# Patient Record
Sex: MALE | Race: BLACK OR AFRICAN AMERICAN | NOT HISPANIC OR LATINO | URBAN - METROPOLITAN AREA
[De-identification: names, ages, dates, MRNs, and addresses within clinical notes are randomized per-mention and may not be internally consistent; named-entity substitution may affect disease eponyms.]

---

## 2018-12-03 ENCOUNTER — EMERGENCY (EMERGENCY)
Facility: HOSPITAL | Age: 38
LOS: 1 days | Discharge: HOME | End: 2018-12-03
Attending: EMERGENCY MEDICINE
Payer: COMMERCIAL

## 2018-12-03 VITALS
RESPIRATION RATE: 16 BRPM | HEART RATE: 92 BPM | TEMPERATURE: 99 F | OXYGEN SATURATION: 99 % | HEIGHT: 71 IN | WEIGHT: 229.94 LBS | SYSTOLIC BLOOD PRESSURE: 135 MMHG | DIASTOLIC BLOOD PRESSURE: 94 MMHG

## 2018-12-03 PROCEDURE — 99053 MED SERV 10PM-8AM 24 HR FAC: CPT

## 2018-12-03 PROCEDURE — 99282 EMERGENCY DEPT VISIT SF MDM: CPT

## 2018-12-03 PROCEDURE — 99284 EMERGENCY DEPT VISIT MOD MDM: CPT | Mod: 25

## 2018-12-03 NOTE — ED PROVIDER NOTE - OBJECTIVE STATEMENT
37 y/o M pt with no significant medical hx presents to ED c/o physician accidently needle stick of the left middle finger, distal phalanx. denies fever. no n/v/d. no urinary f/u/d. no motor or sensory deficits. no other acute issues symptoms or concerns

## 2018-12-03 NOTE — ED ADULT TRIAGE NOTE - CHIEF COMPLAINT QUOTE
Accidental Needle Stick while performing procedure, site cleansed immediately, pt offers no complaints, left 3rd digit

## 2020-04-25 ENCOUNTER — MESSAGE (OUTPATIENT)
Age: 40
End: 2020-04-25

## 2020-05-01 ENCOUNTER — APPOINTMENT (OUTPATIENT)
Age: 40
End: 2020-05-01

## 2020-05-03 LAB
SARS-COV-2 IGG SERPL IA-ACNC: <0.1 INDEX
SARS-COV-2 IGG SERPL QL IA: NEGATIVE

## 2020-05-11 ENCOUNTER — APPOINTMENT (OUTPATIENT)
Dept: DISASTER EMERGENCY | Facility: CLINIC | Age: 40
End: 2020-05-11

## 2021-08-19 ENCOUNTER — EMERGENCY (EMERGENCY)
Facility: HOSPITAL | Age: 41
LOS: 1 days | Discharge: HOME | End: 2021-08-19
Payer: COMMERCIAL

## 2021-08-19 VITALS
DIASTOLIC BLOOD PRESSURE: 90 MMHG | RESPIRATION RATE: 18 BRPM | HEART RATE: 65 BPM | SYSTOLIC BLOOD PRESSURE: 138 MMHG | OXYGEN SATURATION: 98 %

## 2021-08-19 LAB — SARS-COV-2 RNA SPEC QL NAA+PROBE: SIGNIFICANT CHANGE UP

## 2021-08-19 PROCEDURE — 99282 EMERGENCY DEPT VISIT SF MDM: CPT

## 2021-08-19 PROCEDURE — U0005: CPT

## 2021-08-19 PROCEDURE — 99283 EMERGENCY DEPT VISIT LOW MDM: CPT

## 2021-08-19 PROCEDURE — U0003: CPT

## 2021-08-19 NOTE — ED PROVIDER NOTE - PHYSICAL EXAMINATION
Const: AOX3 nontoxic appearing, no apparent respiratory or physical distress. Stable gait   HEENT: NC/AT. Moist mucous membranes.  Eyes: HUI. EOMI  Neck: Soft and supple. Full ROM without pain.  Resp: Speaking in full sentences. No evidence of respiratory distress.   Abd: Soft, non-tender, non-distended.  Skin: No rashes, abrasions or lacerations.  Neuro: Awake, alert & oriented x 3. Moves all extremities symmetrically.

## 2021-08-19 NOTE — ED PROVIDER NOTE - OBJECTIVE STATEMENT
COVID ASYMPTOMATIC SWAB  Pt presenting to the ER for COVID-19 testing. Denies fevers chills, loss of taste or smell, URI symptoms, chest pain or shortness of breath, nausea vomiting diarrhea abdominal pain, weakness or fatigue. Eating and drinking normal diet. Normal output. Pt requesting testing at this time. [] known exposure [] no-known exposure [x] travel [] no travel [ ] smoker [ ] non-smoker

## 2021-08-19 NOTE — ED PROVIDER NOTE - PATIENT PORTAL LINK FT
You can access the FollowMyHealth Patient Portal offered by [facility] by registering at the following website: [email or web address]. By joining [employer]’s FollowMyHealth portal, you will also be able to view your health information using other applications (apps) compatible with our system.

## 2021-08-19 NOTE — ED PROVIDER NOTE - CLINICAL SUMMARY MEDICAL DECISION MAKING FREE TEXT BOX
Pt nontoxic appearing, stable vitals, ambulatory with stable saturation without supplemental oxygen. PT does not meet criteria listed in most updated guidelines as per [facility] protocol/algorithm for admission at this time. pt advised about self-quarantine instructions until negative test results and/or symptom resolution. pt advised on hand hygiene, monitoring of symptoms, antipyretic use as well as and fu with primary care provider. Instructions given in pre-printed copy.

## 2021-10-05 ENCOUNTER — APPOINTMENT (OUTPATIENT)
Dept: INTERNAL MEDICINE | Facility: CLINIC | Age: 41
End: 2021-10-05
Payer: COMMERCIAL

## 2021-10-05 ENCOUNTER — NON-APPOINTMENT (OUTPATIENT)
Age: 41
End: 2021-10-05

## 2021-10-05 VITALS
HEART RATE: 67 BPM | SYSTOLIC BLOOD PRESSURE: 142 MMHG | WEIGHT: 262 LBS | BODY MASS INDEX: 36.68 KG/M2 | DIASTOLIC BLOOD PRESSURE: 89 MMHG | OXYGEN SATURATION: 97 % | HEIGHT: 71 IN | RESPIRATION RATE: 14 BRPM | TEMPERATURE: 97.3 F

## 2021-10-05 DIAGNOSIS — G43.909 MIGRAINE, UNSPECIFIED, NOT INTRACTABLE, W/OUT STATUS MIGRAINOSUS: ICD-10-CM

## 2021-10-05 DIAGNOSIS — Z00.00 ENCOUNTER FOR GENERAL ADULT MEDICAL EXAMINATION W/OUT ABNORMAL FINDINGS: ICD-10-CM

## 2021-10-05 PROCEDURE — 99386 PREV VISIT NEW AGE 40-64: CPT | Mod: 25

## 2021-10-05 PROCEDURE — 93000 ELECTROCARDIOGRAM COMPLETE: CPT

## 2021-10-05 PROCEDURE — 36415 COLL VENOUS BLD VENIPUNCTURE: CPT

## 2021-10-06 ENCOUNTER — TRANSCRIPTION ENCOUNTER (OUTPATIENT)
Age: 41
End: 2021-10-06

## 2021-10-07 ENCOUNTER — NON-APPOINTMENT (OUTPATIENT)
Age: 41
End: 2021-10-07

## 2021-10-07 LAB
ALBUMIN SERPL ELPH-MCNC: 4.8 G/DL
ALP BLD-CCNC: 71 U/L
ALT SERPL-CCNC: 29 U/L
ANION GAP SERPL CALC-SCNC: 13 MMOL/L
AST SERPL-CCNC: 23 U/L
BASOPHILS # BLD AUTO: 0.02 K/UL
BASOPHILS NFR BLD AUTO: 0.4 %
BILIRUB SERPL-MCNC: 0.3 MG/DL
BUN SERPL-MCNC: 19 MG/DL
CALCIUM SERPL-MCNC: 10.2 MG/DL
CHLORIDE SERPL-SCNC: 101 MMOL/L
CHOLEST SERPL-MCNC: 206 MG/DL
CO2 SERPL-SCNC: 27 MMOL/L
CREAT SERPL-MCNC: 1.26 MG/DL
EOSINOPHIL # BLD AUTO: 0.13 K/UL
EOSINOPHIL NFR BLD AUTO: 2.4 %
ESTIMATED AVERAGE GLUCOSE: 123 MG/DL
GLUCOSE SERPL-MCNC: 85 MG/DL
HBA1C MFR BLD HPLC: 5.9 %
HCT VFR BLD CALC: 46 %
HCV AB SER QL: NONREACTIVE
HCV S/CO RATIO: 0.11 S/CO
HDLC SERPL-MCNC: 53 MG/DL
HGB BLD-MCNC: 14.9 G/DL
HIV1+2 AB SPEC QL IA.RAPID: NONREACTIVE
IMM GRANULOCYTES NFR BLD AUTO: 0.4 %
LDLC SERPL CALC-MCNC: 140 MG/DL
LYMPHOCYTES # BLD AUTO: 1.64 K/UL
LYMPHOCYTES NFR BLD AUTO: 29.8 %
MAN DIFF?: NORMAL
MCHC RBC-ENTMCNC: 27.5 PG
MCHC RBC-ENTMCNC: 32.4 GM/DL
MCV RBC AUTO: 84.9 FL
MONOCYTES # BLD AUTO: 0.39 K/UL
MONOCYTES NFR BLD AUTO: 7.1 %
NEUTROPHILS # BLD AUTO: 3.31 K/UL
NEUTROPHILS NFR BLD AUTO: 59.9 %
NONHDLC SERPL-MCNC: 153 MG/DL
PLATELET # BLD AUTO: 276 K/UL
POTASSIUM SERPL-SCNC: 4.4 MMOL/L
PROT SERPL-MCNC: 7.5 G/DL
RBC # BLD: 5.42 M/UL
RBC # FLD: 13.5 %
SODIUM SERPL-SCNC: 140 MMOL/L
TRIGL SERPL-MCNC: 65 MG/DL
TSH SERPL-ACNC: 3.2 UIU/ML
WBC # FLD AUTO: 5.51 K/UL

## 2021-10-07 NOTE — ASSESSMENT
[FreeTextEntry1] : HCM \par - COVID-19 immune 2/2 \par - Colon cancer screening- patient was made aware of recent guideline changes for early screening for CRC. Patient not interested at the moment. \par - Refused flu shot \par - Up to date Tdap \par \par High blood pressure without diagnosis of HTN \par - Likely 2/2 weight gain \par - Will hold off pharmacological treatment at the moment. Patient will attempt to lose weight and check BP at home. \par - Instructed to call if BP are consistently elevated as headaches could possibly be associated with high blood pressure. \par \par Headaches \par - Likely migraines vs dehydration. No neurological deficit on exam.  \par - Patient instructed to hydrate well \par - Fu CT head non contrast considering increased frequency of headaches and associated resting tremors \par \par Obesity\par - More than 15 min spent couseling pt on healthy eating habits \par \par Back pain without sciatica \par - Likely nerve impingement 2/2 weight gain \par - Core strengthening and streching discussed \par - Fu Lumbosacral XR \par \par Screening for cardiovascular condition: \par - CV exam bening \par - EKG wnl \par \par \par

## 2021-10-07 NOTE — HISTORY OF PRESENT ILLNESS
[de-identified] : 42 yo M presenting for comprehensive physical exam \par COVID 19 immune 2/2 Pzier July (2021) \par Up to date with Tdap \par Not interested in flu shot today \par C/o headaches R temporal, frequency 1 every two weeks, more frequent last couple of months. +phonofobia, improves with tylenol. Sometimes associated with resting tremor.  \par Low back pain radiation to L thigh. Pain brought upon bending or standing up.  \par Admit weight gain has been trying weight watchers. Not really exercesing at the moment.

## 2021-10-07 NOTE — REVIEW OF SYSTEMS
[Back Pain] : back pain [Negative] : Psychiatric [Joint Pain] : no joint pain [Joint Stiffness] : no joint stiffness [Muscle Pain] : no muscle pain [Muscle Weakness] : no muscle weakness [Joint Swelling] : no joint swelling

## 2021-10-07 NOTE — HEALTH RISK ASSESSMENT
[1 or 2 (0 pts)] : 1 or 2 (0 points) [Never (0 pts)] : Never (0 points) [No falls in past year] : Patient reported no falls in the past year [0] : 2) Feeling down, depressed, or hopeless: Not at all (0) [PHQ-2 Negative - No further assessment needed] : PHQ-2 Negative - No further assessment needed [HIV Test offered] : HIV Test offered [Hepatitis C test offered] : Hepatitis C test offered [With Family] : lives with family [Employed] : employed [] :  [# Of Children ___] : has [unfilled] children [Sexually Active] : sexually active [] : No [Audit-CScore] : 0 [[id number]] : 0 [High Risk Behavior] : no high risk behavior [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

## 2021-10-07 NOTE — PHYSICAL EXAM
[Normal] : affect was normal and insight and judgment were intact [de-identified] : + L sided tenderness when forward and side bending

## 2021-12-30 ENCOUNTER — EMERGENCY (EMERGENCY)
Facility: HOSPITAL | Age: 41
LOS: 1 days | Discharge: HOME | End: 2021-12-30
Payer: COMMERCIAL

## 2021-12-30 VITALS
HEART RATE: 99 BPM | TEMPERATURE: 99 F | RESPIRATION RATE: 18 BRPM | WEIGHT: 259.93 LBS | HEIGHT: 71 IN | OXYGEN SATURATION: 98 % | SYSTOLIC BLOOD PRESSURE: 144 MMHG | DIASTOLIC BLOOD PRESSURE: 86 MMHG

## 2021-12-30 LAB
B PERT DNA SPEC QL NAA+PROBE: SIGNIFICANT CHANGE UP
C PNEUM DNA SPEC QL NAA+PROBE: SIGNIFICANT CHANGE UP
FLUAV H1 2009 PAND RNA SPEC QL NAA+PROBE: SIGNIFICANT CHANGE UP
FLUAV H1 RNA SPEC QL NAA+PROBE: SIGNIFICANT CHANGE UP
FLUAV H3 RNA SPEC QL NAA+PROBE: SIGNIFICANT CHANGE UP
FLUAV SUBTYP SPEC NAA+PROBE: SIGNIFICANT CHANGE UP
FLUBV RNA SPEC QL NAA+PROBE: SIGNIFICANT CHANGE UP
HADV DNA SPEC QL NAA+PROBE: SIGNIFICANT CHANGE UP
HCOV PNL SPEC NAA+PROBE: SIGNIFICANT CHANGE UP
HMPV RNA SPEC QL NAA+PROBE: SIGNIFICANT CHANGE UP
HPIV1 RNA SPEC QL NAA+PROBE: SIGNIFICANT CHANGE UP
HPIV2 RNA SPEC QL NAA+PROBE: SIGNIFICANT CHANGE UP
HPIV3 RNA SPEC QL NAA+PROBE: SIGNIFICANT CHANGE UP
HPIV4 RNA SPEC QL NAA+PROBE: SIGNIFICANT CHANGE UP
RAPID RVP RESULT: DETECTED
RV+EV RNA SPEC QL NAA+PROBE: SIGNIFICANT CHANGE UP
SARS-COV-2 RNA SPEC QL NAA+PROBE: DETECTED

## 2021-12-30 PROCEDURE — 0225U NFCT DS DNA&RNA 21 SARSCOV2: CPT

## 2021-12-30 PROCEDURE — 99283 EMERGENCY DEPT VISIT LOW MDM: CPT

## 2021-12-30 NOTE — ED PROVIDER NOTE - PATIENT PORTAL LINK FT
You can access the FollowMyHealth Patient Portal offered by [facility] by registering at the following website: [email or web address]. By joining [employer]’s FollowMyHealth portal, you will also be able to view your health information using other applications (apps) compatible with our system.

## 2021-12-30 NOTE — ED ADULT TRIAGE NOTE - CHIEF COMPLAINT QUOTE
Pt ambulatory into ED c/o sore throat and fever, started yesterday. Took Motrin approx 30 minutes prior to arrival.

## 2021-12-30 NOTE — ED PROVIDER NOTE - NS ED ROS FT
Denies fatigue, and weight loss. Denies HA, Dizziness. ENMT: Denies URI symptoms, difficulty swallowing, , loss of taste or smell. CARDIO: Denies CP, palpitations, edema. RESP: Denies Cough, SOB, Diff breathing, hemoptysis. GI: Denies N/V, ABD pain, change in bowel movement.. MS: Denies joint pain, back pain, weakness, decreased ROM, swelling. NEURO: Denies change in gait, seizures, loss of sensation, dizziness, confusion LOC. SKIN: denies rash or discoloration  +chills

## 2021-12-30 NOTE — ED PROVIDER NOTE - OBJECTIVE STATEMENT
COVID ASYMPTOMATIC SWAB  Pt presenting to the ER for COVID-19 testing. Denies fevers chills, loss of taste or smell, URI symptoms, chest pain or shortness of breath, nausea vomiting diarrhea abdominal pain, weakness or fatigue. Eating and drinking normal diet. Normal output. Pt requesting testing at this time. [x] known exposure : sob was sick

## 2021-12-30 NOTE — ED PROVIDER NOTE - PHYSICAL EXAMINATION
Const: AOX3 nontoxic appearing, no apparent respiratory or physical distress. Stable gait   HEENT: NC/AT. Moist mucous membranes.  Eyes: HUI. EOMI  Neck: Soft and supple. Full ROM without pain.  Resp: Speaking in full sentences. No evidence of respiratory distress.   Skin: No visible rashes, abrasions or lacerations.  Neuro: Awake, alert & oriented x 3. Moves all extremities symmetrically.

## 2022-02-03 ENCOUNTER — APPOINTMENT (OUTPATIENT)
Dept: CT IMAGING | Facility: CLINIC | Age: 42
End: 2022-02-03
Payer: COMMERCIAL

## 2022-02-03 ENCOUNTER — OUTPATIENT (OUTPATIENT)
Dept: OUTPATIENT SERVICES | Facility: HOSPITAL | Age: 42
LOS: 1 days | End: 2022-02-03
Payer: COMMERCIAL

## 2022-02-03 ENCOUNTER — RESULT REVIEW (OUTPATIENT)
Age: 42
End: 2022-02-03

## 2022-02-03 ENCOUNTER — APPOINTMENT (OUTPATIENT)
Dept: RADIOLOGY | Facility: CLINIC | Age: 42
End: 2022-02-03
Payer: COMMERCIAL

## 2022-02-03 DIAGNOSIS — G43.909 MIGRAINE, UNSPECIFIED, NOT INTRACTABLE, WITHOUT STATUS MIGRAINOSUS: ICD-10-CM

## 2022-02-03 PROCEDURE — 70450 CT HEAD/BRAIN W/O DYE: CPT | Mod: 26

## 2022-02-03 PROCEDURE — 72100 X-RAY EXAM L-S SPINE 2/3 VWS: CPT | Mod: 26

## 2022-02-03 PROCEDURE — 72100 X-RAY EXAM L-S SPINE 2/3 VWS: CPT

## 2022-02-03 PROCEDURE — 70450 CT HEAD/BRAIN W/O DYE: CPT

## 2022-02-04 ENCOUNTER — NON-APPOINTMENT (OUTPATIENT)
Age: 42
End: 2022-02-04

## 2022-11-08 ENCOUNTER — APPOINTMENT (OUTPATIENT)
Dept: PHYSICAL MEDICINE AND REHAB | Facility: CLINIC | Age: 42
End: 2022-11-08

## 2022-11-08 VITALS
BODY MASS INDEX: 36.4 KG/M2 | HEIGHT: 71 IN | OXYGEN SATURATION: 100 % | SYSTOLIC BLOOD PRESSURE: 145 MMHG | DIASTOLIC BLOOD PRESSURE: 90 MMHG | HEART RATE: 67 BPM | WEIGHT: 260 LBS

## 2022-11-08 DIAGNOSIS — M47.816 SPONDYLOSIS W/OUT MYELOPATHY OR RADICULOPATHY, LUMBAR REGION: ICD-10-CM

## 2022-11-08 DIAGNOSIS — M51.36 OTHER INTERVERTEBRAL DISC DEGENERATION, LUMBAR REGION: ICD-10-CM

## 2022-11-08 PROCEDURE — 99203 OFFICE O/P NEW LOW 30 MIN: CPT

## 2022-11-08 NOTE — ASSESSMENT
[FreeTextEntry1] : Mr. SAVANNAH PEREZ is a 42 year old male who presents with low back pain, likely discogenic vs due to underlying spondylosis. Denies any red flag signs. Will recommend:\par - X-ray L Spine reviewed\par - Given chronicity of pain, will order MRI L Spine\par - Patient to continue Motrin PRN for now, does not require another medication at this time.\par - Start PT 2-3x/week for stretching, strengthening (especially of core muscles), ROM exercises, HEP and modalities PRN including myofascial release, moist heat \par \par RTC in 5-6 weeks. Patient in agreement with plan. Patient aware of red flag signs including any changes to their bowel/bladder control, groin numbness or new weakness. Patient knows to seek immediate attention by calling 911 or going to nearest ER if these symptoms appear.

## 2022-11-08 NOTE — HISTORY OF PRESENT ILLNESS
[FreeTextEntry1] : Mr. SAVANNAH PEREZ is a 42 year old male who presents with low back pain. \par \par Location:Lower lumbar, middle but can radiate to either side\par Onset:Chronic, for about 3 months now, no inciting event\par Provocation/Palliative:Worse with laying down, better with activity. \par Quality:Mostly Achy, can be sharp\par Radiation:Down to L lateral/anterior thigh\par Severity:0/10 now, 7-8/10\par Timing:Not improving with time\par \par Denies any associated numbness. Denies any associated leg weakness. Denies any loss of bowel/bladder control or any groin numbness.\par Previous medications trialed:Motrin with some help\par Previous procedures relevant to complaint:None\par Conservative therapy tried?:None but does do a stretching program at home

## 2022-11-08 NOTE — PHYSICAL EXAM
[FreeTextEntry1] : PE:\par Constitutional: In NAD, calm and cooperative\par MSK (Back)\par 	Inspection: no gross swelling identified\par 	Palpation: No significant tenderness of the bilateral lower lumbar paraspinals\par 	ROM: Pain at 80 degrees lumbar flexion, no significant pain at end extension (15-20 degrees)\par 	Strength: 5/5 strength in bilateral lower extremities\par 	Reflexes: 2+ Patella reflex bilaterally, 2+ Achilles reflex bilaterally, negative clonus bilaterally\par 	Sensation: Intact to light touch in bilateral lower extremities\par Special tests:\par SLR:negative bilaterally\par JANET:negative bilaterally\par FADIR: negative bilaterally\par Facet loading: positive bilaterally

## 2022-11-08 NOTE — DATA REVIEWED
[FreeTextEntry1] : X-ray L Spine 2/3/22 reviewed by me: slight L5-S1 retrolisthesis\par \par  XR LUMBAR SPINE AP AND LATERAL 2 OR 3 VIEWS             Final\par \par No Documents Attached\par \par \par \par \par   EXAM: [id number] - XR LS SPINE AP LAT 2-3 VIEWS  - ORDERED BY: BERENICE FERRARI\par \par \par PROCEDURE DATE:  02/03/2022\par \par \par \par INTERPRETATION:  CLINICAL INDICATION: low back pain\par \par EXAM:\par Upright AP and lateral lumbosacral spine from 2/3/2022 at 0849. No similar prior studies available for comparison.\par \par IMPRESSION:\par No compression fractures, spondylolistheses, or spondylolysis defects.\par \par Now posterior L5-S1 disc space with slight L5 on S1 retrolistheses however without associated spondylolysis defects. Remaining vertebral body alignment and disc space heights maintained.\par \par Unremarkable SI joints and partially visualized hips.\par \par No lytic or blastic lesions.\par \par --- End of Report ---\par \par \par \par \par \par \par MARIS OLIVER MD; Attending Radiologist\par This document has been electronically signed. Feb  3 2022 11:34AM\par \par  \par \par  Ordered by: BERENICE FERRARI       Collected/Examined: [id number] 08:49AM       \par Verified by: BERENICE FERRARI [id number] 03:44PM       \par  Result Communication: No patient communication needed at this time;\par Stage: Final       \par  Performed at: [facility] [place]       Resulted: [id number] 11:34AM       Last Updated: 03Feb2022 03:44PM       Accession: [id number]

## 2022-11-09 ENCOUNTER — OUTPATIENT (OUTPATIENT)
Dept: OUTPATIENT SERVICES | Facility: HOSPITAL | Age: 42
LOS: 1 days | End: 2022-11-09
Payer: COMMERCIAL

## 2022-11-09 ENCOUNTER — APPOINTMENT (OUTPATIENT)
Dept: MRI IMAGING | Facility: CLINIC | Age: 42
End: 2022-11-09

## 2022-11-09 DIAGNOSIS — M51.36 OTHER INTERVERTEBRAL DISC DEGENERATION, LUMBAR REGION: ICD-10-CM

## 2022-11-09 PROCEDURE — 72148 MRI LUMBAR SPINE W/O DYE: CPT

## 2022-11-09 PROCEDURE — 72148 MRI LUMBAR SPINE W/O DYE: CPT | Mod: 26

## 2023-07-29 ENCOUNTER — INPATIENT (INPATIENT)
Facility: HOSPITAL | Age: 43
LOS: 3 days | Discharge: HOME | DRG: 813 | End: 2023-08-02
Attending: HOSPITALIST | Admitting: INTERNAL MEDICINE
Payer: COMMERCIAL

## 2023-07-29 VITALS — HEIGHT: 71 IN | WEIGHT: 265 LBS

## 2023-07-29 DIAGNOSIS — D69.6 THROMBOCYTOPENIA, UNSPECIFIED: ICD-10-CM

## 2023-07-29 DIAGNOSIS — R77.8 OTHER SPECIFIED ABNORMALITIES OF PLASMA PROTEINS: ICD-10-CM

## 2023-07-29 LAB
ABO RH CONFIRMATION: SIGNIFICANT CHANGE UP
ADD ON TEST-SPECIMEN IN LAB: SIGNIFICANT CHANGE UP
ADD ON TEST-SPECIMEN IN LAB: SIGNIFICANT CHANGE UP
ALBUMIN SERPL ELPH-MCNC: 3.3 G/DL — SIGNIFICANT CHANGE UP (ref 3.3–5)
ALP SERPL-CCNC: 67 U/L — SIGNIFICANT CHANGE UP (ref 40–120)
ALT FLD-CCNC: 45 U/L — SIGNIFICANT CHANGE UP (ref 12–78)
ANION GAP SERPL CALC-SCNC: 8 MMOL/L — SIGNIFICANT CHANGE UP (ref 5–17)
ANION GAP SERPL CALC-SCNC: 8 MMOL/L — SIGNIFICANT CHANGE UP (ref 5–17)
ANISOCYTOSIS BLD QL: SLIGHT — SIGNIFICANT CHANGE UP
APPEARANCE UR: ABNORMAL
APTT BLD: 28 SEC — SIGNIFICANT CHANGE UP (ref 24.5–35.6)
AST SERPL-CCNC: 83 U/L — HIGH (ref 15–37)
BABESIA MICROTI PCR, BLD RESULT: SIGNIFICANT CHANGE UP
BACTERIA # UR AUTO: ABNORMAL
BASOPHILS # BLD AUTO: 0 K/UL — SIGNIFICANT CHANGE UP (ref 0–0.2)
BASOPHILS NFR BLD AUTO: 0 % — SIGNIFICANT CHANGE UP (ref 0–2)
BILIRUB SERPL-MCNC: 4.1 MG/DL — HIGH (ref 0.2–1.2)
BILIRUB UR-MCNC: SIGNIFICANT CHANGE UP
BLD GP AB SCN SERPL QL: SIGNIFICANT CHANGE UP
BUN SERPL-MCNC: 35 MG/DL — HIGH (ref 7–23)
BUN SERPL-MCNC: 36 MG/DL — HIGH (ref 7–23)
CALCIUM SERPL-MCNC: 8.6 MG/DL — SIGNIFICANT CHANGE UP (ref 8.5–10.1)
CALCIUM SERPL-MCNC: 8.9 MG/DL — SIGNIFICANT CHANGE UP (ref 8.5–10.1)
CHLORIDE SERPL-SCNC: 105 MMOL/L — SIGNIFICANT CHANGE UP (ref 96–108)
CHLORIDE SERPL-SCNC: 110 MMOL/L — HIGH (ref 96–108)
CK SERPL-CCNC: 301 U/L — SIGNIFICANT CHANGE UP (ref 26–308)
CO2 SERPL-SCNC: 23 MMOL/L — SIGNIFICANT CHANGE UP (ref 22–31)
CO2 SERPL-SCNC: 28 MMOL/L — SIGNIFICANT CHANGE UP (ref 22–31)
COLOR SPEC: ABNORMAL
COMMENT - URINE: SIGNIFICANT CHANGE UP
CREAT SERPL-MCNC: 1.72 MG/DL — HIGH (ref 0.5–1.3)
CREAT SERPL-MCNC: 1.87 MG/DL — HIGH (ref 0.5–1.3)
D DIMER BLD IA.RAPID-MCNC: 3043 NG/ML DDU — HIGH
DIFF PNL FLD: SIGNIFICANT CHANGE UP
EGFR: 45 ML/MIN/1.73M2 — LOW
EGFR: 49 ML/MIN/1.73M2 — LOW
EOSINOPHIL # BLD AUTO: 0 K/UL — SIGNIFICANT CHANGE UP (ref 0–0.5)
EOSINOPHIL NFR BLD AUTO: 0 % — SIGNIFICANT CHANGE UP (ref 0–6)
EPI CELLS # UR: NEGATIVE — SIGNIFICANT CHANGE UP
FIBRINOGEN PPP-MCNC: 595 MG/DL — HIGH (ref 200–435)
GLUCOSE SERPL-MCNC: 165 MG/DL — HIGH (ref 70–99)
GLUCOSE SERPL-MCNC: 216 MG/DL — HIGH (ref 70–99)
GLUCOSE UR QL: SIGNIFICANT CHANGE UP
HAPTOGLOB SERPL-MCNC: <20 MG/DL — LOW (ref 34–200)
HCT VFR BLD CALC: 23.6 % — LOW (ref 39–50)
HCT VFR BLD CALC: 24.1 % — LOW (ref 39–50)
HGB BLD-MCNC: 8.2 G/DL — LOW (ref 13–17)
HGB BLD-MCNC: 8.3 G/DL — LOW (ref 13–17)
HYALINE CASTS # UR AUTO: NEGATIVE /LPF — SIGNIFICANT CHANGE UP
INR BLD: 1.05 RATIO — SIGNIFICANT CHANGE UP (ref 0.85–1.18)
KETONES UR-MCNC: SIGNIFICANT CHANGE UP
LDH SERPL L TO P-CCNC: 1572 U/L — HIGH (ref 84–241)
LEUKOCYTE ESTERASE UR-ACNC: SIGNIFICANT CHANGE UP
LIDOCAIN IGE QN: 610 U/L — HIGH (ref 73–393)
LYMPHOCYTES # BLD AUTO: 0.72 K/UL — LOW (ref 1–3.3)
LYMPHOCYTES # BLD AUTO: 9 % — LOW (ref 13–44)
MACROCYTES BLD QL: SLIGHT — SIGNIFICANT CHANGE UP
MAGNESIUM SERPL-MCNC: 2 MG/DL — SIGNIFICANT CHANGE UP (ref 1.6–2.6)
MANUAL SMEAR VERIFICATION: SIGNIFICANT CHANGE UP
MCHC RBC-ENTMCNC: 27.8 PG — SIGNIFICANT CHANGE UP (ref 27–34)
MCHC RBC-ENTMCNC: 28 PG — SIGNIFICANT CHANGE UP (ref 27–34)
MCHC RBC-ENTMCNC: 34.4 GM/DL — SIGNIFICANT CHANGE UP (ref 32–36)
MCHC RBC-ENTMCNC: 34.7 GM/DL — SIGNIFICANT CHANGE UP (ref 32–36)
MCV RBC AUTO: 80 FL — SIGNIFICANT CHANGE UP (ref 80–100)
MCV RBC AUTO: 81.4 FL — SIGNIFICANT CHANGE UP (ref 80–100)
MICROCYTES BLD QL: SLIGHT — SIGNIFICANT CHANGE UP
MONOCYTES # BLD AUTO: 0.56 K/UL — SIGNIFICANT CHANGE UP (ref 0–0.9)
MONOCYTES NFR BLD AUTO: 7 % — SIGNIFICANT CHANGE UP (ref 2–14)
MYELOCYTES NFR BLD: 1 % — HIGH (ref 0–0)
NEUTROPHILS # BLD AUTO: 6.62 K/UL — SIGNIFICANT CHANGE UP (ref 1.8–7.4)
NEUTROPHILS NFR BLD AUTO: 79 % — HIGH (ref 43–77)
NEUTS BAND # BLD: 4 % — SIGNIFICANT CHANGE UP (ref 0–8)
NITRITE UR-MCNC: SIGNIFICANT CHANGE UP
NRBC # BLD: 0 /100 — SIGNIFICANT CHANGE UP (ref 0–0)
NRBC # BLD: 2 /100 WBCS — HIGH (ref 0–0)
NRBC # BLD: SIGNIFICANT CHANGE UP /100 WBCS (ref 0–0)
NT-PROBNP SERPL-SCNC: 331 PG/ML — HIGH (ref 0–125)
PH UR: SIGNIFICANT CHANGE UP (ref 5–8)
PLAT MORPH BLD: NORMAL — SIGNIFICANT CHANGE UP
PLATELET # BLD AUTO: 18 K/UL — CRITICAL LOW (ref 150–400)
PLATELET # BLD AUTO: 7 K/UL — CRITICAL LOW (ref 150–400)
PLATELET COUNT - ESTIMATE: ABNORMAL
POLYCHROMASIA BLD QL SMEAR: SLIGHT — SIGNIFICANT CHANGE UP
POTASSIUM SERPL-MCNC: 3.4 MMOL/L — LOW (ref 3.5–5.3)
POTASSIUM SERPL-MCNC: 3.9 MMOL/L — SIGNIFICANT CHANGE UP (ref 3.5–5.3)
POTASSIUM SERPL-SCNC: 3.4 MMOL/L — LOW (ref 3.5–5.3)
POTASSIUM SERPL-SCNC: 3.9 MMOL/L — SIGNIFICANT CHANGE UP (ref 3.5–5.3)
PROT SERPL-MCNC: 7.6 GM/DL — SIGNIFICANT CHANGE UP (ref 6–8.3)
PROT UR-MCNC: SIGNIFICANT CHANGE UP
PROTHROM AB SERPL-ACNC: 11.9 SEC — SIGNIFICANT CHANGE UP (ref 9.5–13)
RBC # BLD: 2.95 M/UL — LOW (ref 4.2–5.8)
RBC # BLD: 2.96 M/UL — LOW (ref 4.2–5.8)
RBC # FLD: 15.9 % — HIGH (ref 10.3–14.5)
RBC # FLD: 17.6 % — HIGH (ref 10.3–14.5)
RBC BLD AUTO: ABNORMAL
RBC CASTS # UR COMP ASSIST: ABNORMAL /HPF (ref 0–4)
RETICS #: 130.2 K/UL — HIGH (ref 25–125)
RETICS/RBC NFR: 4.3 % — HIGH (ref 0.5–2.5)
SCHISTOCYTES BLD QL AUTO: SIGNIFICANT CHANGE UP
SODIUM SERPL-SCNC: 141 MMOL/L — SIGNIFICANT CHANGE UP (ref 135–145)
SODIUM SERPL-SCNC: 141 MMOL/L — SIGNIFICANT CHANGE UP (ref 135–145)
SP GR SPEC: SIGNIFICANT CHANGE UP (ref 1.01–1.02)
TROPONIN I, HIGH SENSITIVITY RESULT: 2199.55 NG/L — HIGH
TROPONIN I, HIGH SENSITIVITY RESULT: 2408.27 NG/L — HIGH
TROPONIN I, HIGH SENSITIVITY RESULT: 3920.91 NG/L — HIGH
UROBILINOGEN FLD QL: SIGNIFICANT CHANGE UP
WBC # BLD: 10.93 K/UL — HIGH (ref 3.8–10.5)
WBC # BLD: 7.97 K/UL — SIGNIFICANT CHANGE UP (ref 3.8–10.5)
WBC # FLD AUTO: 10.93 K/UL — HIGH (ref 3.8–10.5)
WBC # FLD AUTO: 7.97 K/UL — SIGNIFICANT CHANGE UP (ref 3.8–10.5)
WBC UR QL: SIGNIFICANT CHANGE UP /HPF (ref 0–5)

## 2023-07-29 PROCEDURE — 99291 CRITICAL CARE FIRST HOUR: CPT

## 2023-07-29 PROCEDURE — 85384 FIBRINOGEN ACTIVITY: CPT

## 2023-07-29 PROCEDURE — 78452 HT MUSCLE IMAGE SPECT MULT: CPT

## 2023-07-29 PROCEDURE — 83615 LACTATE (LD) (LDH) ENZYME: CPT

## 2023-07-29 PROCEDURE — 83735 ASSAY OF MAGNESIUM: CPT

## 2023-07-29 PROCEDURE — 86923 COMPATIBILITY TEST ELECTRIC: CPT

## 2023-07-29 PROCEDURE — 36415 COLL VENOUS BLD VENIPUNCTURE: CPT

## 2023-07-29 PROCEDURE — 93306 TTE W/DOPPLER COMPLETE: CPT

## 2023-07-29 PROCEDURE — P9059: CPT

## 2023-07-29 PROCEDURE — 86850 RBC ANTIBODY SCREEN: CPT

## 2023-07-29 PROCEDURE — 80053 COMPREHEN METABOLIC PANEL: CPT

## 2023-07-29 PROCEDURE — 36430 TRANSFUSION BLD/BLD COMPNT: CPT

## 2023-07-29 PROCEDURE — 85027 COMPLETE CBC AUTOMATED: CPT

## 2023-07-29 PROCEDURE — 83010 ASSAY OF HAPTOGLOBIN QUANT: CPT

## 2023-07-29 PROCEDURE — 71275 CT ANGIOGRAPHY CHEST: CPT | Mod: 26,MA

## 2023-07-29 PROCEDURE — 99223 1ST HOSP IP/OBS HIGH 75: CPT

## 2023-07-29 PROCEDURE — 86900 BLOOD TYPING SEROLOGIC ABO: CPT

## 2023-07-29 PROCEDURE — A9500: CPT

## 2023-07-29 PROCEDURE — 85049 AUTOMATED PLATELET COUNT: CPT

## 2023-07-29 PROCEDURE — 84100 ASSAY OF PHOSPHORUS: CPT

## 2023-07-29 PROCEDURE — 74177 CT ABD & PELVIS W/CONTRAST: CPT | Mod: 26,MA

## 2023-07-29 PROCEDURE — 84484 ASSAY OF TROPONIN QUANT: CPT

## 2023-07-29 PROCEDURE — 93010 ELECTROCARDIOGRAM REPORT: CPT

## 2023-07-29 PROCEDURE — 86901 BLOOD TYPING SEROLOGIC RH(D): CPT

## 2023-07-29 PROCEDURE — 71045 X-RAY EXAM CHEST 1 VIEW: CPT | Mod: 26,76

## 2023-07-29 PROCEDURE — 85025 COMPLETE CBC W/AUTO DIFF WBC: CPT

## 2023-07-29 PROCEDURE — P9011: CPT

## 2023-07-29 PROCEDURE — 36514 APHERESIS PLASMA: CPT

## 2023-07-29 PROCEDURE — 86985 SPLIT BLOOD OR PRODUCTS: CPT

## 2023-07-29 PROCEDURE — 81001 URINALYSIS AUTO W/SCOPE: CPT

## 2023-07-29 PROCEDURE — 93005 ELECTROCARDIOGRAM TRACING: CPT

## 2023-07-29 PROCEDURE — 83690 ASSAY OF LIPASE: CPT

## 2023-07-29 PROCEDURE — 82330 ASSAY OF CALCIUM: CPT

## 2023-07-29 PROCEDURE — P9016: CPT

## 2023-07-29 PROCEDURE — 80048 BASIC METABOLIC PNL TOTAL CA: CPT

## 2023-07-29 PROCEDURE — 85397 CLOTTING FUNCT ACTIVITY: CPT

## 2023-07-29 PROCEDURE — P9017: CPT

## 2023-07-29 PROCEDURE — 93306 TTE W/DOPPLER COMPLETE: CPT | Mod: 26

## 2023-07-29 PROCEDURE — 80074 ACUTE HEPATITIS PANEL: CPT

## 2023-07-29 PROCEDURE — 71045 X-RAY EXAM CHEST 1 VIEW: CPT

## 2023-07-29 PROCEDURE — 93017 CV STRESS TEST TRACING ONLY: CPT

## 2023-07-29 RX ORDER — MORPHINE SULFATE 50 MG/1
2 CAPSULE, EXTENDED RELEASE ORAL ONCE
Refills: 0 | Status: DISCONTINUED | OUTPATIENT
Start: 2023-07-29 | End: 2023-07-29

## 2023-07-29 RX ORDER — POTASSIUM CHLORIDE 20 MEQ
10 PACKET (EA) ORAL
Refills: 0 | Status: DISCONTINUED | OUTPATIENT
Start: 2023-07-29 | End: 2023-07-29

## 2023-07-29 RX ORDER — POTASSIUM CHLORIDE 20 MEQ
20 PACKET (EA) ORAL
Refills: 0 | Status: COMPLETED | OUTPATIENT
Start: 2023-07-29 | End: 2023-07-29

## 2023-07-29 RX ORDER — SODIUM CHLORIDE 9 MG/ML
1000 INJECTION, SOLUTION INTRAVENOUS
Refills: 0 | Status: DISCONTINUED | OUTPATIENT
Start: 2023-07-29 | End: 2023-07-30

## 2023-07-29 RX ORDER — DIPHENHYDRAMINE HCL 50 MG
50 CAPSULE ORAL EVERY 6 HOURS
Refills: 0 | Status: DISCONTINUED | OUTPATIENT
Start: 2023-07-29 | End: 2023-08-02

## 2023-07-29 RX ORDER — DIPHENHYDRAMINE HCL 50 MG
50 CAPSULE ORAL ONCE
Refills: 0 | Status: COMPLETED | OUTPATIENT
Start: 2023-07-29 | End: 2023-07-29

## 2023-07-29 RX ORDER — SODIUM CHLORIDE 9 MG/ML
1000 INJECTION INTRAMUSCULAR; INTRAVENOUS; SUBCUTANEOUS ONCE
Refills: 0 | Status: COMPLETED | OUTPATIENT
Start: 2023-07-29 | End: 2023-07-29

## 2023-07-29 RX ORDER — ACETAMINOPHEN 500 MG
1000 TABLET ORAL ONCE
Refills: 0 | Status: COMPLETED | OUTPATIENT
Start: 2023-07-29 | End: 2023-07-29

## 2023-07-29 RX ORDER — KETOROLAC TROMETHAMINE 30 MG/ML
15 SYRINGE (ML) INJECTION ONCE
Refills: 0 | Status: DISCONTINUED | OUTPATIENT
Start: 2023-07-29 | End: 2023-07-29

## 2023-07-29 RX ORDER — ACETAMINOPHEN 500 MG
1000 TABLET ORAL ONCE
Refills: 0 | Status: COMPLETED | OUTPATIENT
Start: 2023-07-29 | End: 2023-07-30

## 2023-07-29 RX ORDER — CALCIUM GLUCONATE 100 MG/ML
1 VIAL (ML) INTRAVENOUS ONCE
Refills: 0 | Status: COMPLETED | OUTPATIENT
Start: 2023-07-29 | End: 2023-07-29

## 2023-07-29 RX ORDER — ONDANSETRON 8 MG/1
4 TABLET, FILM COATED ORAL ONCE
Refills: 0 | Status: COMPLETED | OUTPATIENT
Start: 2023-07-29 | End: 2023-07-29

## 2023-07-29 RX ORDER — DIPHENHYDRAMINE HCL 50 MG
50 CAPSULE ORAL DAILY
Refills: 0 | Status: DISCONTINUED | OUTPATIENT
Start: 2023-07-29 | End: 2023-08-02

## 2023-07-29 RX ORDER — DEXAMETHASONE 0.5 MG/5ML
40 ELIXIR ORAL ONCE
Refills: 0 | Status: COMPLETED | OUTPATIENT
Start: 2023-07-29 | End: 2023-07-29

## 2023-07-29 RX ORDER — MORPHINE SULFATE 50 MG/1
2 CAPSULE, EXTENDED RELEASE ORAL EVERY 4 HOURS
Refills: 0 | Status: DISCONTINUED | OUTPATIENT
Start: 2023-07-29 | End: 2023-08-02

## 2023-07-29 RX ADMIN — ONDANSETRON 4 MILLIGRAM(S): 8 TABLET, FILM COATED ORAL at 03:11

## 2023-07-29 RX ADMIN — MORPHINE SULFATE 2 MILLIGRAM(S): 50 CAPSULE, EXTENDED RELEASE ORAL at 22:49

## 2023-07-29 RX ADMIN — Medication 15 MILLIGRAM(S): at 01:35

## 2023-07-29 RX ADMIN — Medication 400 MILLIGRAM(S): at 04:33

## 2023-07-29 RX ADMIN — MORPHINE SULFATE 2 MILLIGRAM(S): 50 CAPSULE, EXTENDED RELEASE ORAL at 06:56

## 2023-07-29 RX ADMIN — Medication 120 MILLIGRAM(S): at 03:03

## 2023-07-29 RX ADMIN — MORPHINE SULFATE 2 MILLIGRAM(S): 50 CAPSULE, EXTENDED RELEASE ORAL at 18:40

## 2023-07-29 RX ADMIN — Medication 20 MILLIEQUIVALENT(S): at 22:34

## 2023-07-29 RX ADMIN — Medication 100 GRAM(S): at 13:30

## 2023-07-29 RX ADMIN — Medication 20 MILLIEQUIVALENT(S): at 19:06

## 2023-07-29 RX ADMIN — SODIUM CHLORIDE 125 MILLILITER(S): 9 INJECTION, SOLUTION INTRAVENOUS at 22:34

## 2023-07-29 RX ADMIN — MORPHINE SULFATE 2 MILLIGRAM(S): 50 CAPSULE, EXTENDED RELEASE ORAL at 06:41

## 2023-07-29 RX ADMIN — Medication 50 MILLIGRAM(S): at 12:10

## 2023-07-29 RX ADMIN — Medication 100 GRAM(S): at 12:00

## 2023-07-29 RX ADMIN — SODIUM CHLORIDE 1000 MILLILITER(S): 9 INJECTION INTRAMUSCULAR; INTRAVENOUS; SUBCUTANEOUS at 01:19

## 2023-07-29 RX ADMIN — Medication 1000 MILLIGRAM(S): at 04:48

## 2023-07-29 RX ADMIN — Medication 15 MILLIGRAM(S): at 01:20

## 2023-07-29 RX ADMIN — Medication 50 MILLIGRAM(S): at 15:01

## 2023-07-29 RX ADMIN — MORPHINE SULFATE 2 MILLIGRAM(S): 50 CAPSULE, EXTENDED RELEASE ORAL at 18:19

## 2023-07-29 RX ADMIN — MORPHINE SULFATE 2 MILLIGRAM(S): 50 CAPSULE, EXTENDED RELEASE ORAL at 22:34

## 2023-07-29 RX ADMIN — SODIUM CHLORIDE 125 MILLILITER(S): 9 INJECTION, SOLUTION INTRAVENOUS at 15:04

## 2023-07-29 RX ADMIN — Medication 100 MILLIEQUIVALENT(S): at 18:04

## 2023-07-29 NOTE — ED PROVIDER NOTE - CARE PLAN
1 Principal Discharge DX:	Severe thrombocytopenia  Secondary Diagnosis:	Elevated troponin level  Secondary Diagnosis:	Pancreatitis  Secondary Diagnosis:	Gross hematuria

## 2023-07-29 NOTE — H&P ADULT - ASSESSMENT
Problem list:  1) Thrombocytopenia suspect 2/2 TTP  2) ELIF  3) Anemia, hemolytic      Admit to ICU level of care  Needs emergent plasmapheresis, high risk for spontaneous hemorrhage  Stat dose dexamethasone 40mg IVPB  Stat 2U FFP  Avoid platelets  Symptom control with zofran for nausea  ELIF and anemia 2/2 TTP and microangiopathy, should improve with plasmapheresis   Hematology Dr. Darnell called and case discussed, agrees with diagnosis and is setting up plasma exchange, she has ordered additional labs  Troponin elevated without EKG changes or chest pain most likely related to microangiopathy   Lipase elevated with evidence for pancreatitis on CT, again most likely related to micorangiopathy and should resolve with plasma exchange  Plan discussed with ICU attending Dr. Kapadia    CRITICAL CARE TIME: 73 minutes assessing presenting problems of acute illness, which pose high probability of life threatening deterioration or end organ damage/dysfunction, as well as medical decision making including initiating plan of care, reviewing data, reviewing radiologic exams, discussing with multidisciplinary team,  discussing goals of care with patient/family, and writing this note.  Non-inclusive of procedures performed,

## 2023-07-29 NOTE — CONSULT NOTE ADULT - SUBJECTIVE AND OBJECTIVE BOX
CHIEF COMPLAINT:    HPI:  44y/o M with HLD presents to  with 1 week history of abdominal pain, bloating, fatigue, GONZALEZ. Recent trip to [place], no recent hikes, or heavily wooded area travel. Went to [place] early July. In the ER noted to have hematuria, thrombocytopenia, anemia.     patient seen and examined at the bedside. lying on the stretcher, awake and alert. Feels well, has nausea. Confirms story above.  (29 Jul 2023 03:37)    Admitted for TTP, with signs of pancreatitis & NSTEMI.  Consulted for NSTEMI.  Trop initially elevated at 2100, peak 3920.  On questioning pt reported some chest pressure a few hours  prior to presenting to ED, duration hours.    PAST MEDICAL AND SURGICAL HISTORY:  PAST MEDICAL & SURGICAL HISTORY:  No pertinent past medical history          ALLERGIES:  Allergies    Allergy Status Unknown    Intolerances        SOCIAL HISTORY:  Social History:      FAMILY  HISTORY:  FAMILY HISTORY:      MEDICATIONS:  OUTPATIENT:  Home Medications:      INPATIENT:  MEDICATIONS  (STANDING):  calcium gluconate IVPB 1 Gram(s) IV Intermittent once  calcium gluconate IVPB 1 Gram(s) IV Intermittent once  diphenhydrAMINE Injectable 50 milliGRAM(s) IV Push daily  lactated ringers. 1000 milliLiter(s) (125 mL/Hr) IV Continuous <Continuous>  potassium chloride  10 mEq/100 mL IVPB 10 milliEquivalent(s) IV Intermittent every 1 hour    MEDICATIONS  (PRN):    MEDICATIONS  (PRN):      REVIEW OF SYSTEMS:  ===============================  ===============================  CONSTITUTIONAL: No weakness, fevers or chills  EYES/ENT: No visual changes;  No vertigo or throat pain   NECK: No pain or stiffness  RESPIRATORY: No cough, wheezing, hemoptysis; No shortness of breath  CARDIOVASCULAR: No chest pain or palpitations  GASTROINTESTINAL: No abdominal or epigastric pain. No nausea, vomiting, or hematemesis;   No diarrhea or constipation. No melena or hematochezia.  GENITOURINARY: No dysuria, frequency or hematuria  NEUROLOGICAL: No numbness or weakness  SKIN: No itching, burning, rashes, or lesions   All other review of systems is negative unless indicated above    Vital Signs Last 24 Hrs  T(C): 37.1 (29 Jul 2023 07:13), Max: 37.2 (29 Jul 2023 06:48)  T(F): 98.8 (29 Jul 2023 07:13), Max: 99 (29 Jul 2023 06:48)  HR: 121 (29 Jul 2023 16:00) (94 - 135)  BP: 125/75 (29 Jul 2023 16:00) (110/96 - 153/92)  BP(mean): 88 (29 Jul 2023 16:00) (73 - 108)  RR: 23 (29 Jul 2023 16:00) (11 - 27)  SpO2: 97% (29 Jul 2023 16:00) (94% - 100%)    Parameters below as of 29 Jul 2023 16:00  Patient On (Oxygen Delivery Method): room air        I&O's Summary    28 Jul 2023 07:01  -  29 Jul 2023 07:00  --------------------------------------------------------  IN: 640 mL / OUT: 0 mL / NET: 640 mL        I&O's Detail    28 Jul 2023 07:01  -  29 Jul 2023 07:00  --------------------------------------------------------  IN:    IV PiggyBack: 100 mL    Plasma: 540 mL  Total IN: 640 mL    OUT:  Total OUT: 0 mL    Total NET: 640 mL          PHYSICAL EXAM:    Constitutional: NAD, awake   HEENT: PERR, EOMI,  No oral cyananosis.  Neck:  supple,  No JVD  Respiratory: Breath sounds are clear bilaterally, No wheezing, rales or rhonchi  Cardiovascular: S1 and S2, regular rate and rhythm, no Murmurs, gallops or rubs  Gastrointestinal: Bowel Sounds present, soft, nontender.   Extremities: No peripheral edema. No clubbing or cyanosis.  Vascular: 2+ peripheral pulses  Neurological: A/O x 3, no focal deficits    ===============================  ===============================  LABS:                         8.2    10.93 )-----------( 18       ( 29 Jul 2023 16:12 )             23.6                         8.3    7.97  )-----------( 7        ( 29 Jul 2023 01:04 )             24.1     29 Jul 2023 16:12    141    |  105    |  36     ----------------------------<  216    3.4     |  28     |  1.72   29 Jul 2023 01:04    141    |  110    |  35     ----------------------------<  165    3.9     |  23     |  1.87     Ca    8.6        29 Jul 2023 16:12  Ca    8.9        29 Jul 2023 01:04  Mg     2.0       29 Jul 2023 01:04    TPro  7.6    /  Alb  3.3    /  TBili  4.1    /  DBili  x      /  AST  83     /  ALT  45     /  AlkPhos  67     29 Jul 2023 01:04    PT/INR - ( 29 Jul 2023 01:04 )   PT: 11.9 sec;   INR: 1.05 ratio         PTT - ( 29 Jul 2023 01:04 )  PTT:28.0 sec    THYROID STUDIES:    ===============================  ===============================  CARDIAC BIOMARKERS:  -------  -BNP VALUES:    -------  -TROPONIN VALUES:   Troponin I, High Sensitivity Result: 2408.27 ng/L *H* (07-29-23 @ 16:13)  Troponin I, High Sensitivity Result: 3920.91 ng/L *H* (07-29-23 @ 03:28)  Troponin I, High Sensitivity Result: 2199.55 ng/L *H* (07-29-23 @ 01:04)      ===============================  ===============================  EKG: NSR no ischemic changes

## 2023-07-29 NOTE — H&P ADULT - NSHPREVIEWOFSYSTEMS_GEN_ALL_CORE
Review of Systems:  CONSTITUTIONAL: No fever, chills, or fatigue  EYES: No eye pain, visual disturbances, or discharge  ENMT:  No difficulty hearing, tinnitus, vertigo; No sinus or throat pain  NECK: No pain or stiffness  RESPIRATORY: No cough, wheezing, chills or hemoptysis; No shortness of breath  CARDIOVASCULAR: No chest pain, palpitations, dizziness, or leg swelling  GASTROINTESTINAL: + epigastric pain. +nausea, No vomiting, or hematemesis; No diarrhea or constipation. No melena or hematochezia.  GENITOURINARY: No dysuria, frequency, + hematuria,  NEUROLOGICAL: No headaches, memory loss, loss of strength, numbness, or tremors  SKIN: No itching, burning, + rashes  MUSCULOSKELETAL: No joint pain or swelling; No muscle, back, or extremity pain  PSYCHIATRIC: No depression, anxiety, mood swings, or difficulty sleeping

## 2023-07-29 NOTE — ED PROVIDER NOTE - PROGRESS NOTE DETAILS
Case was discussed with Dr. Ortiz  On-call for cardiology.  Patient is noted to have a highly elevated troponin and D-dimer.  As I was discussing the case with him, the patient's platelet count resulted at 7.  There is concern for ITP versus TTP.  There is also concern for tickborne illness.  I discussed the case with Dr. Darnell  On-call for hematology/oncology.  She recommends checking LDH, reticulocyte count, haptoglobin as well as tickborne disease panel.  She advises to administer 40 mg of dexamethasone.  The ICU team has been consulted and evaluated the patient at bedside.  They will admit to the patient to the ICU and further discussed with hematology/oncology.  Star Albrecht, DO

## 2023-07-29 NOTE — ED PROVIDER NOTE - DIFFERENTIAL DIAGNOSIS
Rule out STEMI, rule out NSTEMI, rule out PE, rule out diverticulitis, rule out pancreatitis, rule out aortic dissection Differential Diagnosis

## 2023-07-29 NOTE — ED PROVIDER NOTE - OBJECTIVE STATEMENT
43-year-old male no pertinent past medical history presents for evaluation of left-sided abdominal pain, chest pain radiating to the back for the past 3 days.  Patient notes that he started feeling "bloating" and abdominal discomfort that was initially mild shortly after landing in [facility] prior to the onset of chest pain.  Patient states that he had taken PPI and H2 blockers with some initial improvement in pain.  Patient then notes while he was on vacation he had some dyspnea on exertion with walking which is not characteristic for him.  Patient notes that this is worse over the past 24 hours and he is experiencing the sternal border chest discomfort described as heaviness and a pressure that is worse with exertion.  Patient also feels like she is unable to take a deep breath.  Patient has not noticed any leg pain or swelling.  Patient states that when he was  walking into the emergency department his discomfort was about 5 out of 10.

## 2023-07-29 NOTE — CONSULT NOTE ADULT - ASSESSMENT
42 y/o M with a PMhx of HLD presented with ongoing abdominal discomfort, fatigue, SOB/CP with exertion for approx 1 week, found to be clinically consistent with TTP.      # Suspected TTP    - Overall strong clinical suspicion; etiology unclear at this time but most likely autoimmune mediated; other causes include organ transplant and malignancy   - Patient with anemia (consistent with hemolysis), thrombocytopenia, renal dysfunction (creatinine 1.87), headache (neuro symptoms/signs), but no fever   - Hgb 8.3 g/dL  - Plt 7K  - LDH >1500  - Hapto <20  - DDimer >3000  - Retic elevated  - Peripheral smear reviewed, large amount of schistocytes   - [id number] ordered STAT, pending results but will initiate tx at this time    - Received Dex 40mg x1 dose 07/28, will likely need to continue daily, considering Rituximab   - Will need STAT plasmapheresis daily for 5 days    - Attending Dr Darnell consulted pheresis services at [facility] ([phone]), will need 5L of exchange daily ---> pending teams arrival this morning 07/29 as soon as plasma is available  - Hospital blood bank aware of need for high volume plasma, ordered STAT  - Calcium gluconate ordered (hypocalcemia with pheresis)    - Need to continue to trend serial CBCs and daily LDH  - Continue close monitoring and supportive measures   - Avoid Plt transfusion at this time      # GONZALEZ/CP with Elevated Troponins    - Cardio consulted  - Trop trending up to 3900 07/29  - Continue necessary supportive measures      42 y/o M with a PMhx of HLD presented with ongoing abdominal discomfort, fatigue, SOB/CP with exertion for approx 1 week, found to be clinically consistent with TTP.      # Suspected TTP    - Overall strong clinical suspicion; etiology unclear at this time but most likely autoimmune mediated; other causes include organ transplant and malignancy   - Patient with anemia (consistent with hemolysis), thrombocytopenia, renal dysfunction (creatinine 1.87), headache (neuro symptoms/signs), but no fever   - Hgb 8.3 g/dL  - Plt 7K  - LDH >1500  - Hapto <20  - DDimer >3000  - Retic elevated  - Peripheral smear reviewed, large amount of schistocytes   - [id number] ordered STAT, pending results but will initiate tx at this time    - Received Dex 40mg x1 dose 07/28, will likely need to continue daily, considering Rituximab   - Will need STAT plasmapheresis daily for 5 days    - Attending Dr Darnell consulted pheresis services at [facility] ([phone]), will need 5L of exchange daily ---> pending teams arrival this morning 07/29 as soon as plasma is available  - Hospital blood bank aware of need for high volume plasma, ordered STAT  - Calcium gluconate ordered (hypocalcemia with pheresis)    - Need to continue to trend serial CBCs and daily LDH  - Continue close monitoring and supportive measures   - Avoid Plt transfusion at this time      # GONZALEZ/CP with Elevated Troponins    - Cardio consulted  - Trop trending up to 3900 07/29  - Most likey 2/2 suspected TTP causing microthrombi in coronary arterial network   - Continue necessary supportive measures      42 y/o M with a PMhx of HLD presented with ongoing abdominal discomfort, fatigue, SOB/CP with exertion for approx 1 week, found to be clinically consistent with TTP.      # Suspected TTP    - Overall strong clinical suspicion; etiology unclear at this time but most likely autoimmune mediated; other causes include organ transplant and malignancy   - Patient with anemia (consistent with hemolysis), thrombocytopenia, renal dysfunction (creatinine 1.87), headache (neuro symptoms/signs), but no fever   - Hgb 8.3 g/dL  - Plt 7K  - LDH >1500  - Hapto <20  - DDimer >3000  - Retic elevated  - Peripheral smear reviewed, large amount of schistocytes   - [id number] ordered STAT, pending results but will initiate tx at this time    - Received Dex 40mg x1 dose 07/28, will likely need to continue daily, considering Rituximab   - Will need STAT plasmapheresis daily for 5 days    - Attending Dr Darnell consulted pheresis services at [facility] ([phone]), will need 5L of exchange daily  - Hospital blood bank aware of need for high volume plasma, ordered STAT  - Calcium gluconate ordered (hypocalcemia with pheresis)  - Benadryl 50mg IV QD prior to each exchange tx  - Pheresis nurse at bedside (12:00)    - Need to continue to trend serial CBCs and daily LDH  - Continue close monitoring and supportive measures   - Avoid Plt transfusion at this time      # GONZALEZ/CP with Elevated Troponins    - Cardio consulted  - Trop trending up to 3900 07/29  - Most likey 2/2 suspected TTP causing microthrombi in coronary arterial network   - Continue necessary supportive measures      44 y/o M with a PMhx of HLD presented with ongoing abdominal discomfort, fatigue, SOB/CP with exertion for approx 1 week, found to be clinically consistent with TTP.      # Suspected TTP    - Overall strong clinical suspicion; etiology unclear at this time but most likely autoimmune mediated; other causes include organ transplant and malignancy   - Patient with anemia (consistent with hemolysis), thrombocytopenia, renal dysfunction (creatinine 1.87), headache (neuro symptoms/signs), but no fever   - Hgb 8.3 g/dL  - Plt 7K  - LDH >1500  - Hapto <20  - DDimer >3000  - Retic elevated  - Peripheral smear reviewed, large amount of schistocytes   - [id number] ordered STAT, pending results but will initiate tx at this time    - Received Dex 40mg x1 dose 07/28, will likely need to continue daily, considering Rituximab   - Will need STAT plasmapheresis daily for 5 days    - Attending Dr Darnell consulted pheresis services at [facility] ([phone]), will need 5L of exchange daily (approx 19 units plasma)  - Hospital blood bank aware of need for high volume plasma, ordered STAT  - Calcium gluconate ordered (hypocalcemia with pheresis)  - Benadryl 50mg IV QD prior to each exchange tx  - Pheresis nurse at bedside (12:00)    - Need to continue to trend serial CBCs and daily LDH  - Continue close monitoring and supportive measures   - Avoid Plt transfusion at this time      # GONZALEZ/CP with Elevated Troponins    - Cardio consulted  - Trop trending up to 3900 07/29  - Most likey 2/2 suspected TTP causing microthrombi in coronary arterial network   - Continue necessary supportive measures      44 y/o M with a PMhx of HLD presented with ongoing abdominal discomfort, fatigue, SOB/CP with exertion for approx 1 week, found to be clinically consistent with TTP.      # Suspected TTP    - Overall strong clinical suspicion; etiology unclear at this time but most likely autoimmune mediated; other causes include organ transplant and malignancy   - Patient with anemia (consistent with hemolysis), thrombocytopenia, renal dysfunction (creatinine 1.87), headache (neuro symptoms/signs), but no fever   - Hgb 8.3 g/dL  - Plt 7K  - LDH >1500  - Hapto <20  - DDimer >3000  - Retic elevated  - Peripheral smear reviewed, large amount of schistocytes   - [id number] ordered STAT, pending results but will initiate tx at this time    - Received Dex 40mg x1 dose 07/28, will likely need to continue daily, considering Rituximab   - Will need STAT plasmapheresis daily ( ordered  for 5 days  but will be ongoing until plts normalize)   - Attending Dr Darnell consulted pheresis services at [facility] ([phone]), will need 5L of exchange daily (approx 19 units plasma) - Firts pheresis today morning as soon as plasma available   - Hospital blood bank aware of need for high volume plasma, ordered STAT  - Calcium gluconate ordered (hypocalcemia with pheresis)  - Benadryl 50mg IV QD prior to each exchange tx  - Pheresis nurse at bedside (12:00)    - Need to continue to trend serial CBCs and daily LDH  - Continue close monitoring and supportive measures   - Avoid Plt transfusion at this time      # GONZALEZ/CP with Elevated Troponins    - Cardio consulted  - Trop trending up to 3900 07/29  - Most likey 2/2 suspected TTP causing microthrombi in coronary arterial network   - Continue necessary supportive measures       Addendum Hem Onc attending.  Patient seen and examined.   Reviewed blood smear- marked schistocytes.  Clinical presentation - c/w immune TTP   Plasmic score high ( 7)  [id number]  ordered STAT but results will not be available for few days.  Started on daily plasmapheresis today- exchange one plasma volume with each pheresis ( needs 5 liters of plasma replacement with each pheresis)   Monitor CBC LDH daily   Monitor troponins / for signs of coronary ischemia. No chest pain now  Folic acid   Steroids- received Dexamethasone 40 mg iv in ED   Will give methylprednisolone  125 mg iv x 6 hrs daily x 2 days 7/30 and 7/31   Might need Rituxan  +/- caplacizumab .  Will follow closely.  D/w ICU team.  D/w [facility] blood bank and  pheresis services from [facility].

## 2023-07-29 NOTE — H&P ADULT - NSHPPHYSICALEXAM_GEN_ALL_CORE
Physical Examination:    General:  Alert, oriented, interactive, nonfocal    HEENT: Pupils equal, reactive to light.  Symmetric    PULM: Clear to auscultation bilaterally, no significant sputum production    CVS: Regular rate and rhythm, no murmurs, rubs, or gallops    ABD: Soft, nondistended, tender over epigastric area, normoactive bowel sounds, no masses    EXT: No edema, nontender    SKIN: mild purpura over flexor surfaces of arms    NEURO: A&Ox3, strength 5/5 all extremities, no focal deficits

## 2023-07-29 NOTE — PROGRESS NOTE ADULT - SUBJECTIVE AND OBJECTIVE BOX
CC:    HPI:  42y/o M with HLD presents to  with 1 week history of abdominal pain, bloating, fatigue, GONZALEZ. Recent trip to [place], no recent hikes, or heavily wooded area travel. Went to [place] early July. In the ER noted to have hematuria, thrombocytopenia, anemia.     7/29: Patient seen in bed.  For Plasmapheresis today.  No CP.  + GONZALEZ.  Pressure behind his eyes       PAST MEDICAL & SURGICAL HISTORY:  No pertinent past medical history          FAMILY HISTORY:      Social Hx:    Allergies    Allergy Status Unknown    Intolerances        Height (cm): 180.3 (07-29 @ 00:23)  Weight (kg): 120.2 (07-29 @ 00:23)  BMI (kg/m2): 37 (07-29 @ 00:23)    ICU Vital Signs Last 24 Hrs  T(C): 37.1 (29 Jul 2023 07:13), Max: 37.2 (29 Jul 2023 06:48)  T(F): 98.8 (29 Jul 2023 07:13), Max: 99 (29 Jul 2023 06:48)  HR: 94 (29 Jul 2023 11:00) (94 - 105)  BP: 140/85 (29 Jul 2023 11:00) (118/94 - 153/92)  BP(mean): 98 (29 Jul 2023 11:00) (73 - 108)  ABP: --  ABP(mean): --  RR: 17 (29 Jul 2023 11:00) (11 - 27)  SpO2: 99% (29 Jul 2023 11:00) (94% - 100%)    O2 Parameters below as of 29 Jul 2023 09:00  Patient On (Oxygen Delivery Method): room air                I&O's Summary    28 Jul 2023 07:01  -  29 Jul 2023 07:00  --------------------------------------------------------  IN: 640 mL / OUT: 0 mL / NET: 640 mL                              8.3    7.97  )-----------( 7        ( 29 Jul 2023 01:04 )             24.1       07-29    141  |  110<H>  |  35<H>  ----------------------------<  165<H>  3.9   |  23  |  1.87<H>    Ca    8.9      29 Jul 2023 01:04  Mg     2.0     07-29    TPro  7.6  /  Alb  3.3  /  TBili  4.1<H>  /  DBili  x   /  AST  83<H>  /  ALT  45  /  AlkPhos  67  07-29      CARDIAC MARKERS ( 29 Jul 2023 01:04 )  x     / x     / 301 U/L / x     / x                Urinalysis Basic - ( 29 Jul 2023 01:30 )    Color: Brown / Appearance: Slightly Turbid / SG: See Note / pH: x  Gluc: x / Ketone: See Note  / Bili: See Note / Urobili: See Note   Blood: x / Protein: See Note / Nitrite: See Note   Leuk Esterase: See Note / RBC: 6-10 /HPF / WBC 3-5 /HPF   Sq Epi: x / Non Sq Epi: x / Bacteria: Few        MEDICATIONS  (STANDING):  calcium gluconate IVPB 1 Gram(s) IV Intermittent once  diphenhydrAMINE Injectable 50 milliGRAM(s) IV Push daily    MEDICATIONS  (PRN):      DVT Prophylaxis: V    Advanced Directives:  Discussed with:    Visit Information: 30 min    ** Time is exclusive of billed procedures and/or teaching and/or routine family updates.

## 2023-07-29 NOTE — ED PROVIDER NOTE - CLINICAL SUMMARY MEDICAL DECISION MAKING FREE TEXT BOX
43-year-old male no pertinent past medical history presents for evaluation of left-sided abdominal pain, chest pain radiating to the back for the past 3 days.  Patient notes that he started feeling "bloating" and abdominal discomfort that was initially mild shortly after landing in [facility] prior to the onset of chest pain.  Patient states that he had taken PPI and H2 blockers with some initial improvement in pain.  Patient then notes while he was on vacation he had some dyspnea on exertion with walking which is not characteristic for him.  Patient notes that this is worse over the past 24 hours and he is experiencing the sternal border chest discomfort described as heaviness and a pressure that is worse with exertion.  Patient also feels like she is unable to take a deep breath.  Patient has not noticed any leg pain or swelling.  Patient states that when he was  walking into the emergency department his discomfort was about 5 out of 10.   see differential listed above.  Patient was found to have highly elevated troponin and repeat EKG did not show any evidence of ST elevation.  The CT scan of the chest/abdomen/pelvis was reviewed with the radiologist and there was no evidence of PE or aortic dissection.  I discussed the case with the PCI doctor on-call and during the discussion the platelet count resulted at 7.  The patient will not be started on anticoagulation due to the platelet count and there is concern for TTP versus ITP.  I have discussed the case with the oncology physician on-call as well as the ICU.  Patient will receive initial dose of dexamethasone 40 mg IV.  Further management is pending recommendation from hematology/oncology

## 2023-07-29 NOTE — PROGRESS NOTE ADULT - ASSESSMENT
A/P: 43 male presenting with TTP, NSTEMI, Pancreatitis and ELIF    Plan:  ICU  Beto placed  plasmapheresis today  CBC and BMP after Plasmapheresis  Will start LR at 125  repeat Lipase in AM  Cardio Consult  ECHO  [place]

## 2023-07-29 NOTE — PATIENT PROFILE ADULT - FALL HARM RISK - HARM RISK INTERVENTIONS

## 2023-07-29 NOTE — ED ADULT TRIAGE NOTE - CHIEF COMPLAINT QUOTE
chest pain x 3 days. Pt returned from [place] 2 days ago. + mild SOB, epigastric pain. Denies n/v/d/fever/chills.

## 2023-07-29 NOTE — CONSULT NOTE ADULT - SUBJECTIVE AND OBJECTIVE BOX
HPI:    42 y/o M with a PMHx of HLD presented to  with x1 week of abdominal pain, bloating, fatigue, SOB/CP on exertion. He mentioned a recent trip to [place], also went to [place] earlier this month. He became concerned about symptoms not improving so came to the ED. He denied any other acute c/o at the time.  (29 Jul 2023 03:37)    07/29/2023: Seen at bedside this morning with attendings Dr Darnell and Dr Lee, no acute distress, understanding of suspected dx and planned tx, agreeable      PAST MEDICAL & SURGICAL HISTORY:    HLD      MEDICATIONS  (STANDING):    calcium gluconate IVPB 1 Gram(s) IV Intermittent once        ALLERGIES:    Allergy Status Unknown        FAMILY HISTORY:    Nothing noted      REVIEW OF SYSTEMS:    Constitutional, Eyes, ENT, Cardiovascular, Respiratory, Gastrointestinal, Genitourinary, Musculoskeletal, Integumentary, Neurological, Psychiatric, Endocrine, Heme/Lymph and Allergic/Immunologic review of systems are otherwise negative except as noted in HPI.       VITALS:    T(C): 37.1 (29 Jul 2023 07:13), Max: 37.2 (29 Jul 2023 06:48)  T(F): 98.8 (29 Jul 2023 07:13), Max: 99 (29 Jul 2023 06:48)  HR: 94 (29 Jul 2023 09:00) (94 - 105)  BP: 128/60 (29 Jul 2023 09:00) (118/94 - 153/92)  BP(mean): 73 (29 Jul 2023 09:00) (73 - 108)  RR: 16 (29 Jul 2023 09:00) (11 - 27)  SpO2: 98% (29 Jul 2023 09:00) (98% - 100%)    Parameters below as of 29 Jul 2023 01:30  Patient On (Oxygen Delivery Method): room air      PHYSICAL:    Constitutional: no acute distress  Eyes: PERRL, EOMI  ENT: pharynx is unremarkable  Neck: supple without JVD; R sided central catheter  Pulmonary: clear to auscultation bilaterally  Cardiac: RRR, normal S1S2  Vascular: no calf tenderness, venous stasis changes, varices   Abdomen: normoactive bowel sounds, soft and nontender, no hepatosplenomegaly or masses appreciated  Lymphatic: no peripheral adenopathy appreciated  Musculoskeletal: full range of motion and no deformities appreciated   Skin: normal appearance overall  Neurology: awake, alert, oriented, no obvious focal deficits   Psychiatric: affect/mood appropriate      LABS:    CBC Full  -  ( 29 Jul 2023 01:04 )  WBC Count : 7.97 K/uL  RBC Count : 2.96 M/uL  Hemoglobin : 8.3 g/dL  Hematocrit : 24.1 %  Platelet Count - Automated : 7 K/uL  Mean Cell Volume : 81.4 fl  Mean Cell Hemoglobin : 28.0 pg  Mean Cell Hemoglobin Concentration : 34.4 gm/dL  Auto Neutrophil # : 6.62 K/uL  Auto Lymphocyte # : 0.72 K/uL  Auto Monocyte # : 0.56 K/uL  Auto Eosinophil # : 0.00 K/uL  Auto Basophil # : 0.00 K/uL  Auto Neutrophil % : 79.0 %  Auto Lymphocyte % : 9.0 %  Auto Monocyte % : 7.0 %  Auto Eosinophil % : 0.0 %  Auto Basophil % : 0.0 %    07-29    141  |  110<H>  |  35<H>  ----------------------------<  165<H>  3.9   |  23  |  1.87<H>    Ca    8.9      29 Jul 2023 01:04  Mg     2.0     07-29    TPro  7.6  /  Alb  3.3  /  TBili  4.1<H>  /  DBili  x   /  AST  83<H>  /  ALT  45  /  AlkPhos  67  07-29    PT/INR - ( 29 Jul 2023 01:04 )   PT: 11.9 sec;   INR: 1.05 ratio         PTT - ( 29 Jul 2023 01:04 )  PTT:28.0 sec  Urinalysis Basic - ( 29 Jul 2023 01:30 )    Color: Brown / Appearance: Slightly Turbid / SG: See Note / pH: x  Gluc: x / Ketone: See Note  / Bili: See Note / Urobili: See Note   Blood: x / Protein: See Note / Nitrite: See Note   Leuk Esterase: See Note / RBC: 6-10 /HPF / WBC 3-5 /HPF   Sq Epi: x / Non Sq Epi: x / Bacteria: Few        RADIOLOGY & ADDITIONAL STUDIES:    Xray Chest 1 View- PORTABLE-Urgent (07.29.23 @ 01:32)    IMPRESSION:   No radiographic evidence of active chest disease.          CT Angio Chest PE Protocol w/ IV Cont (07.29.23 @ 01:51)    IMPRESSION:  No pulmonary embolus or evidence of aortic dissection.    Mild infiltration of the peripancreatic fat and retroperitoneum,   suggestive of acute pancreatitis. Correlate with serum lipase levels.       HPI:    44 y/o M with a PMHx of HLD presented to  with x1 week of abdominal pain, bloating, fatigue, SOB/CP on exertion. He mentioned a recent trip to [place], also went to [place] earlier this month. He became concerned about symptoms not improving so came to the ED. He denied any other acute c/o at the time.  (29 Jul 2023 03:37)    07/29/2023: Seen at bedside this morning with attendings Dr Darnell and Dr Lee, no acute distress, understanding of suspected dx and planned tx, agreeable      PAST MEDICAL & SURGICAL HISTORY:    HLD      MEDICATIONS  (STANDING):    calcium gluconate IVPB 1 Gram(s) IV Intermittent once        ALLERGIES:    Allergy Status Unknown        FAMILY HISTORY:    Nothing noted      REVIEW OF SYSTEMS:    Constitutional, Eyes, ENT, Cardiovascular, Respiratory, Gastrointestinal, Genitourinary, Musculoskeletal, Integumentary, Neurological, Psychiatric, Endocrine, Heme/Lymph and Allergic/Immunologic review of systems are otherwise negative except as noted in HPI.       VITALS:    T(C): 37.1 (29 Jul 2023 07:13), Max: 37.2 (29 Jul 2023 06:48)  T(F): 98.8 (29 Jul 2023 07:13), Max: 99 (29 Jul 2023 06:48)  HR: 94 (29 Jul 2023 09:00) (94 - 105)  BP: 128/60 (29 Jul 2023 09:00) (118/94 - 153/92)  BP(mean): 73 (29 Jul 2023 09:00) (73 - 108)  RR: 16 (29 Jul 2023 09:00) (11 - 27)  SpO2: 98% (29 Jul 2023 09:00) (98% - 100%)    Parameters below as of 29 Jul 2023 01:30  Patient On (Oxygen Delivery Method): room air      PHYSICAL:    Constitutional: no acute distress  Eyes: PERRL, EOMI  ENT: pharynx is unremarkable  Neck: supple without JVD; R sided central catheter  Pulmonary: clear to auscultation bilaterally  Cardiac: RRR, normal S1S2  Vascular: no calf tenderness, venous stasis changes, varices   Pheresis catheter R neck   Abdomen: normoactive bowel sounds, soft and nontender, no hepatosplenomegaly or masses appreciated  Lymphatic: no peripheral adenopathy appreciated  Musculoskeletal: full range of motion and no deformities appreciated   Skin: normal appearance overall  Neurology: awake, alert, oriented, no obvious focal deficits   Psychiatric: affect/mood appropriate      LABS:    CBC Full  -  ( 29 Jul 2023 01:04 )  WBC Count : 7.97 K/uL  RBC Count : 2.96 M/uL  Hemoglobin : 8.3 g/dL  Hematocrit : 24.1 %  Platelet Count - Automated : 7 K/uL  Mean Cell Volume : 81.4 fl  Mean Cell Hemoglobin : 28.0 pg  Mean Cell Hemoglobin Concentration : 34.4 gm/dL  Auto Neutrophil # : 6.62 K/uL  Auto Lymphocyte # : 0.72 K/uL  Auto Monocyte # : 0.56 K/uL  Auto Eosinophil # : 0.00 K/uL  Auto Basophil # : 0.00 K/uL  Auto Neutrophil % : 79.0 %  Auto Lymphocyte % : 9.0 %  Auto Monocyte % : 7.0 %  Auto Eosinophil % : 0.0 %  Auto Basophil % : 0.0 %    07-29    141  |  110<H>  |  35<H>  ----------------------------<  165<H>  3.9   |  23  |  1.87<H>    Ca    8.9      29 Jul 2023 01:04  Mg     2.0     07-29    TPro  7.6  /  Alb  3.3  /  TBili  4.1<H>  /  DBili  x   /  AST  83<H>  /  ALT  45  /  AlkPhos  67  07-29    PT/INR - ( 29 Jul 2023 01:04 )   PT: 11.9 sec;   INR: 1.05 ratio         PTT - ( 29 Jul 2023 01:04 )  PTT:28.0 sec  Urinalysis Basic - ( 29 Jul 2023 01:30 )    Color: Brown / Appearance: Slightly Turbid / SG: See Note / pH: x  Gluc: x / Ketone: See Note  / Bili: See Note / Urobili: See Note   Blood: x / Protein: See Note / Nitrite: See Note   Leuk Esterase: See Note / RBC: 6-10 /HPF / WBC 3-5 /HPF   Sq Epi: x / Non Sq Epi: x / Bacteria: Few    LDH 1572   Hapto < 20  Retic 130 K    RADIOLOGY & ADDITIONAL STUDIES:    Xray Chest 1 View- PORTABLE-Urgent (07.29.23 @ 01:32)    IMPRESSION:   No radiographic evidence of active chest disease.          CT Angio Chest PE Protocol w/ IV Cont (07.29.23 @ 01:51)    IMPRESSION:  No pulmonary embolus or evidence of aortic dissection.    Mild infiltration of the peripancreatic fat and retroperitoneum,   suggestive of acute pancreatitis. Correlate with serum lipase levels.

## 2023-07-29 NOTE — PROCEDURAL SAFETY CHECKLIST WITH OR WITHOUT SEDATION - NSINSTRUMENTCOUNTSD_GEN_ALL_CORE
Pt found in room with BiPAP mask off, standing up on side of bed. Pt pulled BiPAP mask off multiple times and instructed not to remove BiPAP and to call if assistance is needed. Pt inadvertently removed IV in R hand. BiPAP placed back on pt. Pt cleaned and dressed in clean hospital attire. Pt bed cleaned and sheets and blankets changed.   done

## 2023-07-29 NOTE — PROVIDER CONTACT NOTE (EICU) - SITUATION
43-year-old male no pertinent past medical history presents for evaluation of left-sided abdominal pain, chest pain radiating to the back for the past 3 days.  Patient notes that he started feeling "bloating" and abdominal discomfort that was initially mild shortly after landing in [facility] prior to the onset of chest pain.  Patient states that he had taken PPI and H2 blockers with some initial improvement in pain.  Patient then notes while he was on vacation he had some dyspnea on exertion with walking which is not characteristic for him.  Patient notes that this is worse over the past 24 hours and he is experiencing the sternal border chest discomfort described as heaviness and a pressure that is worse with exertion.  Patient also feels like she is unable to take a deep breath.  Patient has not noticed any leg pain or swelling.  Case discussed with the ICU PA.

## 2023-07-29 NOTE — H&P ADULT - HISTORY OF PRESENT ILLNESS
44y/o M with HLD presents to  with 1 week history of abdominal pain, bloating, fatigue, GONZALEZ. Recent trip to [place], no recent hikes, or heavily wooded area travel. Went to [place] early July. In the ER noted to have hematuria, thrombocytopenia, anemia.     patient seen and examined at the bedside. lying on the stretcher, awake and alert. Feels well, has nausea. Confirms story above.

## 2023-07-29 NOTE — CONSULT NOTE ADULT - PROBLEM SELECTOR RECOMMENDATION 9
-Pt reported pressure type chest pain in the setting of TTP.  ECG w/ no ischemic changes.  -Trop intial 2000s peak 3000s trending down to 2000    -Doubt ACS.  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  Elevatd trop has been reported in literature in TTP w/ occurrence in up to 59% of cases (J Thromb Haemost. 2015;13(2):293).  -elevated trop is a marker for higher mortality in TTP pts.  -Await echo - prelimin. review shows normal systolic function.  -Would not empirically treat for ACS given low platelets  -will consider need for ischemic eval. (lexiscan perfusion) once pt has fully recovered & is close to being dscharged.

## 2023-07-30 LAB
ANION GAP SERPL CALC-SCNC: 5 MMOL/L — SIGNIFICANT CHANGE UP (ref 5–17)
BUN SERPL-MCNC: 31 MG/DL — HIGH (ref 7–23)
CALCIUM SERPL-MCNC: 8 MG/DL — LOW (ref 8.5–10.1)
CHLORIDE SERPL-SCNC: 107 MMOL/L — SIGNIFICANT CHANGE UP (ref 96–108)
CO2 SERPL-SCNC: 30 MMOL/L — SIGNIFICANT CHANGE UP (ref 22–31)
CREAT SERPL-MCNC: 1.65 MG/DL — HIGH (ref 0.5–1.3)
EGFR: 52 ML/MIN/1.73M2 — LOW
GLUCOSE SERPL-MCNC: 176 MG/DL — HIGH (ref 70–99)
HCT VFR BLD CALC: 20.4 % — CRITICAL LOW (ref 39–50)
HCT VFR BLD CALC: 24.2 % — LOW (ref 39–50)
HGB BLD-MCNC: 6.8 G/DL — CRITICAL LOW (ref 13–17)
HGB BLD-MCNC: 7.9 G/DL — LOW (ref 13–17)
LDH SERPL L TO P-CCNC: 492 U/L — HIGH (ref 84–241)
LIDOCAIN IGE QN: 88 U/L — SIGNIFICANT CHANGE UP (ref 73–393)
MAGNESIUM SERPL-MCNC: 2.1 MG/DL — SIGNIFICANT CHANGE UP (ref 1.6–2.6)
MCHC RBC-ENTMCNC: 27.4 PG — SIGNIFICANT CHANGE UP (ref 27–34)
MCHC RBC-ENTMCNC: 27.8 PG — SIGNIFICANT CHANGE UP (ref 27–34)
MCHC RBC-ENTMCNC: 32.6 GM/DL — SIGNIFICANT CHANGE UP (ref 32–36)
MCHC RBC-ENTMCNC: 33.3 GM/DL — SIGNIFICANT CHANGE UP (ref 32–36)
MCV RBC AUTO: 83.3 FL — SIGNIFICANT CHANGE UP (ref 80–100)
MCV RBC AUTO: 84 FL — SIGNIFICANT CHANGE UP (ref 80–100)
NRBC # BLD: 6 /100 WBCS — HIGH (ref 0–0)
NRBC # BLD: 9 /100 WBCS — HIGH (ref 0–0)
PHOSPHATE SERPL-MCNC: 2.5 MG/DL — SIGNIFICANT CHANGE UP (ref 2.5–4.5)
PLATELET # BLD AUTO: 106 K/UL — LOW (ref 150–400)
PLATELET # BLD AUTO: 57 K/UL — LOW (ref 150–400)
POTASSIUM SERPL-MCNC: 4.2 MMOL/L — SIGNIFICANT CHANGE UP (ref 3.5–5.3)
POTASSIUM SERPL-SCNC: 4.2 MMOL/L — SIGNIFICANT CHANGE UP (ref 3.5–5.3)
RBC # BLD: 2.45 M/UL — LOW (ref 4.2–5.8)
RBC # BLD: 2.88 M/UL — LOW (ref 4.2–5.8)
RBC # FLD: 17.5 % — HIGH (ref 10.3–14.5)
RBC # FLD: 17.8 % — HIGH (ref 10.3–14.5)
SODIUM SERPL-SCNC: 142 MMOL/L — SIGNIFICANT CHANGE UP (ref 135–145)
WBC # BLD: 14.35 K/UL — HIGH (ref 3.8–10.5)
WBC # BLD: 8.88 K/UL — SIGNIFICANT CHANGE UP (ref 3.8–10.5)
WBC # FLD AUTO: 14.35 K/UL — HIGH (ref 3.8–10.5)
WBC # FLD AUTO: 8.88 K/UL — SIGNIFICANT CHANGE UP (ref 3.8–10.5)

## 2023-07-30 PROCEDURE — 99233 SBSQ HOSP IP/OBS HIGH 50: CPT

## 2023-07-30 PROCEDURE — 99291 CRITICAL CARE FIRST HOUR: CPT

## 2023-07-30 PROCEDURE — 99232 SBSQ HOSP IP/OBS MODERATE 35: CPT

## 2023-07-30 RX ORDER — SIMETHICONE 80 MG/1
80 TABLET, CHEWABLE ORAL EVERY 4 HOURS
Refills: 0 | Status: DISCONTINUED | OUTPATIENT
Start: 2023-07-30 | End: 2023-08-02

## 2023-07-30 RX ORDER — ACETAMINOPHEN 500 MG
1000 TABLET ORAL ONCE
Refills: 0 | Status: COMPLETED | OUTPATIENT
Start: 2023-07-30 | End: 2023-07-30

## 2023-07-30 RX ORDER — FOLIC ACID 0.8 MG
2 TABLET ORAL DAILY
Refills: 0 | Status: DISCONTINUED | OUTPATIENT
Start: 2023-07-30 | End: 2023-08-02

## 2023-07-30 RX ORDER — CALCIUM GLUCONATE 100 MG/ML
2 VIAL (ML) INTRAVENOUS ONCE
Refills: 0 | Status: COMPLETED | OUTPATIENT
Start: 2023-07-30 | End: 2023-07-30

## 2023-07-30 RX ADMIN — Medication 400 MILLIGRAM(S): at 18:14

## 2023-07-30 RX ADMIN — Medication 30 MILLILITER(S): at 22:50

## 2023-07-30 RX ADMIN — Medication 125 MILLIGRAM(S): at 05:42

## 2023-07-30 RX ADMIN — Medication 125 MILLIGRAM(S): at 22:26

## 2023-07-30 RX ADMIN — SIMETHICONE 80 MILLIGRAM(S): 80 TABLET, CHEWABLE ORAL at 12:16

## 2023-07-30 RX ADMIN — Medication 1000 MILLIGRAM(S): at 00:36

## 2023-07-30 RX ADMIN — Medication 50 MILLIGRAM(S): at 09:38

## 2023-07-30 RX ADMIN — Medication 2 MILLIGRAM(S): at 18:15

## 2023-07-30 RX ADMIN — Medication 50 MILLIGRAM(S): at 15:00

## 2023-07-30 RX ADMIN — Medication 400 MILLIGRAM(S): at 00:21

## 2023-07-30 RX ADMIN — SIMETHICONE 80 MILLIGRAM(S): 80 TABLET, CHEWABLE ORAL at 22:50

## 2023-07-30 RX ADMIN — SODIUM CHLORIDE 125 MILLILITER(S): 9 INJECTION, SOLUTION INTRAVENOUS at 05:43

## 2023-07-30 RX ADMIN — Medication 125 MILLIGRAM(S): at 14:39

## 2023-07-30 RX ADMIN — Medication 200 GRAM(S): at 14:00

## 2023-07-30 RX ADMIN — Medication 125 MILLIGRAM(S): at 00:22

## 2023-07-30 NOTE — PROGRESS NOTE ADULT - SUBJECTIVE AND OBJECTIVE BOX
HPI:  44y/o M with HLD presents to  with 1 week history of abdominal pain, bloating, fatigue, GONZALEZ. Recent trip to [place], no recent hikes, or heavily wooded area travel. Went to [place] early July. In the ER noted to have hematuria, thrombocytopenia, anemia.     patient seen and examined at the bedside. lying on the stretcher, awake and alert. Feels well, has nausea. Confirms story above.  (29 Jul 2023 03:37)    Admitted for TTP, with signs of pancreatitis & NSTEMI.  Consulted for NSTEMI.  Trop initially elevated at 2100, peak 3920.  On questioning pt reported some chest pressure a few hours  prior to presenting to ED, duration hours.    7/30/'23" less dyspneic today.  Reviewed results of echo.    MEDICATIONS:  OUTPATIENT  Home Medications:      INPATIENT  MEDICATIONS  (STANDING):  calcium gluconate IVPB 2 Gram(s) IV Intermittent once  diphenhydrAMINE Injectable 50 milliGRAM(s) IV Push daily  folic acid 2 milliGRAM(s) Oral daily  methylPREDNISolone sodium succinate Injectable 125 milliGRAM(s) IV Push every 6 hours    MEDICATIONS  (PRN):  diphenhydrAMINE Injectable 50 milliGRAM(s) IV Push every 6 hours PRN Rash and/or Itching  morphine  - Injectable 2 milliGRAM(s) IV Push every 4 hours PRN Moderate Pain (4 - 6)  simethicone 80 milliGRAM(s) Chew every 4 hours PRN Gas          Vital Signs Last 24 Hrs  T(C): 36.7 (30 Jul 2023 09:43), Max: 37.3 (29 Jul 2023 21:00)  T(F): 98.1 (30 Jul 2023 09:43), Max: 99.2 (29 Jul 2023 21:00)  HR: 106 (30 Jul 2023 12:00) (88 - 135)  BP: 120/83 (30 Jul 2023 12:00) (87/42 - 142/72)  BP(mean): 89 (30 Jul 2023 12:00) (54 - 105)  RR: 21 (30 Jul 2023 12:00) (7 - 24)  SpO2: 100% (30 Jul 2023 12:00) (94% - 100%)    Parameters below as of 29 Jul 2023 20:00  Patient On (Oxygen Delivery Method): room air    Daily     Daily I&O's Summary    29 Jul 2023 07:01  -  30 Jul 2023 07:00  --------------------------------------------------------  IN: 3074 mL / OUT: 1425 mL / NET: 1649 mL        I&O's Detail    29 Jul 2023 07:01  -  30 Jul 2023 07:00  --------------------------------------------------------  IN:    IV PiggyBack: 100 mL    Lactated Ringers: 2634 mL    Oral Fluid: 340 mL  Total IN: 3074 mL    OUT:    Voided (mL): 1425 mL  Total OUT: 1425 mL    Total NET: 1649 mL          I&O's Summary    29 Jul 2023 07:01  -  30 Jul 2023 07:00  --------------------------------------------------------  IN: 3074 mL / OUT: 1425 mL / NET: 1649 mL        PHYSICAL EXAM:    Constitutional: NAD, awake   HEENT: PERR, EOMI,  No oral cyananosis.  Neck:  supple,  No JVD  Respiratory: Breath sounds are clear bilaterally, No wheezing, rales or rhonchi  Cardiovascular: S1 and S2, regular rate and rhythm, no Murmurs, gallops or rubs  Gastrointestinal: Bowel Sounds present, soft, nontender.   Extremities: No peripheral edema. No clubbing or cyanosis.  Vascular: 2+ peripheral pulses  Neurological: A/O x 3, no focal deficits    ===============================  ===============================  LABS:                         6.8    8.88  )-----------( 57       ( 30 Jul 2023 05:29 )             20.4                         8.2    10.93 )-----------( 18       ( 29 Jul 2023 16:12 )             23.6                         8.3    7.97  )-----------( 7        ( 29 Jul 2023 01:04 )             24.1     30 Jul 2023 05:29    142    |  107    |  31     ----------------------------<  176    4.2     |  30     |  1.65   29 Jul 2023 16:12    141    |  105    |  36     ----------------------------<  216    3.4     |  28     |  1.72   29 Jul 2023 01:04    141    |  110    |  35     ----------------------------<  165    3.9     |  23     |  1.87     Ca    8.0        30 Jul 2023 05:29  Ca    8.6        29 Jul 2023 16:12  Ca    8.9        29 Jul 2023 01:04  Phos  2.5       30 Jul 2023 05:29  Mg     2.1       30 Jul 2023 05:29  Mg     2.0       29 Jul 2023 01:04    TPro  7.6    /  Alb  3.3    /  TBili  4.1    /  DBili  x      /  AST  83     /  ALT  45     /  AlkPhos  67     29 Jul 2023 01:04    PT/INR - ( 29 Jul 2023 01:04 )   PT: 11.9 sec;   INR: 1.05 ratio         PTT - ( 29 Jul 2023 01:04 )  PTT:28.0 sec  ===============================  ===============================  CARDIAC BIOMARKERS:  BNP      TROPONIN  Troponin I, High Sensitivity Result: 2408.27 ng/L *H* (07-29-23 @ 16:13)  Troponin I, High Sensitivity Result: 3920.91 ng/L *H* (07-29-23 @ 03:28)  Troponin I, High Sensitivity Result: 2199.55 ng/L *H* (07-29-23 @ 01:04)      ===============================  ===============================  EKG: NSR no ischemic changes     Impression     Summary     Estimated left ventricular ejection fraction is 60-65 %.   Mild to Moderate concentric left ventricular hypertrophy is present.   The left ventricle is normal in size, wall motion and contractility as   seen in limited views.   The mitral valve leaflets appear thin and normal.   Mild (1+) mitral regurgitation is present.   Normal appearing tricuspid valve structure.   Mild to Moderate Tricuspid regurgitation is present.   Normal appearing pulmonic valve structure.   Trace to mild pulmonic valvular regurgitation is present.   Trace to small pericardial effusion is present.   There is no evidence of tamponade.     Signature     ----------------------------------------------------------------   Electronically signed by Raymond Alberts MD(Interpreting   physician) on 07/29/2023 06:56 PM   ----------------------------------------------------------------      ===============================    Emmett Cortez M.D.  Cardiology, [facility]  Cell: [phone]  Offices:    ([facility] Office)  [phone] ([facility] Office)

## 2023-07-30 NOTE — PROGRESS NOTE ADULT - ASSESSMENT
44 y/o M with a PMhx of HLD presented with ongoing abdominal discomfort, fatigue, SOB/CP with exertion for approx 1 week, found to be clinically consistent with TTP.      # Suspected TTP    - Overall strong clinical suspicion; etiology unclear at this time but most likely autoimmune mediated; other causes include organ transplant and malignancy   - Patient with anemia (consistent with hemolysis), thrombocytopenia, renal dysfunction (creatinine 1.87), headache (neuro symptoms/signs), but no fever   - Plasmic score high ( 7 )  - Hgb down to 6.8 g/dL  - Plt up to 52K  - LDH initially >1500 ---> improved to 492 07/30  - Hapto <20  - DDimer >3000  - Retic elevated  - Peripheral smear reviewed, large amount of schistocytes   - [id number] ordered STAT, pending results but will initiate tx at this time    - Received Dex 40mg x1 dose 07/28 ----> methylprednisolone 125mg IV q 6 hrs daily for 2 days 07/30 & 07/31 (ordered)  - Will consider Rituxan +/- caplacizumab  - Will need STAT plasmapheresis daily, ordered  for 5 days but will be ongoing until Plts normalize ---> s/p first pheresis tx 07/29  - Attending Dr Darnell consulted pheresis services at [facility] ([phone]), will need 5L of exchange daily (approx 19 units plasma)  - Hospital blood bank aware of need for high volume plasma, ordered STAT  - Calcium gluconate ordered (hypocalcemia with pheresis)  - Add Folic Acid  - Benadryl 50mg IV QD prior to each exchange tx    - Need to continue to trend serial CBCs and daily LDH  - Continue close monitoring and supportive measures   - Avoid Plt transfusion at this time      # GONZALEZ/CP with Elevated Troponins    - Cardio following  - Trop down trending ---> 2400 07/30  - Most likely 2/2 suspected TTP causing microthrombi in coronary arterial network   - Continue necessary supportive measures    44 y/o M with a PMhx of HLD presented with ongoing abdominal discomfort, fatigue, SOB/CP with exertion for approx 1 week, found to be clinically consistent with TTP.      # Suspected TTP    - Overall strong clinical suspicion; etiology unclear at this time but most likely autoimmune mediated; other causes include organ transplant and malignancy   - Patient with anemia (consistent with hemolysis), thrombocytopenia, renal dysfunction (creatinine 1.87), headache (neuro symptoms/signs), but no fever   - Plasmic score high ( 7 )  - Hgb down to 6.8 g/dL  - Plt up to 52K  - LDH initially >1500 ---> improved to 492 07/30  - Hapto <20  - DDimer >3000  - Retic elevated  - Peripheral smear reviewed, large amount of schistocytes   - [id number] ordered STAT, pending results but will initiate tx at this time    - Received Dex 40mg x1 dose 07/28 ----> methylprednisolone 125mg IV q 6 hrs daily for 2 days 07/30 & 07/31 (ordered)  - Will consider Rituxan +/- caplacizumab ---> HepB surface antigen & antibody, core antibody ordered  - Will need STAT plasmapheresis daily, ordered  for 5 days but will be ongoing until Plts normalize ---> s/p first pheresis tx 07/29  - Attending Dr Darnell consulted pheresis services at [facility] ([phone]), will need 5L of exchange daily (approx 19 units plasma)  - Hospital blood bank aware of need for high volume plasma, ordered STAT  - Calcium gluconate ordered (hypocalcemia with pheresis)  - Folic Acid 2mg QD added  - Benadryl 50mg IV QD prior to each exchange tx    - Need to continue to trend serial CBCs and daily LDH  - Continue close monitoring and supportive measures   - Avoid Plt transfusion at this time      # GONZALEZ/CP with Elevated Troponins    - Cardio following  - Trop down trending ---> 2400 07/30  - Most likely 2/2 suspected TTP causing microthrombi in coronary arterial network   - Continue necessary supportive measures    42 y/o M with a PMhx of HLD presented with ongoing abdominal discomfort, fatigue, SOB/CP with exertion for approx 1 week, found to be clinically consistent with TTP.      # Suspected TTP    - Overall strong clinical suspicion; etiology unclear at this time but most likely autoimmune mediated; other causes include organ transplant and malignancy   - Patient with anemia (consistent with hemolysis), thrombocytopenia, renal dysfunction (creatinine 1.87), headache (neuro symptoms/signs), but no fever   - Plasmic score high ( 7 )  - Hgb down to 6.8 g/dL ---> 1U PRBC 07/30  - Plt up to 52K  - LDH initially >1500 ---> improved to 492 07/30  - Hapto <20  - DDimer >3000  - Retic elevated  - Peripheral smear reviewed, large amount of schistocytes   - [id number] ordered STAT, pending results but will initiate tx at this time    - Received Dex 40mg x1 dose 07/28 ----> methylprednisolone 125mg IV q 6 hrs daily for 2 days 07/30 & 07/31 (ordered)  - Will consider Rituxan +/- caplacizumab ---> HepB surface antigen & antibody, core antibody ordered  - Will need STAT plasmapheresis daily, ordered  for 5 days but will be ongoing until Plts normalize ---> s/p first pheresis tx 07/29  - Attending Dr Darnell consulted pheresis services at [facility] ([phone]), will need 5L of exchange daily (approx 19 units plasma)  - Hospital blood bank aware of need for high volume plasma, ordered STAT  - Calcium gluconate ordered (hypocalcemia with pheresis)  - Folic Acid 2mg QD added  - Benadryl 50mg IV QD prior to each exchange tx    - Need to continue to trend serial CBCs and daily LDH  - Continue close monitoring and supportive measures   - Avoid Plt transfusion at this time      # GONZALEZ/CP with Elevated Troponins    - Cardio following  - Trop down trending ---> 2400 07/30  - Most likely 2/2 suspected TTP causing microthrombi in coronary arterial network   - Continue necessary supportive measures    42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 Diagnosed with acute immune TTP.      # Suspected TTP    - Overall strong clinical suspicion  , confirmatory [id number] sent- pending   -  Plasmic score high ( 7 )  - Hgb down to 6.8 g/dL ---> 1U PRBC 07/30  - Plt up to 52K after first plasmapheresis   - LDH initially >1500 ---> improved to 492 07/30  - Peripheral smear reviewed, large amount of schistocytes   - [id number] ordered STAT, pending results but tx initiated as urgent.  - Received Dex 40mg x1 dose 07/28 ----> methylprednisolone 125mg IV q 6 hrs daily for 2 days 07/30 & 07/31 (ordered)  - Will consider Rituxan +/- caplacizumab ---> HepB surface antigen & antibody, core antibody ordered  - Will need STAT plasmapheresis daily, ordered  for 5 days but will be ongoing until Plts normalize ---> s/p first pheresis tx 07/29  - Attending Dr Darnell consulted pheresis services at [facility] ([phone]), he needs  5L  plasma  exchange daily (approx 19 units plasma)  - Hospital blood bank aware of need for high volume plasma, ordered STAT  - Calcium gluconate ordered (hypocalcemia with pheresis)  - Folic Acid 2mg QD added  - Benadryl 50mg IV QD prior to each exchange tx    - Need to continue to trend serial CBCs and daily LDH  - Continue close monitoring and supportive measures   - Avoid Plt transfusion at this time      # GONZALEZ/CP with Elevated Troponins    - Cardio following  - Trop down trending ---> 2400 07/30  - Most likely 2/2 suspected TTP causing microthrombi in coronary arterial network   - Continue necessary supportive measures

## 2023-07-30 NOTE — PROGRESS NOTE ADULT - SUBJECTIVE AND OBJECTIVE BOX
HPI:  42y/o M with HLD presents to  with 1 week history of abdominal pain, bloating, fatigue, GONZALEZ. Recent trip to [place], no recent hikes, or heavily wooded area travel. Went to [place] early July. In the ER noted to have hematuria, thrombocytopenia, anemia. Admitted for Thrombocytopenia. Received emergent plasmapheresis today for high suspicion of TTP.   Overnight: Pleuritic pain.     SAVANNAH PEREZ   43y    Male  All allergies reviewed No Known Allergies        BRIEF HOSPITAL COURSE:        PAST MEDICAL & SURGICAL HISTORY:  No pertinent past medical history          Review of Systems:                       All other ROS are negative.        ICU Vital Signs Last 24 Hrs  T(C): 36.8 (30 Jul 2023 20:52), Max: 37.2 (30 Jul 2023 11:54)  T(F): 98.3 (30 Jul 2023 20:52), Max: 98.9 (30 Jul 2023 11:54)  HR: 84 (31 Jul 2023 03:00) (82 - 122)  BP: 98/69 (31 Jul 2023 03:00) (87/42 - 134/95)  BP(mean): 76 (31 Jul 2023 03:00) (54 - 105)  ABP: --  ABP(mean): --  RR: 16 (31 Jul 2023 03:00) (10 - 27)  SpO2: 96% (31 Jul 2023 03:00) (94% - 100%)    O2 Parameters below as of 30 Jul 2023 13:00  Patient On (Oxygen Delivery Method): room air            I&O's Detail    29 Jul 2023 07:01  -  30 Jul 2023 07:00  --------------------------------------------------------  IN:    IV PiggyBack: 100 mL    Lactated Ringers: 2634 mL    Oral Fluid: 340 mL  Total IN: 3074 mL    OUT:    Voided (mL): 1425 mL  Total OUT: 1425 mL    Total NET: 1649 mL      30 Jul 2023 07:01  -  31 Jul 2023 04:14  --------------------------------------------------------  IN:    Lactated Ringers: 125 mL    Oral Fluid: 1160 mL    PRBCs (Packed Red Blood Cells): 441 mL  Total IN: 1726 mL    OUT:    Voided (mL): 900 mL  Total OUT: 900 mL    Total NET: 826 mL        Physical Examination:    General: NAD    HEENT: AT/NC. trachea midline    PULM: CTABL    CVS: RRR    ABD: Soft. Non- tendered.    EXT: warm. well perfused.    Neuro: No focal deficits.        LABS:                        7.9    14.35 )-----------( 106      ( 30 Jul 2023 18:42 )             24.2     07-30    142  |  107  |  31<H>  ----------------------------<  176<H>  4.2   |  30  |  1.65<H>    Ca    8.0<L>      30 Jul 2023 05:29  Phos  2.5     07-30  Mg     2.1     07-30            CAPILLARY BLOOD GLUCOSE          Urinalysis Basic - ( 30 Jul 2023 05:29 )    Color: x / Appearance: x / SG: x / pH: x  Gluc: 176 mg/dL / Ketone: x  / Bili: x / Urobili: x   Blood: x / Protein: x / Nitrite: x   Leuk Esterase: x / RBC: x / WBC x   Sq Epi: x / Non Sq Epi: x / Bacteria: x      CULTURES:    Medications:  MEDICATIONS  (STANDING):  diphenhydrAMINE Injectable 50 milliGRAM(s) IV Push daily  folic acid 2 milliGRAM(s) Oral daily  methylPREDNISolone sodium succinate Injectable 125 milliGRAM(s) IV Push every 6 hours    MEDICATIONS  (PRN):  aluminum hydroxide/magnesium hydroxide/simethicone Suspension 30 milliLiter(s) Oral every 4 hours PRN Dyspepsia  diphenhydrAMINE Injectable 50 milliGRAM(s) IV Push every 6 hours PRN Rash and/or Itching  morphine  - Injectable 2 milliGRAM(s) IV Push every 4 hours PRN Moderate Pain (4 - 6)  simethicone 80 milliGRAM(s) Chew every 4 hours PRN Gas    RADIOLOGY/IMAGING/ECHO    Appropriate Imaging reviewed.      Health issues     HEALTH ISSUES - PROBLEM Dx:  Elevated troponin      IMPRESSION:  No pulmonary embolus or evidence of aortic dissection.    Mild infiltration of the peripancreatic fat and retroperitoneum,   suggestive of acute pancreatitis. Correlate with serum lipase levels.    Dr. Riddle discussed the above findings with Dr. Albrecht at 2:00 AM on   7/29/2023 with read back.        --- End of Report ---            ORION RIDDLE MD; Attending Radiologist  This document has been electronically signed. Jul 29 2023  2:28AM

## 2023-07-30 NOTE — PROGRESS NOTE ADULT - NEUROLOGICAL
sensation intact/responds to pain/responds to verbal commands
sensation intact/responds to pain/responds to verbal commands

## 2023-07-30 NOTE — PROGRESS NOTE ADULT - SUBJECTIVE AND OBJECTIVE BOX
HPI:    42 y/o M with a PMHx of HLD presented to  with x1 week of abdominal pain, bloating, fatigue, SOB/CP on exertion. He mentioned a recent trip to [place], also went to [place] earlier this month. He became concerned about symptoms not improving so came to the ED. He denied any other acute c/o at the time.  (29 Jul 2023 03:37)    07/29/2023: Seen at bedside this morning with attendings Dr Darnell and Dr Lee, no acute distress, understanding of suspected dx and planned tx, agreeable    07/30/2023:      PAST MEDICAL & SURGICAL HISTORY:    HLD      MEDICATIONS  (STANDING):    calcium gluconate IVPB 2 Gram(s) IV Intermittent once  diphenhydrAMINE Injectable 50 milliGRAM(s) IV Push daily  lactated ringers. 1000 milliLiter(s) (125 mL/Hr) IV Continuous <Continuous>  methylPREDNISolone sodium succinate Injectable 125 milliGRAM(s) IV Push every 6 hours      MEDICATIONS  (PRN):    diphenhydrAMINE Injectable 50 milliGRAM(s) IV Push every 6 hours PRN Rash and/or Itching  morphine  - Injectable 2 milliGRAM(s) IV Push every 4 hours PRN Moderate Pain (4 - 6)  simethicone 80 milliGRAM(s) Chew every 4 hours PRN Gas      ALLERGIES:    Allergy Status Unknown        FAMILY HISTORY:    Nothing noted      REVIEW OF SYSTEMS:    Constitutional, Eyes, ENT, Cardiovascular, Respiratory, Gastrointestinal, Genitourinary, Musculoskeletal, Integumentary, Neurological, Psychiatric, Endocrine, Heme/Lymph and Allergic/Immunologic review of systems are otherwise negative except as noted in HPI.       VITALS:    ICU Vital Signs Last 24 Hrs  T(C): 36.7 (30 Jul 2023 09:43), Max: 37.3 (29 Jul 2023 21:00)  T(F): 98.1 (30 Jul 2023 09:43), Max: 99.2 (29 Jul 2023 21:00)  HR: 103 (30 Jul 2023 10:00) (88 - 135)  BP: 122/62 (30 Jul 2023 10:00) (87/42 - 142/72)  BP(mean): 75 (30 Jul 2023 10:00) (54 - 105)  ABP: --  ABP(mean): --  RR: 21 (30 Jul 2023 10:00) (7 - 24)  SpO2: 100% (30 Jul 2023 10:00) (94% - 100%)    O2 Parameters below as of 29 Jul 2023 20:00  Patient On (Oxygen Delivery Method): room air      PHYSICAL:    Constitutional: no acute distress  Eyes: PERRL, EOMI  ENT: pharynx is unremarkable  Neck: supple without JVD; R sided central catheter  Pulmonary: clear to auscultation bilaterally  Cardiac: RRR, normal S1S2  Vascular: no calf tenderness, venous stasis changes, varices   Pheresis catheter R neck   Abdomen: normoactive bowel sounds, soft and nontender, no hepatosplenomegaly or masses appreciated  Lymphatic: no peripheral adenopathy appreciated  Musculoskeletal: full range of motion and no deformities appreciated   Skin: normal appearance overall  Neurology: awake, alert, oriented, no obvious focal deficits   Psychiatric: affect/mood appropriate      LABS:                          6.8    8.88  )-----------( 57       ( 30 Jul 2023 05:29 )             20.4     07-30    142  |  107  |  31<H>  ----------------------------<  176<H>  4.2   |  30  |  1.65<H>    Ca    8.0<L>      30 Jul 2023 05:29  Phos  2.5     07-30  Mg     2.1     07-30    TPro  7.6  /  Alb  3.3  /  TBili  4.1<H>  /  DBili  x   /  AST  83<H>  /  ALT  45  /  AlkPhos  67  07-29      LDH 1572 ---> 492    Hapto < 20    Retic 130 K      RADIOLOGY & ADDITIONAL STUDIES:    Xray Chest 1 View- PORTABLE-Urgent (07.29.23 @ 01:32)    IMPRESSION:   No radiographic evidence of active chest disease.          CT Angio Chest PE Protocol w/ IV Cont (07.29.23 @ 01:51)    IMPRESSION:  No pulmonary embolus or evidence of aortic dissection.    Mild infiltration of the peripancreatic fat and retroperitoneum,   suggestive of acute pancreatitis. Correlate with serum lipase levels.       HPI:    42 y/o M with a PMHx of HLD presented to  with x1 week of abdominal pain, bloating, fatigue, SOB/CP on exertion. He mentioned a recent trip to [place], also went to [place] earlier this month. He became concerned about symptoms not improving so came to the ED. He denied any other acute c/o at the time.  (29 Jul 2023 03:37)    07/29/2023: Seen at bedside this morning with attendings Dr Darnell and Dr Lee, no acute distress, understanding of suspected dx and planned tx, agreeable    07/30/2023: Seen at bedside, no acute distress, had rash/allergic reaction yesterday s/p first pheresis session that has improved       PAST MEDICAL & SURGICAL HISTORY:    HLD      MEDICATIONS  (STANDING):    calcium gluconate IVPB 2 Gram(s) IV Intermittent once  diphenhydrAMINE Injectable 50 milliGRAM(s) IV Push daily  lactated ringers. 1000 milliLiter(s) (125 mL/Hr) IV Continuous <Continuous>  methylPREDNISolone sodium succinate Injectable 125 milliGRAM(s) IV Push every 6 hours      MEDICATIONS  (PRN):    diphenhydrAMINE Injectable 50 milliGRAM(s) IV Push every 6 hours PRN Rash and/or Itching  morphine  - Injectable 2 milliGRAM(s) IV Push every 4 hours PRN Moderate Pain (4 - 6)  simethicone 80 milliGRAM(s) Chew every 4 hours PRN Gas      ALLERGIES:    Allergy Status Unknown        FAMILY HISTORY:    Nothing noted      REVIEW OF SYSTEMS:    Constitutional, Eyes, ENT, Cardiovascular, Respiratory, Gastrointestinal, Genitourinary, Musculoskeletal, Integumentary, Neurological, Psychiatric, Endocrine, Heme/Lymph and Allergic/Immunologic review of systems are otherwise negative except as noted in HPI.       VITALS:    ICU Vital Signs Last 24 Hrs  T(C): 36.7 (30 Jul 2023 09:43), Max: 37.3 (29 Jul 2023 21:00)  T(F): 98.1 (30 Jul 2023 09:43), Max: 99.2 (29 Jul 2023 21:00)  HR: 103 (30 Jul 2023 10:00) (88 - 135)  BP: 122/62 (30 Jul 2023 10:00) (87/42 - 142/72)  BP(mean): 75 (30 Jul 2023 10:00) (54 - 105)  ABP: --  ABP(mean): --  RR: 21 (30 Jul 2023 10:00) (7 - 24)  SpO2: 100% (30 Jul 2023 10:00) (94% - 100%)    O2 Parameters below as of 29 Jul 2023 20:00  Patient On (Oxygen Delivery Method): room air      PHYSICAL:    Constitutional: no acute distress  Eyes: PERRL, EOMI  ENT: pharynx is unremarkable  Neck: supple without JVD; R sided central catheter  Pulmonary: clear to auscultation bilaterally  Cardiac: RRR, normal S1S2  Vascular: no calf tenderness, venous stasis changes, varices   Pheresis catheter R neck   Abdomen: normoactive bowel sounds, soft and nontender, no hepatosplenomegaly or masses appreciated  Lymphatic: no peripheral adenopathy appreciated  Musculoskeletal: full range of motion and no deformities appreciated   Skin: normal appearance overall  Neurology: awake, alert, oriented, no obvious focal deficits   Psychiatric: affect/mood appropriate      LABS:                          6.8    8.88  )-----------( 57       ( 30 Jul 2023 05:29 )             20.4     07-30    142  |  107  |  31<H>  ----------------------------<  176<H>  4.2   |  30  |  1.65<H>    Ca    8.0<L>      30 Jul 2023 05:29  Phos  2.5     07-30  Mg     2.1     07-30    TPro  7.6  /  Alb  3.3  /  TBili  4.1<H>  /  DBili  x   /  AST  83<H>  /  ALT  45  /  AlkPhos  67  07-29      LDH 1572 ---> 492    Hapto < 20    Retic 130 K      RADIOLOGY & ADDITIONAL STUDIES:    Xray Chest 1 View- PORTABLE-Urgent (07.29.23 @ 01:32)    IMPRESSION:   No radiographic evidence of active chest disease.          CT Angio Chest PE Protocol w/ IV Cont (07.29.23 @ 01:51)    IMPRESSION:  No pulmonary embolus or evidence of aortic dissection.    Mild infiltration of the peripancreatic fat and retroperitoneum,   suggestive of acute pancreatitis. Correlate with serum lipase levels.

## 2023-07-30 NOTE — PROGRESS NOTE ADULT - SUBJECTIVE AND OBJECTIVE BOX
HPI:  42y/o M with HLD presents to  with 1 week history of abdominal pain, bloating, fatigue, GONZALEZ. Recent trip to [place], no recent hikes, or heavily wooded area travel. Went to [place] early July. In the ER noted to have hematuria, thrombocytopenia, anemia.     7/29: Patient seen in bed.  For Plasmapheresis today.  No CP.  + GONZALEZ.  Pressure behind his eyes  7/30: HgB <7 today.  No HA.  GONZALEZ    PAST MEDICAL & SURGICAL HISTORY:  No pertinent past medical history          FAMILY HISTORY:      Social Hx:    Allergies    No Known Allergies    Intolerances            ICU Vital Signs Last 24 Hrs  T(C): 36.7 (30 Jul 2023 09:43), Max: 37.3 (29 Jul 2023 21:00)  T(F): 98.1 (30 Jul 2023 09:43), Max: 99.2 (29 Jul 2023 21:00)  HR: 101 (30 Jul 2023 11:00) (88 - 135)  BP: 122/62 (30 Jul 2023 10:00) (87/42 - 142/72)  BP(mean): 75 (30 Jul 2023 10:00) (54 - 105)  ABP: --  ABP(mean): --  RR: 17 (30 Jul 2023 11:00) (7 - 24)  SpO2: 98% (30 Jul 2023 11:00) (94% - 100%)    O2 Parameters below as of 29 Jul 2023 20:00  Patient On (Oxygen Delivery Method): room air                I&O's Summary    29 Jul 2023 07:01  -  30 Jul 2023 07:00  --------------------------------------------------------  IN: 3074 mL / OUT: 1425 mL / NET: 1649 mL                              6.8    8.88  )-----------( 57       ( 30 Jul 2023 05:29 )             20.4       07-30    142  |  107  |  31<H>  ----------------------------<  176<H>  4.2   |  30  |  1.65<H>    Ca    8.0<L>      30 Jul 2023 05:29  Phos  2.5     07-30  Mg     2.1     07-30    TPro  7.6  /  Alb  3.3  /  TBili  4.1<H>  /  DBili  x   /  AST  83<H>  /  ALT  45  /  AlkPhos  67  07-29      CARDIAC MARKERS ( 29 Jul 2023 01:04 )  x     / x     / 301 U/L / x     / x                Urinalysis Basic - ( 30 Jul 2023 05:29 )    Color: x / Appearance: x / SG: x / pH: x  Gluc: 176 mg/dL / Ketone: x  / Bili: x / Urobili: x   Blood: x / Protein: x / Nitrite: x   Leuk Esterase: x / RBC: x / WBC x   Sq Epi: x / Non Sq Epi: x / Bacteria: x        MEDICATIONS  (STANDING):  calcium gluconate IVPB 2 Gram(s) IV Intermittent once  diphenhydrAMINE Injectable 50 milliGRAM(s) IV Push daily  lactated ringers. 1000 milliLiter(s) (125 mL/Hr) IV Continuous <Continuous>  methylPREDNISolone sodium succinate Injectable 125 milliGRAM(s) IV Push every 6 hours    MEDICATIONS  (PRN):  diphenhydrAMINE Injectable 50 milliGRAM(s) IV Push every 6 hours PRN Rash and/or Itching  morphine  - Injectable 2 milliGRAM(s) IV Push every 4 hours PRN Moderate Pain (4 - 6)  simethicone 80 milliGRAM(s) Chew every 4 hours PRN Gas      DVT Prophylaxis:    Advanced Directives:  Discussed with:    Visit Information: 30 min    ** Time is exclusive of billed procedures and/or teaching and/or routine family updates.

## 2023-07-30 NOTE — PROGRESS NOTE ADULT - ASSESSMENT
Assessment/Plan:            Risks associated: ELIF. BERNA.   Therapies initiated:  Results interpreted:          Time spent on this patient encounter, which includes documenting this note in the electronic medical record, was 42 minutes including assessing the presenting problems with associated risks, reviewing the medical record to prepare for the encounter, and meeting face to face with the patient to obtain additional history.  I have also performed an appropriate physical exam, made interventions listed and ordered and interpreted appropriate diagnostic studies as documented.     To improve communication and patient safety, I have coordinated care with the multidisciplinary team including the bedside nurse, appropriate attending of record and consultants as needed.      *Note opened before midnight. Care given on 7/31.   Assessment/Plan:    1) Thrombocytopenia  2) hemolytic anemia  3) ELIF    Plan:      Neuro: Pain management.  CV: Elevated troponin most likely secondary to microangiopathy within coronary vasculature. No ischemic changes on EKG. non- ischemic presentation. Cardiology following, suggestive of ischemic eval s/p recovery from TTP. Keep BP <140 (decreases hemolysis)  Pulm: no active issues  Renal: ELIF secondary to TTP. Will resolve with initiation of plasmapheresis Maintain adequate pressure and promote euvolemia. Avoid nephrotoxic agents. Trend BMP.   GI: GI PPX ordered. Advance diet as tolerated. Anti- acids ordered.  ID: No infectious etiology noted.   Heme: TTP, underwent one session of plasmapheresis Nest session on 7/31. IV benadryl ordered PRN for urticaria development s/p treatment. Continue treatment until end points are reached (LDH, PLT, symptomatology). Pending ADAMS13 for definitive dx. Continue steroids Heparin SQ for dvt ppx started. Heme following.        Risks associated: microangiopathic hemolytic anemia. Vaso-occlusive thrombus in arterial/venous system  Therapies initiated: Plasmapheresis   Results interpreted: Labs.           Time spent on this patient encounter, which includes documenting this note in the electronic medical record, was 42 minutes including assessing the presenting problems with associated risks, reviewing the medical record to prepare for the encounter, and meeting face to face with the patient to obtain additional history.  I have also performed an appropriate physical exam, made interventions listed and ordered and interpreted appropriate diagnostic studies as documented.     To improve communication and patient safety, I have coordinated care with the multidisciplinary team including the bedside nurse, appropriate attending of record and consultants as needed.      *Note opened before midnight. Care given on 7/31.

## 2023-07-30 NOTE — PROGRESS NOTE ADULT - SUBJECTIVE AND OBJECTIVE BOX
Patient is a 43y old  Male who presents with a chief complaint of Thrombocytopenia (29 Jul 2023 17:25)    HPI:  42y/o M with HLD presents to  with 1 week history of abdominal pain, bloating, fatigue, GONZALEZ. Recent trip to [place], no recent hikes, or heavily wooded area travel. Went to [place] early July. In the ER noted to have hematuria, thrombocytopenia, anemia. Admitted for Thrombocytopenia. Received emergent plasmapheresis today for high suspicion of TTP.    Allergies: No Known Allergies    PAST MEDICAL & SURGICAL HISTORY:  No pertinent past medical history        FAMILY HISTORY:    SOCIAL HISTORY:    Home Medications:    Review of Systems:  Pertinent positives noted above, all other ROS negative x10 system    T(F): 99.2 (07-29-23 @ 21:00), Max: 99.2 (07-29-23 @ 21:00)  HR: 101 (07-30-23 @ 02:00) (94 - 135)  BP: 107/58 (07-30-23 @ 02:00) (105/73 - 153/92)  RR: 16 (07-30-23 @ 02:00) (7 - 27)  SpO2: 97% (07-30-23 @ 00:00)  Wt(kg): --    CAPILLARY BLOOD GLUCOSE        I&O's Summary    28 Jul 2023 07:01  -  29 Jul 2023 07:00  --------------------------------------------------------  IN: 640 mL / OUT: 0 mL / NET: 640 mL    29 Jul 2023 07:01  -  30 Jul 2023 02:52  --------------------------------------------------------  IN: 1547 mL / OUT: 1075 mL / NET: 472 mL        Physical Exam:     Gen: NAD  Neuro: awake/alert  HEENT: RIJ Catheter  CVS: +S1S2  Resp: CTA  Abd: NT ND  Ext: warm dry no edema  Skin: well perfused    Meds:       methylPREDNISolone sodium succinate Injectable 125 milliGRAM(s) IV Push every 6 hours     diphenhydrAMINE Injectable 50 milliGRAM(s) IV Push daily  diphenhydrAMINE Injectable 50 milliGRAM(s) IV Push every 6 hours PRN     morphine  - Injectable 2 milliGRAM(s) IV Push every 4 hours PRN                 lactated ringers. 1000 milliLiter(s) IV Continuous <Continuous>                                    8.2    10.93 )-----------( 18       ( 29 Jul 2023 16:12 )             23.6       07-29    141  |  105  |  36<H>  ----------------------------<  216<H>  3.4<L>   |  28  |  1.72<H>    Ca    8.6      29 Jul 2023 16:12  Mg     2.0     07-29    TPro  7.6  /  Alb  3.3  /  TBili  4.1<H>  /  DBili  x   /  AST  83<H>  /  ALT  45  /  AlkPhos  67  07-29      CARDIAC MARKERS ( 29 Jul 2023 01:04 )  x     / x     / 301 U/L / x     / x          PT/INR - ( 29 Jul 2023 01:04 )   PT: 11.9 sec;   INR: 1.05 ratio         PTT - ( 29 Jul 2023 01:04 )  PTT:28.0 sec  Urinalysis Basic - ( 29 Jul 2023 16:12 )    Color: x / Appearance: x / SG: x / pH: x  Gluc: 216 mg/dL / Ketone: x  / Bili: x / Urobili: x   Blood: x / Protein: x / Nitrite: x   Leuk Esterase: x / RBC: x / WBC x   Sq Epi: x / Non Sq Epi: x / Bacteria: x              Radiology:   < from: CT Abdomen and Pelvis w/ IV Cont (07.29.23 @ 01:51) >    ACC: [id number] EXAM:  CT ABDOMEN AND PELVIS IC   ORDERED BY: SOHA DELGADO     ACC: [id number] EXAM:  CT ANGIO CHEST PULM ART WAWI   ORDERED BY: SOHA DELGADO     PROCEDURE DATE:  07/29/2023          INTERPRETATION:  CLINICAL INFORMATION: Left upper quadrant and lower   quadrant abdominal pain radiating to the chest and back. Dyspnea on   exertion.    COMPARISON: None.    CONTRAST/COMPLICATIONS:  IV Contrast: Omnipaque 350 (accession [id number]), IV contrast documented   in unlinked concurrent exam(accession [id number])  90 cc administered   10   cc discarded  Oral Contrast: NONE  Complications: None reported at time of study completion    PROCEDURE:  CT Angiography of the Chest was performed followed by portal venous phase   imaging of the Abdomen and Pelvis.  Sagittal and coronal reformats were performed as well as 3D (MIP)   reconstructions.    FINDINGS:  CHEST:  LUNGS AND LARGE AIRWAYS: Patent central airways. No pulmonary nodules.  PLEURA: No pleural effusion.  VESSELS: No pulmonary embolus. Normal caliber thoracic aorta without   evidence of acute dissection.  HEART: Heart size is normal. Trace pericardial fluid.  MEDIASTINUM AND ALIS: No lymphadenopathy.  CHEST WALL AND LOWER NECK: Within normal limits.    ABDOMEN AND PELVIS:  LIVER: Within normal limits.  BILE DUCTS: Normal caliber.  GALLBLADDER: Within normal limits.  SPLEEN: Within normal limits.  PANCREAS: Mild peripancreatic inflammation in the fat within the   retroperitoneum. Homogeneous enhancement of the pancreatic parenchyma. No   pancreatic ductal dilatation.  ADRENALS: Within normal limits.  KIDNEYS/URETERS: Low-attenuation lesion within the right kidney too small   to accurately characterize. No hydronephrosis.    BLADDER: Minimally distended.  REPRODUCTIVE ORGANS: Prostate within normal limits.    BOWEL: No bowel obstruction. Appendix is normal. Mild infiltration   surrounding the proximal duodenum, likely reactive.  PERITONEUM: No ascites.  VESSELS: Within normal limits.  RETROPERITONEUM/LYMPH NODES: No lymphadenopathy.  ABDOMINAL WALL: Small fat-containing umbilical hernia.  BONES: Degenerative changes.    IMPRESSION:  No pulmonary embolus or evidence of aortic dissection.    Mild infiltration of the peripancreatic fat and retroperitoneum,   suggestive of acute pancreatitis. Correlate with serum lipase levels.    Dr. Riddle discussed the above findings with Dr. Delgado at 2:00 AM on   7/29/2023 with read back.        --- End of Report ---            ORION RIDDLE MD; Attending Radiologist  This document has been electronically signed. Jul 29 2023  2:28AM    < end of copied text >      Problems  -Thrombocytopenia  -Hypokalemia  -ELIF    Assessment/Plan:  -Tylenol ordered for pain control. Additional Morphine PRN  -Elevated Troponin Cardiology consulted; concern for micothrombi. Echo demonstrated. Echo demonstrated EF 60-65%  -Heme onc consulted. Strong suspicion for TTP. Received urgent plasmapheresis today. Plan to repeat in AM  -Solumedrol 125mg Q6  -Potassium 40meq supplemented  -ELIF likely secondary to TTP injury. Trend renal function  -DVT PPX    Time spent on this patient encounter, which includes documenting this note in the electronic medical record, was 55 minutes including assessing the presenting problems with associated risks, reviewing the medical record to prepare for the encounter, and meeting face to face with the patient to obtain additional history.  I have also performed an appropriate physical exam, made interventions listed and ordered and interpreted appropriate diagnostic studies as documented.     To improve communication and patient safety, I have coordinated care with the multidisciplinary team including the bedside nurse, appropriate attending of record and consultants as needed.

## 2023-07-30 NOTE — PROGRESS NOTE ADULT - ASSESSMENT
A/P: 43 male presenting with TTP, NSTEMI, Pancreatitis and ELIF    Plan:  ICU  Transfuse 1 U PRBC today  Repeat CBC after Plasmapheresis  plasmapheresis today  Continue Solumedrol  D/C LR  repeat Lipase in AM  Cardio Consult  ECHO  Venodynes   Likely can start chemical DVT prophylaxis on 7/31 as his platelets are recovering

## 2023-07-31 LAB
ANION GAP SERPL CALC-SCNC: 8 MMOL/L — SIGNIFICANT CHANGE UP (ref 5–17)
APPEARANCE UR: CLEAR — SIGNIFICANT CHANGE UP
BACTERIA # UR AUTO: ABNORMAL
BILIRUB UR-MCNC: NEGATIVE — SIGNIFICANT CHANGE UP
BUN SERPL-MCNC: 26 MG/DL — HIGH (ref 7–23)
CALCIUM SERPL-MCNC: 8.4 MG/DL — LOW (ref 8.5–10.1)
CHLORIDE SERPL-SCNC: 104 MMOL/L — SIGNIFICANT CHANGE UP (ref 96–108)
CO2 SERPL-SCNC: 28 MMOL/L — SIGNIFICANT CHANGE UP (ref 22–31)
COLOR SPEC: YELLOW — SIGNIFICANT CHANGE UP
CREAT SERPL-MCNC: 1.5 MG/DL — HIGH (ref 0.5–1.3)
DIFF PNL FLD: ABNORMAL
EGFR: 58 ML/MIN/1.73M2 — LOW
EPI CELLS # UR: NEGATIVE — SIGNIFICANT CHANGE UP
GLUCOSE SERPL-MCNC: 164 MG/DL — HIGH (ref 70–99)
GLUCOSE UR QL: NEGATIVE — SIGNIFICANT CHANGE UP
HAV IGM SER-ACNC: SIGNIFICANT CHANGE UP
HBV CORE IGM SER-ACNC: SIGNIFICANT CHANGE UP
HBV SURFACE AG SER-ACNC: SIGNIFICANT CHANGE UP
HCT VFR BLD CALC: 23.7 % — LOW (ref 39–50)
HCV AB S/CO SERPL IA: 0.08 S/CO — SIGNIFICANT CHANGE UP (ref 0–0.99)
HCV AB SERPL-IMP: SIGNIFICANT CHANGE UP
HGB BLD-MCNC: 7.8 G/DL — LOW (ref 13–17)
KETONES UR-MCNC: ABNORMAL
LDH SERPL L TO P-CCNC: 354 U/L — HIGH (ref 84–241)
LEUKOCYTE ESTERASE UR-ACNC: NEGATIVE — SIGNIFICANT CHANGE UP
MAGNESIUM SERPL-MCNC: 2.2 MG/DL — SIGNIFICANT CHANGE UP (ref 1.6–2.6)
MCHC RBC-ENTMCNC: 28 PG — SIGNIFICANT CHANGE UP (ref 27–34)
MCHC RBC-ENTMCNC: 32.9 GM/DL — SIGNIFICANT CHANGE UP (ref 32–36)
MCV RBC AUTO: 84.9 FL — SIGNIFICANT CHANGE UP (ref 80–100)
NITRITE UR-MCNC: NEGATIVE — SIGNIFICANT CHANGE UP
NRBC # BLD: 10 /100 WBCS — HIGH (ref 0–0)
PH UR: 7 — SIGNIFICANT CHANGE UP (ref 5–8)
PHOSPHATE SERPL-MCNC: 3.1 MG/DL — SIGNIFICANT CHANGE UP (ref 2.5–4.5)
PLATELET # BLD AUTO: 174 K/UL — SIGNIFICANT CHANGE UP (ref 150–400)
POTASSIUM SERPL-MCNC: 3.8 MMOL/L — SIGNIFICANT CHANGE UP (ref 3.5–5.3)
POTASSIUM SERPL-SCNC: 3.8 MMOL/L — SIGNIFICANT CHANGE UP (ref 3.5–5.3)
PROT UR-MCNC: 30 MG/DL
RBC # BLD: 2.79 M/UL — LOW (ref 4.2–5.8)
RBC # FLD: 17.4 % — HIGH (ref 10.3–14.5)
RBC CASTS # UR COMP ASSIST: ABNORMAL /HPF (ref 0–4)
SODIUM SERPL-SCNC: 140 MMOL/L — SIGNIFICANT CHANGE UP (ref 135–145)
SP GR SPEC: 1.01 — SIGNIFICANT CHANGE UP (ref 1.01–1.02)
UROBILINOGEN FLD QL: NEGATIVE — SIGNIFICANT CHANGE UP
WBC # BLD: 16.6 K/UL — HIGH (ref 3.8–10.5)
WBC # FLD AUTO: 16.6 K/UL — HIGH (ref 3.8–10.5)
WBC UR QL: SIGNIFICANT CHANGE UP /HPF (ref 0–5)

## 2023-07-31 PROCEDURE — 71045 X-RAY EXAM CHEST 1 VIEW: CPT | Mod: 26

## 2023-07-31 PROCEDURE — 99232 SBSQ HOSP IP/OBS MODERATE 35: CPT

## 2023-07-31 PROCEDURE — 99233 SBSQ HOSP IP/OBS HIGH 50: CPT

## 2023-07-31 RX ORDER — DIPHENHYDRAMINE HCL 50 MG
25 CAPSULE ORAL ONCE
Refills: 0 | Status: COMPLETED | OUTPATIENT
Start: 2023-07-31 | End: 2023-07-31

## 2023-07-31 RX ORDER — HEPARIN SODIUM 5000 [USP'U]/ML
5000 INJECTION INTRAVENOUS; SUBCUTANEOUS EVERY 8 HOURS
Refills: 0 | Status: DISCONTINUED | OUTPATIENT
Start: 2023-07-31 | End: 2023-08-02

## 2023-07-31 RX ORDER — CALCIUM GLUCONATE 100 MG/ML
2 VIAL (ML) INTRAVENOUS ONCE
Refills: 0 | Status: COMPLETED | OUTPATIENT
Start: 2023-07-31 | End: 2023-07-31

## 2023-07-31 RX ADMIN — Medication 50 MILLIGRAM(S): at 10:23

## 2023-07-31 RX ADMIN — HEPARIN SODIUM 5000 UNIT(S): 5000 INJECTION INTRAVENOUS; SUBCUTANEOUS at 23:00

## 2023-07-31 RX ADMIN — Medication 125 MILLIGRAM(S): at 23:00

## 2023-07-31 RX ADMIN — Medication 125 MILLIGRAM(S): at 04:06

## 2023-07-31 RX ADMIN — Medication 125 MILLIGRAM(S): at 10:22

## 2023-07-31 RX ADMIN — Medication 2 MILLIGRAM(S): at 10:22

## 2023-07-31 RX ADMIN — Medication 125 MILLIGRAM(S): at 18:13

## 2023-07-31 RX ADMIN — HEPARIN SODIUM 5000 UNIT(S): 5000 INJECTION INTRAVENOUS; SUBCUTANEOUS at 14:12

## 2023-07-31 RX ADMIN — Medication 25 MILLIGRAM(S): at 14:12

## 2023-07-31 RX ADMIN — HEPARIN SODIUM 5000 UNIT(S): 5000 INJECTION INTRAVENOUS; SUBCUTANEOUS at 06:26

## 2023-07-31 RX ADMIN — Medication 200 GRAM(S): at 10:36

## 2023-07-31 NOTE — PROGRESS NOTE ADULT - ASSESSMENT
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 1 week. Diagnosed with acute immune TTP.      # Suspected TTP    - Overall strong clinical suspicion  , confirmatory [id number] sent- pending   -  Plasmic score high ( 7 )  - Hgb down to 6.8 g/dL ---> 1U PRBC 07/30  - Plt up to 52K after first plasmapheresis   - LDH initially >1500 ---> improved to 492 07/30  - Peripheral smear reviewed, large amount of schistocytes   - Received Dex 40mg x1 dose 07/28 ----> methylprednisolone 125mg IV q 6 hrs daily for 2 days 07/30 & 07/31 (ordered)  - Switch to PO prednisone 1mg/Kg PO- 8/1/23  - Will consider Rituxan +/- caplacizumab --->await ADAMTS-13 for definitive Dx, Hep B surface antigen & antibody, core antibody-Negative   - STAT plasmapheresis daily, ordered for 5 days but ongoing until Plts normalize and two cycles to avoid rebound response --  - s/p first pheresis Tx 07/29 & 7/30. Platelets normal today-7/31 completed 3rd today, based on recommendation- two cycles of plasmapheresis after platelets normalize. Will reevaluate tomorrow and leave the catheter in place.  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 1 week. Diagnosed with acute immune TTP.      # Suspected TTP    - Strong clinical suspicion TTP; confirmatory [id number] pending   - Plasmic score high ( 7 )  - Hgb down to 6.8 g/dL ---> 1U PRBC 07/30--->7.8 gm/dL  - Plt up to 52K after first plasmapheresis ---->174 K  - LDH initially >1500 ---> improved to 492 07/30 --->354  - Peripheral smear: large amount of schistocytes   - Received Dex 40mg x1 dose 07/28 ----> methylprednisolone 125mg IV q 6 hrs daily for 2 days 07/30 & 07/31  - plan for PO prednisone 1mg/Kg PO- 8/1/23  - consider Rituxan +/- caplacizumab --->await ADAMTS-13 for definitive Dx,   - Hep B surface antigen & antibody, core antibody-Negative   - cont plasmapheresis daily x 5 days/ongoing until Plts normalize and two cycles to avoid rebound response.  Began 07/29. Platelets normal today-7/31/23, D3 today., Will cont two further plasmapheresis s/p platelets normalized.              - Calcium gluconate ordered (hypocalcemia with pheresis)  - Folic Acid 2mg QD added   - Benadryl 50mg IV QD prior to each exchange tx  - continue to trend serial CBCs and daily LDH  - Continue close monitoring and supportive measures   - Avoid Plt transfusion at this time      # GONZALEZ/CP with Elevated Tronopin    - Cardio following  - Trop down trending ---> 2408- 07/31  - ECHO -Normal  - Most likely 2/2 suspected TTP causing microthrombi in coronary arterial network   - Continue necessary supportive measures

## 2023-07-31 NOTE — PROGRESS NOTE ADULT - SUBJECTIVE AND OBJECTIVE BOX
HPI:    42 y/o M with a PMHx of HLD presented to  with x1 week of abdominal pain, bloating, fatigue, SOB/CP on exertion. He mentioned a recent trip to [place], also went to [place] earlier this month. He became concerned about symptoms not improving so came to the ED. He denied any other acute c/o at the time.  (29 Jul 2023 03:37)    07/29/2023: Seen at bedside this morning with attendings Dr Darnell and Dr Lee, no acute distress, understanding of suspected dx and planned tx, agreeable    07/30/2023: Seen at bedside, no acute distress, had rash/allergic reaction yesterday s/p first pheresis session that has improved     07/31/2023- Seen at bedside, plasmapheresis ongoing. Denies any acute symptoms    PAST MEDICAL & SURGICAL HISTORY:    HLD      MEDICATIONS  (STANDING):    diphenhydrAMINE Injectable 50 milliGRAM(s) IV Push daily  folic acid 2 milliGRAM(s) Oral daily  heparin   Injectable 5000 Unit(s) SubCutaneous every 8 hours  methylPREDNISolone sodium succinate Injectable 125 milliGRAM(s) IV Push every 6 hours    MEDICATIONS  (PRN):  aluminum hydroxide/magnesium hydroxide/simethicone Suspension 30 milliLiter(s) Oral every 4 hours PRN Dyspepsia  diphenhydrAMINE Injectable 50 milliGRAM(s) IV Push every 6 hours PRN Rash and/or Itching  morphine  - Injectable 2 milliGRAM(s) IV Push every 4 hours PRN Moderate Pain (4 - 6)  simethicone 80 milliGRAM(s) Chew every 4 hours PRN Gas        ALLERGIES:    Allergy Status Unknown        FAMILY HISTORY:    Nothing noted      REVIEW OF SYSTEMS:    Constitutional, Eyes, ENT, Cardiovascular, Respiratory, Gastrointestinal, Genitourinary, Musculoskeletal, Integumentary, Neurological, Psychiatric, Endocrine, Heme/Lymph and Allergic/Immunologic review of systems are otherwise negative except as noted in HPI.       VITALS:    ICU Vital Signs Last 24 Hrs  T(C): 37.1 (31 Jul 2023 04:00), Max: 37.1 (31 Jul 2023 04:00)  T(F): 98.8 (31 Jul 2023 04:00), Max: 98.8 (31 Jul 2023 04:00)  HR: 87 (31 Jul 2023 12:00) (76 - 112)  BP: 117/79 (31 Jul 2023 12:00) (98/57 - 135/94)  BP(mean): 88 (31 Jul 2023 12:00) (66 - 102)  ABP: --  ABP(mean): --  RR: 7 (31 Jul 2023 09:00) (7 - 27)  SpO2: 99% (31 Jul 2023 12:00) (94% - 100%)    O2 Parameters below as of 31 Jul 2023 08:00  Patient On (Oxygen Delivery Method): room air      PHYSICAL:    Constitutional: no acute distress  Eyes: PERRL, EOMI  ENT: pharynx is unremarkable  Neck: supple without JVD; R sided central catheter  Pulmonary: clear to auscultation bilaterally  Cardiac: RRR, normal S1S2  Vascular: no calf tenderness, venous stasis changes, varices   Pheresis catheter R neck   Abdomen: normoactive bowel sounds, soft and nontender, no hepatosplenomegaly or masses appreciated  Lymphatic: no peripheral adenopathy appreciated  Musculoskeletal: full range of motion and no deformities appreciated   Skin: normal appearance overall  Neurology: awake, alert, oriented, no obvious focal deficits   Psychiatric: affect/mood appropriate      LABS:                   7.8    16.60 )-----------( 174      ( 31 Jul 2023 05:53 )             23.7     07-31    140  |  104  |  26<H>  ----------------------------<  164<H>  3.8   |  28  |  1.50<H>    Ca    8.4<L>      31 Jul 2023 05:53  Phos  3.1     07-31  Mg     2.2     07-31           LDH-354<--1572 ---> 492    Hapto < 20    Retic 130 K      RADIOLOGY & ADDITIONAL STUDIES:    Xray Chest 1 View- PORTABLE-Urgent (07.29.23 @ 01:32)    IMPRESSION:   No radiographic evidence of active chest disease.          CT Angio Chest PE Protocol w/ IV Cont (07.29.23 @ 01:51)    IMPRESSION:  No pulmonary embolus or evidence of aortic dissection.    Mild infiltration of the peripancreatic fat and retroperitoneum,   suggestive of acute pancreatitis. Correlate with serum lipase levels.

## 2023-07-31 NOTE — PROGRESS NOTE ADULT - ASSESSMENT
43 male presenting with TTP, NSTEMI, Pancreatitis and ELIF    Plan:      Plasmapheresis today, has improved platelet account 170, on solumedrol    has improved,     awaiting ADAMTS 13 not back yet,   inc troponin likely secondary to TTP, normal echo, cardiology input appreciated    tolerating diet    May transfer to floor    trend platelet count, ldh, lfts, hematology input appreciated

## 2023-07-31 NOTE — PROGRESS NOTE ADULT - SUBJECTIVE AND OBJECTIVE BOX
Events Overnight:  Patient platelet count 170, feeling better, no sob, no chest pain, echo had normal lv function    HPI:     42y/o M with HLD presents to  with 1 week history of abdominal pain,   fatigue, GONZALEZ.  . Went to [place] early July. In the ER noted to have hematuria, thrombocytopenia 18 anemia.   positive schistocytes on smear  anemic  positive troponin, trending down  diagnosed with TTP, received plasma pharesis on 7/29, 30 and today  is responding also started on Solumedrol    PAST MEDICAL & SURGICAL HISTORY:  No pertinent past medical history    Social Hx. - patient works as ER MD    ROS- shortness of breathe, fatigue, abdominal pain improved, no chest pain, no fevers, no chills, slight leg swelling,   moving bowels, no dysuria,    ROS otherwise negative     MEDICATIONS  (STANDING):  diphenhydrAMINE Injectable 50 milliGRAM(s) IV Push daily  folic acid 2 milliGRAM(s) Oral daily  heparin   Injectable 5000 Unit(s) SubCutaneous every 8 hours  methylPREDNISolone sodium succinate Injectable 125 milliGRAM(s) IV Push every 6 hours    MEDICATIONS  (PRN):  aluminum hydroxide/magnesium hydroxide/simethicone Suspension 30 milliLiter(s) Oral every 4 hours PRN Dyspepsia  diphenhydrAMINE Injectable 50 milliGRAM(s) IV Push every 6 hours PRN Rash and/or Itching  morphine  - Injectable 2 milliGRAM(s) IV Push every 4 hours PRN Moderate Pain (4 - 6)  simethicone 80 milliGRAM(s) Chew every 4 hours PRN Gas    ICU Vital Signs Last 24 Hrs  T(C): 37.1 (31 Jul 2023 04:00), Max: 37.2 (30 Jul 2023 11:54)  T(F): 98.8 (31 Jul 2023 04:00), Max: 98.9 (30 Jul 2023 11:54)  HR: 98 (31 Jul 2023 10:00) (76 - 112)  BP: 126/81 (31 Jul 2023 10:00) (98/57 - 135/94)  BP(mean): 90 (31 Jul 2023 10:00) (66 - 102)  ABP: --  ABP(mean): --  RR: 7 (31 Jul 2023 09:00) (7 - 27)  SpO2: 100% (31 Jul 2023 10:00) (94% - 100%)    O2 Parameters below as of 31 Jul 2023 08:00  Patient On (Oxygen Delivery Method): room air    Physical Exam     General - comfortable, no distress  HEENT nc/at, anicteric  neuro non focal , awake, alert  neck supple  lungs clear  cv rrr  abdomen soft  extremities trace edema  skin no rash    I&O's Summary    30 Jul 2023 07:01  -  31 Jul 2023 07:00  --------------------------------------------------------  IN: 1726 mL / OUT: 900 mL / NET: 826 mL                        7.8    16.60 )-----------( 174      ( 31 Jul 2023 05:53 )             23.7       07-31    140  |  104  |  26<H>  ----------------------------<  164<H>  3.8   |  28  |  1.50<H>    Ca    8.4<L>      31 Jul 2023 05:53  Phos  3.1     07-31  Mg     2.2     07-31    Urinalysis Basic - ( 31 Jul 2023 05:53 )    Color: x / Appearance: x / SG: x / pH: x  Gluc: 164 mg/dL / Ketone: x  / Bili: x / Urobili: x   Blood: x / Protein: x / Nitrite: x   Leuk Esterase: x / RBC: x / WBC x   Sq Epi: x / Non Sq Epi: x / Bacteria: x    DVT Prophylaxis:   Heparin subq                                                              Advanced Directives: Full Code

## 2023-08-01 LAB
A PHAGOCYTOPH DNA BLD QL NAA+PROBE: NEGATIVE — SIGNIFICANT CHANGE UP
ALBUMIN SERPL ELPH-MCNC: 3.1 G/DL — LOW (ref 3.3–5)
ALP SERPL-CCNC: 60 U/L — SIGNIFICANT CHANGE UP (ref 40–120)
ALT FLD-CCNC: 29 U/L — SIGNIFICANT CHANGE UP (ref 12–78)
ANION GAP SERPL CALC-SCNC: 6 MMOL/L — SIGNIFICANT CHANGE UP (ref 5–17)
AST SERPL-CCNC: 27 U/L — SIGNIFICANT CHANGE UP (ref 15–37)
B MICROTI DNA BLD QL NAA+PROBE: NEGATIVE — SIGNIFICANT CHANGE UP
B MIYAMOTOI GLPQ BLD QL NAA+NON-PROBE: NEGATIVE — SIGNIFICANT CHANGE UP
BABESIA DNA SPEC QL NAA+PROBE: NEGATIVE — SIGNIFICANT CHANGE UP
BABESIA DNA SPEC QL NAA+PROBE: NEGATIVE — SIGNIFICANT CHANGE UP
BASOPHILS # BLD AUTO: 0 K/UL — SIGNIFICANT CHANGE UP (ref 0–0.2)
BASOPHILS NFR BLD AUTO: 0 % — SIGNIFICANT CHANGE UP (ref 0–2)
BILIRUB SERPL-MCNC: 0.5 MG/DL — SIGNIFICANT CHANGE UP (ref 0.2–1.2)
BUN SERPL-MCNC: 27 MG/DL — HIGH (ref 7–23)
CA-I BLD-SCNC: 1.09 MMOL/L — LOW (ref 1.15–1.33)
CALCIUM SERPL-MCNC: 8.2 MG/DL — LOW (ref 8.5–10.1)
CHLORIDE SERPL-SCNC: 104 MMOL/L — SIGNIFICANT CHANGE UP (ref 96–108)
CO2 SERPL-SCNC: 30 MMOL/L — SIGNIFICANT CHANGE UP (ref 22–31)
CREAT SERPL-MCNC: 1.46 MG/DL — HIGH (ref 0.5–1.3)
E CHAFFEENSIS DNA BLD QL NAA+PROBE: NEGATIVE — SIGNIFICANT CHANGE UP
E EWINGII DNA SPEC QL NAA+PROBE: NEGATIVE — SIGNIFICANT CHANGE UP
EGFR: 60 ML/MIN/1.73M2 — SIGNIFICANT CHANGE UP
EHRLICHIA DNA SPEC QL NAA+PROBE: NEGATIVE — SIGNIFICANT CHANGE UP
EOSINOPHIL # BLD AUTO: 0 K/UL — SIGNIFICANT CHANGE UP (ref 0–0.5)
EOSINOPHIL NFR BLD AUTO: 0 % — SIGNIFICANT CHANGE UP (ref 0–6)
GLUCOSE SERPL-MCNC: 160 MG/DL — HIGH (ref 70–99)
HAPTOGLOB SERPL-MCNC: 55 MG/DL — SIGNIFICANT CHANGE UP (ref 34–200)
HCT VFR BLD CALC: 24 % — LOW (ref 39–50)
HGB BLD-MCNC: 7.9 G/DL — LOW (ref 13–17)
LDH SERPL L TO P-CCNC: 379 U/L — HIGH (ref 84–241)
LIDOCAIN IGE QN: 90 U/L — SIGNIFICANT CHANGE UP (ref 73–393)
LYMPHOCYTES # BLD AUTO: 1.67 K/UL — SIGNIFICANT CHANGE UP (ref 1–3.3)
LYMPHOCYTES # BLD AUTO: 9 % — LOW (ref 13–44)
MAGNESIUM SERPL-MCNC: 2.2 MG/DL — SIGNIFICANT CHANGE UP (ref 1.6–2.6)
MCHC RBC-ENTMCNC: 28.4 PG — SIGNIFICANT CHANGE UP (ref 27–34)
MCHC RBC-ENTMCNC: 32.9 GM/DL — SIGNIFICANT CHANGE UP (ref 32–36)
MCV RBC AUTO: 86.3 FL — SIGNIFICANT CHANGE UP (ref 80–100)
MONOCYTES # BLD AUTO: 0.93 K/UL — HIGH (ref 0–0.9)
MONOCYTES NFR BLD AUTO: 5 % — SIGNIFICANT CHANGE UP (ref 2–14)
NEUTROPHILS # BLD AUTO: 15.23 K/UL — HIGH (ref 1.8–7.4)
NEUTROPHILS NFR BLD AUTO: 79 % — HIGH (ref 43–77)
NRBC # BLD: SIGNIFICANT CHANGE UP /100 WBCS (ref 0–0)
PHOSPHATE SERPL-MCNC: 3.1 MG/DL — SIGNIFICANT CHANGE UP (ref 2.5–4.5)
PLATELET # BLD AUTO: 260 K/UL — SIGNIFICANT CHANGE UP (ref 150–400)
POTASSIUM SERPL-MCNC: 3.9 MMOL/L — SIGNIFICANT CHANGE UP (ref 3.5–5.3)
POTASSIUM SERPL-SCNC: 3.9 MMOL/L — SIGNIFICANT CHANGE UP (ref 3.5–5.3)
PROT SERPL-MCNC: 6.1 GM/DL — SIGNIFICANT CHANGE UP (ref 6–8.3)
RBC # BLD: 2.78 M/UL — LOW (ref 4.2–5.8)
RBC # FLD: 17.9 % — HIGH (ref 10.3–14.5)
SODIUM SERPL-SCNC: 140 MMOL/L — SIGNIFICANT CHANGE UP (ref 135–145)
TROPONIN I, HIGH SENSITIVITY RESULT: 252.12 NG/L — HIGH
WBC # BLD: 18.57 K/UL — HIGH (ref 3.8–10.5)
WBC # FLD AUTO: 18.57 K/UL — HIGH (ref 3.8–10.5)

## 2023-08-01 PROCEDURE — 78452 HT MUSCLE IMAGE SPECT MULT: CPT | Mod: 26

## 2023-08-01 PROCEDURE — 99232 SBSQ HOSP IP/OBS MODERATE 35: CPT

## 2023-08-01 PROCEDURE — 93018 CV STRESS TEST I&R ONLY: CPT

## 2023-08-01 PROCEDURE — 99233 SBSQ HOSP IP/OBS HIGH 50: CPT

## 2023-08-01 PROCEDURE — 93016 CV STRESS TEST SUPVJ ONLY: CPT

## 2023-08-01 RX ORDER — CALCIUM GLUCONATE 100 MG/ML
2 VIAL (ML) INTRAVENOUS ONCE
Refills: 0 | Status: COMPLETED | OUTPATIENT
Start: 2023-08-01 | End: 2023-08-01

## 2023-08-01 RX ORDER — REGADENOSON 0.08 MG/ML
0.4 INJECTION, SOLUTION INTRAVENOUS ONCE
Refills: 0 | Status: COMPLETED | OUTPATIENT
Start: 2023-08-01 | End: 2023-08-01

## 2023-08-01 RX ADMIN — Medication 2 MILLIGRAM(S): at 11:07

## 2023-08-01 RX ADMIN — Medication 30 MILLILITER(S): at 11:05

## 2023-08-01 RX ADMIN — HEPARIN SODIUM 5000 UNIT(S): 5000 INJECTION INTRAVENOUS; SUBCUTANEOUS at 05:56

## 2023-08-01 RX ADMIN — Medication 50 MILLIGRAM(S): at 13:31

## 2023-08-01 RX ADMIN — REGADENOSON 0.4 MILLIGRAM(S): 0.08 INJECTION, SOLUTION INTRAVENOUS at 09:50

## 2023-08-01 RX ADMIN — Medication 200 GRAM(S): at 16:16

## 2023-08-01 RX ADMIN — Medication 30 MILLILITER(S): at 00:11

## 2023-08-01 RX ADMIN — Medication 50 MILLIGRAM(S): at 21:29

## 2023-08-01 RX ADMIN — Medication 30 MILLILITER(S): at 04:19

## 2023-08-01 NOTE — PROGRESS NOTE ADULT - PROBLEM SELECTOR PLAN 1
-Pt reported pressure type chest pain in the setting of TTP.  ECG w/ no ischemic changes.  -Trop intial 2000s peak 3000s trending down to 2000  -Echo w/ normal EF no RWMA.    -Doubt ACS.  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  Elevatd trop has been reported in literature in TTP w/ occurrence in up to 59% of cases (J Thromb Haemost. 2015;13(2):293).  -elevated trop is a marker for higher mortality in TTP pts.  -Would not empirically treat for ACS given low platelets.  -will consider need for ischemic eval. (lexiscan perfusion) once pt has fully recovered & is close to being dscharged.
-Pt reported pressure type chest pain in the setting of TTP.  ECG w/ no ischemic changes.  -Trop intial 2000s peak 3000s trending down to 2000  -Echo w/ normal EF no RWMA.    -Doubt ACS.  Likely myocardial injury 2/2 thrombotic microangiopathy involving coronaries.  Elevatd trop has been reported in literature in TTP w/ occurrence in up to 59% of cases (J Thromb Haemost. 2015;13(2):293).  -reviewed pharm nuclear stress w/ pt. - normal perfusion normal EF.  -no further evaluation at this time, no OP cardio followup needed.    Please call if questions.

## 2023-08-01 NOTE — PROVIDER CONTACT NOTE (OTHER) - SITUATION
Spoke with Jovanna to notify Dr. Reyes Jackson Jr. they will receive discharge papers via fax # [phone].
Dr. Albrecht spoke to Dr. Darnell (oncall Oncology)  paged @[phone], returned page @[phone]

## 2023-08-01 NOTE — PROGRESS NOTE ADULT - ASSESSMENT
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 1 week. Diagnosed with acute immune TTP.      # Suspected TTP    - Strong clinical suspicion TTP; confirmatory [id number] pending   - Plasmic score high ( 7 )  - Hgb down to 6.8 g/dL ---> 1U PRBC 07/30--->7.8 gm/dL  - Plt up to 52K after first plasmapheresis ---->174 K  - LDH initially >1500 ---> improved to 492 07/30 --->354  - Peripheral smear: large amount of schistocytes   - Received Dex 40mg x1 dose 07/28 ----> methylprednisolone 125mg IV q 6 hrs daily for 2 days 07/30 & 07/31  - Started PO Prednisone 50 mg PO BID- 8/1/23 for two days (8/1 & 8/2), then Prednisone 40 mg PO BID x 1 week (Ordered)  - consider Rituxan +/- caplacizumab --->await ADAMTS-13 for definitive Dx,   - Hep B surface antigen & antibody, core antibody-Negative   - cont plasmapheresis daily x 5 days/ongoing until Plts normalize and two cycles to avoid rebound response.  Began 07/29. Platelets normal -7/31/23, D3 and also 8/1-D4.,   - Will d/c further plasmapheresis- monitor platelets closely                - Calcium gluconate ordered (hypocalcemia with pheresis)  - Folic Acid 2mg QD added   - Benadryl 50mg IV QD prior to each exchange tx  - continue to trend serial CBCs and daily LDH  - Continue close monitoring and supportive measures   -d/w Dr. Bruno, in regards to d/c of plasmapheresis and PO prednisone plan.      # GONZALEZ/CP with Elevated Tronopin    - Cardio following  - Trop down trending --->252<-- 2408- 07/31  - ECHO -Normal  -s/p Stress test-No evidence of reversible or fixed perfusion defects. Normal left ventricular contractility with an ejection fraction of   79% (Normal: 50% or greater).  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 1 week. Diagnosed with acute immune TTP.      # Suspected TTP    - Strong clinical suspicion TTP; confirmatory [id number] pending   - Plasmic score high ( 7 )  - Hgb down to 6.8 g/dL ---> 1U PRBC --->7.8 gm/dL  - Plt up to 52K after first plasmapheresis ---->174 K-->207K  - LDH initially >1500 ---> improved to 492  --->354  - Peripheral smear at presentation c/w large amount of schistocytes   - Received Dex 40mg x1 dose  ----> methylprednisolone 125mg IV q 6 hrs daily for 2 days  &   - 23 started PO Prednisone 50 mg PO BID x two days ( & ), then Prednisone 40 mg PO BID x 1 week (Ordered)  - consider Rituxan +/- caplacizumab --->await ADAMTS-13 for definitive Dx,   - Hep B surface antigen & antibody, core antibody-Negative   - cont plasmapheresis daily; given plt recovery x 48 hrs, will stop plasmapheresis following tx 23.  Platelets normal -23-23, s/p 4 days pheresis, Will d/c further plasmapheresis- monitor platelets closely and will need f/u outpt with Dr. Darnell.             - Calcium gluconate ordered (hypocalcemia with pheresis)  - Folic Acid 2mg QD added   - Benadryl 50mg IV QD prior to each exchange tx  - continue to trend serial CBCs and daily LDH  - Continue close monitoring and supportive measures       # GONZALEZ/CP with Elevated Tronopin    - Cardio following  - Trop down trendin<-- 2408-   - ECHO -Normal  -s/p Stress test-No evidence of reversible or fixed perfusion defects. Normal left ventricular contractility with an ejection fraction of 79% (Normal: 50% or greater).  - Most likely 2/2 suspected TTP causing microthrombi in coronary arterial network   - Continue necessary supportive measures

## 2023-08-01 NOTE — PROGRESS NOTE ADULT - SUBJECTIVE AND OBJECTIVE BOX
HPI:    44 y/o M with a PMHx of HLD presented to  with x1 week of abdominal pain, bloating, fatigue, SOB/CP on exertion. He mentioned a recent trip to [place], also went to [place] earlier this month. He became concerned about symptoms not improving so came to the ED. He denied any other acute c/o at the time.  (29 Jul 2023 03:37)    07/29/2023: Seen at bedside this morning with attendings Dr Darnell and Dr Lee, no acute distress, understanding of suspected dx and planned tx, agreeable    07/30/2023: Seen at bedside, no acute distress, had rash/allergic reaction yesterday s/p first pheresis session that has improved     07/31/2023- Seen at bedside, plasmapheresis ongoing. Denies any acute symptoms    8/1/2023-In no acute distress    PAST MEDICAL & SURGICAL HISTORY:    HLD      MEDICATIONS  (STANDING):    diphenhydrAMINE Injectable 50 milliGRAM(s) IV Push daily  folic acid 2 milliGRAM(s) Oral daily  heparin   Injectable 5000 Unit(s) SubCutaneous every 8 hours  predniSONE   Tablet 50 milliGRAM(s) Oral two times a day    MEDICATIONS  (PRN):    aluminum hydroxide/magnesium hydroxide/simethicone Suspension 30 milliLiter(s) Oral every 4 hours PRN Dyspepsia  diphenhydrAMINE Injectable 50 milliGRAM(s) IV Push every 6 hours PRN Rash and/or Itching  morphine  - Injectable 2 milliGRAM(s) IV Push every 4 hours PRN Moderate Pain (4 - 6)  simethicone 80 milliGRAM(s) Chew every 4 hours PRN Gas        ALLERGIES:    Allergy Status Unknown      FAMILY HISTORY:    Nothing noted      REVIEW OF SYSTEMS:    Constitutional, Eyes, ENT, Cardiovascular, Respiratory, Gastrointestinal, Genitourinary, Musculoskeletal, Integumentary, Neurological, Psychiatric, Endocrine, Heme/Lymph and Allergic/Immunologic review of systems are otherwise negative except as noted in HPI.       VITALS:    ICU Vital Signs Last 24 Hrs  T(C): 36.8 (01 Aug 2023 16:10), Max: 37.1 (31 Jul 2023 20:00)  T(F): 98.3 (01 Aug 2023 16:10), Max: 98.8 (31 Jul 2023 20:00)  HR: 78 (01 Aug 2023 16:10) (73 - 98)  BP: 120/84 (01 Aug 2023 16:10) (120/84 - 136/74)  BP(mean): 96 (01 Aug 2023 16:10) (79 - 100)  ABP: --  ABP(mean): --  RR: 18 (01 Aug 2023 16:10) (12 - 21)  SpO2: 99% (01 Aug 2023 16:10) (95% - 100%)    O2 Parameters below as of 01 Aug 2023 16:10  Patient On (Oxygen Delivery Method): room air      PHYSICAL:    Constitutional: no acute distress  Eyes: PERRL, EOMI  ENT: pharynx is unremarkable  Neck: supple without JVD; R sided central catheter  Pulmonary: clear to auscultation bilaterally  Cardiac: RRR, normal S1S2  Vascular: no calf tenderness, venous stasis changes, varices   Pheresis catheter R neck   Abdomen: normoactive bowel sounds, soft and nontender, no hepatosplenomegaly or masses appreciated  Lymphatic: no peripheral adenopathy appreciated  Musculoskeletal: full range of motion and no deformities appreciated   Skin: normal appearance overall  Neurology: awake, alert, oriented, no obvious focal deficits   Psychiatric: affect/mood appropriate      LABS:                          7.9    18.57 )-----------( 260      ( 01 Aug 2023 05:31 )             24.0     08-01    140  |  104  |  27<H>  ----------------------------<  160<H>  3.9   |  30  |  1.46<H>    Ca    8.2<L>      01 Aug 2023 05:31  Phos  3.1     08-01  Mg     2.2     08-01    TPro  6.1  /  Alb  3.1<L>  /  TBili  0.5  /  DBili  x   /  AST  27  /  ALT  29  /  AlkPhos  60  08-01             LDH-379<--354<--1572 ---> 492    Hapto -55<-- 20    Retic 130 K      RADIOLOGY & ADDITIONAL STUDIES:    Xray Chest 1 View- PORTABLE-Urgent (07.29.23 @ 01:32)    IMPRESSION:   No radiographic evidence of active chest disease.        CT Angio Chest PE Protocol w/ IV Cont (07.29.23 @ 01:51)    IMPRESSION:  No pulmonary embolus or evidence of aortic dissection.    Mild infiltration of the peripancreatic fat and retroperitoneum,   suggestive of acute pancreatitis. Correlate with serum lipase levels.

## 2023-08-01 NOTE — PROGRESS NOTE ADULT - SUBJECTIVE AND OBJECTIVE BOX
HPI:  44y/o M with HLD presents to  with 1 week history of abdominal pain, bloating, fatigue, GONZALEZ. Recent trip to [place], no recent hikes, or heavily wooded area travel. Went to [place] early July. In the ER noted to have hematuria, thrombocytopenia, anemia.         Review of Systems:  CONSTITUTIONAL: weakness+  EYES/ENT: No visual changes;  No vertigo or throat pain   NECK: No pain or stiffness  RESPIRATORY: No cough, wheezing, hemoptysis; No shortness of breath,   CARDIOVASCULAR: No chest pain or palpitations  GASTROINTESTINAL: No abdominal or epigastric pain. No nausea, vomiting, or hematemesis; No diarrhea or constipation.   GENITOURINARY: No dysuria, frequency or hematuria  NEUROLOGICAL: No numbness or weakness  SKIN: No itching, burning, rashes, or lesions   All other review of systems is negative unless indicated above    PHYSICAL EXAM:    Vital Signs Last 24 Hrs  T(C): 36.8 (01 Aug 2023 08:00), Max: 37.1 (31 Jul 2023 20:00)  T(F): 98.3 (01 Aug 2023 08:00), Max: 98.8 (31 Jul 2023 20:00)  HR: 86 (01 Aug 2023 08:00) (76 - 98)  BP: 136/74 (01 Aug 2023 08:00) (120/70 - 136/74)  BP(mean): 85 (01 Aug 2023 08:00) (79 - 97)  RR: 21 (01 Aug 2023 08:00) (12 - 21)  SpO2: 99% (01 Aug 2023 08:00) (95% - 100%)    Parameters below as of 01 Aug 2023 04:00  Patient On (Oxygen Delivery Method): room air        GENERAL: comfortable   HEAD:  Atraumatic, Normocephalic  EYES: EOMI, PERRLA, conjunctiva and sclera clear  HEENT: Moist mucous membranes  NECK: Supple, No JVD  NERVOUS SYSTEM:  Alert & Oriented X3, Motor Strength 5/5 B/L upper and lower extremities; DTRs 2+ intact and symmetric  CHEST/LUNG: Clear to auscultation bilaterally; No rales, rhonchi, wheezing, or rubs  HEART:S1S2 normal, no murmer  ABDOMEN: Soft, Nontender, Nondistended; Bowel sounds present  GENITOURINARY- Voiding, no palpable bladder  EXTREMITIES:  2+ Peripheral Pulses, No clubbing, cyanosis, or edema  MUSCULOSKELTAL- No muscle tenderness, Muscle tone normal, No joint tenderness, no Joint swelling, Joint range of motion-normal  SKIN-no rash, no lesion  PSYCH- Mood stable  LYMPH Node- No palpable lymph node    LABS:                        7.9    18.57 )-----------( 260      ( 01 Aug 2023 05:31 )             24.0     08-01    140  |  104  |  27<H>  ----------------------------<  160<H>  3.9   |  30  |  1.46<H>    Ca    8.2<L>      01 Aug 2023 05:31  Phos  3.1     08-01  Mg     2.2     08-01    TPro  6.1  /  Alb  3.1<L>  /  TBili  0.5  /  DBili  x   /  AST  27  /  ALT  29  /  AlkPhos  60  08-01      Urinalysis Basic - ( 01 Aug 2023 05:31 )    Color: x / Appearance: x / SG: x / pH: x  Gluc: 160 mg/dL / Ketone: x  / Bili: x / Urobili: x   Blood: x / Protein: x / Nitrite: x   Leuk Esterase: x / RBC: x / WBC x   Sq Epi: x / Non Sq Epi: x / Bacteria: x        CAPILLARY BLOOD GLUCOSE                Standing medicine  aluminum hydroxide/magnesium hydroxide/simethicone Suspension 30 milliLiter(s) Oral every 4 hours PRN  diphenhydrAMINE Injectable 50 milliGRAM(s) IV Push every 6 hours PRN  diphenhydrAMINE Injectable 50 milliGRAM(s) IV Push daily  folic acid 2 milliGRAM(s) Oral daily  heparin   Injectable 5000 Unit(s) SubCutaneous every 8 hours  morphine  - Injectable 2 milliGRAM(s) IV Push every 4 hours PRN  predniSONE   Tablet 50 milliGRAM(s) Oral two times a day  simethicone 80 milliGRAM(s) Chew every 4 hours PRN      A/P    #ITP   -management as per hematologist team   -discussed with icu team     #positive trop   -stress test to follow     #dvt pr          HPI:  42y/o M with HLD presents to  with 1 week history of abdominal pain, bloating, fatigue, GONZALEZ. Recent trip to [place], no recent hikes, or heavily wooded area travel. Went to [place] early July. In the ER noted to have hematuria, thrombocytopenia, anemia.         Review of Systems:  CONSTITUTIONAL: weakness+  EYES/ENT: No visual changes;  No vertigo or throat pain   NECK: No pain or stiffness  RESPIRATORY: No cough, wheezing, hemoptysis; No shortness of breath,   CARDIOVASCULAR: No chest pain or palpitations  GASTROINTESTINAL: No abdominal or epigastric pain. No nausea, vomiting, or hematemesis; No diarrhea or constipation.   GENITOURINARY: No dysuria, frequency or hematuria  NEUROLOGICAL: No numbness or weakness  SKIN: No itching, burning, rashes, or lesions   All other review of systems is negative unless indicated above    PHYSICAL EXAM:    Vital Signs Last 24 Hrs  T(C): 36.8 (01 Aug 2023 08:00), Max: 37.1 (31 Jul 2023 20:00)  T(F): 98.3 (01 Aug 2023 08:00), Max: 98.8 (31 Jul 2023 20:00)  HR: 86 (01 Aug 2023 08:00) (76 - 98)  BP: 136/74 (01 Aug 2023 08:00) (120/70 - 136/74)  BP(mean): 85 (01 Aug 2023 08:00) (79 - 97)  RR: 21 (01 Aug 2023 08:00) (12 - 21)  SpO2: 99% (01 Aug 2023 08:00) (95% - 100%)    Parameters below as of 01 Aug 2023 04:00  Patient On (Oxygen Delivery Method): room air        GENERAL: comfortable   HEAD:  Atraumatic, Normocephalic  EYES: EOMI, PERRLA, conjunctiva and sclera clear  HEENT: Moist mucous membranes  NECK: Supple, No JVD  NERVOUS SYSTEM:  Alert & Oriented X3, Motor Strength 5/5 B/L upper and lower extremities; DTRs 2+ intact and symmetric  CHEST/LUNG: Clear to auscultation bilaterally; No rales, rhonchi, wheezing, or rubs  HEART:S1S2 normal, no murmer  ABDOMEN: Soft, Nontender, Nondistended; Bowel sounds present  GENITOURINARY- Voiding, no palpable bladder  EXTREMITIES:  2+ Peripheral Pulses, No clubbing, cyanosis, or edema  MUSCULOSKELTAL- No muscle tenderness, Muscle tone normal, No joint tenderness, no Joint swelling, Joint range of motion-normal  SKIN-no rash, no lesion  PSYCH- Mood stable  LYMPH Node- No palpable lymph node    LABS:                        7.9    18.57 )-----------( 260      ( 01 Aug 2023 05:31 )             24.0     08-01    140  |  104  |  27<H>  ----------------------------<  160<H>  3.9   |  30  |  1.46<H>    Ca    8.2<L>      01 Aug 2023 05:31  Phos  3.1     08-01  Mg     2.2     08-01    TPro  6.1  /  Alb  3.1<L>  /  TBili  0.5  /  DBili  x   /  AST  27  /  ALT  29  /  AlkPhos  60  08-01      Urinalysis Basic - ( 01 Aug 2023 05:31 )    Color: x / Appearance: x / SG: x / pH: x  Gluc: 160 mg/dL / Ketone: x  / Bili: x / Urobili: x   Blood: x / Protein: x / Nitrite: x   Leuk Esterase: x / RBC: x / WBC x   Sq Epi: x / Non Sq Epi: x / Bacteria: x        CAPILLARY BLOOD GLUCOSE                Standing medicine  aluminum hydroxide/magnesium hydroxide/simethicone Suspension 30 milliLiter(s) Oral every 4 hours PRN  diphenhydrAMINE Injectable 50 milliGRAM(s) IV Push every 6 hours PRN  diphenhydrAMINE Injectable 50 milliGRAM(s) IV Push daily  folic acid 2 milliGRAM(s) Oral daily  heparin   Injectable 5000 Unit(s) SubCutaneous every 8 hours  morphine  - Injectable 2 milliGRAM(s) IV Push every 4 hours PRN  predniSONE   Tablet 50 milliGRAM(s) Oral two times a day  simethicone 80 milliGRAM(s) Chew every 4 hours PRN      A/P    #TTP  -management as per hematologist team   -discussed with icu team     #positive trop   -stress test to follow     #dvt pr

## 2023-08-01 NOTE — CHART NOTE - NSCHARTNOTEFT_GEN_A_CORE
CALLED BY OUTPATIENT LABS DUE TO ABNORMAL RESULT   JUSTEN PRELIM POSITIVE
STRESS TEST REPORT    SAVANNAH PEREZ 43yM  MRN: [id number]  : 78  Admit: 23    REGADENASON    Regadenoson was 5 cc (0.4 mg Regadenoson) which was given rapidly over 10 seconds  into a peripheral IV.  Immediately after Regadenoson injection, flush w/ 5 cc saline given.  Radiopharmaceutical was injected 10-20 sec after the saline flush.  Patient was monitored for 6 minutes.  A 12 lead ECG was recorded every minute & BP was recorded throughout the test.      Resting ECG: NSR  Peak infusion ECG:  NSR  Chest pain: none     CONCLUSION:  Normal response to IV lexiscan.  See myocardial perfusion scan report.
Hematology  Case reviewed Strong clinical suspicion for TTP  Patient started on steroids  [id number] ordered STAT  STAT plasma exchange daily   Spoke with pheresis services [facility] ( [phone])  Needs 5 liters of plasma exchange daily.   Ordered plasma STAT and spoke with hospital blood bank.  Pheresis will be done around 8-9 AM when plasma available.  Spoke with ICU team.  Full consult to follow.

## 2023-08-01 NOTE — PROGRESS NOTE ADULT - SUBJECTIVE AND OBJECTIVE BOX
HPI:  44y/o M with HLD presents to  with 1 week history of abdominal pain, bloating, fatigue, GONZALEZ. Recent trip to [place], no recent hikes, or heavily wooded area travel. Went to [place] early July. In the ER noted to have hematuria, thrombocytopenia, anemia.     patient seen and examined at the bedside. lying on the stretcher, awake and alert. Feels well, has nausea. Confirms story above.  (29 Jul 2023 03:37)    Admitted for TTP, with signs of pancreatitis & NSTEMI.  Consulted for NSTEMI.  Trop initially elevated at 2100, peak 3920.  On questioning pt reported some chest pressure a few hours  prior to presenting to ED, duration hours.    7/30/'23" less dyspneic today.  Reviewed results of echo.  8/1/'23: no complaints asymptomatic reviewd results of MPI  normal perfusion.      MEDICATIONS:  OUTPATIENT  Home Medications:      INPATIENT  MEDICATIONS  (STANDING):  diphenhydrAMINE Injectable 50 milliGRAM(s) IV Push daily  folic acid 2 milliGRAM(s) Oral daily  heparin   Injectable 5000 Unit(s) SubCutaneous every 8 hours  predniSONE   Tablet 50 milliGRAM(s) Oral two times a day    MEDICATIONS  (PRN):  aluminum hydroxide/magnesium hydroxide/simethicone Suspension 30 milliLiter(s) Oral every 4 hours PRN Dyspepsia  diphenhydrAMINE Injectable 50 milliGRAM(s) IV Push every 6 hours PRN Rash and/or Itching  morphine  - Injectable 2 milliGRAM(s) IV Push every 4 hours PRN Moderate Pain (4 - 6)  simethicone 80 milliGRAM(s) Chew every 4 hours PRN Gas          Vital Signs Last 24 Hrs  T(C): 36.7 (01 Aug 2023 15:56), Max: 37.1 (31 Jul 2023 20:00)  T(F): 98 (01 Aug 2023 15:56), Max: 98.8 (31 Jul 2023 20:00)  HR: 85 (01 Aug 2023 16:00) (73 - 98)  BP: 121/85 (01 Aug 2023 16:00) (121/85 - 136/74)  BP(mean): 95 (01 Aug 2023 16:00) (79 - 100)  RR: 17 (01 Aug 2023 16:00) (12 - 21)  SpO2: 98% (01 Aug 2023 15:56) (95% - 100%)    Parameters below as of 01 Aug 2023 15:56  Patient On (Oxygen Delivery Method): room air    Daily     Daily I&O's Summary    31 Jul 2023 07:01  -  01 Aug 2023 07:00  --------------------------------------------------------  IN: 1360 mL / OUT: 700 mL / NET: 660 mL        I&O's Detail    31 Jul 2023 07:01  -  01 Aug 2023 07:00  --------------------------------------------------------  IN:    Oral Fluid: 1360 mL  Total IN: 1360 mL    OUT:    Voided (mL): 700 mL  Total OUT: 700 mL    Total NET: 660 mL          I&O's Summary    31 Jul 2023 07:01  -  01 Aug 2023 07:00  --------------------------------------------------------  IN: 1360 mL / OUT: 700 mL / NET: 660 mL      PHYSICAL EXAM:    Constitutional: NAD, awake   HEENT: PERR, EOMI,  No oral cyananosis.  Neck:  supple,  No JVD  Respiratory: Breath sounds are clear bilaterally, No wheezing, rales or rhonchi  Cardiovascular: S1 and S2, regular rate and rhythm, no Murmurs, gallops or rubs  Gastrointestinal: Bowel Sounds present, soft, nontender.   Extremities: No peripheral edema. No clubbing or cyanosis.  Vascular: 2+ peripheral pulses  Neurological: A/O x 3, no focal deficits    ===============================  ===============================  LABS:                         7.9    18.57 )-----------( 260      ( 01 Aug 2023 05:31 )             24.0                         7.8    16.60 )-----------( 174      ( 31 Jul 2023 05:53 )             23.7                         7.9    14.35 )-----------( 106      ( 30 Jul 2023 18:42 )             24.2     01 Aug 2023 05:31    140    |  104    |  27     ----------------------------<  160    3.9     |  30     |  1.46   31 Jul 2023 05:53    140    |  104    |  26     ----------------------------<  164    3.8     |  28     |  1.50   30 Jul 2023 05:29    142    |  107    |  31     ----------------------------<  176    4.2     |  30     |  1.65     Ca    8.2        01 Aug 2023 05:31  Ca    8.4        31 Jul 2023 05:53  Ca    8.0        30 Jul 2023 05:29  Phos  3.1       01 Aug 2023 05:31  Phos  3.1       31 Jul 2023 05:53  Phos  2.5       30 Jul 2023 05:29  Mg     2.2       01 Aug 2023 05:31  Mg     2.2       31 Jul 2023 05:53  Mg     2.1       30 Jul 2023 05:29    TPro  6.1    /  Alb  3.1    /  TBili  0.5    /  DBili  x      /  AST  27     /  ALT  29     /  AlkPhos  60     01 Aug 2023 05:31      ===============================  ===============================  CARDIAC BIOMARKERS:  BNP      TROPONIN  Troponin I, High Sensitivity Result: 252.12 ng/L *H* (08-01-23 @ 05:31)  Troponin I, High Sensitivity Result: 2408.27 ng/L *H* (07-29-23 @ 16:13)  Troponin I, High Sensitivity Result: 3920.91 ng/L *H* (07-29-23 @ 03:28)  Troponin I, High Sensitivity Result: 2199.55 ng/L *H* (07-29-23 @ 01:04)      ===============================  ===============================  EKG: NSR no ischemic changes     Impression     Summary     Estimated left ventricular ejection fraction is 60-65 %.   Mild to Moderate concentric left ventricular hypertrophy is present.   The left ventricle is normal in size, wall motion and contractility as   seen in limited views.   The mitral valve leaflets appear thin and normal.   Mild (1+) mitral regurgitation is present.   Normal appearing tricuspid valve structure.   Mild to Moderate Tricuspid regurgitation is present.   Normal appearing pulmonic valve structure.   Trace to mild pulmonic valvular regurgitation is present.   Trace to small pericardial effusion is present.   There is no evidence of tamponade.     Signature     ----------------------------------------------------------------   Electronically signed by Raymond Alberts MD(Interpreting   physician) on 07/29/2023 06:56 PM   ----------------------------------------------------------------      ===============================    Emmett Cortez M.D.  Cardiology, [facility]  Cell: [phone]  Offices:    ([facility] Office)  [phone] ([facility] Office)

## 2023-08-02 ENCOUNTER — TRANSCRIPTION ENCOUNTER (OUTPATIENT)
Age: 43
End: 2023-08-02

## 2023-08-02 VITALS
OXYGEN SATURATION: 98 % | RESPIRATION RATE: 19 BRPM | DIASTOLIC BLOOD PRESSURE: 90 MMHG | TEMPERATURE: 98 F | SYSTOLIC BLOOD PRESSURE: 129 MMHG | HEART RATE: 72 BPM

## 2023-08-02 LAB
BASOPHILS # BLD AUTO: 0.08 K/UL — SIGNIFICANT CHANGE UP (ref 0–0.2)
BASOPHILS NFR BLD AUTO: 0.4 % — SIGNIFICANT CHANGE UP (ref 0–2)
CLOSURE TME COLL+EPINEP BLD: 312 K/UL — SIGNIFICANT CHANGE UP (ref 150–400)
EOSINOPHIL # BLD AUTO: 0.04 K/UL — SIGNIFICANT CHANGE UP (ref 0–0.5)
EOSINOPHIL NFR BLD AUTO: 0.2 % — SIGNIFICANT CHANGE UP (ref 0–6)
HAPTOGLOB SERPL-MCNC: <20 MG/DL — LOW (ref 34–200)
HCT VFR BLD CALC: 30 % — LOW (ref 39–50)
HGB BLD-MCNC: 9.7 G/DL — LOW (ref 13–17)
IMM GRANULOCYTES NFR BLD AUTO: 9.6 % — HIGH (ref 0–0.9)
LDH SERPL L TO P-CCNC: 629 U/L — HIGH (ref 84–241)
LYMPHOCYTES # BLD AUTO: 11.5 % — LOW (ref 13–44)
LYMPHOCYTES # BLD AUTO: 2.26 K/UL — SIGNIFICANT CHANGE UP (ref 1–3.3)
MCHC RBC-ENTMCNC: 28.9 PG — SIGNIFICANT CHANGE UP (ref 27–34)
MCHC RBC-ENTMCNC: 32.3 GM/DL — SIGNIFICANT CHANGE UP (ref 32–36)
MCV RBC AUTO: 89.3 FL — SIGNIFICANT CHANGE UP (ref 80–100)
MONOCYTES # BLD AUTO: 1.08 K/UL — HIGH (ref 0–0.9)
MONOCYTES NFR BLD AUTO: 5.5 % — SIGNIFICANT CHANGE UP (ref 2–14)
NEUTROPHILS # BLD AUTO: 14.28 K/UL — HIGH (ref 1.8–7.4)
NEUTROPHILS NFR BLD AUTO: 72.8 % — SIGNIFICANT CHANGE UP (ref 43–77)
NRBC # BLD: 25 /100 WBCS — HIGH (ref 0–0)
PLATELET # BLD AUTO: SIGNIFICANT CHANGE UP K/UL (ref 150–400)
RBC # BLD: 3.36 M/UL — LOW (ref 4.2–5.8)
RBC # FLD: 19.1 % — HIGH (ref 10.3–14.5)
WBC # BLD: 19.62 K/UL — HIGH (ref 3.8–10.5)
WBC # FLD AUTO: 19.62 K/UL — HIGH (ref 3.8–10.5)

## 2023-08-02 PROCEDURE — 99239 HOSP IP/OBS DSCHRG MGMT >30: CPT

## 2023-08-02 PROCEDURE — 99232 SBSQ HOSP IP/OBS MODERATE 35: CPT

## 2023-08-02 RX ORDER — FOLIC ACID 0.8 MG
2 TABLET ORAL
Qty: 30 | Refills: 0
Start: 2023-08-02 | End: 2023-08-16

## 2023-08-02 RX ADMIN — Medication 50 MILLIGRAM(S): at 09:54

## 2023-08-02 RX ADMIN — Medication 2 MILLIGRAM(S): at 09:54

## 2023-08-02 NOTE — PROGRESS NOTE ADULT - REASON FOR ADMISSION
Thrombocytopenia

## 2023-08-02 NOTE — DISCHARGE NOTE PROVIDER - CARE PROVIDER_API CALL
Marsha Deshpande  Hematology  [address], [place] [address]  Phone: [phone]  Fax: [phone]  Follow Up Time: 1-3 days    Reyes Jackson  Internal Medicine  [address] [place] [address]  Phone: [phone]  Fax: [phone]  Follow Up Time: 1 week

## 2023-08-02 NOTE — DISCHARGE NOTE PROVIDER - NSDCCPCAREPLAN_GEN_ALL_CORE_FT
PRINCIPAL DISCHARGE DIAGNOSIS  Diagnosis: Severe thrombocytopenia  Assessment and Plan of Treatment:       SECONDARY DISCHARGE DIAGNOSES  Diagnosis: Elevated troponin level  Assessment and Plan of Treatment:     Diagnosis: Pancreatitis  Assessment and Plan of Treatment:     Diagnosis: Gross hematuria  Assessment and Plan of Treatment:

## 2023-08-02 NOTE — DISCHARGE NOTE NURSING/CASE MANAGEMENT/SOCIAL WORK - NSDCPEFALRISK_GEN_ALL_CORE
For information on Fall & Injury Prevention, visit: [email or web address] OR  [email or web address] OR  https://www.cdc.gov/steadi/patient.html

## 2023-08-02 NOTE — PROGRESS NOTE ADULT - ASSESSMENT
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 1 week. Diagnosed with acute immune TTP.      # Suspected TTP    - Strong clinical suspicion TTP; confirmatory [id number] pending as of 23  - Plasmic score high ( 7 )  - Hgb down to 6.8 g/dL ---> 1U PRBC --->7.8 gm/dL  - Hgb- 9.7 g/dl as of 23  - Plt up to 52K after first plasmapheresis ---->174 K-->260K-->312 K/ul  - LDH initially >1500 ---> improved to 492  --->354  - Peripheral smear at presentation c/w large amount of schistocytes   - Received Dex 40mg x1 dose  ----> methylprednisolone 125mg IV q 6 hrs daily for 2 days  &   - 23 started PO Prednisone 50 mg PO BID x two days ( & ), then Prednisone 40 mg PO BID x 1 week (Ordered)  - Will consider Rituxan +/- caplacizumab --->await ADAMTS-13 for definitive Dx   - Hep B surface antigen & antibody, core antibody-Negative   - s/p 4 days plasmapheresis , ,  & 23.  Platelets normal -23-23, s/p 4 days pheresis,   - s/p Calcium gluconate with pheresis  - Folic Acid 2mg QD added   - Benadryl 50mg IV QD prior to each exchange tx  - continue to trend serial CBCs and daily LDH while admitted.  - Continue close monitoring and supportive measures  -d/w Dr. Bruno and Dr. Mathew, most likely d/c today -keep the catheter in place and to f/u outpatient with Dr. Darnell within few days   -Per discussion with Dr. Paige and Dr. Darnell, patient may be d/c'd home with Shiley catheter in place, which will be removed as out patient later pending further management.        # GONZALEZ/CP with Elevated Tronopin    - Cardio following  - Trop down trendin<-- 2408-   - ECHO -Normal  -s/p Stress test-No evidence of reversible or fixed perfusion defects. Normal left ventricular contractility with an ejection fraction of 79% (Normal: 50% or greater).  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 1 week. Diagnosed with acute immune TTP.      # Suspected TTP    - Strong clinical suspicion TTP; confirmatory [id number] pending as of 23  - Plasmic score high ( 7 )  - Hgb down to 6.8 g/dL ---> 1U PRBC --->7.8 gm/dL  - Hgb- 9.7 g/dl as of 23  - Plt up to 52K after first plasmapheresis ---->174 K-->260K-->312 K/ul  - LDH initially >1500 ---> improved to 492  --->354  - Peripheral smear at presentation c/w large amount of schistocytes   - Received Dex 40mg x1 dose  ----> methylprednisolone 125mg IV q 6 hrs daily for 2 days  &   - 23 started PO Prednisone 50 mg PO BID x two days ( & ), then Prednisone 40 mg PO BID x 1 week (Ordered)  - Will consider Rituxan +/- caplacizumab --->await ADAMTS-13 for definitive Dx   - Hep B surface antigen & antibody, core antibody-Negative   - s/p 4 days plasmapheresis , ,  & 23.  Platelets normal -23-23, s/p 4 days pheresis,   - s/p Calcium gluconate with pheresis  - Folic Acid 2mg QD added   - Benadryl 50mg IV QD prior to each exchange tx  - continue to trend serial CBCs and daily LDH while admitted.  - Continue close monitoring and supportive measures  -d/w Dr. Bruno and Dr. Mathew, most likely d/c today -keep the catheter in place and to f/u outpatient with Dr. Darnell within few days   -Per discussion with Dr. Paige and Dr. Darnell, patient may be d/c'd home with prednisone 50mg po bid and  Shiley catheter in place, which will be removed as out patient later pending further management.        # GONZALEZ/CP with Elevated Tronopin    - Cardio following  - Trop down trendin<-- 2408-   - ECHO -Normal  -s/p Stress test-No evidence of reversible or fixed perfusion defects. Normal left ventricular contractility with an ejection fraction of 79% (Normal: 50% or greater).  - Most likely 2/2 suspected TTP causing microthrombi in coronary arterial network   - Continue necessary supportive measures

## 2023-08-02 NOTE — DISCHARGE NOTE NURSING/CASE MANAGEMENT/SOCIAL WORK - PATIENT PORTAL LINK FT
You can access the FollowMyHealth Patient Portal offered by [facility] by registering at the following website: [email or web address]. By joining [facility]’s FollowMyHealth portal, you will also be able to view your health information using other applications (apps) compatible with our system.

## 2023-08-02 NOTE — DISCHARGE NOTE PROVIDER - PROVIDER TOKENS
PROVIDER:[TOKEN:[[id number]],FOLLOWUP:[1-3 days]],PROVIDER:[TOKEN:[[id number]],FOLLOWUP:[1 week]]

## 2023-08-02 NOTE — DISCHARGE NOTE PROVIDER - HOSPITAL COURSE
Hospital course-  42y/o M with HLD presents to  with 1 week history of abdominal pain, bloating, fatigue, GONZALEZ.  In the ER noted to have hematuria, thrombocytopenia, anemia.     PT was diagnosed with TTP and started on steroids and was given plasmapheresis which improved his clinical condition and currently he is feeling better. He is being discharged with further management as an outpt, time spent 45 minutes   He will be discharged on Shiley's catheter which course will be decided in hematologist office follow up on friday or monday.    #Discussed case with ICU team and hematologist team. Discussed with pt and all questions have been answered      Vital Signs Last 24 Hrs  T(C): 37 (02 Aug 2023 08:00), Max: 37 (02 Aug 2023 08:00)  T(F): 98.6 (02 Aug 2023 08:00), Max: 98.6 (02 Aug 2023 08:00)  HR: 78 (02 Aug 2023 08:00) (67 - 98)  BP: 126/85 (02 Aug 2023 08:00) (120/84 - 151/94)  BP(mean): 98 (02 Aug 2023 08:00) (95 - 110)  RR: 18 (02 Aug 2023 08:00) (17 - 21)  SpO2: 98% (02 Aug 2023 08:00) (98% - 100%)    Parameters below as of 02 Aug 2023 08:00  Patient On (Oxygen Delivery Method): room air      T(C): 37 (08-02-23 @ 08:00), Max: 37 (08-02-23 @ 08:00)  HR: 78 (08-02-23 @ 08:00) (67 - 98)  BP: 126/85 (08-02-23 @ 08:00) (120/84 - 151/94)  RR: 18 (08-02-23 @ 08:00) (17 - 21)  SpO2: 98% (08-02-23 @ 08:00) (98% - 100%)    CONSTITUTIONAL: Well groomed, no apparent distress  EYES: PERRLA and symmetric, EOMI, No conjunctival or scleral injection, non-icteric  ENMT: Oral mucosa with moist membranes. Normal dentition; no pharyngeal injection or exudates             NECK: Supple, symmetric and without tracheal deviation   RESP: No respiratory distress, no use of accessory muscles; CTA b/l, no WRR  CV: RRR, +S1S2, no MRG; no JVD; no peripheral edema  GI: Soft, NT, ND, no rebound, no guarding; no palpable masses; no hepatosplenomegaly; no hernia palpated  LYMPH: No cervical LAD or tenderness; no axillary LAD or tenderness; no inguinal LAD or tenderness  MSK: Normal gait; No digital clubbing or cyanosis; examination of the (head/neck/spine/ribs/pelvis, RUE, LUE, RLE, LLE) without misalignment,            Normal ROM without pain, no spinal tenderness, normal muscle strength/tone  SKIN: No rashes or ulcers noted; no subcutaneous nodules or induration palpable  NEURO: CN II-XII intact; normal reflexes in upper and lower extremities, sensation intact in upper and lower extremities b/l to light touch   PSYCH: Appropriate insight/judgment; A+O x 3, mood and affect appropriate, recent/remote memory intact      08-02-23 @ 11:12

## 2023-08-02 NOTE — PROGRESS NOTE ADULT - SUBJECTIVE AND OBJECTIVE BOX
HPI:    44 y/o M with a PMHx of HLD presented to  with x1 week of abdominal pain, bloating, fatigue, SOB/CP on exertion. He mentioned a recent trip to [place], also went to [place] earlier this month. He became concerned about symptoms not improving so came to the ED. He denied any other acute c/o at the time.  (29 Jul 2023 03:37)    07/29/2023: Seen at bedside this morning with attendings Dr Darnell and Dr Lee, no acute distress, understanding of suspected dx and planned tx, agreeable    07/30/2023: Seen at bedside, no acute distress, had rash/allergic reaction yesterday s/p first pheresis session that has improved     07/31/2023- Seen at bedside, plasmapheresis ongoing. Denies any acute symptoms    8/1/2023-In no acute distress    8/2/2023- Seen in ICU, OOB to chair, in no acute distress, eager to go home. aware to follow Dr. Anaya as outpatient, contact information given.    PAST MEDICAL & SURGICAL HISTORY:    HLD      MEDICATIONS  (STANDING):    diphenhydrAMINE Injectable 50 milliGRAM(s) IV Push daily  folic acid 2 milliGRAM(s) Oral daily  heparin   Injectable 5000 Unit(s) SubCutaneous every 8 hours  predniSONE   Tablet 50 milliGRAM(s) Oral two times a day    MEDICATIONS  (PRN):    aluminum hydroxide/magnesium hydroxide/simethicone Suspension 30 milliLiter(s) Oral every 4 hours PRN Dyspepsia  diphenhydrAMINE Injectable 50 milliGRAM(s) IV Push every 6 hours PRN Rash and/or Itching  morphine  - Injectable 2 milliGRAM(s) IV Push every 4 hours PRN Moderate Pain (4 - 6)  simethicone 80 milliGRAM(s) Chew every 4 hours PRN Gas      ALLERGIES:    Allergy Status Unknown      FAMILY HISTORY:    Nothing noted      REVIEW OF SYSTEMS:    Constitutional, Eyes, ENT, Cardiovascular, Respiratory, Gastrointestinal, Genitourinary, Musculoskeletal, Integumentary, Neurological, Psychiatric, Endocrine, Heme/Lymph and Allergic/Immunologic review of systems are otherwise negative except as noted in HPI.       VITALS:    ICU Vital Signs Last 24 Hrs  T(C): 37 (02 Aug 2023 08:00), Max: 37 (02 Aug 2023 08:00)  T(F): 98.6 (02 Aug 2023 08:00), Max: 98.6 (02 Aug 2023 08:00)  HR: 78 (02 Aug 2023 08:00) (67 - 98)  BP: 126/85 (02 Aug 2023 08:00) (120/84 - 151/94)  BP(mean): 98 (02 Aug 2023 08:00) (95 - 110)  ABP: --  ABP(mean): --  RR: 18 (02 Aug 2023 08:00) (17 - 21)  SpO2: 98% (02 Aug 2023 08:00) (98% - 100%)    O2 Parameters below as of 02 Aug 2023 08:00  Patient On (Oxygen Delivery Method): room air      PHYSICAL:    Constitutional: no acute distress  Eyes: PERRL, EOMI  ENT: pharynx is unremarkable  Neck: supple without JVD; R sided central catheter  Pulmonary: clear to auscultation bilaterally  Cardiac: RRR, normal S1S2  Vascular: no calf tenderness, venous stasis changes, varices   Pheresis catheter R neck   Abdomen: normoactive bowel sounds, soft and nontender, no hepatosplenomegaly or masses appreciated  Lymphatic: no peripheral adenopathy appreciated  Musculoskeletal: full range of motion and no deformities appreciated   Skin: normal appearance overall  Neurology: awake, alert, oriented, no obvious focal deficits   Psychiatric: affect/mood appropriate      LABS:                                  9.7    19.62 )-----------( CLUMPED    ( 02 Aug 2023 07:59 )             30.0     08/02/23- Platelets- 312 K/ul    08-01    140  |  104  |  27<H>  ----------------------------<  160<H>  3.9   |  30  |  1.46<H>    Ca    8.2<L>      01 Aug 2023 05:31  Phos  3.1     08-01  Mg     2.2     08-01    TPro  6.1  /  Alb  3.1<L>  /  TBili  0.5  /  DBili  x   /  AST  27  /  ALT  29  /  AlkPhos  60  08-01      LDH-629<--379<--354<--1572 ---> 492    Hapto -55<-- 20    Retic 130 K      RADIOLOGY & ADDITIONAL STUDIES:    Xray Chest 1 View- PORTABLE-Urgent (07.29.23 @ 01:32)    IMPRESSION:   No radiographic evidence of active chest disease.        CT Angio Chest PE Protocol w/ IV Cont (07.29.23 @ 01:51)    IMPRESSION:  No pulmonary embolus or evidence of aortic dissection.    Mild infiltration of the peripancreatic fat and retroperitoneum,   suggestive of acute pancreatitis. Correlate with serum lipase levels.

## 2023-08-02 NOTE — DISCHARGE NOTE PROVIDER - CARE PROVIDERS DIRECT ADDRESSES
,[email or web address]

## 2023-08-02 NOTE — PROGRESS NOTE ADULT - PROVIDER SPECIALTY LIST ADULT
Heme/Onc
Heme/Onc
Hospitalist
MICU
Critical Care
Heme/Onc
Cardiology
Critical Care
Heme/Onc
Cardiology
Critical Care
Critical Care

## 2023-08-03 ENCOUNTER — RESULT REVIEW (OUTPATIENT)
Age: 43
End: 2023-08-03

## 2023-08-03 ENCOUNTER — APPOINTMENT (OUTPATIENT)
Dept: HEMATOLOGY ONCOLOGY | Facility: CLINIC | Age: 43
End: 2023-08-03

## 2023-08-03 ENCOUNTER — OUTPATIENT (OUTPATIENT)
Dept: OUTPATIENT SERVICES | Facility: HOSPITAL | Age: 43
LOS: 1 days | Discharge: HOME | End: 2023-08-03

## 2023-08-03 ENCOUNTER — TRANSCRIPTION ENCOUNTER (OUTPATIENT)
Age: 43
End: 2023-08-03

## 2023-08-03 DIAGNOSIS — M31.19 OTHER THROMBOTIC MICROANGIOPATHY: ICD-10-CM

## 2023-08-03 LAB
ANISOCYTOSIS BLD QL: SLIGHT — SIGNIFICANT CHANGE UP
BASOPHILS # BLD AUTO: 0 K/UL — SIGNIFICANT CHANGE UP (ref 0–0.2)
BASOPHILS NFR BLD AUTO: 0 % — SIGNIFICANT CHANGE UP (ref 0–2)
EOSINOPHIL # BLD AUTO: 0 K/UL — SIGNIFICANT CHANGE UP (ref 0–0.5)
EOSINOPHIL NFR BLD AUTO: 0 % — SIGNIFICANT CHANGE UP (ref 0–6)
HCT VFR BLD CALC: 27.2 % — LOW (ref 39–50)
HGB BLD-MCNC: 9 G/DL — LOW (ref 13–17)
LG PLATELETS BLD QL AUTO: SLIGHT — SIGNIFICANT CHANGE UP
LYMPHOCYTES # BLD AUTO: 18 % — SIGNIFICANT CHANGE UP (ref 13–44)
LYMPHOCYTES # BLD AUTO: 2.55 K/UL — SIGNIFICANT CHANGE UP (ref 1–3.3)
MACROCYTES BLD QL: SLIGHT — SIGNIFICANT CHANGE UP
MCHC RBC-ENTMCNC: 29.3 PG — SIGNIFICANT CHANGE UP (ref 27–34)
MCHC RBC-ENTMCNC: 33.1 GM/DL — SIGNIFICANT CHANGE UP (ref 32–36)
MCV RBC AUTO: 88.6 FL — SIGNIFICANT CHANGE UP (ref 80–100)
METAMYELOCYTES # FLD: 4 % — HIGH (ref 0–0)
MICROCYTES BLD QL: SLIGHT — SIGNIFICANT CHANGE UP
MONOCYTES # BLD AUTO: 0.57 K/UL — SIGNIFICANT CHANGE UP (ref 0–0.9)
MONOCYTES NFR BLD AUTO: 4 % — SIGNIFICANT CHANGE UP (ref 2–14)
MYELOCYTES NFR BLD: 3 % — HIGH (ref 0–0)
NEUTROPHILS # BLD AUTO: 10.07 K/UL — HIGH (ref 1.8–7.4)
NEUTROPHILS NFR BLD AUTO: 68 % — SIGNIFICANT CHANGE UP (ref 43–77)
NEUTS BAND # BLD: 3 % — SIGNIFICANT CHANGE UP (ref 0–8)
NRBC # BLD: 21 /100 — HIGH (ref 0–0)
NRBC # BLD: SIGNIFICANT CHANGE UP /100 WBCS (ref 0–0)
OVALOCYTES BLD QL SMEAR: SLIGHT — SIGNIFICANT CHANGE UP
PLAT MORPH BLD: NORMAL — SIGNIFICANT CHANGE UP
PLATELET # BLD AUTO: 329 K/UL — SIGNIFICANT CHANGE UP (ref 150–400)
POIKILOCYTOSIS BLD QL AUTO: SLIGHT — SIGNIFICANT CHANGE UP
POLYCHROMASIA BLD QL SMEAR: SIGNIFICANT CHANGE UP
RBC # BLD: 3.07 M/UL — LOW (ref 4.2–5.8)
RBC # FLD: 20.2 % — HIGH (ref 10.3–14.5)
RBC BLD AUTO: SIGNIFICANT CHANGE UP
SCHISTOCYTES BLD QL AUTO: SLIGHT — SIGNIFICANT CHANGE UP
WBC # BLD: 14.18 K/UL — HIGH (ref 3.8–10.5)
WBC # FLD AUTO: 14.18 K/UL — HIGH (ref 3.8–10.5)

## 2023-08-04 ENCOUNTER — RESULT REVIEW (OUTPATIENT)
Age: 43
End: 2023-08-04

## 2023-08-04 ENCOUNTER — TRANSCRIPTION ENCOUNTER (OUTPATIENT)
Age: 43
End: 2023-08-04

## 2023-08-04 ENCOUNTER — APPOINTMENT (OUTPATIENT)
Dept: HEMATOLOGY ONCOLOGY | Facility: CLINIC | Age: 43
End: 2023-08-04

## 2023-08-04 LAB
ADAMTS13 ACTIVITY: <2 % — SIGNIFICANT CHANGE UP
ADAMTS13 REFLEX COMMENT: SIGNIFICANT CHANGE UP
ANISOCYTOSIS BLD QL: SLIGHT — SIGNIFICANT CHANGE UP
BASOPHILS # BLD AUTO: 0 K/UL — SIGNIFICANT CHANGE UP (ref 0–0.2)
BASOPHILS NFR BLD AUTO: 0 % — SIGNIFICANT CHANGE UP (ref 0–2)
EOSINOPHIL # BLD AUTO: 0 K/UL — SIGNIFICANT CHANGE UP (ref 0–0.5)
EOSINOPHIL NFR BLD AUTO: 0 % — SIGNIFICANT CHANGE UP (ref 0–6)
HCT VFR BLD CALC: 28.6 % — LOW (ref 39–50)
HGB BLD-MCNC: 9.4 G/DL — LOW (ref 13–17)
LG PLATELETS BLD QL AUTO: SLIGHT — SIGNIFICANT CHANGE UP
LYMPHOCYTES # BLD AUTO: 0.65 K/UL — LOW (ref 1–3.3)
LYMPHOCYTES # BLD AUTO: 5 % — LOW (ref 13–44)
MACROCYTES BLD QL: SLIGHT — SIGNIFICANT CHANGE UP
MCHC RBC-ENTMCNC: 29.3 PG — SIGNIFICANT CHANGE UP (ref 27–34)
MCHC RBC-ENTMCNC: 32.9 GM/DL — SIGNIFICANT CHANGE UP (ref 32–36)
MCV RBC AUTO: 89.1 FL — SIGNIFICANT CHANGE UP (ref 80–100)
METAMYELOCYTES # FLD: 5 % — HIGH (ref 0–0)
MICROCYTES BLD QL: SLIGHT — SIGNIFICANT CHANGE UP
MONOCYTES # BLD AUTO: 0.78 K/UL — SIGNIFICANT CHANGE UP (ref 0–0.9)
MONOCYTES NFR BLD AUTO: 6 % — SIGNIFICANT CHANGE UP (ref 2–14)
MYELOCYTES NFR BLD: 1 % — HIGH (ref 0–0)
NEUTROPHILS # BLD AUTO: 10.79 K/UL — HIGH (ref 1.8–7.4)
NEUTROPHILS NFR BLD AUTO: 80 % — HIGH (ref 43–77)
NEUTS BAND # BLD: 3 % — SIGNIFICANT CHANGE UP (ref 0–8)
NRBC # BLD: 6 /100 — HIGH (ref 0–0)
NRBC # BLD: SIGNIFICANT CHANGE UP /100 WBCS (ref 0–0)
OVALOCYTES BLD QL SMEAR: SLIGHT — SIGNIFICANT CHANGE UP
PLAT MORPH BLD: NORMAL — SIGNIFICANT CHANGE UP
PLATELET # BLD AUTO: 396 K/UL — SIGNIFICANT CHANGE UP (ref 150–400)
POIKILOCYTOSIS BLD QL AUTO: SLIGHT — SIGNIFICANT CHANGE UP
POLYCHROMASIA BLD QL SMEAR: SIGNIFICANT CHANGE UP
RBC # BLD: 3.21 M/UL — LOW (ref 4.2–5.8)
RBC # FLD: 21.1 % — HIGH (ref 10.3–14.5)
RBC BLD AUTO: SIGNIFICANT CHANGE UP
SCHISTOCYTES BLD QL AUTO: SLIGHT — SIGNIFICANT CHANGE UP
VWF CP INHIB PPP-ACNC: 21 UNITS/ML — HIGH
WBC # BLD: 13 K/UL — HIGH (ref 3.8–10.5)
WBC # FLD AUTO: 13 K/UL — HIGH (ref 3.8–10.5)

## 2023-08-07 ENCOUNTER — NON-APPOINTMENT (OUTPATIENT)
Age: 43
End: 2023-08-07

## 2023-08-07 ENCOUNTER — APPOINTMENT (OUTPATIENT)
Dept: HEMATOLOGY ONCOLOGY | Facility: CLINIC | Age: 43
End: 2023-08-07

## 2023-08-07 ENCOUNTER — INPATIENT (INPATIENT)
Facility: HOSPITAL | Age: 43
LOS: 8 days | Discharge: HOME HEALTH | DRG: 813 | End: 2023-08-16
Attending: INTERNAL MEDICINE | Admitting: INTERNAL MEDICINE
Payer: COMMERCIAL

## 2023-08-07 VITALS — WEIGHT: 225.09 LBS | HEIGHT: 71 IN

## 2023-08-07 DIAGNOSIS — E87.6 HYPOKALEMIA: ICD-10-CM

## 2023-08-07 DIAGNOSIS — D69.3 IMMUNE THROMBOCYTOPENIC PURPURA: ICD-10-CM

## 2023-08-07 DIAGNOSIS — N17.9 ACUTE KIDNEY FAILURE, UNSPECIFIED: ICD-10-CM

## 2023-08-07 DIAGNOSIS — R77.8 OTHER SPECIFIED ABNORMALITIES OF PLASMA PROTEINS: ICD-10-CM

## 2023-08-07 DIAGNOSIS — R31.0 GROSS HEMATURIA: ICD-10-CM

## 2023-08-07 DIAGNOSIS — D58.9 HEREDITARY HEMOLYTIC ANEMIA, UNSPECIFIED: ICD-10-CM

## 2023-08-07 LAB
ALBUMIN SERPL ELPH-MCNC: 3.6 G/DL — SIGNIFICANT CHANGE UP (ref 3.3–5)
ALP SERPL-CCNC: 79 U/L — SIGNIFICANT CHANGE UP (ref 40–120)
ALT FLD-CCNC: 86 U/L — HIGH (ref 12–78)
ANION GAP SERPL CALC-SCNC: 10 MMOL/L — SIGNIFICANT CHANGE UP (ref 5–17)
ANISOCYTOSIS BLD QL: SLIGHT — SIGNIFICANT CHANGE UP
APPEARANCE UR: ABNORMAL
APTT BLD: 36.8 SEC — HIGH (ref 24.5–35.6)
AST SERPL-CCNC: 96 U/L — HIGH (ref 15–37)
BACTERIA # UR AUTO: ABNORMAL
BASOPHILS # BLD AUTO: 0 K/UL — SIGNIFICANT CHANGE UP (ref 0–0.2)
BASOPHILS NFR BLD AUTO: 0 % — SIGNIFICANT CHANGE UP (ref 0–2)
BILIRUB SERPL-MCNC: 4.9 MG/DL — HIGH (ref 0.2–1.2)
BILIRUB UR-MCNC: SIGNIFICANT CHANGE UP
BLD GP AB SCN SERPL QL: SIGNIFICANT CHANGE UP
BUN SERPL-MCNC: 35 MG/DL — HIGH (ref 7–23)
CALCIUM SERPL-MCNC: 8.9 MG/DL — SIGNIFICANT CHANGE UP (ref 8.5–10.1)
CHLORIDE SERPL-SCNC: 106 MMOL/L — SIGNIFICANT CHANGE UP (ref 96–108)
CO2 SERPL-SCNC: 24 MMOL/L — SIGNIFICANT CHANGE UP (ref 22–31)
COLOR SPEC: ABNORMAL
COMMENT - URINE: SIGNIFICANT CHANGE UP
CREAT SERPL-MCNC: 1.7 MG/DL — HIGH (ref 0.5–1.3)
D DIMER BLD IA.RAPID-MCNC: 2054 NG/ML DDU — HIGH
D DIMER BLD IA.RAPID-MCNC: 318 NG/ML DDU — HIGH
DIFF PNL FLD: SIGNIFICANT CHANGE UP
EGFR: 50 ML/MIN/1.73M2 — LOW
EOSINOPHIL # BLD AUTO: 0 K/UL — SIGNIFICANT CHANGE UP (ref 0–0.5)
EOSINOPHIL NFR BLD AUTO: 0 % — SIGNIFICANT CHANGE UP (ref 0–6)
EPI CELLS # UR: SIGNIFICANT CHANGE UP
GLUCOSE SERPL-MCNC: 148 MG/DL — HIGH (ref 70–99)
GLUCOSE UR QL: SIGNIFICANT CHANGE UP
GRAN CASTS # UR COMP ASSIST: ABNORMAL /LPF
HCT VFR BLD CALC: 26.9 % — LOW (ref 39–50)
HGB BLD-MCNC: 9.1 G/DL — LOW (ref 13–17)
INR BLD: 2.24 RATIO — HIGH (ref 0.85–1.18)
KETONES UR-MCNC: SIGNIFICANT CHANGE UP
LACTATE SERPL-SCNC: 2.1 MMOL/L — HIGH (ref 0.7–2)
LACTATE SERPL-SCNC: 2.5 MMOL/L — HIGH (ref 0.7–2)
LEUKOCYTE ESTERASE UR-ACNC: SIGNIFICANT CHANGE UP
LIDOCAIN IGE QN: 694 U/L — HIGH (ref 73–393)
LYMPHOCYTES # BLD AUTO: 1.87 K/UL — SIGNIFICANT CHANGE UP (ref 1–3.3)
LYMPHOCYTES # BLD AUTO: 15 % — SIGNIFICANT CHANGE UP (ref 13–44)
MACROCYTES BLD QL: SLIGHT — SIGNIFICANT CHANGE UP
MANUAL SMEAR VERIFICATION: SIGNIFICANT CHANGE UP
MCHC RBC-ENTMCNC: 29.8 PG — SIGNIFICANT CHANGE UP (ref 27–34)
MCHC RBC-ENTMCNC: 33.8 GM/DL — SIGNIFICANT CHANGE UP (ref 32–36)
MCV RBC AUTO: 88.2 FL — SIGNIFICANT CHANGE UP (ref 80–100)
METAMYELOCYTES # FLD: 2 % — HIGH (ref 0–0)
MICROCYTES BLD QL: SLIGHT — SIGNIFICANT CHANGE UP
MONOCYTES # BLD AUTO: 1.12 K/UL — HIGH (ref 0–0.9)
MONOCYTES NFR BLD AUTO: 9 % — SIGNIFICANT CHANGE UP (ref 2–14)
MYELOCYTES NFR BLD: 2 % — HIGH (ref 0–0)
NEUTROPHILS # BLD AUTO: 8.99 K/UL — HIGH (ref 1.8–7.4)
NEUTROPHILS NFR BLD AUTO: 72 % — SIGNIFICANT CHANGE UP (ref 43–77)
NITRITE UR-MCNC: SIGNIFICANT CHANGE UP
NRBC # BLD: 16 /100 — HIGH (ref 0–0)
NRBC # BLD: SIGNIFICANT CHANGE UP /100 WBCS (ref 0–0)
NT-PROBNP SERPL-SCNC: 490 PG/ML — HIGH (ref 0–125)
OVALOCYTES BLD QL SMEAR: SLIGHT — SIGNIFICANT CHANGE UP
PH UR: SIGNIFICANT CHANGE UP (ref 5–8)
PLAT MORPH BLD: NORMAL — SIGNIFICANT CHANGE UP
PLATELET # BLD AUTO: 3 K/UL — CRITICAL LOW (ref 150–400)
POIKILOCYTOSIS BLD QL AUTO: SIGNIFICANT CHANGE UP
POLYCHROMASIA BLD QL SMEAR: SIGNIFICANT CHANGE UP
POTASSIUM SERPL-MCNC: 3.3 MMOL/L — LOW (ref 3.5–5.3)
POTASSIUM SERPL-SCNC: 3.3 MMOL/L — LOW (ref 3.5–5.3)
PROT SERPL-MCNC: 7 GM/DL — SIGNIFICANT CHANGE UP (ref 6–8.3)
PROT UR-MCNC: SIGNIFICANT CHANGE UP
PROTHROM AB SERPL-ACNC: 24.7 SEC — HIGH (ref 9.5–13)
RBC # BLD: 3.05 M/UL — LOW (ref 4.2–5.8)
RBC # FLD: 25.2 % — HIGH (ref 10.3–14.5)
RBC BLD AUTO: ABNORMAL
RBC CASTS # UR COMP ASSIST: ABNORMAL /HPF (ref 0–4)
SCHISTOCYTES BLD QL AUTO: SIGNIFICANT CHANGE UP
SODIUM SERPL-SCNC: 140 MMOL/L — SIGNIFICANT CHANGE UP (ref 135–145)
SP GR SPEC: SIGNIFICANT CHANGE UP (ref 1.01–1.02)
TROPONIN I, HIGH SENSITIVITY RESULT: 968.06 NG/L — HIGH
UROBILINOGEN FLD QL: SIGNIFICANT CHANGE UP
WBC # BLD: 12.49 K/UL — HIGH (ref 3.8–10.5)
WBC # FLD AUTO: 12.49 K/UL — HIGH (ref 3.8–10.5)
WBC UR QL: SIGNIFICANT CHANGE UP /HPF (ref 0–5)

## 2023-08-07 PROCEDURE — P9059: CPT

## 2023-08-07 PROCEDURE — 93971 EXTREMITY STUDY: CPT | Mod: RT

## 2023-08-07 PROCEDURE — P9016: CPT

## 2023-08-07 PROCEDURE — 85379 FIBRIN DEGRADATION QUANT: CPT

## 2023-08-07 PROCEDURE — 99291 CRITICAL CARE FIRST HOUR: CPT

## 2023-08-07 PROCEDURE — C1750: CPT

## 2023-08-07 PROCEDURE — 80053 COMPREHEN METABOLIC PANEL: CPT

## 2023-08-07 PROCEDURE — 71045 X-RAY EXAM CHEST 1 VIEW: CPT | Mod: 26

## 2023-08-07 PROCEDURE — 71045 X-RAY EXAM CHEST 1 VIEW: CPT

## 2023-08-07 PROCEDURE — P9011: CPT

## 2023-08-07 PROCEDURE — 83605 ASSAY OF LACTIC ACID: CPT

## 2023-08-07 PROCEDURE — 83690 ASSAY OF LIPASE: CPT

## 2023-08-07 PROCEDURE — 77001 FLUOROGUIDE FOR VEIN DEVICE: CPT

## 2023-08-07 PROCEDURE — 99223 1ST HOSP IP/OBS HIGH 75: CPT

## 2023-08-07 PROCEDURE — 36558 INSERT TUNNELED CV CATH: CPT

## 2023-08-07 PROCEDURE — 83735 ASSAY OF MAGNESIUM: CPT

## 2023-08-07 PROCEDURE — C1887: CPT

## 2023-08-07 PROCEDURE — 36415 COLL VENOUS BLD VENIPUNCTURE: CPT

## 2023-08-07 PROCEDURE — 36514 APHERESIS PLASMA: CPT

## 2023-08-07 PROCEDURE — 85730 THROMBOPLASTIN TIME PARTIAL: CPT

## 2023-08-07 PROCEDURE — C1894: CPT

## 2023-08-07 PROCEDURE — 36430 TRANSFUSION BLD/BLD COMPNT: CPT

## 2023-08-07 PROCEDURE — P9017: CPT

## 2023-08-07 PROCEDURE — C9113: CPT

## 2023-08-07 PROCEDURE — 86985 SPLIT BLOOD OR PRODUCTS: CPT

## 2023-08-07 PROCEDURE — C1769: CPT

## 2023-08-07 PROCEDURE — 93971 EXTREMITY STUDY: CPT | Mod: 26,RT

## 2023-08-07 PROCEDURE — 80048 BASIC METABOLIC PNL TOTAL CA: CPT

## 2023-08-07 PROCEDURE — 85610 PROTHROMBIN TIME: CPT

## 2023-08-07 PROCEDURE — 84484 ASSAY OF TROPONIN QUANT: CPT

## 2023-08-07 PROCEDURE — 86923 COMPATIBILITY TEST ELECTRIC: CPT

## 2023-08-07 PROCEDURE — 76937 US GUIDE VASCULAR ACCESS: CPT

## 2023-08-07 PROCEDURE — 85027 COMPLETE CBC AUTOMATED: CPT

## 2023-08-07 PROCEDURE — 84100 ASSAY OF PHOSPHORUS: CPT

## 2023-08-07 PROCEDURE — 71045 X-RAY EXAM CHEST 1 VIEW: CPT | Mod: 26,77,76

## 2023-08-07 PROCEDURE — 93005 ELECTROCARDIOGRAM TRACING: CPT

## 2023-08-07 RX ORDER — ACETAMINOPHEN 500 MG
1000 TABLET ORAL ONCE
Refills: 0 | Status: COMPLETED | OUTPATIENT
Start: 2023-08-07 | End: 2023-08-07

## 2023-08-07 RX ORDER — DIPHENHYDRAMINE HCL 50 MG
25 CAPSULE ORAL ONCE
Refills: 0 | Status: COMPLETED | OUTPATIENT
Start: 2023-08-07 | End: 2023-08-08

## 2023-08-07 RX ORDER — MORPHINE SULFATE 50 MG/1
2 CAPSULE, EXTENDED RELEASE ORAL ONCE
Refills: 0 | Status: DISCONTINUED | OUTPATIENT
Start: 2023-08-07 | End: 2023-08-07

## 2023-08-07 RX ORDER — ACETAMINOPHEN 500 MG
650 TABLET ORAL ONCE
Refills: 0 | Status: COMPLETED | OUTPATIENT
Start: 2023-08-07 | End: 2023-08-07

## 2023-08-07 RX ORDER — CALCIUM GLUCONATE 100 MG/ML
2 VIAL (ML) INTRAVENOUS ONCE
Refills: 0 | Status: COMPLETED | OUTPATIENT
Start: 2023-08-07 | End: 2023-08-07

## 2023-08-07 RX ORDER — FAMOTIDINE 10 MG/ML
20 INJECTION INTRAVENOUS ONCE
Refills: 0 | Status: COMPLETED | OUTPATIENT
Start: 2023-08-07 | End: 2023-08-07

## 2023-08-07 RX ORDER — CALCIUM GLUCONATE 100 MG/ML
2 VIAL (ML) INTRAVENOUS DAILY
Refills: 0 | Status: DISCONTINUED | OUTPATIENT
Start: 2023-08-07 | End: 2023-08-07

## 2023-08-07 RX ORDER — DIPHENHYDRAMINE HCL 50 MG
25 CAPSULE ORAL DAILY
Refills: 0 | Status: DISCONTINUED | OUTPATIENT
Start: 2023-08-07 | End: 2023-08-07

## 2023-08-07 RX ORDER — CHLORHEXIDINE GLUCONATE 213 G/1000ML
1 SOLUTION TOPICAL
Refills: 0 | Status: DISCONTINUED | OUTPATIENT
Start: 2023-08-07 | End: 2023-08-14

## 2023-08-07 RX ORDER — DIPHENHYDRAMINE HCL 50 MG
25 CAPSULE ORAL DAILY
Refills: 0 | Status: COMPLETED | OUTPATIENT
Start: 2023-08-07 | End: 2023-08-12

## 2023-08-07 RX ORDER — POTASSIUM CHLORIDE 20 MEQ
40 PACKET (EA) ORAL ONCE
Refills: 0 | Status: COMPLETED | OUTPATIENT
Start: 2023-08-07 | End: 2023-08-07

## 2023-08-07 RX ORDER — POTASSIUM CHLORIDE 20 MEQ
10 PACKET (EA) ORAL
Refills: 0 | Status: COMPLETED | OUTPATIENT
Start: 2023-08-07 | End: 2023-08-07

## 2023-08-07 RX ORDER — SODIUM CHLORIDE 9 MG/ML
1000 INJECTION INTRAMUSCULAR; INTRAVENOUS; SUBCUTANEOUS
Refills: 0 | Status: DISCONTINUED | OUTPATIENT
Start: 2023-08-07 | End: 2023-08-09

## 2023-08-07 RX ADMIN — Medication 400 MILLIGRAM(S): at 15:01

## 2023-08-07 RX ADMIN — SODIUM CHLORIDE 75 MILLILITER(S): 9 INJECTION INTRAMUSCULAR; INTRAVENOUS; SUBCUTANEOUS at 16:36

## 2023-08-07 RX ADMIN — FAMOTIDINE 20 MILLIGRAM(S): 10 INJECTION INTRAVENOUS at 15:00

## 2023-08-07 RX ADMIN — Medication 40 MILLIEQUIVALENT(S): at 22:01

## 2023-08-07 RX ADMIN — Medication 650 MILLIGRAM(S): at 23:02

## 2023-08-07 RX ADMIN — MORPHINE SULFATE 2 MILLIGRAM(S): 50 CAPSULE, EXTENDED RELEASE ORAL at 19:10

## 2023-08-07 RX ADMIN — Medication 100 MILLIEQUIVALENT(S): at 16:37

## 2023-08-07 RX ADMIN — Medication 650 MILLIGRAM(S): at 22:02

## 2023-08-07 RX ADMIN — Medication 200 GRAM(S): at 18:00

## 2023-08-07 RX ADMIN — Medication 125 MILLIGRAM(S): at 15:00

## 2023-08-07 RX ADMIN — Medication 25 MILLIGRAM(S): at 22:02

## 2023-08-07 RX ADMIN — MORPHINE SULFATE 2 MILLIGRAM(S): 50 CAPSULE, EXTENDED RELEASE ORAL at 18:58

## 2023-08-07 NOTE — PROGRESS NOTE ADULT - SUBJECTIVE AND OBJECTIVE BOX
CC:    HPI: Patient is a 42 Y/O male who was recently D/C with TTP.  He went through a course of plasmapheresis exchanges and steroids. He was D/C Home with a platelet coung in the 390s on 8/4 and today it is 3K and he has hematuria.  He will have a shiley placed and have restart plasmapheresis today.  With the plan to start Retuxan on 8/8        PAST MEDICAL & SURGICAL HISTORY:  No pertinent past medical history          FAMILY HISTORY:      Social Hx:    Allergies    No Known Allergies    Intolerances        Height (cm): 180.3 (08-07 @ 12:31)  Weight (kg): 102.058 (08-07 @ 12:31)  BMI (kg/m2): 31.4 (08-07 @ 12:31)    ICU Vital Signs Last 24 Hrs  T(C): 36.9 (07 Aug 2023 15:14), Max: 36.9 (07 Aug 2023 15:14)  T(F): 98.5 (07 Aug 2023 15:14), Max: 98.5 (07 Aug 2023 15:14)  HR: 77 (07 Aug 2023 15:05) (68 - 77)  BP: 145/100 (07 Aug 2023 15:05) (145/100 - 146/97)  BP(mean): 112 (07 Aug 2023 12:36) (112 - 112)  ABP: --  ABP(mean): --  RR: 20 (07 Aug 2023 15:05) (18 - 20)  SpO2: 100% (07 Aug 2023 15:05) (100% - 100%)    O2 Parameters below as of 07 Aug 2023 15:05  Patient On (Oxygen Delivery Method): room air                I&O's Summary                            9.1    12.49 )-----------( 3        ( 07 Aug 2023 12:56 )             26.9       08-07    140  |  106  |  35<H>  ----------------------------<  148<H>  3.3<L>   |  24  |  1.70<H>    Ca    8.9      07 Aug 2023 12:56    TPro  7.0  /  Alb  3.6  /  TBili  4.9<H>  /  DBili  x   /  AST  96<H>  /  ALT  86<H>  /  AlkPhos  79  08-07                Urinalysis Basic - ( 07 Aug 2023 12:56 )    Color: Brown / Appearance: Slightly Turbid / SG: See Note / pH: x  Gluc: 148 mg/dL / Ketone: See Note  / Bili: See Note / Urobili: See Note   Blood: x / Protein: See Note / Nitrite: See Note   Leuk Esterase: See Note / RBC: 3-5 /HPF / WBC 0-2 /HPF   Sq Epi: x / Non Sq Epi: x / Bacteria: Few        MEDICATIONS  (STANDING):  calcium gluconate IVPB 2 Gram(s) IV Intermittent once  chlorhexidine 4% Liquid 1 Application(s) Topical <User Schedule>  potassium chloride  10 mEq/100 mL IVPB 10 milliEquivalent(s) IV Intermittent every 1 hour  sodium chloride 0.9%. 1000 milliLiter(s) (75 mL/Hr) IV Continuous <Continuous>    MEDICATIONS  (PRN):      DVT Prophylaxis: V    Advanced Directives:  Discussed with:    Visit Information: 30 min    ** Time is exclusive of billed procedures and/or teaching and/or routine family updates.

## 2023-08-07 NOTE — PROCEDURE NOTE - ADDITIONAL PROCEDURE DETAILS
Catheter placed in setting of TTP for plasmapheresis. Procedure performed independent of critical care time.

## 2023-08-07 NOTE — ED PROVIDER NOTE - PHYSICAL EXAMINATION
Constitutional: NAD AOx3  Eyes: PERRL EOMI  Head: Normocephalic atraumatic  Mouth: MMM  Cardiac: regular rate and rhythm  Resp: Lungs CTAB  GI: Abd s/nd/nt  Neuro: CN2-12 grossly intact, XIONG x 4  Skin: No visible rashes

## 2023-08-07 NOTE — PROGRESS NOTE ADULT - ASSESSMENT
A/P: 43 male presenting with TTP and again with ELIF.  Hematuria    Plan:  ICU  PO Diet  Place Beto  Will Have Plasmapheresis Tonight  NS at 75  Replace K+  Ca++ Gluconate for plasmapheresis  Venodynes  Steroids  Rituxan on 8/8

## 2023-08-07 NOTE — H&P ADULT - NSHPLABSRESULTS_GEN_ALL_CORE
LABS:    CBC Full  -  ( 08 Aug 2023 05:50 )  WBC Count : 10.96 K/uL  RBC Count : 2.52 M/uL  Hemoglobin : 7.5 g/dL  Hematocrit : 22.7 %  Platelet Count - Automated : 7 K/uL  Mean Cell Volume : 90.1 fl  Mean Cell Hemoglobin : 29.8 pg  Mean Cell Hemoglobin Concentration : 33.0 gm/dL  Auto Neutrophil # : x  Auto Lymphocyte # : x  Auto Monocyte # : x  Auto Eosinophil # : x  Auto Basophil # : x  Auto Neutrophil % : x  Auto Lymphocyte % : x  Auto Monocyte % : x  Auto Eosinophil % : x  Auto Basophil % : x    08-08    139  |  107  |  34<H>  ----------------------------<  190<H>  4.4   |  27  |  1.49<H>    Ca    8.3<L>      08 Aug 2023 05:50  Phos  3.4     08-08  Mg     2.2     08-08    TPro  7.0  /  Alb  3.6  /  TBili  4.9<H>  /  DBili  x   /  AST  96<H>  /  ALT  86<H>  /  AlkPhos  79  08-07    PT/INR - ( 07 Aug 2023 17:35 )   PT: 24.7 sec;   INR: 2.24 ratio         PTT - ( 07 Aug 2023 17:35 )  PTT:36.8 sec  Urinalysis Basic - ( 08 Aug 2023 05:50 )    Color: x / Appearance: x / SG: x / pH: x  Gluc: 190 mg/dL / Ketone: x  / Bili: x / Urobili: x   Blood: x / Protein: x / Nitrite: x   Leuk Esterase: x / RBC: x / WBC x   Sq Epi: x / Non Sq Epi: x / Bacteria: x      CAPILLARY BLOOD GLUCOSE            LIVER FUNCTIONS - ( 07 Aug 2023 12:56 )  Alb: 3.6 g/dL / Pro: 7.0 gm/dL / ALK PHOS: 79 U/L / ALT: 86 U/L / AST: 96 U/L / GGT: x

## 2023-08-07 NOTE — ED ADULT NURSE NOTE - NSFALLUNIVINTERV_ED_ALL_ED
Bed/Stretcher in lowest position, wheels locked, appropriate side rails in place/Call bell, personal items and telephone in reach/Instruct patient to call for assistance before getting out of bed/chair/stretcher/Non-slip footwear applied when patient is off stretcher/[place] to call system/Physically safe environment - no spills, clutter or unnecessary equipment/Purposeful proactive rounding/Room/bathroom lighting operational, light cord in reach

## 2023-08-07 NOTE — CONSULT NOTE ADULT - ASSESSMENT
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 1 week and came to ED on 7/29 Dx'ed and treated for acute Immune TTP. D/c'ed on 8/2/23, on 40 BID Prednisone and Folic acid. Platelets noted to be 3K    # TTP    - Recent admission 7/29-8/2  ED with chest pain, and noted with anemia and thrombocytopenia, peripheral smear at presentation c/w large amount of schistocytes  - Confirmatory [id number]- <2 c/w TTP caused by severely deficient activity of the FOBTFR21 clinically defined as an activity level <10 percent  - Received Dex 40mg x1 dose 07/28 ----> methylprednisolone 125mg IV q 6 hrs daily for 2 days 07/30 & 07/31  - 8/1/23 started PO Prednisone 50 mg PO BID x two days (8/1 & 8/2), then Prednisone 40 mg PO BID x 1 week (Ordered)  - consider Rituxan +/- caplacizumab --->await ADAMTS-13 for definitive Dx,   - Hep B surface antigen & antibody, core antibody-Negative   - s/p plasmapheresis x 4 days pheresis- 7/31/23-8/3/23. Platelets recovered after 2 days of pheresis.    - Blood bank notified for the need for 19 plasma units., and [facility] requested for the plasmapheresis  - d/w Dr. Lee, one of the ICU PAs to shortly place a line, [facility]   - Calcium gluconate ordered (for hypocalcemia with pheresis)  - Folic Acid 2mg QD added   -Tylenol 650 mg PO and  Benadryl 50mg IV QD prior to each exchange.  - continue to trend serial CBCs and daily LDH  - Continue close monitoring and supportive measures.  - Plan to start Rituximab later this week. 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 1 week and came to ED on 7/29 Dx'ed and treated for acute Immune TTP. D/c'ed on 8/2/23, on 40 BID Prednisone and Folic acid. Platelets noted to be 3K    # TTP    - Recent admission 7/29-8/2  ED with chest pain, and noted with anemia and thrombocytopenia, peripheral smear at presentation c/w large amount of schistocytes  - Confirmatory [id number]- <2 c/w TTP caused by severely deficient activity of the VFGPOV68 clinically defined as an activity level <10 percent  - Received Dex 40mg x1 dose 07/28 ----> methylprednisolone 125mg IV q 6 hrs daily for 2 days 07/30 & 07/31  - 8/1/23 started PO Prednisone 50 mg PO BID x two days (8/1 & 8/2), then Prednisone 40 mg PO BID x 1 week (Ordered)  - consider Rituxan +/- caplacizumab --->await ADAMTS-13 for definitive Dx,   - Hep B surface antigen & antibody, core antibody-Negative   - s/p plasmapheresis x 4 days pheresis- 7/31/23-8/3/23. Platelets recovered after 2 days of pheresis.    - Blood bank notified for the need for 5000 ml plasma ., and [facility] requested for the plasmapheresis  - d/w Dr. Lee, one of the ICU PAs to shortly place a line, [facility]   - Calcium gluconate ordered (for hypocalcemia with pheresis)  - Folic Acid 2mg QD added   -Tylenol 650 mg PO and  Benadryl 50mg IV QD prior to each exchange.  - continue to trend serial CBCs and daily LDH  - Continue close monitoring and supportive measures.  - Plan to start Rituximab later this week.    Addendum Hem onc attending.  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 On Friday plts were 396.  Now with acute relapse - plts down to 3 K.  Increase steroids- solumedrol 125 mg q 12 x 2 days , next change back to oral prednisone.  Start urgent plasmapheresis daily today- exchange one plasma volume daily ( 5000 ml ) .  ADAMTS results are back < 2 confirmed diagnosis of immune TTP.  Will  start Rituxan tomorrow.    D/w ED attending, ICU attending, spoke with  [facility] Pheresis services and spoke with [facility] blood bank.  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 1 week and came to ED on 7/29 Dx'ed and treated for acute Immune TTP. D/c'ed on 8/2/23, on 40 BID Prednisone and Folic acid with close outpatient follow up, with platelets almost 400K, but then noted hematuria and fatigue presented to ED and platelets today noted to be 3K.    # TTP    - Recent admission 7/29-8/2  ED with chest pain, and noted with anemia and thrombocytopenia, peripheral smear at presentation c/w large amount of schistocytes  - Confirmatory [id number]- <2 c/w TTP caused by severely deficient activity of the EDHMYZ05 clinically defined as an activity level <10 percent  - Received Dex 40mg x1 dose 07/28 ----> methylprednisolone 125mg IV q 6 hrs daily for 2 days 07/30 & 07/31  - 8/1/23 started PO Prednisone 50 mg PO BID x two days (8/1 & 8/2), then Prednisone 40 mg PO BID x 1 week (Ordered)  - consider Rituxan +/- caplacizumab --->await ADAMTS-13 for definitive Dx,   - Hep B surface antigen & antibody, core antibody-Negative   - s/p plasmapheresis x 4 days pheresis- 7/31/23-8/3/23. Platelets recovered after 2 days of pheresis.    - Blood bank notified for the need for 5000 ml plasma ., and [facility] requested for the plasmapheresis  - d/w Dr. Lee, one of the ICU PAs to shortly place a line, [facility]   - Calcium gluconate ordered (for hypocalcemia with pheresis)  - Folic Acid 2mg QD added   -Tylenol 650 mg PO and  Benadryl 50mg IV QD prior to each exchange.  - continue to trend serial CBCs and daily LDH  - Continue close monitoring and supportive measures.  - Plan to start Rituximab later this week.    Addendum Hem onc attending.  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 On Friday plts were 396.  Now with acute relapse - plts down to 3 K.  Increase steroids- solumedrol 125 mg q 12 x 2 days , next change back to oral prednisone.  Start urgent plasmapheresis daily today- exchange one plasma volume daily ( 5000 ml ) .  ADAMTS results are back < 2 confirmed diagnosis of immune TTP.  Will  start Rituxan tomorrow.    D/w ED attending, ICU attending, spoke with  [facility] services and spoke with [facility] blood bank.

## 2023-08-07 NOTE — ED PROVIDER NOTE - PROGRESS NOTE
Verbal/written post procedure instructions were given to patient/caregiver./Instructed patient/caregiver to follow-up with primary care physician./Instructed patient/caregiver regarding signs and symptoms of infection.
Stable.

## 2023-08-07 NOTE — PROCEDURE NOTE - NSPOSTPRCRAD_GEN_A_CORE
Catheter was visualized in subclavian vein on CXR. Line was repositioned and is now located in SVC/post-procedure radiography performed
central line located in the/central line located in the superior vena cava/no pneumothorax/post procedure radiography not performed

## 2023-08-07 NOTE — PATIENT PROFILE ADULT - FALL HARM RISK - HARM RISK INTERVENTIONS
Communicate Risk of Fall with Harm to all staff/Reinforce activity limits and safety measures with patient and family/Tailored Fall Risk Interventions/Visual Cue: Yellow wristband and red socks/Bed in lowest position, wheels locked, appropriate side rails in place/Call bell, personal items and telephone in reach/Instruct patient to call for assistance before getting out of bed or chair/Non-slip footwear when patient is out of bed/[place] to call system/Physically safe environment - no spills, clutter or unnecessary equipment/Purposeful Proactive Rounding/Room/bathroom lighting operational, light cord in reach Assistance with ambulation/Assistance OOB with selected safe patient handling equipment/Communicate Risk of Fall with Harm to all staff/Discuss with provider need for PT consult/Monitor gait and stability/Provide patient with walking aids - walker, cane, crutches/Reinforce activity limits and safety measures with patient and family/Tailored Fall Risk Interventions/Visual Cue: Yellow wristband and red socks/Bed in lowest position, wheels locked, appropriate side rails in place/Call bell, personal items and telephone in reach/Instruct patient to call for assistance before getting out of bed or chair/Non-slip footwear when patient is out of bed/[place] to call system/Physically safe environment - no spills, clutter or unnecessary equipment/Purposeful Proactive Rounding/Room/bathroom lighting operational, light cord in reach

## 2023-08-07 NOTE — H&P ADULT - HISTORY OF PRESENT ILLNESS
S:    Pt seen and examined  HD # 1  FULL CODE  PMHx TTP (recent Dx)  43M  recently D/C with TTP.  He went through a course of plasmapheresis exchanges and steroids.   He was D/C Home 72h ago with a platelet count in the 390s on 8/4 and  came in today c/o ongoing abdominal discomfort, fatigue, SOB/CP with exertion     today it is 3K and he has hematuria.  He will have a shiley placed and have restart plasmapheresis today.  With the plan to start Retuxan on 8/8 8/8: Plt 3k. Shileo to be placed, pex initiated.

## 2023-08-07 NOTE — ED PROVIDER NOTE - NS ED MD DISPO SPECIAL CONSIDERATION1
.[facility]  Delivery Note    Larry Hdz Patient Status:      2024 MRN [id number]   Location [facility] NICU Attending Nanette Marcus,    Hosp Day # 0 PCP No primary care provider on file.     Date of Admission:  2022    HPI:  Larry Hdz is a(n) Weight: (!) 1760 g (Filed from Delivery Summary) male infant.    Date of Delivery: 2022  Time of Delivery: 5:05 AM  Delivery Type: Vaginal, Spontaneous    Maternal Information:  Information for the patient's mother:  IsaelTaylor rodriguez [[id number]]   30 year old     Information for the patient's mother:  IsaelTaylor rodriguez [[id number]]          Pertinent Maternal Prenatal Labs:  Information for the patient's mother:  Wilder Taylor [[id number]]     Recent Labs   Lab 22  1445 22  0911 22  1039   HIV Antigen/ Antibody Combo Screen  --  Nonreactive Nonreactive   RPR  --  Nonreactive Nonreactive   Neisseria gonorrhoeae by Nucleic Acid Amplification Negative  --   --    Chlamydia trachomatis by Nucleic Acid Amplification Negative  --   --    Rubella Antibody, IgG  --   --  47.9        Information for the patient's mother:  IsaelTaylor rodriguez [[id number]]   No results for input(s): CULT, SDES in the last 8765 hours.       Information for the patient's mother:  Isaelmichael Taylor [[id number]]     Recent Labs   Lab 22  1445 22  0831 22  0911 22  1039   GBS  --  Negative for  Streptococcus agalactiae (Strep group B)  --   --    HIV Antigen/ Antibody Combo Screen  --   --  Nonreactive Nonreactive   Hepatitis B Surface Antigen  --   --   --  Negative   RPR  --   --  Nonreactive Nonreactive   Neisseria gonorrhoeae by Nucleic Acid Amplification Negative  --   --   --    Chlamydia trachomatis by Nucleic Acid Amplification Negative  --   --   --    Rubella Antibody, IgG  --   --   --  47.9           Pregnancy/ Complications: Neonatologist asked to attend this delivery by  obstetrician due to vaginal delivery of 33w 1d infant.    Mother PPROM on .  BMZ -.  Placed on latency antibiotics amp/azithro IV 48H then PO azithro/amox for 5D. No maternal fever and infant well appearing on monitor.  GBS negative.  Progressed to labor .      Rupture Date: 2022  Rupture Time: 3:30 AM  Rupture Type: Spontaneous  Fluid Color: Clear  Induction:    Augmentation: Oxytocin  Complications:      Apgars:   1 minute: 8                5 minutes:9               Resuscitation: Infant was vigorous after delivery, TCC of 30 seconds, infant was dried, orally and deep suctioned and stimulated.  CPAP +5 21-30% to support respirations. No other resuscitation was required, transitioned well to extrauterine life. Transported to NICU on CPAP +5 21-30%.      Physical Exam:  Birth Weight: Weight: (!) 1760 g (Filed from Delivery Summary)    Gen:  Awake, alert, appropriate, in no apparent distress  Skin:   Intact, No rashes, no jaundice  HEENT:  AFOSF, neck supple, no nasal flaring, oral mucous membranes moist; left ear tag.  Lungs:  Coarse equal air entry, no retractions, no increased WOB  Chest:  S1, S2 no murmur  Abd:  Soft, nontender, nondistended, no HSM, no masses  Ext:  Peripheral pulses equal bilaterally, no clicks  Neuro: +grasp, equal keith, good tone, no focal deficits  Spine:  No sacral dimples  Hips:  No hip clicks   MSK:  Moves all four extremities appropriately  :   male.  Testicles high riding in scrotum.    Assessment:  AGA  male at 33 1/7   Delivery via   PPROM , GBS neg, no maternal fever, received latency antibiotics  RDS, s/p BMZ x 2    Plan:  Admit to NICU  Parents updated after delivery    Nanette Marcus DO     None

## 2023-08-07 NOTE — ED PROVIDER NOTE - PROGRESS NOTE DETAILS
d/w dr alexandra, recommends solumedrol, plasmapharesis and admission.  updated patient of plan. MD FINN platelets 3, needs central ine for plasmapharesis, was admitted to ICU on last admission for same. discussed with ICU PA for consult and admit. MD FINN platelets 3, needs central ine for plasmapharesis, was admitted to ICU on last admission for same. discussed with ICU MARGIE Arambula for consult and admit. MD FINN

## 2023-08-07 NOTE — ED ADULT TRIAGE NOTE - CHIEF COMPLAINT QUOTE
chest pain since 1 hr PTA. midsternal non-radiating. also c/o SOB, GONZALEZ. recently hospitalized for chest pain due to microthrombus 2 weeks ago.

## 2023-08-07 NOTE — ED ADULT NURSE NOTE - SUICIDE SCREENING DEPRESSION
Called and spoke with pt's other, and she has been advised and states understanding of medication being to sent to pharmacy.
Low dose sertraline is what I think is the best next step. It might not show any effects initially but starting at such a low dose can decrease side effects risks.  I'd like to have the patient keep her follow up appt
Pt's mom, Jr Harris called to advise Dr. Cynthia Hernandez pt is ready to start her new medication and is still using [address] at [facility]. Please advise at [phone].   Ldm
Negative

## 2023-08-07 NOTE — H&P ADULT - ASSESSMENT
A:    43M  HD # 1  FULL CODE    Here for:    1. TTP  2. Thrombocytopenia 2/2 above  3. ELIF ? CKD  4. Anemia    This patient requires high level of care for support of one or more vital organ systems    P:    TTP, Plt drop > 300 to 3 in 5 days  Complicated by anemia, renal dysfxn    ADAMS13 test suggests TTP    Needs urgent PEX; will place catheter  NY[facility] aware  Hematology (Mann) involved  Steroids, add'l Tx/disease modifying Tx per heme (rituxan, etc)  IV hydration  Dash diet  No AC at this time 2/2 thrombocytopenia  OOB as able    Dispo: ICU admit. Venous access, PEX.     TOTAL CARE TIME: 55+ minutes  (EXCLUSIVE of any non bundled procedures)

## 2023-08-07 NOTE — ED CLERICAL - NS ED CLERK PCP INFORMATION
What Type Of Note Output Would You Prefer (Optional)?: Standard Output How Severe Are Your Spot(S)?: mild Have Your Spot(S) Been Treated In The Past?: has not been treated Hpi Title: Evaluation of Skin Lesions Yes Additional History: Patient is being seen today for a upper body skin check only. Patient states since last visit, Patient saw Dr. 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

## 2023-08-07 NOTE — ED ADULT NURSE NOTE - OBJECTIVE STATEMENT
Pt arrives to ED complaining of chest pain worse on exertion. Described as "chest pressure." Pt recently diagnosed with ITP two weeks ago. Pt states he started to have hematuria this morning. Described generalized weakness, fatigue. denies other medical history. alert and oriented x 4. ambulatory.

## 2023-08-07 NOTE — PHARMACOTHERAPY INTERVENTION NOTE - COMMENTS
Medication reconciliation completed.  Reviewed Medication list and confirmed med allergies with patient; confirmed with Dr. First Medyamila.

## 2023-08-07 NOTE — ED PROVIDER NOTE - OBJECTIVE STATEMENT
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 1 week and came to ED on 7/29 Dx'ed and treated for acute Immune TTP. D/c'ed on 8/2/23, on 40bid prednisone and folic acid. Was feeling well until yesterday when sxs recurred. Also had hematuria. Colleen Jarquin.

## 2023-08-08 ENCOUNTER — APPOINTMENT (OUTPATIENT)
Dept: HEMATOLOGY ONCOLOGY | Facility: CLINIC | Age: 43
End: 2023-08-08

## 2023-08-08 LAB
ANION GAP SERPL CALC-SCNC: 5 MMOL/L — SIGNIFICANT CHANGE UP (ref 5–17)
BUN SERPL-MCNC: 34 MG/DL — HIGH (ref 7–23)
CALCIUM SERPL-MCNC: 8.3 MG/DL — LOW (ref 8.5–10.1)
CHLORIDE SERPL-SCNC: 107 MMOL/L — SIGNIFICANT CHANGE UP (ref 96–108)
CO2 SERPL-SCNC: 27 MMOL/L — SIGNIFICANT CHANGE UP (ref 22–31)
CREAT SERPL-MCNC: 1.49 MG/DL — HIGH (ref 0.5–1.3)
CULTURE RESULTS: SIGNIFICANT CHANGE UP
EGFR: 59 ML/MIN/1.73M2 — LOW
GLUCOSE SERPL-MCNC: 190 MG/DL — HIGH (ref 70–99)
HCT VFR BLD CALC: 22.7 % — LOW (ref 39–50)
HCT VFR BLD CALC: 24 % — LOW (ref 39–50)
HGB BLD-MCNC: 7.5 G/DL — LOW (ref 13–17)
HGB BLD-MCNC: 7.8 G/DL — LOW (ref 13–17)
MAGNESIUM SERPL-MCNC: 2.2 MG/DL — SIGNIFICANT CHANGE UP (ref 1.6–2.6)
MCHC RBC-ENTMCNC: 29.8 PG — SIGNIFICANT CHANGE UP (ref 27–34)
MCHC RBC-ENTMCNC: 30.7 PG — SIGNIFICANT CHANGE UP (ref 27–34)
MCHC RBC-ENTMCNC: 32.5 GM/DL — SIGNIFICANT CHANGE UP (ref 32–36)
MCHC RBC-ENTMCNC: 33 GM/DL — SIGNIFICANT CHANGE UP (ref 32–36)
MCV RBC AUTO: 90.1 FL — SIGNIFICANT CHANGE UP (ref 80–100)
MCV RBC AUTO: 94.5 FL — SIGNIFICANT CHANGE UP (ref 80–100)
NRBC # BLD: 16 /100 WBCS — HIGH (ref 0–0)
NRBC # BLD: 21 /100 WBCS — HIGH (ref 0–0)
PHOSPHATE SERPL-MCNC: 3.4 MG/DL — SIGNIFICANT CHANGE UP (ref 2.5–4.5)
PLATELET # BLD AUTO: 21 K/UL — LOW (ref 150–400)
PLATELET # BLD AUTO: 7 K/UL — CRITICAL LOW (ref 150–400)
POTASSIUM SERPL-MCNC: 4.4 MMOL/L — SIGNIFICANT CHANGE UP (ref 3.5–5.3)
POTASSIUM SERPL-SCNC: 4.4 MMOL/L — SIGNIFICANT CHANGE UP (ref 3.5–5.3)
RBC # BLD: 2.52 M/UL — LOW (ref 4.2–5.8)
RBC # BLD: 2.54 M/UL — LOW (ref 4.2–5.8)
RBC # FLD: 27.6 % — HIGH (ref 10.3–14.5)
RBC # FLD: 29.3 % — HIGH (ref 10.3–14.5)
SODIUM SERPL-SCNC: 139 MMOL/L — SIGNIFICANT CHANGE UP (ref 135–145)
SPECIMEN SOURCE: SIGNIFICANT CHANGE UP
WBC # BLD: 10.96 K/UL — HIGH (ref 3.8–10.5)
WBC # BLD: 12.49 K/UL — HIGH (ref 3.8–10.5)
WBC # FLD AUTO: 10.96 K/UL — HIGH (ref 3.8–10.5)
WBC # FLD AUTO: 12.49 K/UL — HIGH (ref 3.8–10.5)

## 2023-08-08 PROCEDURE — 99233 SBSQ HOSP IP/OBS HIGH 50: CPT

## 2023-08-08 PROCEDURE — 99291 CRITICAL CARE FIRST HOUR: CPT

## 2023-08-08 RX ORDER — CALCIUM GLUCONATE 100 MG/ML
2 VIAL (ML) INTRAVENOUS ONCE
Refills: 0 | Status: COMPLETED | OUTPATIENT
Start: 2023-08-08 | End: 2023-08-08

## 2023-08-08 RX ORDER — DIPHENHYDRAMINE HCL 50 MG
25 CAPSULE ORAL ONCE
Refills: 0 | Status: COMPLETED | OUTPATIENT
Start: 2023-08-08 | End: 2023-08-08

## 2023-08-08 RX ORDER — ACETAMINOPHEN 500 MG
650 TABLET ORAL ONCE
Refills: 0 | Status: COMPLETED | OUTPATIENT
Start: 2023-08-08 | End: 2023-08-08

## 2023-08-08 RX ORDER — RITUXIMAB 10 MG/ML
825 INJECTION, SOLUTION INTRAVENOUS ONCE
Refills: 0 | Status: COMPLETED | OUTPATIENT
Start: 2023-08-08 | End: 2023-08-08

## 2023-08-08 RX ORDER — DIPHENHYDRAMINE HCL 50 MG
50 CAPSULE ORAL ONCE
Refills: 0 | Status: COMPLETED | OUTPATIENT
Start: 2023-08-08 | End: 2023-08-10

## 2023-08-08 RX ADMIN — Medication 25 MILLIGRAM(S): at 02:45

## 2023-08-08 RX ADMIN — Medication 125 MILLIGRAM(S): at 14:11

## 2023-08-08 RX ADMIN — Medication 125 MILLIGRAM(S): at 02:37

## 2023-08-08 RX ADMIN — Medication 25 MILLIGRAM(S): at 13:04

## 2023-08-08 RX ADMIN — Medication 650 MILLIGRAM(S): at 13:40

## 2023-08-08 RX ADMIN — Medication 650 MILLIGRAM(S): at 13:10

## 2023-08-08 RX ADMIN — RITUXIMAB 137.5 MILLIGRAM(S): 10 INJECTION, SOLUTION INTRAVENOUS at 13:58

## 2023-08-08 RX ADMIN — Medication 25 MILLIGRAM(S): at 16:56

## 2023-08-08 RX ADMIN — Medication 25 MILLIGRAM(S): at 09:48

## 2023-08-08 RX ADMIN — SODIUM CHLORIDE 75 MILLILITER(S): 9 INJECTION INTRAMUSCULAR; INTRAVENOUS; SUBCUTANEOUS at 21:31

## 2023-08-08 RX ADMIN — CHLORHEXIDINE GLUCONATE 1 APPLICATION(S): 213 SOLUTION TOPICAL at 09:47

## 2023-08-08 RX ADMIN — Medication 200 GRAM(S): at 09:50

## 2023-08-08 NOTE — PROGRESS NOTE ADULT - SUBJECTIVE AND OBJECTIVE BOX
HPI: Patient is a 42 Y/O male who was recently D/C with TTP.  He went through a course of plasmapheresis exchanges and steroids. He was D/C Home with a platelet coung in the 390s on 8/4 and today it is 3K and he has hematuria.  He will have a shiley placed and have restart plasmapheresis today.  With the plan to start Retuxan on 8/8 8/8: platelet count 7K today, S/P Plasmapheresis again today, for Rituxan today        PAST MEDICAL & SURGICAL HISTORY:  No pertinent past medical history          FAMILY HISTORY:      Social Hx:    Allergies    No Known Allergies    Intolerances            ICU Vital Signs Last 24 Hrs  T(C): 36.9 (08 Aug 2023 13:00), Max: 36.9 (07 Aug 2023 15:14)  T(F): 98.5 (08 Aug 2023 13:00), Max: 98.5 (07 Aug 2023 15:14)  HR: 88 (08 Aug 2023 13:00) (72 - 102)  BP: 119/81 (08 Aug 2023 13:00) (102/55 - 150/91)  BP(mean): 93 (08 Aug 2023 13:00) (63 - 114)  ABP: --  ABP(mean): --  RR: 18 (08 Aug 2023 12:00) (12 - 20)  SpO2: 98% (08 Aug 2023 13:00) (97% - 100%)    O2 Parameters below as of 08 Aug 2023 12:00  Patient On (Oxygen Delivery Method): room air                I&O's Summary    07 Aug 2023 07:01  -  08 Aug 2023 07:00  --------------------------------------------------------  IN: 1092 mL / OUT: 800 mL / NET: 292 mL    08 Aug 2023 07:01  -  08 Aug 2023 13:36  --------------------------------------------------------  IN: 5236 mL / OUT: 775 mL / NET: 4461 mL                              7.5    10.96 )-----------( 7        ( 08 Aug 2023 05:50 )             22.7       08-08    139  |  107  |  34<H>  ----------------------------<  190<H>  4.4   |  27  |  1.49<H>    Ca    8.3<L>      08 Aug 2023 05:50  Phos  3.4     08-08  Mg     2.2     08-08    TPro  7.0  /  Alb  3.6  /  TBili  4.9<H>  /  DBili  x   /  AST  96<H>  /  ALT  86<H>  /  AlkPhos  79  08-07                Urinalysis Basic - ( 08 Aug 2023 05:50 )    Color: x / Appearance: x / SG: x / pH: x  Gluc: 190 mg/dL / Ketone: x  / Bili: x / Urobili: x   Blood: x / Protein: x / Nitrite: x   Leuk Esterase: x / RBC: x / WBC x   Sq Epi: x / Non Sq Epi: x / Bacteria: x        MEDICATIONS  (STANDING):  chlorhexidine 4% Liquid 1 Application(s) Topical <User Schedule>  diphenhydrAMINE Injectable 25 milliGRAM(s) IV Push daily  methylPREDNISolone sodium succinate Injectable 125 milliGRAM(s) IV Push every 12 hours  riTUXimab IVPB (Non - oncologic) 825 milliGRAM(s) IV Intermittent once  sodium chloride 0.9%. 1000 milliLiter(s) (75 mL/Hr) IV Continuous <Continuous>    MEDICATIONS  (PRN):  diphenhydrAMINE Injectable 50 milliGRAM(s) IntraMuscular once PRN Rash and/or Itching      DVT Prophylaxis:    Advanced Directives:  Discussed with:    Visit Information: 30 min    ** Time is exclusive of billed procedures and/or teaching and/or routine family updates.

## 2023-08-08 NOTE — PROGRESS NOTE ADULT - ASSESSMENT
Reported Hct results of 28.8 to Dr Avani Cabrera see if can add retic to prev Hct sample. A/P: 43 male presenting with TTP and again with ELIF.  Hematuria    Plan:  ICU  PO Diet  CBC after PLEX  Will need Shiley removed  Then tunnelled cath after platelet count 50K or greater  Will likely need AC once OK ed by HEM  NS at 75  Ca++ Gluconate for plasmapheresis  Venodynes  Steroids

## 2023-08-08 NOTE — PROGRESS NOTE ADULT - SUBJECTIVE AND OBJECTIVE BOX
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 He received plasmapheresis and steroids for acute immune mediated TTP and d/c'ed 08/02 with close Heme/Onc follow up with attending Dr Darnell. His Plts were improving as of Friday 08/04 at 400K but then dropped down to approx 3K Monday 08/07 and referred back to the hospital.    8/7/2023- Seen at bedside, in no acute distress. Reports feeling well until yesterday  with tiredness and hematuria x 1 day    08/08/2023: Seen at bedside, no acute distress; s/p 1st pheresis tx this admission       PAST MEDICAL & SURGICAL HISTORY:    HLD      FAMILY HISTORY:    No known blood disorders      MEDICATIONS  (STANDING):    chlorhexidine 4% Liquid 1 Application(s) Topical <User Schedule>  diphenhydrAMINE Injectable 25 milliGRAM(s) IV Push daily  methylPREDNISolone sodium succinate Injectable 125 milliGRAM(s) IV Push every 12 hours  sodium chloride 0.9%. 1000 milliLiter(s) (75 mL/Hr) IV Continuous <Continuous>      MEDICATIONS  (PRN):    diphenhydrAMINE Injectable 50 milliGRAM(s) IntraMuscular once PRN Rash and/or Itching      ALLERGIES:    No Known Allergies      REVIEW OF SYSTEMS:    Constitutional, Eyes, ENT, Cardiovascular, Respiratory, Gastrointestinal, Genitourinary, Musculoskeletal, Integumentary, Neurological, Psychiatric, Endocrine, Heme/Lymph and Allergic/Immunologic review of systems are otherwise negative except as noted in HPI.       VITALS:    ICU Vital Signs Last 24 Hrs  T(C): 36.7 (08 Aug 2023 08:00), Max: 36.9 (07 Aug 2023 15:14)  T(F): 98 (08 Aug 2023 08:00), Max: 98.5 (07 Aug 2023 15:14)  HR: 98 (08 Aug 2023 11:00) (68 - 102)  BP: 115/84 (08 Aug 2023 11:00) (112/86 - 150/91)  BP(mean): 94 (08 Aug 2023 11:00) (90 - 114)  ABP: --  ABP(mean): --  RR: 18 (08 Aug 2023 11:00) (12 - 20)  SpO2: 100% (08 Aug 2023 11:00) (97% - 100%)    O2 Parameters below as of 08 Aug 2023 11:00  Patient On (Oxygen Delivery Method): room air      PHYSICAL:    Constitutional: no acute distress  Eyes: no conjunctival infection, anicteric.   ENT: pharynx is unremarkable  Neck: supple without JVD  Pulmonary: clear to auscultation bilaterally  Cardiac: RRR  Vascular: no calf tenderness, venous stasis changes, varices  Abdomen: normoactive bowel sounds, soft and nontender, no hepatosplenomegaly or masses appreciated  Lymphatic: no peripheral adenopathy appreciated  Musculoskeletal: full range of motion and no deformities appreciated  Skin: normal appearance, no rash/erythema   Neurology: grossly intact  Psychiatric: affect appropriate      LABS:                            7.5    10.96 )-----------( 7        ( 08 Aug 2023 05:50 )             22.7     08-08    139  |  107  |  34<H>  ----------------------------<  190<H>  4.4   |  27  |  1.49<H>    Ca    8.3<L>      08 Aug 2023 05:50  Phos  3.4     08-08  Mg     2.2     08-08    TPro  7.0  /  Alb  3.6  /  TBili  4.9<H>  /  DBili  x   /  AST  96<H>  /  ALT  86<H>  /  AlkPhos  79  08-07        RADIOLOGY & ADDITIONAL STUDIES:      US Duplex Venous Upper Ext Ltd, Right (08.07.23 @ 19:25)    IMPRESSION:  Right internal jugular vein thrombosis.        Xray Chest 1 View-PORTABLE IMMEDIATE (Xray Chest 1 View-PORTABLE IMMEDIATE .) (08.07.23 @ 18:37)    Impression:    No acute pulmonary disease.    Tip of right IJ catheter is in the superior vena cava without   pneumothorax.

## 2023-08-08 NOTE — PROGRESS NOTE ADULT - ASSESSMENT
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 Plts recently dropped suddenly from approx 400K to 3K, referred back to hospital.     # Immune Mediated TTP    - Recent admission 07/29 - 08/02  ED with chest pain, and noted with anemia and thrombocytopenia, peripheral smear at presentation c/w large amount of schistocytes  - Confirmatory [id number] deficiency <2 ; c/w TTP caused by severely deficient activity of the [id number] clinically defined as an activity level <10 %  - Received Dex 40mg x1 dose 07/28 ----> methylprednisolone 125mg IV q 6 hrs daily for 2 days 07/30 & 07/31  - 08/01 started PO Prednisone 50 mg PO BID x two days (08/01 & 08/02), then Prednisone 40 mg PO BID x 1 week  - Hep B surface antigen & antibody, core antibody all negative ----> Rituximab ordered to start 08/08 after pheresis tx ---> 1N team notified, orders faxed, consent completed  - S/p plasmapheresis x 4 days (07/31 - 08/03) during previous admission  - S/p plasmapheresis x1 day (08/07) this admission, currently pending next tx    - [facility] re-notified with plan for 5L plasma with each pheresis tx  - Beto in place, anticipating tunneled cath placement  - US revealed R IJ thrombosis   - Calcium gluconate ordered (for hypocalcemia with pheresis)  - Folic Acid 2mg QD added   - Tylenol 650 mg PO and Benadryl 50mg IV QD prior to each exchange  - Continue to trend serial CBCs  - Continue close monitoring and supportive measures      Addendum Hem onc attending.    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 On Friday plts were 396.  Now with acute relapse - plts down to 3 K.  Increase steroids- solumedrol 125 mg q 12 x 2 days , next change back to oral prednisone.  Start urgent plasmapheresis daily today- exchange one plasma volume daily ( 5000 ml ) .  ADAMTS results are back < 2 confirmed diagnosis of immune TTP.  Will start Rituxan     D/w ED attending, ICU attending, spoke with [facility] services and spoke with [facility] blood bank.  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 Plts recently dropped suddenly from approx 400K to 3K, referred back to hospital.     # Immune Mediated TTP    - Recent admission 07/29 - 08/02  ED with chest pain, and noted with anemia and thrombocytopenia, peripheral smear at presentation c/w large amount of schistocytes  - Confirmatory [id number] deficiency <2 ; c/w TTP caused by severely deficient activity of the [id number] clinically defined as an activity level <10 %  - Received Dex 40mg x1 dose 07/28 ----> methylprednisolone 125mg IV q 6 hrs daily for 2 days 07/30 & 07/31 ---> 08/01 started PO Prednisone 50 mg PO BID x two days (08/01 & 08/02)  - Currently receiving Solumedrol 125mg q 12 hrs x2 days (08/07 & 08/08) total then Prednisone 100mg QD (dose based on 1mg/kg) starting 08/09  - Hep B surface antigen & antibody, core antibody all negative ----> Rituximab ordered to start 08/08 after pheresis tx ---> 1N team notified, orders faxed, consent completed  - S/p plasmapheresis x 4 days (07/31 - 08/03) during previous admission  - S/p plasmapheresis x1 day (08/07) this admission, currently pending next tx    - [facility] re-notified with plan for 5L plasma with each pheresis tx  - Beto in place, anticipating tunneled cath placement  - US revealed R IJ thrombosis   - Calcium gluconate ordered (for hypocalcemia with pheresis)  - Folic Acid 2mg QD added   - Tylenol 650 mg PO and Benadryl 50mg IV QD prior to each exchange  - Continue to trend serial CBCs  - Continue close monitoring and supportive measures      Addendum Hem onc attending.    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 On Friday plts were 396.  Now with acute relapse - plts down to 3 K.  Increase steroids- solumedrol 125 mg q 12 x 2 days , next change back to oral prednisone.  Start urgent plasmapheresis daily today- exchange one plasma volume daily ( 5000 ml ) .  ADAMTS results are back < 2 confirmed diagnosis of immune TTP.  Will start Rituxan     D/w ED attending, ICU attending, spoke with [facility]heres[facility] services and spoke with hospital blood bank.  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 Plts recently dropped suddenly from approx 400K to 3K, referred back to hospital.     # Immune Mediated TTP    - Recent admission 07/29 - 08/02  ED with chest pain, and noted with anemia and thrombocytopenia, peripheral smear at presentation c/w large amount of schistocytes  - Confirmatory [id number] deficiency <2 ; c/w TTP caused by severely deficient activity of the [id number] clinically defined as an activity level <10 %  - Received Dex 40mg x1 dose 07/28 ----> methylprednisolone 125mg IV q 6 hrs daily for 2 days 07/30 & 07/31 ---> 08/01 started PO Prednisone 50 mg PO BID x two days (08/01 & 08/02)  - Currently receiving Solumedrol 125mg q 12 hrs x2 days (08/07 & 08/08) total then Prednisone 100mg QD (dose based on 1mg/kg) starting 08/09  - Hep B surface antigen & antibody, core antibody all negative ----> Rituximab ordered to start 08/08 after pheresis tx ---> 1N team notified, orders faxed, consent completed  - S/p plasmapheresis x 4 days (07/31 - 08/03) during previous admission  - S/p plasmapheresis x1 day (08/07) this admission, currently pending next tx    - [facility] re-notified with plan for 5L plasma with each pheresis tx  - Beto in place, anticipating tunneled cath placement  - US revealed R IJ thrombosis   - Calcium gluconate ordered (for hypocalcemia with pheresis)  - Folic Acid 2mg QD added   - Tylenol 650 mg PO and Benadryl 50mg IV QD prior to each exchange  - Continue to trend serial CBCs  - Continue close monitoring and supportive measures      Addendum Hem onc attending.  Patient seen today on morning rounds together with Hem Onc PA.   44 y/o physician diagnosed with acute TTP 8/4  Currently on daily plasmapheresis - exchange one plasma volume daily ( 5000 ml ) .  On steroids- will change to oral prednisone tomorrow.   [id number]  < 2 confirmed diagnosis of immune TTP.  Will start Rituxan today . Discussed potential adverse effects. Will give Rituxan after pheresis today.   Plan to continue pheresis daily until plts are normal for 2 days. Will have  pheresis tomorrow later in the day- spoke with [facility] pheresis supervisor and confirmed.

## 2023-08-09 ENCOUNTER — APPOINTMENT (OUTPATIENT)
Dept: HEMATOLOGY ONCOLOGY | Facility: CLINIC | Age: 43
End: 2023-08-09

## 2023-08-09 DIAGNOSIS — M31.19 OTHER THROMBOTIC MICROANGIOPATHY: ICD-10-CM

## 2023-08-09 LAB
ANION GAP SERPL CALC-SCNC: 6 MMOL/L — SIGNIFICANT CHANGE UP (ref 5–17)
BUN SERPL-MCNC: 28 MG/DL — HIGH (ref 7–23)
CALCIUM SERPL-MCNC: 8.5 MG/DL — SIGNIFICANT CHANGE UP (ref 8.5–10.1)
CHLORIDE SERPL-SCNC: 106 MMOL/L — SIGNIFICANT CHANGE UP (ref 96–108)
CO2 SERPL-SCNC: 28 MMOL/L — SIGNIFICANT CHANGE UP (ref 22–31)
CREAT SERPL-MCNC: 1.52 MG/DL — HIGH (ref 0.5–1.3)
EGFR: 57 ML/MIN/1.73M2 — LOW
GLUCOSE SERPL-MCNC: 191 MG/DL — HIGH (ref 70–99)
HCT VFR BLD CALC: 21.6 % — LOW (ref 39–50)
HCT VFR BLD CALC: 24.4 % — LOW (ref 39–50)
HGB BLD-MCNC: 6.9 G/DL — CRITICAL LOW (ref 13–17)
HGB BLD-MCNC: 7.9 G/DL — LOW (ref 13–17)
MAGNESIUM SERPL-MCNC: 2.3 MG/DL — SIGNIFICANT CHANGE UP (ref 1.6–2.6)
MCHC RBC-ENTMCNC: 30.5 PG — SIGNIFICANT CHANGE UP (ref 27–34)
MCHC RBC-ENTMCNC: 31 PG — SIGNIFICANT CHANGE UP (ref 27–34)
MCHC RBC-ENTMCNC: 31.9 GM/DL — LOW (ref 32–36)
MCHC RBC-ENTMCNC: 32.4 GM/DL — SIGNIFICANT CHANGE UP (ref 32–36)
MCV RBC AUTO: 95.6 FL — SIGNIFICANT CHANGE UP (ref 80–100)
MCV RBC AUTO: 95.7 FL — SIGNIFICANT CHANGE UP (ref 80–100)
NRBC # BLD: 11 /100 WBCS — HIGH (ref 0–0)
NRBC # BLD: 12 /100 WBCS — HIGH (ref 0–0)
PHOSPHATE SERPL-MCNC: 3.7 MG/DL — SIGNIFICANT CHANGE UP (ref 2.5–4.5)
PLATELET # BLD AUTO: 39 K/UL — LOW (ref 150–400)
PLATELET # BLD AUTO: 57 K/UL — LOW (ref 150–400)
POTASSIUM SERPL-MCNC: 4.2 MMOL/L — SIGNIFICANT CHANGE UP (ref 3.5–5.3)
POTASSIUM SERPL-SCNC: 4.2 MMOL/L — SIGNIFICANT CHANGE UP (ref 3.5–5.3)
RBC # BLD: 2.26 M/UL — LOW (ref 4.2–5.8)
RBC # BLD: 2.55 M/UL — LOW (ref 4.2–5.8)
RBC # FLD: 27.2 % — HIGH (ref 10.3–14.5)
RBC # FLD: 29.4 % — HIGH (ref 10.3–14.5)
SODIUM SERPL-SCNC: 140 MMOL/L — SIGNIFICANT CHANGE UP (ref 135–145)
WBC # BLD: 12.64 K/UL — HIGH (ref 3.8–10.5)
WBC # BLD: 19.2 K/UL — HIGH (ref 3.8–10.5)
WBC # FLD AUTO: 12.64 K/UL — HIGH (ref 3.8–10.5)
WBC # FLD AUTO: 19.2 K/UL — HIGH (ref 3.8–10.5)

## 2023-08-09 PROCEDURE — 99291 CRITICAL CARE FIRST HOUR: CPT

## 2023-08-09 PROCEDURE — 99221 1ST HOSP IP/OBS SF/LOW 40: CPT

## 2023-08-09 PROCEDURE — 99232 SBSQ HOSP IP/OBS MODERATE 35: CPT

## 2023-08-09 RX ORDER — FOLIC ACID 0.8 MG
2 TABLET ORAL DAILY
Refills: 0 | Status: DISCONTINUED | OUTPATIENT
Start: 2023-08-09 | End: 2023-08-16

## 2023-08-09 RX ORDER — CALCIUM GLUCONATE 100 MG/ML
2 VIAL (ML) INTRAVENOUS ONCE
Refills: 0 | Status: COMPLETED | OUTPATIENT
Start: 2023-08-09 | End: 2023-08-09

## 2023-08-09 RX ORDER — ENOXAPARIN SODIUM 100 MG/ML
40 INJECTION SUBCUTANEOUS ONCE
Refills: 0 | Status: COMPLETED | OUTPATIENT
Start: 2023-08-09 | End: 2023-08-09

## 2023-08-09 RX ADMIN — SODIUM CHLORIDE 75 MILLILITER(S): 9 INJECTION INTRAMUSCULAR; INTRAVENOUS; SUBCUTANEOUS at 05:27

## 2023-08-09 RX ADMIN — CHLORHEXIDINE GLUCONATE 1 APPLICATION(S): 213 SOLUTION TOPICAL at 05:30

## 2023-08-09 RX ADMIN — Medication 200 GRAM(S): at 15:46

## 2023-08-09 RX ADMIN — ENOXAPARIN SODIUM 40 MILLIGRAM(S): 100 INJECTION SUBCUTANEOUS at 23:07

## 2023-08-09 RX ADMIN — Medication 2 MILLIGRAM(S): at 12:00

## 2023-08-09 RX ADMIN — Medication 25 MILLIGRAM(S): at 15:46

## 2023-08-09 RX ADMIN — Medication 125 MILLIGRAM(S): at 01:39

## 2023-08-09 RX ADMIN — Medication 50 MILLIGRAM(S): at 12:12

## 2023-08-09 RX ADMIN — Medication 50 MILLIGRAM(S): at 23:08

## 2023-08-09 NOTE — CONSULT NOTE ADULT - ASSESSMENT
42yo M with TTP, in need fo access for plasmapheresis referred to IR for left tunneled central venous catheter  - PLT today 39  - Hgb 6.9  - INR 2.24

## 2023-08-09 NOTE — PROGRESS NOTE ADULT - ASSESSMENT
A/P: 43 male presenting with TTP and again with ELIF.  Hematuria    Plan:  ICU  PO Diet  CBC after PLEX today  Will need Shiley removed  Then tunnelled cath after platelet count 50K or greater  Called IR for anticipatation of the platelet count > 50K on 8/10  Will likely need AC once OK ed by HEM  NS at 75  Ca++ Gluconate for plasmapheresis  Venodynes  Steroids  S/P first Rituxan dose 8/8   A/P: 43 male presenting with TTP and again with ELIF.  Hematuria    Plan:  ICU  PO Diet  CBC after PLEX today  Will need Shiley removed  Then tunnelled cath after platelet count 50K or greater  Called IR for anticipatation of the platelet count > 50K on 8/10  Will likely need AC once OK ed by HEM  NS at 75  Ca++ Gluconate for plasmapheresis  Venodynes  Steroids  S/P first Rituxan dose 8/8  One U PRBC today

## 2023-08-09 NOTE — PROGRESS NOTE ADULT - SUBJECTIVE AND OBJECTIVE BOX
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 He received plasmapheresis and steroids for acute immune mediated TTP and d/c'ed 08/02 with close Heme/Onc follow up with attending Dr Darnell. His Plts were improving as of Friday 08/04 at 400K but then dropped down to approx 3K Monday 08/07 and referred back to the hospital.    8/7/2023- Seen at bedside, in no acute distress. Reports feeling well until yesterday  with tiredness and hematuria x 1 day    08/08/2023: Seen at bedside, no acute distress; s/p 1st pheresis tx this admission     08/09/2023: Seen at bedside, feeling well, no acute distress; S/p 2nd pheresis tx this admission as well as 1st Rituximab       PAST MEDICAL & SURGICAL HISTORY:    HLD      FAMILY HISTORY:    No known blood disorders      MEDICATIONS  (STANDING):    calcium gluconate IVPB 2 Gram(s) IV Intermittent once  chlorhexidine 4% Liquid 1 Application(s) Topical <User Schedule>  diphenhydrAMINE Injectable 25 milliGRAM(s) IV Push daily  predniSONE   Tablet 50 milliGRAM(s) Oral two times a day  sodium chloride 0.9%. 1000 milliLiter(s) (75 mL/Hr) IV Continuous <Continuous>      MEDICATIONS  (PRN):    diphenhydrAMINE Injectable 50 milliGRAM(s) IntraMuscular once PRN Rash and/or Itching        ALLERGIES:    No Known Allergies      REVIEW OF SYSTEMS:    Constitutional, Eyes, ENT, Cardiovascular, Respiratory, Gastrointestinal, Genitourinary, Musculoskeletal, Integumentary, Neurological, Psychiatric, Endocrine, Heme/Lymph and Allergic/Immunologic review of systems are otherwise negative except as noted in HPI.       VITALS:    ICU Vital Signs Last 24 Hrs  T(C): 36.8 (09 Aug 2023 04:00), Max: 37.1 (08 Aug 2023 18:00)  T(F): 98.3 (09 Aug 2023 04:00), Max: 98.8 (08 Aug 2023 20:00)  HR: 97 (09 Aug 2023 10:13) (71 - 115)  BP: 137/83 (09 Aug 2023 08:57) (102/55 - 151/91)  BP(mean): 100 (09 Aug 2023 08:57) (63 - 108)  ABP: --  ABP(mean): --  RR: 15 (09 Aug 2023 10:13) (11 - 22)  SpO2: 97% (09 Aug 2023 04:00) (95% - 100%)    O2 Parameters below as of 09 Aug 2023 04:00  Patient On (Oxygen Delivery Method): room air      PHYSICAL:    Constitutional: no acute distress  Eyes: no conjunctival infection, anicteric.   ENT: pharynx is unremarkable  Neck: supple without JVD  Pulmonary: clear to auscultation bilaterally  Cardiac: RRR  Vascular: no calf tenderness, venous stasis changes, varices  Abdomen: normoactive bowel sounds, soft and nontender, no hepatosplenomegaly or masses appreciated  Lymphatic: no peripheral adenopathy appreciated  Musculoskeletal: full range of motion and no deformities appreciated  Skin: normal appearance, no rash/erythema   Neurology: grossly intact  Psychiatric: affect appropriate      LABS:                                                6.9    12.64 )-----------( 39       ( 09 Aug 2023 05:45 )             21.6     08-09    140  |  106  |  28<H>  ----------------------------<  191<H>  4.2   |  28  |  1.52<H>    Ca    8.5      09 Aug 2023 05:45  Phos  3.7     08-09  Mg     2.3     08-09    TPro  7.0  /  Alb  3.6  /  TBili  4.9<H>  /  DBili  x   /  AST  96<H>  /  ALT  86<H>  /  AlkPhos  79  08-07          RADIOLOGY & ADDITIONAL STUDIES:      US Duplex Venous Upper Ext Ltd, Right (08.07.23 @ 19:25)    IMPRESSION:  Right internal jugular vein thrombosis.        Xray Chest 1 View-PORTABLE IMMEDIATE (Xray Chest 1 View-PORTABLE IMMEDIATE .) (08.07.23 @ 18:37)    Impression:    No acute pulmonary disease.    Tip of right IJ catheter is in the superior vena cava without   pneumothorax.

## 2023-08-09 NOTE — PROGRESS NOTE ADULT - ASSESSMENT
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 Plts recently dropped suddenly from approx 400K to 3K, referred back to hospital.       # Immune Mediated TTP    - Recent admission 07/29 - 08/02  ED with chest pain, and noted with anemia and thrombocytopenia, peripheral smear at presentation c/w large amount of schistocytes  - Confirmatory [id number] deficiency <2 ; c/w TTP caused by severely deficient activity of the [id number] clinically defined as an activity level <10 %  - Received Dex 40mg x1 dose 07/28 ----> methylprednisolone 125mg IV q 6 hrs daily for 2 days 07/30 & 07/31 ---> 08/01 started PO Prednisone 50 mg PO BID x two days (08/01 & 08/02)  - Currently receiving Solumedrol 125mg q 12 hrs x2 days (08/07 & 08/08) total then Prednisone 50mg BID (dose based on 1mg/kg) starting 08/09 ---> confirmed with pharmacy  - Hep B surface antigen & antibody, core antibody all negative ----> Rituximab ordered to start 08/08 after pheresis tx ---> 1N team notified, orders faxed, consent completed  - S/p plasmapheresis x 4 days (07/31 - 08/03) during previous admission  - S/p plasmapheresis x2 days (08/07 & 08/08) this admission, currently pending next tx ---> okay to continue pheresis txs with Rituximab    - [facility] re-notified with plan for 5L plasma with each pheresis tx  - Beto in place, US revealed R IJ thrombosis ---> anticipating tunneled cath placement ---> Intensivist to speak with IR to anticipate exchange for 08/10 AM   - Calcium gluconate ordered (for hypocalcemia with pheresis)  - Folic Acid 2mg QD added   - Tylenol 650 mg PO and Benadryl 50mg IV QD prior to each exchange    - Hgb down to 6.9 g/dL ---> pending 1U PRBC 08/09  - Plts up to 39K  - Continue to trend serial CBCs  - Continue close monitoring and supportive measures      Addendum Hem onc attending.  Patient seen today on morning rounds together with Hem Onc PA.   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  On steroids- will change to oral prednisone   [id number]  < 2 confirmed diagnosis of immune TTP.  Rituximab started. Discussed potential adverse effects.   Plan to continue pheresis daily until plts are normal for 2 days. Will have  pheresis tomorrow later in the day- spoke with [facility] pheresis supervisor and confirmed.      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 Plts recently dropped suddenly from approx 400K to 3K, referred back to hospital.       # Immune Mediated TTP    - Recent admission 07/29 - 08/02  ED with chest pain, and noted with anemia and thrombocytopenia, peripheral smear at presentation c/w large amount of schistocytes  - Confirmatory [id number] deficiency <2 ; c/w TTP caused by severely deficient activity of the [id number] clinically defined as an activity level <10 %  - Received Dex 40mg x1 dose 07/28 ----> methylprednisolone 125mg IV q 6 hrs daily for 2 days 07/30 & 07/31 ---> 08/01 started PO Prednisone 50 mg PO BID x two days (08/01 & 08/02)  - Currently receiving Solumedrol 125mg q 12 hrs x2 days (08/07 & 08/08) total then Prednisone 50mg BID (dose based on 1mg/kg) starting 08/09 ---> confirmed with pharmacy  - Hep B surface antigen & antibody, core antibody all negative ----> Rituximab ordered to start 08/08 after pheresis tx ---> 1N team notified, orders faxed, consent completed  - S/p plasmapheresis x 4 days (07/31 - 08/03) during previous admission  - S/p plasmapheresis x2 days (08/07 & 08/08) this admission, currently pending next tx ---> okay to continue pheresis txs with Rituximab    - [facility] re-notified with plan for 5L plasma with each pheresis tx  - Beto in place, US revealed R IJ thrombosis ---> anticipating tunneled cath placement ---> Intensivist to speak with IR to anticipate exchange for 08/10 AM   - Calcium gluconate ordered (for hypocalcemia with pheresis)  - Folic Acid 2mg QD added   - Tylenol 650 mg PO and Benadryl 50mg IV QD prior to each exchange    - Hgb down to 6.9 g/dL ---> pending 1U PRBC 08/09  - Plts up to 39K  - Continue to trend serial CBCs  - Continue close monitoring and supportive measures    Tx plan :  daily pheresis until plts > 150 for two days.  Change steroids to po Prednisone 50 mg bid ( spoke with pharmacy)  Transfuse I 1unit PRBC  Plan for catheter exchange and placement  of tunneled catheter tomorrow if plts adequate for procedure.  Weekly Rituxan x 4 ( Rituxan # 2 will be due 8/15 )

## 2023-08-09 NOTE — PROGRESS NOTE ADULT - SUBJECTIVE AND OBJECTIVE BOX
HPI: Patient is a 42 Y/O male who was recently D/C with TTP.  He went through a course of plasmapheresis exchanges and steroids. He was D/C Home with a platelet coung in the 390s on 8/4 and today it is 3K and he has hematuria.  He will have a shiley placed and have restart plasmapheresis today.  With the plan to start Retuxan on 8/8 8/8: platelet count 7K today, S/P Plasmapheresis again today, for Rituxan today   8/9: For Plasmapheresis today.           PAST MEDICAL & SURGICAL HISTORY:  No pertinent past medical history          FAMILY HISTORY:      Social Hx:    Allergies    No Known Allergies    Intolerances            ICU Vital Signs Last 24 Hrs  T(C): 36.9 (09 Aug 2023 10:35), Max: 37.1 (08 Aug 2023 18:00)  T(F): 98.5 (09 Aug 2023 10:35), Max: 98.8 (08 Aug 2023 20:00)  HR: 87 (09 Aug 2023 10:35) (71 - 115)  BP: 125/74 (09 Aug 2023 10:35) (115/88 - 151/91)  BP(mean): 88 (09 Aug 2023 10:35) (84 - 108)  ABP: --  ABP(mean): --  RR: 16 (09 Aug 2023 10:35) (11 - 22)  SpO2: 97% (09 Aug 2023 10:35) (95% - 100%)    O2 Parameters below as of 09 Aug 2023 10:35  Patient On (Oxygen Delivery Method): room air                I&O's Summary    08 Aug 2023 07:01  -  09 Aug 2023 07:00  --------------------------------------------------------  IN: 6992 mL / OUT: 2050 mL / NET: 4942 mL                              6.9    12.64 )-----------( 39       ( 09 Aug 2023 05:45 )             21.6       08-09    140  |  106  |  28<H>  ----------------------------<  191<H>  4.2   |  28  |  1.52<H>    Ca    8.5      09 Aug 2023 05:45  Phos  3.7     08-09  Mg     2.3     08-09    TPro  7.0  /  Alb  3.6  /  TBili  4.9<H>  /  DBili  x   /  AST  96<H>  /  ALT  86<H>  /  AlkPhos  79  08-07                Urinalysis Basic - ( 09 Aug 2023 05:45 )    Color: x / Appearance: x / SG: x / pH: x  Gluc: 191 mg/dL / Ketone: x  / Bili: x / Urobili: x   Blood: x / Protein: x / Nitrite: x   Leuk Esterase: x / RBC: x / WBC x   Sq Epi: x / Non Sq Epi: x / Bacteria: x        MEDICATIONS  (STANDING):  calcium gluconate IVPB 2 Gram(s) IV Intermittent once  chlorhexidine 4% Liquid 1 Application(s) Topical <User Schedule>  diphenhydrAMINE Injectable 25 milliGRAM(s) IV Push daily  folic acid 2 milliGRAM(s) Oral daily  predniSONE   Tablet 50 milliGRAM(s) Oral two times a day  sodium chloride 0.9%. 1000 milliLiter(s) (75 mL/Hr) IV Continuous <Continuous>    MEDICATIONS  (PRN):  diphenhydrAMINE Injectable 50 milliGRAM(s) IntraMuscular once PRN Rash and/or Itching      DVT Prophylaxis:    Advanced Directives:  Discussed with:    Visit Information: 30 min    ** Time is exclusive of billed procedures and/or teaching and/or routine family updates.

## 2023-08-10 DIAGNOSIS — E66.1 DRUG-INDUCED OBESITY: ICD-10-CM

## 2023-08-10 DIAGNOSIS — M54.50 LOW BACK PAIN, UNSPECIFIED: ICD-10-CM

## 2023-08-10 DIAGNOSIS — Z13.6 ENCOUNTER FOR SCREENING FOR CARDIOVASCULAR DISORDERS: ICD-10-CM

## 2023-08-10 LAB
ALBUMIN SERPL ELPH-MCNC: 3.4 G/DL — SIGNIFICANT CHANGE UP (ref 3.3–5)
ALP SERPL-CCNC: 78 U/L — SIGNIFICANT CHANGE UP (ref 40–120)
ALT FLD-CCNC: 32 U/L — SIGNIFICANT CHANGE UP (ref 12–78)
ANION GAP SERPL CALC-SCNC: 5 MMOL/L — SIGNIFICANT CHANGE UP (ref 5–17)
APTT BLD: 23.9 SEC — LOW (ref 24.5–35.6)
AST SERPL-CCNC: 22 U/L — SIGNIFICANT CHANGE UP (ref 15–37)
BILIRUB SERPL-MCNC: 0.6 MG/DL — SIGNIFICANT CHANGE UP (ref 0.2–1.2)
BUN SERPL-MCNC: 22 MG/DL — SIGNIFICANT CHANGE UP (ref 7–23)
CALCIUM SERPL-MCNC: 9 MG/DL — SIGNIFICANT CHANGE UP (ref 8.5–10.1)
CHLORIDE SERPL-SCNC: 105 MMOL/L — SIGNIFICANT CHANGE UP (ref 96–108)
CO2 SERPL-SCNC: 28 MMOL/L — SIGNIFICANT CHANGE UP (ref 22–31)
CREAT SERPL-MCNC: 1.47 MG/DL — HIGH (ref 0.5–1.3)
EGFR: 60 ML/MIN/1.73M2 — SIGNIFICANT CHANGE UP
GLUCOSE SERPL-MCNC: 183 MG/DL — HIGH (ref 70–99)
HCT VFR BLD CALC: 25.8 % — LOW (ref 39–50)
HGB BLD-MCNC: 8.5 G/DL — LOW (ref 13–17)
INR BLD: 1.01 RATIO — SIGNIFICANT CHANGE UP (ref 0.85–1.18)
MAGNESIUM SERPL-MCNC: 2.1 MG/DL — SIGNIFICANT CHANGE UP (ref 1.6–2.6)
MCHC RBC-ENTMCNC: 31.3 PG — SIGNIFICANT CHANGE UP (ref 27–34)
MCHC RBC-ENTMCNC: 32.9 GM/DL — SIGNIFICANT CHANGE UP (ref 32–36)
MCV RBC AUTO: 94.9 FL — SIGNIFICANT CHANGE UP (ref 80–100)
NRBC # BLD: 17 /100 WBCS — HIGH (ref 0–0)
PHOSPHATE SERPL-MCNC: 4.2 MG/DL — SIGNIFICANT CHANGE UP (ref 2.5–4.5)
PLATELET # BLD AUTO: 78 K/UL — LOW (ref 150–400)
POTASSIUM SERPL-MCNC: 4.3 MMOL/L — SIGNIFICANT CHANGE UP (ref 3.5–5.3)
POTASSIUM SERPL-SCNC: 4.3 MMOL/L — SIGNIFICANT CHANGE UP (ref 3.5–5.3)
PROT SERPL-MCNC: 6.4 GM/DL — SIGNIFICANT CHANGE UP (ref 6–8.3)
PROTHROM AB SERPL-ACNC: 11.4 SEC — SIGNIFICANT CHANGE UP (ref 9.5–13)
RBC # BLD: 2.72 M/UL — LOW (ref 4.2–5.8)
RBC # FLD: 26.9 % — HIGH (ref 10.3–14.5)
SODIUM SERPL-SCNC: 138 MMOL/L — SIGNIFICANT CHANGE UP (ref 135–145)
WBC # BLD: 17.07 K/UL — HIGH (ref 3.8–10.5)
WBC # FLD AUTO: 17.07 K/UL — HIGH (ref 3.8–10.5)

## 2023-08-10 PROCEDURE — 99233 SBSQ HOSP IP/OBS HIGH 50: CPT

## 2023-08-10 PROCEDURE — 99232 SBSQ HOSP IP/OBS MODERATE 35: CPT

## 2023-08-10 PROCEDURE — 36558 INSERT TUNNELED CV CATH: CPT

## 2023-08-10 PROCEDURE — 76937 US GUIDE VASCULAR ACCESS: CPT | Mod: 26

## 2023-08-10 PROCEDURE — 77001 FLUOROGUIDE FOR VEIN DEVICE: CPT | Mod: 26

## 2023-08-10 RX ORDER — ACETAMINOPHEN 500 MG
650 TABLET ORAL EVERY 6 HOURS
Refills: 0 | Status: DISCONTINUED | OUTPATIENT
Start: 2023-08-10 | End: 2023-08-16

## 2023-08-10 RX ORDER — ENOXAPARIN SODIUM 100 MG/ML
100 INJECTION SUBCUTANEOUS EVERY 12 HOURS
Refills: 0 | Status: DISCONTINUED | OUTPATIENT
Start: 2023-08-10 | End: 2023-08-16

## 2023-08-10 RX ORDER — CALCIUM GLUCONATE 100 MG/ML
2 VIAL (ML) INTRAVENOUS ONCE
Refills: 0 | Status: COMPLETED | OUTPATIENT
Start: 2023-08-10 | End: 2023-08-10

## 2023-08-10 RX ADMIN — Medication 2 MILLIGRAM(S): at 10:54

## 2023-08-10 RX ADMIN — ENOXAPARIN SODIUM 100 MILLIGRAM(S): 100 INJECTION SUBCUTANEOUS at 21:11

## 2023-08-10 RX ADMIN — Medication 200 GRAM(S): at 07:52

## 2023-08-10 RX ADMIN — Medication 25 MILLIGRAM(S): at 07:52

## 2023-08-10 RX ADMIN — Medication 50 MILLIGRAM(S): at 10:54

## 2023-08-10 RX ADMIN — Medication 50 MILLIGRAM(S): at 07:52

## 2023-08-10 RX ADMIN — CHLORHEXIDINE GLUCONATE 1 APPLICATION(S): 213 SOLUTION TOPICAL at 06:04

## 2023-08-10 RX ADMIN — Medication 50 MILLIGRAM(S): at 21:12

## 2023-08-10 NOTE — PROGRESS NOTE ADULT - ASSESSMENT
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 Plts recently dropped suddenly from approx 400K to 3K, referred back to hospital.       # Immune Mediated TTP    - Recent admission 07/29 - 08/02  ED with chest pain, and noted with anemia and thrombocytopenia, peripheral smear at presentation c/w large amount of schistocytes  - Confirmatory [id number] deficiency <2 ; c/w TTP caused by severely deficient activity of the ENEGAR19 clinically defined as an activity level <10 %  - Received Dex 40mg x1 dose 07/28 ----> methylprednisolone 125mg IV q 6 hrs daily for 2 days 07/30 & 07/31 ---> 08/01 started PO Prednisone 50 mg PO BID x two days (08/01 & 08/02)  - Received Solumedrol 125mg q 12 hrs x2 days (08/07 & 08/08) total then Prednisone 50mg BID (dose based on 1mg/kg) starting 08/09 ---> confirmed with pharmacy  - Hep B surface antigen & antibody, core antibody all negative ----> Rituximab started on 08/08 after pheresis Tx ---> 1N team notified, orders faxed, consent completed  - S/p plasmapheresis x 4 days (07/31 - 08/03) during previous admission  - S/p plasmapheresis x 2 days (08/07 & 08/08) this admission, currently pending next Tx ---> okay to continue pheresis Txs with Rituximab    - [facility] [place] [facility] re-notified with plan for 5L--> 4/5 L plasma with each pheresis Tx.   - Plasma pheresis started 8/7/23, with plan to continue daily until two days of platelets > 150 K/ul  - Shiley in place, with plans to be removed/replaced today by tunneled catheter today.  - Calcium gluconate ordered (for hypocalcemia with pheresis)  - Folic Acid 2mg QD added   - Tylenol 650 mg PO and Benadryl 50mg IV QD prior to each exchange    - Hgb down to 8.5g/dl<-- 6.9 g/dL ---> s/p 1U PRBC 08/09  - Plts up to 78K/ul <--39K  - Continue to trend serial CBCs  - Continue close monitoring and supportive measures    # RIJ Thrombosis 2/2 to RIJ catheter     -US-8/7/23- revealed RIJ thrombosis   - plan for starting Lovenox today after tunneled cath placement today.      Tx plan :    Daily pheresis until plts > 150 for two days.  Change steroids to PO Prednisone 50 mg bid ( spoke with pharmacy)  Plan for catheter exchange and placement  of tunneled catheter today by IR after plasma pheresis.  To be started on Lovenox 1 mg/kg SC q12hr after tunneled catheter placement today.  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 Plts recently dropped suddenly from approx 400K to 3K, referred back to hospital.       # Immune Mediated TTP    - Recent admission 07/29 - 08/02  ED with chest pain, and noted with anemia and thrombocytopenia, peripheral smear at presentation c/w large amount of schistocytes  - Confirmatory [id number] deficiency <2 ; c/w TTP caused by severely deficient activity of the [id number] clinically defined as an activity level <10 %  - Received Dex 40mg x1 dose 07/28 ----> methylprednisolone 125mg IV q 6 hrs daily for 2 days 07/30 & 07/31 ---> 08/01 started PO Prednisone 50 mg PO BID x two days (08/01 & 08/02)  - Received Solumedrol 125mg q 12 hrs x2 days (08/07 & 08/08) total then Prednisone 50mg BID (dose based on 1mg/kg) starting 08/09 ---> confirmed with pharmacy  - Hep B surface antigen & antibody, core antibody all negative ----> Rituximab started on 08/08 after pheresis Tx ---> 1N team notified, orders faxed, consent completed  - S/p plasmapheresis x 4 days (07/31 - 08/03) during previous admission  - Readmitted 8/7  and had to resume pheresis for pts drop to 7 K   - Plasma pheresis started 8/7/23, with plan to continue daily until two days of platelets > 150 K/ul  Ht improved- will adjust plasma volume for exchange accordingly   - Shiley in place, with plans to be removed/replaced today by tunneled catheter today.  - Calcium gluconate ordered (for hypocalcemia with pheresis)  - Folic Acid 2mg QD added   - Tylenol 650 mg PO and Benadryl 50mg IV QD prior to each exchange    - Hgb down to 8.5g/dl<-- 6.9 g/dL ---> s/p 1U PRBC 08/09  - Plts up to 78K/ul <--39K  - Continue to trend serial CBCs  - Continue close monitoring and supportive measures    # RIJ Thrombosis 2/2 to RIJ catheter     -US-8/7/23- revealed RIJ thrombosis   - plan for starting Lovenox today after tunneled cath placement today.      Tx plan :    Daily pheresis until plts > 150 for two days.  Change steroids to PO Prednisone 50 mg bid ( spoke with pharmacy)  Plan for catheter exchange and placement  of tunneled catheter today by IR after plasma pheresis.  To be started on Lovenox 1 mg/kg SC q12hr after tunneled catheter placement today.  Weekly Rituxan x 4 ( Rituxan # 2 will be due 8/15 )

## 2023-08-10 NOTE — PROGRESS NOTE ADULT - SUBJECTIVE AND OBJECTIVE BOX
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 He received plasmapheresis and steroids for acute immune mediated TTP and d/c'ed 08/02 with close Heme/Onc follow up with attending Dr Darnell. His Plts were improving as of Friday 08/04 at 400K but then dropped down to approx 3K Monday 08/07 and referred back to the hospital.    8/7/2023- Seen at bedside, in no acute distress. Reports feeling well until yesterday  with tiredness and hematuria x 1 day    08/08/2023: Seen at bedside, no acute distress; s/p 1st pheresis tx this admission     08/09/2023: Seen at bedside, feeling well, no acute distress; S/p 2nd pheresis tx this admission as well as 1st Rituximab     08/10/2023- Seen at bedside with Dr. Briggs. Undergoing plasma pheresis.     PAST MEDICAL & SURGICAL HISTORY:    HLD      FAMILY HISTORY:    No known blood disorders      MEDICATIONS  (STANDING):      chlorhexidine 4% Liquid 1 Application(s) Topical <User Schedule>  diphenhydrAMINE Injectable 25 milliGRAM(s) IV Push daily  folic acid 2 milliGRAM(s) Oral daily  predniSONE   Tablet 50 milliGRAM(s) Oral two times a day    MEDICATIONS  (PRN):          ALLERGIES:    No Known Allergies      REVIEW OF SYSTEMS:    Constitutional, Eyes, ENT, Cardiovascular, Respiratory, Gastrointestinal, Genitourinary, Musculoskeletal, Integumentary, Neurological, Psychiatric, Endocrine, Heme/Lymph and Allergic/Immunologic review of systems are otherwise negative except as noted in HPI.       VITALS:    Vital Signs Last 24 Hrs  T(C): 36.8 (10 Aug 2023 04:00), Max: 37.4 (09 Aug 2023 17:00)  T(F): 98.2 (10 Aug 2023 04:00), Max: 99.3 (09 Aug 2023 17:00)  HR: 74 (10 Aug 2023 04:00) (74 - 105)  BP: 143/89 (10 Aug 2023 04:00) (117/80 - 143/89)  BP(mean): 103 (10 Aug 2023 04:00) (88 - 103)  RR: 16 (10 Aug 2023 04:00) (10 - 20)  SpO2: 100% (10 Aug 2023 04:00) (96% - 100%)    Parameters below as of 10 Aug 2023 04:00  Patient On (Oxygen Delivery Method): room air          PHYSICAL:    Constitutional: no acute distress  Eyes: no conjunctival infection, anicteric.   ENT: pharynx is unremarkable  Neck: supple without JVD  Pulmonary: clear to auscultation bilaterally  Cardiac: RRR  Vascular: no calf tenderness, venous stasis changes, varices  Abdomen: normoactive bowel sounds, soft and nontender, no hepatosplenomegaly or masses appreciated  Lymphatic: no peripheral adenopathy appreciated  Musculoskeletal: full range of motion and no deformities appreciated  Skin: normal appearance, no rash/erythema   Neurology: grossly intact  Psychiatric: affect appropriate      LABS:                                       8.5    17.07 )-----------( 78       ( 10 Aug 2023 06:01 )             25.8     08-10    138  |  105  |  22  ----------------------------<  183<H>  4.3   |  28  |  1.47<H>    Ca    9.0      10 Aug 2023 06:01  Phos  4.2     08-10  Mg     2.1     08-10    TPro  6.4  /  Alb  3.4  /  TBili  0.6  /  DBili  x   /  AST  22  /  ALT  32  /  AlkPhos  78  08-10            RADIOLOGY & ADDITIONAL STUDIES:      US Duplex Venous Upper Ext Ltd, Right (08.07.23 @ 19:25)    IMPRESSION:  Right internal jugular vein thrombosis.        Xray Chest 1 View-PORTABLE IMMEDIATE (Xray Chest 1 View-PORTABLE IMMEDIATE .) (08.07.23 @ 18:37)    Impression:    No acute pulmonary disease.    Tip of right IJ catheter is in the superior vena cava without   pneumothorax.       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 He received plasmapheresis and steroids for acute immune mediated TTP and d/c'ed 08/02 with close Heme/Onc follow up with attending Dr Darnell. His Plts were improving as of Friday 08/04 at 400K but then dropped down to approx 3K Monday 08/07 and referred back to the hospital.    8/7/2023- Seen at bedside, in no acute distress. Reports feeling well until yesterday  with tiredness and hematuria x 1 day    08/08/2023: Seen at bedside, no acute distress; s/p 1st pheresis tx this admission     08/09/2023: Seen at bedside, feeling well, no acute distress; S/p 2nd pheresis tx this admission as well as 1st Rituximab     08/10/2023- Seen at bedside with Dr. Darnell. Undergoing plasma pheresis.     PAST MEDICAL & SURGICAL HISTORY:    HLD      FAMILY HISTORY:    No known blood disorders      MEDICATIONS  (STANDING):      chlorhexidine 4% Liquid 1 Application(s) Topical <User Schedule>  diphenhydrAMINE Injectable 25 milliGRAM(s) IV Push daily  folic acid 2 milliGRAM(s) Oral daily  predniSONE   Tablet 50 milliGRAM(s) Oral two times a day    MEDICATIONS  (PRN):          ALLERGIES:    No Known Allergies      REVIEW OF SYSTEMS:    Constitutional, Eyes, ENT, Cardiovascular, Respiratory, Gastrointestinal, Genitourinary, Musculoskeletal, Integumentary, Neurological, Psychiatric, Endocrine, Heme/Lymph and Allergic/Immunologic review of systems are otherwise negative except as noted in HPI.       VITALS:    Vital Signs Last 24 Hrs  T(C): 36.8 (10 Aug 2023 04:00), Max: 37.4 (09 Aug 2023 17:00)  T(F): 98.2 (10 Aug 2023 04:00), Max: 99.3 (09 Aug 2023 17:00)  HR: 74 (10 Aug 2023 04:00) (74 - 105)  BP: 143/89 (10 Aug 2023 04:00) (117/80 - 143/89)  BP(mean): 103 (10 Aug 2023 04:00) (88 - 103)  RR: 16 (10 Aug 2023 04:00) (10 - 20)  SpO2: 100% (10 Aug 2023 04:00) (96% - 100%)    Parameters below as of 10 Aug 2023 04:00  Patient On (Oxygen Delivery Method): room air          PHYSICAL:    Constitutional: no acute distress  Eyes: no conjunctival infection, anicteric.   Pulmonary: clear to auscultation bilaterally  Cardiac: RRR  Vascular: no calf tenderness, venous stasis changes, varices  R IJ Shiley catheter in place   Abdomen: normoactive bowel sounds, soft and nontender, no hepatosplenomegaly or masses appreciated  Lymphatic: no peripheral adenopathy appreciated  Musculoskeletal: full range of motion and no deformities appreciated  Skin: normal appearance, no rash/erythema   Neurology: grossly intact  Psychiatric: affect appropriate      LABS:                                       8.5    17.07 )-----------( 78       ( 10 Aug 2023 06:01 )             25.8     08-10    138  |  105  |  22  ----------------------------<  183<H>  4.3   |  28  |  1.47<H>    Ca    9.0      10 Aug 2023 06:01  Phos  4.2     08-10  Mg     2.1     08-10    TPro  6.4  /  Alb  3.4  /  TBili  0.6  /  DBili  x   /  AST  22  /  ALT  32  /  AlkPhos  78  08-10            RADIOLOGY & ADDITIONAL STUDIES:      US Duplex Venous Upper Ext Ltd, Right (08.07.23 @ 19:25)    IMPRESSION:  Right internal jugular vein thrombosis.        Xray Chest 1 View-PORTABLE IMMEDIATE (Xray Chest 1 View-PORTABLE IMMEDIATE .) (08.07.23 @ 18:37)    Impression:    No acute pulmonary disease.    Tip of right IJ catheter is in the superior vena cava without   pneumothorax.

## 2023-08-10 NOTE — PROGRESS NOTE ADULT - SUBJECTIVE AND OBJECTIVE BOX
HPI: Patient is a 44 Y/O male who was recently D/C with TTP.  He went through a course of plasmapheresis exchanges and steroids. He was D/C Home with a platelet coung in the 390s on 8/4 and today it is 3K and he has hematuria.  He will have a shiley placed and have restart plasmapheresis today.  With the plan to start Retuxan on 8/8 8/8: platelet count 7K today, S/P Plasmapheresis again today, for Rituxan today   8/9: For Plasmapheresis today.    8/10: Platelets recovering          PAST MEDICAL & SURGICAL HISTORY:  No pertinent past medical history          FAMILY HISTORY:      Social Hx:    Allergies    No Known Allergies    Intolerances            ICU Vital Signs Last 24 Hrs  T(C): 36 (10 Aug 2023 11:00), Max: 37.4 (09 Aug 2023 17:00)  T(F): 96.8 (10 Aug 2023 11:00), Max: 99.3 (09 Aug 2023 17:00)  HR: 82 (10 Aug 2023 11:35) (74 - 105)  BP: 155/81 (10 Aug 2023 11:00) (117/85 - 155/81)  BP(mean): 99 (10 Aug 2023 11:00) (93 - 103)  ABP: --  ABP(mean): --  RR: 16 (10 Aug 2023 11:00) (16 - 20)  SpO2: 100% (10 Aug 2023 11:00) (98% - 100%)    O2 Parameters below as of 10 Aug 2023 11:00  Patient On (Oxygen Delivery Method): room air                I&O's Summary    09 Aug 2023 07:01  -  10 Aug 2023 07:00  --------------------------------------------------------  IN: 1296 mL / OUT: 1150 mL / NET: 146 mL    10 Aug 2023 07:01  -  10 Aug 2023 13:42  --------------------------------------------------------  IN: 0 mL / OUT: 300 mL / NET: -300 mL                              8.5    17.07 )-----------( 78       ( 10 Aug 2023 06:01 )             25.8       08-10    138  |  105  |  22  ----------------------------<  183<H>  4.3   |  28  |  1.47<H>    Ca    9.0      10 Aug 2023 06:01  Phos  4.2     08-10  Mg     2.1     08-10    TPro  6.4  /  Alb  3.4  /  TBili  0.6  /  DBili  x   /  AST  22  /  ALT  32  /  AlkPhos  78  08-10                Urinalysis Basic - ( 10 Aug 2023 06:01 )    Color: x / Appearance: x / SG: x / pH: x  Gluc: 183 mg/dL / Ketone: x  / Bili: x / Urobili: x   Blood: x / Protein: x / Nitrite: x   Leuk Esterase: x / RBC: x / WBC x   Sq Epi: x / Non Sq Epi: x / Bacteria: x        MEDICATIONS  (STANDING):  chlorhexidine 4% Liquid 1 Application(s) Topical <User Schedule>  diphenhydrAMINE Injectable 25 milliGRAM(s) IV Push daily  folic acid 2 milliGRAM(s) Oral daily  predniSONE   Tablet 50 milliGRAM(s) Oral two times a day    MEDICATIONS  (PRN):      DVT Prophylaxis: Lovenox    Advanced Directives:  Discussed with:    Visit Information:    ** Time is exclusive of billed procedures and/or teaching and/or routine family updates.

## 2023-08-10 NOTE — PROGRESS NOTE ADULT - ASSESSMENT
A/P: 43 male presenting with TTP and again with ELIF.  Hematuria    Plan:  ICU  PO Diet  Shiley removed  Tunnelled cath Placed  D/E HEM and OK to start full dose Lovenox tonight  Ca++ Gluconate for plasmapheresis  Venodynes  Steroids  S/P first Rituxan dose 8/8

## 2023-08-11 LAB
ANION GAP SERPL CALC-SCNC: 3 MMOL/L — LOW (ref 5–17)
BUN SERPL-MCNC: 22 MG/DL — SIGNIFICANT CHANGE UP (ref 7–23)
CALCIUM SERPL-MCNC: 8.8 MG/DL — SIGNIFICANT CHANGE UP (ref 8.5–10.1)
CHLORIDE SERPL-SCNC: 104 MMOL/L — SIGNIFICANT CHANGE UP (ref 96–108)
CO2 SERPL-SCNC: 28 MMOL/L — SIGNIFICANT CHANGE UP (ref 22–31)
CREAT SERPL-MCNC: 1.37 MG/DL — HIGH (ref 0.5–1.3)
EGFR: 65 ML/MIN/1.73M2 — SIGNIFICANT CHANGE UP
GLUCOSE SERPL-MCNC: 174 MG/DL — HIGH (ref 70–99)
HCT VFR BLD CALC: 27.6 % — LOW (ref 39–50)
HGB BLD-MCNC: 8.8 G/DL — LOW (ref 13–17)
MAGNESIUM SERPL-MCNC: 2.3 MG/DL — SIGNIFICANT CHANGE UP (ref 1.6–2.6)
MCHC RBC-ENTMCNC: 31.2 PG — SIGNIFICANT CHANGE UP (ref 27–34)
MCHC RBC-ENTMCNC: 31.9 GM/DL — LOW (ref 32–36)
MCV RBC AUTO: 97.9 FL — SIGNIFICANT CHANGE UP (ref 80–100)
NRBC # BLD: 19 /100 WBCS — HIGH (ref 0–0)
PHOSPHATE SERPL-MCNC: 4.5 MG/DL — SIGNIFICANT CHANGE UP (ref 2.5–4.5)
PLATELET # BLD AUTO: 118 K/UL — LOW (ref 150–400)
POTASSIUM SERPL-MCNC: 4.4 MMOL/L — SIGNIFICANT CHANGE UP (ref 3.5–5.3)
POTASSIUM SERPL-SCNC: 4.4 MMOL/L — SIGNIFICANT CHANGE UP (ref 3.5–5.3)
RBC # BLD: 2.82 M/UL — LOW (ref 4.2–5.8)
RBC # FLD: 27.4 % — HIGH (ref 10.3–14.5)
SODIUM SERPL-SCNC: 135 MMOL/L — SIGNIFICANT CHANGE UP (ref 135–145)
WBC # BLD: 13.97 K/UL — HIGH (ref 3.8–10.5)
WBC # FLD AUTO: 13.97 K/UL — HIGH (ref 3.8–10.5)

## 2023-08-11 PROCEDURE — 99232 SBSQ HOSP IP/OBS MODERATE 35: CPT

## 2023-08-11 PROCEDURE — 93010 ELECTROCARDIOGRAM REPORT: CPT

## 2023-08-11 RX ORDER — CALCIUM GLUCONATE 100 MG/ML
2 VIAL (ML) INTRAVENOUS ONCE
Refills: 0 | Status: COMPLETED | OUTPATIENT
Start: 2023-08-11 | End: 2023-08-11

## 2023-08-11 RX ORDER — CALCIUM GLUCONATE 100 MG/ML
2 VIAL (ML) INTRAVENOUS ONCE
Refills: 0 | Status: DISCONTINUED | OUTPATIENT
Start: 2023-08-11 | End: 2023-08-11

## 2023-08-11 RX ORDER — METOPROLOL TARTRATE 50 MG
5 TABLET ORAL ONCE
Refills: 0 | Status: COMPLETED | OUTPATIENT
Start: 2023-08-11 | End: 2023-08-11

## 2023-08-11 RX ADMIN — Medication 50 MILLIGRAM(S): at 09:29

## 2023-08-11 RX ADMIN — Medication 50 MILLIGRAM(S): at 21:06

## 2023-08-11 RX ADMIN — Medication 5 MILLIGRAM(S): at 23:36

## 2023-08-11 RX ADMIN — Medication 25 MILLIGRAM(S): at 09:29

## 2023-08-11 RX ADMIN — ENOXAPARIN SODIUM 100 MILLIGRAM(S): 100 INJECTION SUBCUTANEOUS at 21:06

## 2023-08-11 RX ADMIN — Medication 200 GRAM(S): at 09:06

## 2023-08-11 RX ADMIN — ENOXAPARIN SODIUM 100 MILLIGRAM(S): 100 INJECTION SUBCUTANEOUS at 09:29

## 2023-08-11 RX ADMIN — Medication 2 MILLIGRAM(S): at 09:29

## 2023-08-11 NOTE — PROGRESS NOTE ADULT - SUBJECTIVE AND OBJECTIVE BOX
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 He received plasmapheresis and steroids for acute immune mediated TTP and d/c'ed 08/02 with close Heme/Onc follow up with attending Dr Darnell. His Plts were improving as of Friday 08/04 at 400K but then dropped down to approx 3K Monday 08/07 and referred back to the hospital.    8/7/2023- Seen at bedside, in no acute distress. Reports feeling well until yesterday  with tiredness and hematuria x 1 day    08/08/2023: Seen at bedside, no acute distress; s/p 1st pheresis tx this admission     08/09/2023: Seen at bedside, feeling well, no acute distress; S/p 2nd pheresis tx this admission as well as 1st Rituximab     08/10/2023- Seen at bedside with Dr. Darnell. Undergoing plasma pheresis.     08/11/2023: Seen at bedside, feeling well, no acute distress      PAST MEDICAL & SURGICAL HISTORY:    HLD      FAMILY HISTORY:    No known blood disorders      MEDICATIONS  (STANDING):    chlorhexidine 4% Liquid 1 Application(s) Topical <User Schedule>  diphenhydrAMINE Injectable 25 milliGRAM(s) IV Push daily  enoxaparin Injectable 100 milliGRAM(s) SubCutaneous every 12 hours  folic acid 2 milliGRAM(s) Oral daily  predniSONE   Tablet 50 milliGRAM(s) Oral two times a day      MEDICATIONS  (PRN):    acetaminophen     Tablet .. 650 milliGRAM(s) Oral every 6 hours PRN Mild Pain (1 - 3)        ALLERGIES:    No Known Allergies      REVIEW OF SYSTEMS:    Constitutional, Eyes, ENT, Cardiovascular, Respiratory, Gastrointestinal, Genitourinary, Musculoskeletal, Integumentary, Neurological, Psychiatric, Endocrine, Heme/Lymph and Allergic/Immunologic review of systems are otherwise negative except as noted in HPI.       VITALS:    ICU Vital Signs Last 24 Hrs  T(C): 37.1 (10 Aug 2023 17:10), Max: 37.1 (10 Aug 2023 17:10)  T(F): 98.8 (10 Aug 2023 17:10), Max: 98.8 (10 Aug 2023 17:10)  HR: 86 (11 Aug 2023 09:30) (67 - 102)  BP: 130/88 (11 Aug 2023 09:30) (124/79 - 155/81)  BP(mean): 101 (11 Aug 2023 09:30) (87 - 108)  ABP: --  ABP(mean): --  RR: 25 (11 Aug 2023 09:30) (14 - 25)  SpO2: 98% (11 Aug 2023 09:30) (94% - 100%)    O2 Parameters below as of 11 Aug 2023 04:00  Patient On (Oxygen Delivery Method): room air        PHYSICAL:    Constitutional: no acute distress  Eyes: no conjunctival infection, anicteric.   Pulmonary: clear to auscultation bilaterally  Cardiac: RRR  Vascular: no calf tenderness, venous stasis changes, varices; L chest wall tunneled cath in place   Abdomen: normoactive bowel sounds, soft and nontender, no hepatosplenomegaly or masses appreciated  Lymphatic: no peripheral adenopathy appreciated  Musculoskeletal: full range of motion and no deformities appreciated  Skin: normal appearance, no rash/erythema   Neurology: grossly intact  Psychiatric: affect appropriate      LABS:                          8.8    13.97 )-----------( 118      ( 11 Aug 2023 05:50 )             27.6     08-11    135  |  104  |  22  ----------------------------<  174<H>  4.4   |  28  |  1.37<H>    Ca    8.8      11 Aug 2023 05:50  Phos  4.5     08-11  Mg     2.3     08-11    TPro  6.4  /  Alb  3.4  /  TBili  0.6  /  DBili  x   /  AST  22  /  ALT  32  /  AlkPhos  78  08-10        RADIOLOGY & ADDITIONAL STUDIES:      US Duplex Venous Upper Ext Ltd, Right (08.07.23 @ 19:25)    IMPRESSION:  Right internal jugular vein thrombosis.        Xray Chest 1 View-PORTABLE IMMEDIATE (Xray Chest 1 View-PORTABLE IMMEDIATE .) (08.07.23 @ 18:37)    Impression:    No acute pulmonary disease.    Tip of right IJ catheter is in the superior vena cava without   pneumothorax.

## 2023-08-11 NOTE — PROGRESS NOTE ADULT - ASSESSMENT
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 Plts recently dropped suddenly from approx 400K to 3K, referred back to hospital.       # Immune Mediated TTP    - Recent admission 07/29 - 08/02  ED with chest pain, and noted with anemia and thrombocytopenia, peripheral smear at presentation c/w large amount of schistocytes  - Confirmatory [id number] deficiency <2 ; c/w TTP caused by severely deficient activity of the GGEXHA11 clinically defined as an activity level <10 %  - Received Dex 40mg x1 dose 07/28 ----> methylprednisolone 125mg IV q 6 hrs daily for 2 days 07/30 & 07/31 ---> 08/01 started PO Prednisone 50 mg PO BID x two days (08/01 & 08/02)  - Received Solumedrol 125mg q 12 hrs x2 days (08/07 & 08/08) total then Prednisone 50mg BID (dose based on 1mg/kg) starting 08/09 ---> confirmed with pharmacy  - Hep B surface antigen & antibody, core antibody all negative ----> Rituximab started on 08/08 after pheresis Tx ---> 1N team notified, orders faxed, consent completed ---> q 1 weekly x4 doses total  - S/p plasmapheresis x 4 days (07/31 - 08/03) during previous admission  - Plasmapheresis resumed 08/07 ---> continue daily until x2 days of Plts >150K  - Plasma exchange volume lowered from 5L to 4.5L  - Calcium gluconate ordered (for hypocalcemia with pheresis)  - Folic Acid 2mg QD added   - Tylenol 650 mg PO and Benadryl 50mg IV QD prior to each exchange    - Hgb 8.8 g/dL; s/p 1U PRBC 08/09  - Plts up to 118K  - Continue to trend serial CBCs  - Continue close monitoring and supportive measures      # RIJ Thrombosis     - US 08/07 revealed RIJ thrombosis; 2/2 RIJ Shiley in place   - S/p removal with L tunneled cath placement 08/10  - Lovenox 100      Tx Plan:    Daily pheresis until Plts >150K x2 days  Continue steroids, will begin to taper lower once Plts improved/stable  Continue other medications as ordered   Lovenox 100mg q 12hrs   S/p Rituxan x1 08/08; due for #2 08/15 ---> will give inpatient if still admitted, otherwise plan for outpatient follow up and tx

## 2023-08-11 NOTE — PROGRESS NOTE ADULT - SUBJECTIVE AND OBJECTIVE BOX
HPI: Patient is a 42 Y/O male who was recently D/C with TTP.  He went through a course of plasmapheresis exchanges and steroids. He was D/C Home with a platelet coung in the 390s on 8/4 and today it is 3K and he has hematuria.  He will have a shiley placed and have restart plasmapheresis today.  With the plan to start Retuxan on 8/8 8/8: platelet count 7K today, S/P Plasmapheresis again today, for Rituxan today   8/9: For Plasmapheresis today.    8/10: Platelets recovering   8/11: Platelets 118 today, for PLEX          PAST MEDICAL & SURGICAL HISTORY:  No pertinent past medical history          FAMILY HISTORY:      Social Hx:    Allergies    No Known Allergies    Intolerances            ICU Vital Signs Last 24 Hrs  T(C): 37.1 (10 Aug 2023 17:10), Max: 37.1 (10 Aug 2023 17:10)  T(F): 98.8 (10 Aug 2023 17:10), Max: 98.8 (10 Aug 2023 17:10)  HR: 86 (11 Aug 2023 09:30) (67 - 102)  BP: 130/88 (11 Aug 2023 09:30) (124/79 - 155/81)  BP(mean): 101 (11 Aug 2023 09:30) (87 - 108)  ABP: --  ABP(mean): --  RR: 25 (11 Aug 2023 09:30) (14 - 25)  SpO2: 98% (11 Aug 2023 09:30) (94% - 100%)    O2 Parameters below as of 11 Aug 2023 04:00  Patient On (Oxygen Delivery Method): room air                I&O's Summary    10 Aug 2023 07:01  -  11 Aug 2023 07:00  --------------------------------------------------------  IN: 0 mL / OUT: 725 mL / NET: -725 mL                              8.8    13.97 )-----------( 118      ( 11 Aug 2023 05:50 )             27.6       08-11    135  |  104  |  22  ----------------------------<  174<H>  4.4   |  28  |  1.37<H>    Ca    8.8      11 Aug 2023 05:50  Phos  4.5     08-11  Mg     2.3     08-11    TPro  6.4  /  Alb  3.4  /  TBili  0.6  /  DBili  x   /  AST  22  /  ALT  32  /  AlkPhos  78  08-10                Urinalysis Basic - ( 11 Aug 2023 05:50 )    Color: x / Appearance: x / SG: x / pH: x  Gluc: 174 mg/dL / Ketone: x  / Bili: x / Urobili: x   Blood: x / Protein: x / Nitrite: x   Leuk Esterase: x / RBC: x / WBC x   Sq Epi: x / Non Sq Epi: x / Bacteria: x        MEDICATIONS  (STANDING):  chlorhexidine 4% Liquid 1 Application(s) Topical <User Schedule>  diphenhydrAMINE Injectable 25 milliGRAM(s) IV Push daily  enoxaparin Injectable 100 milliGRAM(s) SubCutaneous every 12 hours  folic acid 2 milliGRAM(s) Oral daily  predniSONE   Tablet 50 milliGRAM(s) Oral two times a day    MEDICATIONS  (PRN):  acetaminophen     Tablet .. 650 milliGRAM(s) Oral every 6 hours PRN Mild Pain (1 - 3)      DVT Prophylaxis: Lovenox    Advanced Directives:  Discussed with:    Visit Information:    ** Time is exclusive of billed procedures and/or teaching and/or routine family updates.

## 2023-08-11 NOTE — PROVIDER CONTACT NOTE (OTHER) - SITUATION
Patient monitor alarmed for A-fib, when bedside assessment took place the patient said he felt a flutter in his chest and has no history of arrythmia

## 2023-08-11 NOTE — PROGRESS NOTE ADULT - ASSESSMENT
A/P: 43 male presenting with TTP and again with ELIF.  Hematuria  R IJ DVT    Plan:  ICU  PO Diet  Tunnelled cath Placed  full dose Lovenox tonight for R IJ DVT  Ca++ Gluconate for plasmapheresis  Venodynes  Steroids  S/P first Rituxan dose 8/8

## 2023-08-12 DIAGNOSIS — R77.8 OTHER SPECIFIED ABNORMALITIES OF PLASMA PROTEINS: ICD-10-CM

## 2023-08-12 DIAGNOSIS — I48.0 PAROXYSMAL ATRIAL FIBRILLATION: ICD-10-CM

## 2023-08-12 LAB
ADD ON TEST-SPECIMEN IN LAB: SIGNIFICANT CHANGE UP
ALBUMIN SERPL ELPH-MCNC: 2.8 G/DL — LOW (ref 3.3–5)
ALP SERPL-CCNC: 71 U/L — SIGNIFICANT CHANGE UP (ref 40–120)
ALT FLD-CCNC: 40 U/L — SIGNIFICANT CHANGE UP (ref 12–78)
ANION GAP SERPL CALC-SCNC: 6 MMOL/L — SIGNIFICANT CHANGE UP (ref 5–17)
AST SERPL-CCNC: 21 U/L — SIGNIFICANT CHANGE UP (ref 15–37)
BILIRUB SERPL-MCNC: 0.4 MG/DL — SIGNIFICANT CHANGE UP (ref 0.2–1.2)
BUN SERPL-MCNC: 20 MG/DL — SIGNIFICANT CHANGE UP (ref 7–23)
CALCIUM SERPL-MCNC: 8.7 MG/DL — SIGNIFICANT CHANGE UP (ref 8.5–10.1)
CHLORIDE SERPL-SCNC: 105 MMOL/L — SIGNIFICANT CHANGE UP (ref 96–108)
CO2 SERPL-SCNC: 25 MMOL/L — SIGNIFICANT CHANGE UP (ref 22–31)
CREAT SERPL-MCNC: 1.22 MG/DL — SIGNIFICANT CHANGE UP (ref 0.5–1.3)
CULTURE RESULTS: SIGNIFICANT CHANGE UP
CULTURE RESULTS: SIGNIFICANT CHANGE UP
EGFR: 75 ML/MIN/1.73M2 — SIGNIFICANT CHANGE UP
GLUCOSE SERPL-MCNC: 175 MG/DL — HIGH (ref 70–99)
HCT VFR BLD CALC: 27.4 % — LOW (ref 39–50)
HGB BLD-MCNC: 8.7 G/DL — LOW (ref 13–17)
LIDOCAIN IGE QN: 161 U/L — SIGNIFICANT CHANGE UP (ref 73–393)
MAGNESIUM SERPL-MCNC: 2.4 MG/DL — SIGNIFICANT CHANGE UP (ref 1.6–2.6)
MCHC RBC-ENTMCNC: 31.2 PG — SIGNIFICANT CHANGE UP (ref 27–34)
MCHC RBC-ENTMCNC: 31.8 GM/DL — LOW (ref 32–36)
MCV RBC AUTO: 98.2 FL — SIGNIFICANT CHANGE UP (ref 80–100)
NRBC # BLD: 12 /100 WBCS — HIGH (ref 0–0)
PHOSPHATE SERPL-MCNC: 3.9 MG/DL — SIGNIFICANT CHANGE UP (ref 2.5–4.5)
PLATELET # BLD AUTO: 153 K/UL — SIGNIFICANT CHANGE UP (ref 150–400)
POTASSIUM SERPL-MCNC: 4.4 MMOL/L — SIGNIFICANT CHANGE UP (ref 3.5–5.3)
POTASSIUM SERPL-SCNC: 4.4 MMOL/L — SIGNIFICANT CHANGE UP (ref 3.5–5.3)
PROT SERPL-MCNC: 5.7 GM/DL — LOW (ref 6–8.3)
RBC # BLD: 2.79 M/UL — LOW (ref 4.2–5.8)
RBC # FLD: 26.3 % — HIGH (ref 10.3–14.5)
SODIUM SERPL-SCNC: 136 MMOL/L — SIGNIFICANT CHANGE UP (ref 135–145)
SPECIMEN SOURCE: SIGNIFICANT CHANGE UP
SPECIMEN SOURCE: SIGNIFICANT CHANGE UP
TROPONIN I, HIGH SENSITIVITY RESULT: 27.15 NG/L — SIGNIFICANT CHANGE UP
TROPONIN I, HIGH SENSITIVITY RESULT: 27.19 NG/L — SIGNIFICANT CHANGE UP
WBC # BLD: 12.87 K/UL — HIGH (ref 3.8–10.5)
WBC # FLD AUTO: 12.87 K/UL — HIGH (ref 3.8–10.5)

## 2023-08-12 PROCEDURE — 99232 SBSQ HOSP IP/OBS MODERATE 35: CPT

## 2023-08-12 PROCEDURE — 93010 ELECTROCARDIOGRAM REPORT: CPT

## 2023-08-12 PROCEDURE — 99222 1ST HOSP IP/OBS MODERATE 55: CPT

## 2023-08-12 RX ORDER — PANTOPRAZOLE SODIUM 20 MG/1
40 TABLET, DELAYED RELEASE ORAL DAILY
Refills: 0 | Status: DISCONTINUED | OUTPATIENT
Start: 2023-08-12 | End: 2023-08-16

## 2023-08-12 RX ORDER — ONDANSETRON 8 MG/1
4 TABLET, FILM COATED ORAL ONCE
Refills: 0 | Status: COMPLETED | OUTPATIENT
Start: 2023-08-12 | End: 2023-08-12

## 2023-08-12 RX ORDER — ONDANSETRON 8 MG/1
4 TABLET, FILM COATED ORAL ONCE
Refills: 0 | Status: DISCONTINUED | OUTPATIENT
Start: 2023-08-12 | End: 2023-08-12

## 2023-08-12 RX ORDER — MORPHINE SULFATE 50 MG/1
2 CAPSULE, EXTENDED RELEASE ORAL ONCE
Refills: 0 | Status: DISCONTINUED | OUTPATIENT
Start: 2023-08-12 | End: 2023-08-12

## 2023-08-12 RX ORDER — NITROGLYCERIN 6.5 MG
0.4 CAPSULE, EXTENDED RELEASE ORAL ONCE
Refills: 0 | Status: COMPLETED | OUTPATIENT
Start: 2023-08-12 | End: 2023-08-12

## 2023-08-12 RX ORDER — CALCIUM GLUCONATE 100 MG/ML
2 VIAL (ML) INTRAVENOUS ONCE
Refills: 0 | Status: COMPLETED | OUTPATIENT
Start: 2023-08-12 | End: 2023-08-12

## 2023-08-12 RX ADMIN — CHLORHEXIDINE GLUCONATE 1 APPLICATION(S): 213 SOLUTION TOPICAL at 07:14

## 2023-08-12 RX ADMIN — ONDANSETRON 4 MILLIGRAM(S): 8 TABLET, FILM COATED ORAL at 12:45

## 2023-08-12 RX ADMIN — PANTOPRAZOLE SODIUM 40 MILLIGRAM(S): 20 TABLET, DELAYED RELEASE ORAL at 13:15

## 2023-08-12 RX ADMIN — Medication 2 MILLIGRAM(S): at 09:28

## 2023-08-12 RX ADMIN — ENOXAPARIN SODIUM 100 MILLIGRAM(S): 100 INJECTION SUBCUTANEOUS at 21:37

## 2023-08-12 RX ADMIN — Medication 0.4 MILLIGRAM(S): at 12:57

## 2023-08-12 RX ADMIN — MORPHINE SULFATE 2 MILLIGRAM(S): 50 CAPSULE, EXTENDED RELEASE ORAL at 12:47

## 2023-08-12 RX ADMIN — Medication 50 MILLIGRAM(S): at 09:27

## 2023-08-12 RX ADMIN — ENOXAPARIN SODIUM 100 MILLIGRAM(S): 100 INJECTION SUBCUTANEOUS at 09:28

## 2023-08-12 RX ADMIN — Medication 25 MILLIGRAM(S): at 10:12

## 2023-08-12 RX ADMIN — Medication 30 MILLILITER(S): at 22:19

## 2023-08-12 RX ADMIN — Medication 200 GRAM(S): at 10:13

## 2023-08-12 RX ADMIN — MORPHINE SULFATE 2 MILLIGRAM(S): 50 CAPSULE, EXTENDED RELEASE ORAL at 13:00

## 2023-08-12 RX ADMIN — Medication 50 MILLIGRAM(S): at 21:36

## 2023-08-12 NOTE — PROGRESS NOTE ADULT - SUBJECTIVE AND OBJECTIVE BOX
HPI: Patient is a 44 Y/O male who was recently D/C with TTP.  He went through a course of plasmapheresis exchanges and steroids. He was D/C Home with a platelet coung in the 390s on 8/4 and today it is 3K and he has hematuria.  He will have a shiley placed and have restart plasmapheresis today.  With the plan to start Retuxan on 8/8 8/8: platelet count 7K today, S/P Plasmapheresis again today, for Rituxan today   8/9: For Plasmapheresis today.    8/10: Platelets recovering  8/11: Platelets 118 today, for PLEX  8/12: Platelets up to 150, PLEX today, hunt an episode of A-FIB and now in NSR    PAST MEDICAL & SURGICAL HISTORY:  No pertinent past medical history          FAMILY HISTORY:      Social Hx:    Allergies    No Known Allergies    Intolerances        Height (cm): 180.3 (08-11 @ 13:58)    ICU Vital Signs Last 24 Hrs  T(C): 36.8 (12 Aug 2023 10:00), Max: 36.9 (11 Aug 2023 16:10)  T(F): 98.3 (12 Aug 2023 10:00), Max: 98.5 (11 Aug 2023 16:10)  HR: 87 (12 Aug 2023 10:00) (66 - 133)  BP: 133/81 (12 Aug 2023 10:00) (104/74 - 162/110)  BP(mean): 94 (12 Aug 2023 10:00) (80 - 126)  ABP: --  ABP(mean): --  RR: 17 (12 Aug 2023 10:00) (7 - 32)  SpO2: 100% (12 Aug 2023 10:00) (94% - 100%)    O2 Parameters below as of 12 Aug 2023 10:00  Patient On (Oxygen Delivery Method): room air                I&O's Summary    11 Aug 2023 07:01  -  12 Aug 2023 07:00  --------------------------------------------------------  IN: 0 mL / OUT: 1150 mL / NET: -1150 mL    12 Aug 2023 07:01  -  12 Aug 2023 10:33  --------------------------------------------------------  IN: 100 mL / OUT: 400 mL / NET: -300 mL                              8.7    12.87 )-----------( 153      ( 12 Aug 2023 06:25 )             27.4       08-11    135  |  104  |  22  ----------------------------<  174<H>  4.4   |  28  |  1.37<H>    Ca    8.8      11 Aug 2023 05:50  Phos  4.5     08-11  Mg     2.3     08-11                  Urinalysis Basic - ( 11 Aug 2023 05:50 )    Color: x / Appearance: x / SG: x / pH: x  Gluc: 174 mg/dL / Ketone: x  / Bili: x / Urobili: x   Blood: x / Protein: x / Nitrite: x   Leuk Esterase: x / RBC: x / WBC x   Sq Epi: x / Non Sq Epi: x / Bacteria: x        MEDICATIONS  (STANDING):  chlorhexidine 4% Liquid 1 Application(s) Topical <User Schedule>  enoxaparin Injectable 100 milliGRAM(s) SubCutaneous every 12 hours  folic acid 2 milliGRAM(s) Oral daily  predniSONE   Tablet 50 milliGRAM(s) Oral two times a day    MEDICATIONS  (PRN):  acetaminophen     Tablet .. 650 milliGRAM(s) Oral every 6 hours PRN Mild Pain (1 - 3)      DVT Prophylaxis: Lovenox    Advanced Directives:  Discussed with:    Visit Information:    ** Time is exclusive of billed procedures and/or teaching and/or routine family updates.

## 2023-08-12 NOTE — PROGRESS NOTE ADULT - ASSESSMENT
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 Plts recently dropped suddenly from approx 400K to 3K, referred back to hospital.       # Immune Mediated TTP    - Recent admission 07/29 - 08/02  ED with chest pain, and noted with anemia and thrombocytopenia, peripheral smear at presentation c/w large amount of schistocytes  - Confirmatory MIIBIO04 deficiency <2 ; c/w TTP caused by severely deficient activity of the [id number] clinically defined as an activity level <10 %  - Received Dex 40mg x1 dose 07/28 ----> methylprednisolone 125mg IV q 6 hrs daily for 2 days 07/30 & 07/31 ---> 08/01 started PO Prednisone 50 mg PO BID x two days (08/01 & 08/02)  - Received Solumedrol 125mg q 12 hrs x2 days (08/07 & 08/08) total then Prednisone 50mg BID (dose based on 1mg/kg) starting 08/09 ---> confirmed with pharmacy  - Hep B surface antigen & antibody, core antibody all negative ----> Rituximab started on 08/08 after pheresis Tx ---> 1N team notified, orders faxed, consent completed ---> q 1 weekly x4 doses total  - S/p plasmapheresis x 4 days (07/31 - 08/03) during previous admission  - Plasmapheresis resumed 08/07 ---> continue daily until x2 days of Plts >150K  - Plasma exchange volume lowered from 5L to 4.5L  - Calcium gluconate ordered (for hypocalcemia with pheresis)  - Folic Acid 2mg QD added   - Tylenol 650 mg PO and Benadryl 50mg IV QD prior to each exchange    - Hgb 8.7 g/dL; s/p 1U PRBC 08/09  - Plts up to 153K  - Continue to trend serial CBCs  - Continue close monitoring and supportive measures      # RIJ Thrombosis     - US 08/07 revealed RIJ thrombosis; 2/2 RIJ Shiley in place   - S/p removal with L tunneled cath placement 08/10  - Lovenox 100 q 12hrs      Tx Plan:    Daily pheresis until Plts >150K x2 days  Continue steroids, will begin to taper lower once Plts improved/stable  Continue other medications as ordered   Lovenox 100mg q 12hrs   S/p Rituxan x1 08/08; due for #2 08/15 ---> will give inpatient if still admitted, otherwise plan for outpatient follow up and tx

## 2023-08-12 NOTE — CONSULT NOTE ADULT - CONSULT REASON
TTP
44yo M with TTP, in need fo access for plasmapheresis referred to IR for left tunneled central venous catheter
Asked to see patient for PAF

## 2023-08-12 NOTE — PROGRESS NOTE ADULT - ASSESSMENT
A/P: 43 male presenting with TTP and again with ELIF.  Hematuria  R IJ DVT    Plan:  ICU  PO Diet  Tunnelled cath Placed  full dose Lovenox tonight for R IJ DVT  Ca++ Gluconate for plasmapheresis  Had a brief episode of AFIB.  Now NSR.  Called back cardio about the AFIB.  NTD  Venodynes  Steroids  S/P first Rituxan dose 8/8

## 2023-08-12 NOTE — CONSULT NOTE ADULT - PROBLEM SELECTOR RECOMMENDATION 9
1st episode of AF last night w/ spontaneous return of sinus rhythm.  Recent cardiac work-up revealed no significant structural abnormality.  Would favor initiation of metoprolol if AF recurs; receiving anticoagulation for hematologic indication.    * Case discussed with Dr Lee
- Case reviewed with Dr. Roper, plan for placement of left tunneled central venous catheter tomorrow pending the following: PLT >50, INR <2.0, Hgb that is stable for sedation or anesthesia  - Keep pt NPO after midngiht ,repeat AM labs

## 2023-08-12 NOTE — CONSULT NOTE ADULT - PROBLEM SELECTOR RECOMMENDATION 2
Persistent but with level lower than seen during prior admission; no ischemic symptoms; normal nuclear stress test earlier this month.

## 2023-08-12 NOTE — CONSULT NOTE ADULT - SUBJECTIVE AND OBJECTIVE BOX
Chief Complaint:   Patient is a 43y old  Male who presents with a chief complaint of TTP    HPI:  42yo M with TTP was initially DC'd home on steroids but returned with PLT 3, now receiving plasmapheresis. Right IJ shiley with clot formation. IR consulted for placement of left IJ tunneled central venous catheter so right IJ shiley can be removed.     Allergies  No Known Allergies    MEDICATIONS  (STANDING):  calcium gluconate IVPB 2 Gram(s) IV Intermittent once  chlorhexidine 4% Liquid 1 Application(s) Topical <User Schedule>  diphenhydrAMINE Injectable 25 milliGRAM(s) IV Push daily  folic acid 2 milliGRAM(s) Oral daily  predniSONE   Tablet 50 milliGRAM(s) Oral two times a day  sodium chloride 0.9%. 1000 milliLiter(s) (75 mL/Hr) IV Continuous <Continuous>    MEDICATIONS  (PRN):  diphenhydrAMINE Injectable 50 milliGRAM(s) IntraMuscular once PRN Rash and/or Itching      PAST MEDICAL & SURGICAL HISTORY:  No pertinent past medical history      Vital Signs Last 24 Hrs  T(C): 36.8 (09 Aug 2023 12:00), Max: 37.1 (08 Aug 2023 18:00)  T(F): 98.2 (09 Aug 2023 12:00), Max: 98.8 (08 Aug 2023 20:00)  HR: 94 (09 Aug 2023 13:12) (71 - 115)  BP: 123/80 (09 Aug 2023 13:12) (115/88 - 151/91)  BP(mean): 92 (09 Aug 2023 13:12) (84 - 108)  RR: 19 (09 Aug 2023 13:12) (10 - 22)  SpO2: 100% (09 Aug 2023 13:12) (95% - 100%)    Parameters below as of 09 Aug 2023 13:12  Patient On (Oxygen Delivery Method): room air      CBC                        6.9    12.64 )-----------( 39       ( 09 Aug 2023 05:45 )             21.6       Chemistry  08-09    140  |  106  |  28<H>  ----------------------------<  191<H>  4.2   |  28  |  1.52<H>    Ca    8.5      09 Aug 2023 05:45  Phos  3.7     08-09  Mg     2.3     08-09      Coags  PT/INR - ( 07 Aug 2023 17:35 )   PT: 24.7 sec;   INR: 2.24 ratio    PTT - ( 07 Aug 2023 17:35 )  PTT:36.8 sec    
  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 He is being treated in the ICU with corticosteroids and plasmapheresis  Cardiology called for new onset AF.  He has no history of arrhythmia.  AF started last night and was associated with palpitations (no clear exacerbating or alleviating factors); spontaneous return of sinus rhythm ~ 5 hours later; no similar symptoms in past. He feels well currently.    PAST MEDICAL & SURGICAL HISTORY:  HLD  TTP    SOCIAL HISTORY:   Alcohol: Denied  Smoking: Nonsmoker    FAMILY HISTORY: N/C to presenting problem    MEDICATIONS  (STANDING):  calcium gluconate IVPB 2 Gram(s) IV Intermittent once  chlorhexidine 4% Liquid 1 Application(s) Topical <User Schedule>  diphenhydrAMINE Injectable 25 milliGRAM(s) IV Push daily  enoxaparin Injectable 100 milliGRAM(s) SubCutaneous every 12 hours  folic acid 2 milliGRAM(s) Oral daily  predniSONE   Tablet 50 milliGRAM(s) Oral two times a day    MEDICATIONS  (PRN):  acetaminophen     Tablet .. 650 milliGRAM(s) Oral every 6 hours PRN Mild Pain (1 - 3)    Allergies:  No Known Allergies    REVIEW OF SYSTEMS:  CONSTITUTIONAL: No fever  Eyes: No visual changes  NECK: No pain or stiffness  RESPIRATORY: No cough, wheezing, hemoptysis; No shortness of breath  CARDIOVASCULAR: + palpitations  GASTROINTESTINAL: No nausea, vomiting  GENITOURINARY: No dysuria  NEUROLOGICAL: No numbness.  SKIN: No itching or rash  All other review of systems is negative unless indicated above    VITAL SIGNS:   Vital Signs Last 24 Hrs  T(C): 36.7 (12 Aug 2023 04:00), Max: 37.1 (11 Aug 2023 09:30)  T(F): 98 (12 Aug 2023 04:00), Max: 98.8 (11 Aug 2023 09:30)  HR: 96 (12 Aug 2023 09:00) (66 - 133)  BP: 131/81 (12 Aug 2023 09:00) (104/74 - 162/110)  BP(mean): 93 (12 Aug 2023 09:00) (80 - 126)  RR: 16 (12 Aug 2023 09:00) (7 - 32)  SpO2: 98% (12 Aug 2023 09:00) (94% - 99%)  Patient On (Oxygen Delivery Method): room air    PHYSICAL EXAM:  Constitutional: NAD, awake and alert  HEENT:  No oral cyanosis.  Pulmonary: Non-labored, breath sounds are clear  Cardiovascular: S1 and S2, regular rate and rhythm  Gastrointestinal: Bowel Sounds present  Lymph: No peripheral edema.   Neurological: Alert, no focal deficits  Skin: No rashes.  Psych:  Mood & affect appropriate    LABS:             8.7    12.87 )-----------( 153      ( 12 Aug 2023 06:25 )             27.4              135    |  104    |  22     ----------------------------<  174    4.4     |  28     |  1.37     Ca    8.8        11 Aug 2023 05:50  Ca    9.0        10 Aug 2023 06:01  Phos  4.5       11 Aug 2023 05:50    TPro  6.4    /  Alb  3.4    /  TBili  0.6    /  DBili  x      /  AST  22     /  ALT  32     /  AlkPhos  78     10 Aug 2023 06:01    TroponinI hsT: 968.06    TTE Echo Complete w/o Contrast w/ Doppler (07.29.23 @ 09:31):   Estimated left ventricular ejection fraction is 60-65 %.   Mild to Moderate concentric left ventricular hypertrophy is present.   The left ventricle is normal in size, wall motion and contractility as seen in limited views.   The mitral valve leaflets appear thin and normal. Mild (1+) mitral regurgitation is present.   Normal appearing tricuspid valve structure.  Mild to Moderate Tricuspid regurgitation is present.   Normal appearing pulmonic valve structure. Trace to mild pulmonic valvular regurgitation is present.   Trace to small pericardial effusion is present.   There is no evidence of tamponade.    Xray Chest 1 View-PORTABLE IMMEDIATE (Xray Chest 1 View-PORTABLE IMMEDIATE .) (08.07.23 @ 18:37):   Right IJ catheter was repositioned and tip is now in the superior vena cava. No pneumothorax. The lungs are clear. The heart size is stable.    NM Nuclear Stress Pharmacologic Multiple (08.01.23 @ 11:00):   Normal SPECT Myocardial Perfusion Imaging.  1.  No evidence of reversible or fixed perfusion defects.  2.  Normal left ventricular contractility with an ejection fraction of 79% (Normal: 50% or greater).  3.  No regionalwall motion abnormalities.  4.  Revisualized trace pericardial effusion.      
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 1 week and came to ED on 7/29 Dx'ed and treated for acute Immune TTP. D/c'ed on 8/2/23, on 40 BID Prednisone and Folic acid.     8/7/2023- Seen at bedside, in no acute distress. Reports feeling well until yesterday  with tiredness and hematuria x 1 day    PAST MEDICAL & SURGICAL HISTORY:    HLD    FAMILY HISTORY:      MEDICATIONS  (STANDING):    calcium gluconate IVPB 2 Gram(s) IV Intermittent once  chlorhexidine 4% Liquid 1 Application(s) Topical <User Schedule>  potassium chloride  10 mEq/100 mL IVPB 10 milliEquivalent(s) IV Intermittent every 1 hour  sodium chloride 0.9%. 1000 milliLiter(s) (75 mL/Hr) IV Continuous <Continuous>    MEDICATIONS  (PRN):      Allergies    No Known Allergies    Review of Systems    Constitutional, Eyes, ENT, Cardiovascular, Respiratory, Gastrointestinal, Genitourinary, Musculoskeletal, Integumentary, Neurological, Psychiatric, Endocrine, Heme/Lymph and Allergic/Immunologic review of systems are otherwise negative except as noted in HPI.     Vital Signs Last 24 Hrs    T(C): 36.9 (07 Aug 2023 15:14), Max: 36.9 (07 Aug 2023 15:14)  T(F): 98.5 (07 Aug 2023 15:14), Max: 98.5 (07 Aug 2023 15:14)  HR: 77 (07 Aug 2023 15:05) (68 - 77)  BP: 145/100 (07 Aug 2023 15:05) (145/100 - 146/97)  BP(mean): 112 (07 Aug 2023 12:36) (112 - 112)  RR: 20 (07 Aug 2023 15:05) (18 - 20)  SpO2: 100% (07 Aug 2023 15:05) (100% - 100%)    Parameters below as of 07 Aug 2023 15:05  Patient On (Oxygen Delivery Method): room air    Physical Exam    Eyes: no conjunctival infection, anicteric.   ENT: pharynx is unremarkable, moist mucus membrane, no oral lesions.   Neck: supple without JVD, no thyromegaly or masses appreciated.   Pulmonary: clear to auscultation bilaterally, no dullness, no wheezing.   Cardiac: RRR, normal S1S2, no murmurs, rubs, gallops.   Vascular: no JVD, no calf tenderness, venous stasis changes, varices.   Abdomen: normoactive bowel sounds, soft and nontender, no hepatosplenomegaly or masses appreciated.   Lymphatic: no peripheral adenopathy appreciated.   Musculoskeletal: full range of motion and no deformities appreciated.   Skin: normal appearance, no rash, nodules, vesicles, ulcers, erythema.   Neurology: grossly intact.   Psychiatric: affect appropriate.     LABS:    CBC Full  -  ( 07 Aug 2023 12:56 )  WBC Count : 12.49 K/uL  RBC Count : 3.05 M/uL  Hemoglobin : 9.1 g/dL  Hematocrit : 26.9 %  Platelet Count - Automated : 3 K/uL  Mean Cell Volume : 88.2 fl  Mean Cell Hemoglobin : 29.8 pg  Mean Cell Hemoglobin Concentration : 33.8 gm/dL  Auto Neutrophil # : 8.99 K/uL  Auto Lymphocyte # : 1.87 K/uL  Auto Monocyte # : 1.12 K/uL  Auto Eosinophil # : 0.00 K/uL  Auto Basophil # : 0.00 K/uL  Auto Neutrophil % : 72.0 %  Auto Lymphocyte % : 15.0 %  Auto Monocyte % : 9.0 %  Auto Eosinophil % : 0.0 %  Auto Basophil % : 0.0 %    08-07    140  |  106  |  35<H>  ----------------------------<  148<H>  3.3<L>   |  24  |  1.70<H>    Ca    8.9      07 Aug 2023 12:56    TPro  7.0  /  Alb  3.6  /  TBili  4.9<H>  /  DBili  x   /  AST  96<H>  /  ALT  86<H>  /  AlkPhos  79  08-07      Urinalysis Basic - ( 07 Aug 2023 12:56 )    Color: Brown / Appearance: Slightly Turbid / SG: See Note / pH: x  Gluc: 148 mg/dL / Ketone: See Note  / Bili: See Note / Urobili: See Note   Blood: x / Protein: See Note / Nitrite: See Note   Leuk Esterase: See Note / RBC: 3-5 /HPF / WBC 0-2 /HPF   Sq Epi: x / Non Sq Epi: x / Bacteria: Few        RADIOLOGY & ADDITIONAL STUDIES:

## 2023-08-12 NOTE — PROGRESS NOTE ADULT - SUBJECTIVE AND OBJECTIVE BOX
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 He received plasmapheresis and steroids for acute immune mediated TTP and d/c'ed 08/02 with close Heme/Onc follow up with attending Dr Darnell. His Plts were improving as of Friday 08/04 at 400K but then dropped down to approx 3K Monday 08/07 and referred back to the hospital.    8/7/2023- Seen at bedside, in no acute distress. Reports feeling well until yesterday  with tiredness and hematuria x 1 day    08/08/2023: Seen at bedside, no acute distress; s/p 1st pheresis tx this admission     08/09/2023: Seen at bedside, feeling well, no acute distress; S/p 2nd pheresis tx this admission as well as 1st Rituximab     08/10/2023- Seen at bedside with Dr. Darnell. Undergoing plasma pheresis.     08/11/2023: Seen at bedside, feeling well, no acute distress    08/12/2023:       PAST MEDICAL & SURGICAL HISTORY:    HLD      FAMILY HISTORY:    No known blood disorders      MEDICATIONS  (STANDING):    chlorhexidine 4% Liquid 1 Application(s) Topical <User Schedule>  enoxaparin Injectable 100 milliGRAM(s) SubCutaneous every 12 hours  folic acid 2 milliGRAM(s) Oral daily  predniSONE   Tablet 50 milliGRAM(s) Oral two times a day      MEDICATIONS  (PRN):    acetaminophen     Tablet .. 650 milliGRAM(s) Oral every 6 hours PRN Mild Pain (1 - 3)        ALLERGIES:    No Known Allergies      REVIEW OF SYSTEMS:    Constitutional, Eyes, ENT, Cardiovascular, Respiratory, Gastrointestinal, Genitourinary, Musculoskeletal, Integumentary, Neurological, Psychiatric, Endocrine, Heme/Lymph and Allergic/Immunologic review of systems are otherwise negative except as noted in HPI.       VITALS:    ICU Vital Signs Last 24 Hrs  T(C): 36.8 (12 Aug 2023 10:00), Max: 36.9 (11 Aug 2023 16:10)  T(F): 98.3 (12 Aug 2023 10:00), Max: 98.5 (11 Aug 2023 16:10)  HR: 87 (12 Aug 2023 10:00) (66 - 133)  BP: 133/81 (12 Aug 2023 10:00) (104/74 - 162/110)  BP(mean): 94 (12 Aug 2023 10:00) (80 - 126)  ABP: --  ABP(mean): --  RR: 17 (12 Aug 2023 10:00) (7 - 32)  SpO2: 100% (12 Aug 2023 10:00) (94% - 100%)    O2 Parameters below as of 12 Aug 2023 10:00  Patient On (Oxygen Delivery Method): room air        PHYSICAL:    Constitutional: no acute distress  Eyes: no conjunctival infection, anicteric.   Pulmonary: clear to auscultation bilaterally  Cardiac: RRR  Vascular: no calf tenderness, venous stasis changes, varices; L chest wall tunneled cath in place   Abdomen: normoactive bowel sounds, soft and nontender, no hepatosplenomegaly or masses appreciated  Lymphatic: no peripheral adenopathy appreciated  Musculoskeletal: full range of motion and no deformities appreciated  Skin: normal appearance, no rash/erythema   Neurology: grossly intact  Psychiatric: affect appropriate      LABS:                                            8.7    12.87 )-----------( 153      ( 12 Aug 2023 06:25 )             27.4     08-12    136  |  105  |  20  ----------------------------<  175<H>  4.4   |  25  |  1.22    Ca    8.7      12 Aug 2023 06:25  Phos  3.9     08-12  Mg     2.4     08-12    TPro  5.7<L>  /  Alb  2.8<L>  /  TBili  0.4  /  DBili  x   /  AST  21  /  ALT  40  /  AlkPhos  71  08-12          RADIOLOGY & ADDITIONAL STUDIES:      US Duplex Venous Upper Ext Ltd, Right (08.07.23 @ 19:25)    IMPRESSION:  Right internal jugular vein thrombosis.        Xray Chest 1 View-PORTABLE IMMEDIATE (Xray Chest 1 View-PORTABLE IMMEDIATE .) (08.07.23 @ 18:37)    Impression:    No acute pulmonary disease.    Tip of right IJ catheter is in the superior vena cava without   pneumothorax.       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 He received plasmapheresis and steroids for acute immune mediated TTP and d/c'ed 08/02 with close Heme/Onc follow up with attending Dr Darnell. His Plts were improving as of Friday 08/04 at 400K but then dropped down to approx 3K Monday 08/07 and referred back to the hospital.    8/7/2023- Seen at bedside, in no acute distress. Reports feeling well until yesterday  with tiredness and hematuria x 1 day    08/08/2023: Seen at bedside, no acute distress; s/p 1st pheresis tx this admission     08/09/2023: Seen at bedside, feeling well, no acute distress; S/p 2nd pheresis tx this admission as well as 1st Rituximab     08/10/2023- Seen at bedside with Dr. Darnell. Undergoing plasma pheresis.     08/11/2023: Seen at bedside, feeling well, no acute distress    08/12/2023: Seen at bedside, completed plasmapheresis and developed acute CP, NV, ICU team at bedside       PAST MEDICAL & SURGICAL HISTORY:    HLD      FAMILY HISTORY:    No known blood disorders      MEDICATIONS  (STANDING):    chlorhexidine 4% Liquid 1 Application(s) Topical <User Schedule>  enoxaparin Injectable 100 milliGRAM(s) SubCutaneous every 12 hours  folic acid 2 milliGRAM(s) Oral daily  predniSONE   Tablet 50 milliGRAM(s) Oral two times a day      MEDICATIONS  (PRN):    acetaminophen     Tablet .. 650 milliGRAM(s) Oral every 6 hours PRN Mild Pain (1 - 3)        ALLERGIES:    No Known Allergies      REVIEW OF SYSTEMS:    Constitutional, Eyes, ENT, Cardiovascular, Respiratory, Gastrointestinal, Genitourinary, Musculoskeletal, Integumentary, Neurological, Psychiatric, Endocrine, Heme/Lymph and Allergic/Immunologic review of systems are otherwise negative except as noted in HPI.       VITALS:    ICU Vital Signs Last 24 Hrs  T(C): 36.8 (12 Aug 2023 10:00), Max: 36.9 (11 Aug 2023 16:10)  T(F): 98.3 (12 Aug 2023 10:00), Max: 98.5 (11 Aug 2023 16:10)  HR: 87 (12 Aug 2023 10:00) (66 - 133)  BP: 133/81 (12 Aug 2023 10:00) (104/74 - 162/110)  BP(mean): 94 (12 Aug 2023 10:00) (80 - 126)  ABP: --  ABP(mean): --  RR: 17 (12 Aug 2023 10:00) (7 - 32)  SpO2: 100% (12 Aug 2023 10:00) (94% - 100%)    O2 Parameters below as of 12 Aug 2023 10:00  Patient On (Oxygen Delivery Method): room air        PHYSICAL:    Constitutional: no acute distress  Eyes: no conjunctival infection, anicteric.   Pulmonary: clear to auscultation bilaterally  Cardiac: RRR  Vascular: no calf tenderness, venous stasis changes, varices; L chest wall tunneled cath in place   Abdomen: normoactive bowel sounds, soft and nontender, no hepatosplenomegaly or masses appreciated  Lymphatic: no peripheral adenopathy appreciated  Musculoskeletal: full range of motion and no deformities appreciated  Skin: normal appearance, no rash/erythema   Neurology: grossly intact  Psychiatric: affect appropriate      LABS:                                            8.7    12.87 )-----------( 153      ( 12 Aug 2023 06:25 )             27.4     08-12    136  |  105  |  20  ----------------------------<  175<H>  4.4   |  25  |  1.22    Ca    8.7      12 Aug 2023 06:25  Phos  3.9     08-12  Mg     2.4     08-12    TPro  5.7<L>  /  Alb  2.8<L>  /  TBili  0.4  /  DBili  x   /  AST  21  /  ALT  40  /  AlkPhos  71  08-12          RADIOLOGY & ADDITIONAL STUDIES:      US Duplex Venous Upper Ext Ltd, Right (08.07.23 @ 19:25)    IMPRESSION:  Right internal jugular vein thrombosis.        Xray Chest 1 View-PORTABLE IMMEDIATE (Xray Chest 1 View-PORTABLE IMMEDIATE .) (08.07.23 @ 18:37)    Impression:    No acute pulmonary disease.    Tip of right IJ catheter is in the superior vena cava without   pneumothorax.

## 2023-08-13 LAB
ANION GAP SERPL CALC-SCNC: 5 MMOL/L — SIGNIFICANT CHANGE UP (ref 5–17)
BUN SERPL-MCNC: 21 MG/DL — SIGNIFICANT CHANGE UP (ref 7–23)
CALCIUM SERPL-MCNC: 8.8 MG/DL — SIGNIFICANT CHANGE UP (ref 8.5–10.1)
CHLORIDE SERPL-SCNC: 102 MMOL/L — SIGNIFICANT CHANGE UP (ref 96–108)
CO2 SERPL-SCNC: 27 MMOL/L — SIGNIFICANT CHANGE UP (ref 22–31)
CREAT SERPL-MCNC: 1.23 MG/DL — SIGNIFICANT CHANGE UP (ref 0.5–1.3)
EGFR: 74 ML/MIN/1.73M2 — SIGNIFICANT CHANGE UP
GLUCOSE SERPL-MCNC: 164 MG/DL — HIGH (ref 70–99)
HCT VFR BLD CALC: 29 % — LOW (ref 39–50)
HGB BLD-MCNC: 9.4 G/DL — LOW (ref 13–17)
MAGNESIUM SERPL-MCNC: 2.5 MG/DL — SIGNIFICANT CHANGE UP (ref 1.6–2.6)
MCHC RBC-ENTMCNC: 31.6 PG — SIGNIFICANT CHANGE UP (ref 27–34)
MCHC RBC-ENTMCNC: 32.4 GM/DL — SIGNIFICANT CHANGE UP (ref 32–36)
MCV RBC AUTO: 97.6 FL — SIGNIFICANT CHANGE UP (ref 80–100)
NRBC # BLD: 4 /100 WBCS — HIGH (ref 0–0)
PHOSPHATE SERPL-MCNC: 4 MG/DL — SIGNIFICANT CHANGE UP (ref 2.5–4.5)
PLATELET # BLD AUTO: 177 K/UL — SIGNIFICANT CHANGE UP (ref 150–400)
POTASSIUM SERPL-MCNC: 4.2 MMOL/L — SIGNIFICANT CHANGE UP (ref 3.5–5.3)
POTASSIUM SERPL-SCNC: 4.2 MMOL/L — SIGNIFICANT CHANGE UP (ref 3.5–5.3)
RBC # BLD: 2.97 M/UL — LOW (ref 4.2–5.8)
RBC # FLD: 24.3 % — HIGH (ref 10.3–14.5)
SODIUM SERPL-SCNC: 134 MMOL/L — LOW (ref 135–145)
WBC # BLD: 10.51 K/UL — HIGH (ref 3.8–10.5)
WBC # FLD AUTO: 10.51 K/UL — HIGH (ref 3.8–10.5)

## 2023-08-13 PROCEDURE — 93010 ELECTROCARDIOGRAM REPORT: CPT

## 2023-08-13 PROCEDURE — 99232 SBSQ HOSP IP/OBS MODERATE 35: CPT

## 2023-08-13 RX ORDER — DIPHENHYDRAMINE HCL 50 MG
25 CAPSULE ORAL ONCE
Refills: 0 | Status: COMPLETED | OUTPATIENT
Start: 2023-08-13 | End: 2023-08-13

## 2023-08-13 RX ORDER — CALCIUM GLUCONATE 100 MG/ML
2 VIAL (ML) INTRAVENOUS ONCE
Refills: 0 | Status: COMPLETED | OUTPATIENT
Start: 2023-08-13 | End: 2023-08-13

## 2023-08-13 RX ADMIN — PANTOPRAZOLE SODIUM 40 MILLIGRAM(S): 20 TABLET, DELAYED RELEASE ORAL at 09:36

## 2023-08-13 RX ADMIN — Medication 25 MILLIGRAM(S): at 09:35

## 2023-08-13 RX ADMIN — Medication 50 MILLIGRAM(S): at 09:35

## 2023-08-13 RX ADMIN — ENOXAPARIN SODIUM 100 MILLIGRAM(S): 100 INJECTION SUBCUTANEOUS at 21:25

## 2023-08-13 RX ADMIN — ENOXAPARIN SODIUM 100 MILLIGRAM(S): 100 INJECTION SUBCUTANEOUS at 09:35

## 2023-08-13 RX ADMIN — Medication 2 MILLIGRAM(S): at 09:36

## 2023-08-13 RX ADMIN — Medication 30 MILLILITER(S): at 06:24

## 2023-08-13 RX ADMIN — CHLORHEXIDINE GLUCONATE 1 APPLICATION(S): 213 SOLUTION TOPICAL at 06:09

## 2023-08-13 RX ADMIN — Medication 50 MILLIGRAM(S): at 21:25

## 2023-08-13 RX ADMIN — Medication 30 MILLILITER(S): at 21:25

## 2023-08-13 RX ADMIN — Medication 200 GRAM(S): at 09:36

## 2023-08-13 NOTE — PROGRESS NOTE ADULT - ASSESSMENT
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 Plts recently dropped suddenly from approx 400K to 3K, referred back to hospital.       # Immune Mediated TTP    - Recent admission 07/29 - 08/02  ED with chest pain, and noted with anemia and thrombocytopenia, peripheral smear at presentation c/w large amount of schistocytes  - Confirmatory [id number] deficiency <2 ; c/w TTP caused by severely deficient activity of the [id number] clinically defined as an activity level <10 %  - Received Dex 40mg x1 dose 07/28 ----> methylprednisolone 125mg IV q 6 hrs daily for 2 days 07/30 & 07/31 ---> 08/01 started PO Prednisone 50 mg PO BID x two days (08/01 & 08/02)  - Received Solumedrol 125mg q 12 hrs x2 days (08/07 & 08/08) total then Prednisone 50mg BID (dose based on 1mg/kg) starting 08/09 ---> confirmed with pharmacy  - Hep B surface antigen & antibody, core antibody all negative ----> Rituximab started on 08/08 after pheresis Tx ---> 1N team notified, orders faxed, consent completed ---> q 1 weekly x4 doses total  - S/p plasmapheresis x 4 days (07/31 - 08/03) during previous admission  - Plasmapheresis resumed 08/07 ---> continue daily until x2 days of Plts >150K  - Plasma exchange volume lowered from 5L to 4.5L  - Calcium gluconate ordered (for hypocalcemia with pheresis)  - Folic Acid 2mg QD added   - Tylenol 650 mg PO and Benadryl 50mg IV QD prior to each exchange    - Hgb 9.4 g/dL; s/p 1U PRBC 08/09  - Plts up to 177K ---> this is day #2 of Plts >150K  - Continue to trend serial CBCs  - Continue close monitoring and supportive measures      # RIJ Thrombosis     - US 08/07 revealed RIJ thrombosis; 2/2 RIJ Shiley in place   - S/p removal with L tunneled cath placement 08/10  - Lovenox 100 q 12hrs      Tx Plan:    - Daily pheresis until Plts >150K x2 days   - Continue steroids, will begin to taper lower starting 08/14  - Continue other medications as ordered    - S/p Rituxan x1 08/08; due for #2 08/15 ---> should be given inpatient as patient did have reaction with first dosing    - At this time, patients Plts remained >150K both 08/12 & 08/13 ---> continue with plan to receive pheresis 08/13, check Plts 08/14 AM and oncoming team will make decision about pheresis then   44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 Plts recently dropped suddenly from approx 400K to 3K, referred back to hospital.       # Immune Mediated TTP    - Recent admission 07/29 - 08/02  ED with chest pain, and noted with anemia and thrombocytopenia, peripheral smear at presentation c/w large amount of schistocytes  - Confirmatory [id number] deficiency <2 ; c/w TTP caused by severely deficient activity of the [id number] clinically defined as an activity level <10 %  - Received Dex 40mg x1 dose 07/28 ----> methylprednisolone 125mg IV q 6 hrs daily for 2 days 07/30 & 07/31 ---> 08/01 started PO Prednisone 50 mg PO BID x two days (08/01 & 08/02)  - Received Solumedrol 125mg q 12 hrs x2 days (08/07 & 08/08) total then Prednisone 50mg BID (dose based on 1mg/kg) starting 08/09 ---> confirmed with pharmacy  - Hep B surface antigen & antibody, core antibody all negative ----> Rituximab started on 08/08 after pheresis Tx ---> 1N team notified, orders faxed, consent completed ---> q 1 weekly x4 doses total  - S/p plasmapheresis x 4 days (07/31 - 08/03) during previous admission  - Plasmapheresis resumed 08/07 ---> continue daily until x2 days of Plts >150K  - Plasma exchange volume lowered from 5L to 4.5L  - Calcium gluconate ordered (for hypocalcemia with pheresis)  - Folic Acid 2mg QD added   - Tylenol 650 mg PO and Benadryl 50mg IV QD prior to each exchange    - Hgb 9.4 g/dL; s/p 1U PRBC 08/09  - Plts up to 177K ---> this is day #2 of Plts >150K  - Continue to trend serial CBCs  - Continue close monitoring and supportive measures      # RIJ Thrombosis     - US 08/07 revealed RIJ thrombosis; 2/2 RIJ Shiley in place   - S/p removal with L tunneled cath placement 08/10  - Lovenox 100 q 12hrs      Tx Plan:    - Daily pheresis until Plts >150K x2 days   - Continue steroids, will begin to taper lower starting 08/14  - Continue other medications as ordered    - S/p Rituxan x1 08/08; due for #2 08/15 ---> should be given inpatient as patient did have reaction with first dosing    - At this time, patients Plts remained >150K both 08/12 & 08/13 ---> continue with plan to receive pheresis 08/13, check Plts 08/14 AM and oncoming team will make decision to hold tx

## 2023-08-13 NOTE — PROGRESS NOTE ADULT - ASSESSMENT
A/P: 43 male presenting with TTP and again with ELIF.  Hematuria  R IJ DVT    Plan:  ICU  PO Diet  Tunnelled cath Placed  full dose Lovenox for R IJ DVT  Ca++ Gluconate for plasmapheresis  Had a brief episode of AFIB.  Now NSR.  Called back cardio about the AFIB.  NTD  Venodynes  Steroids  S/P first Rituxan dose 8/8

## 2023-08-13 NOTE — PROGRESS NOTE ADULT - SUBJECTIVE AND OBJECTIVE BOX
HPI: Patient is a 42 Y/O male who was recently D/C with TTP.  He went through a course of plasmapheresis exchanges and steroids. He was D/C Home with a platelet coung in the 390s on 8/4 and today it is 3K and he has hematuria.  He will have a shiley placed and have restart plasmapheresis today.  With the plan to start Retuxan on 8/8 8/8: platelet count 7K today, S/P Plasmapheresis again today, for Rituxan today   8/9: For Plasmapheresis today.    8/10: Platelets recovering  8/11: Platelets 118 today, for PLEX  8/12: Platelets up to 150, PLEX today, hunt an episode of A-FIB and now in NSR  8/13: No events over night, for PLEX today       PAST MEDICAL & SURGICAL HISTORY:  No pertinent past medical history          FAMILY HISTORY:      Social Hx:    Allergies    No Known Allergies    Intolerances            ICU Vital Signs Last 24 Hrs  T(C): 36.6 (13 Aug 2023 08:00), Max: 37.1 (12 Aug 2023 18:00)  T(F): 97.9 (13 Aug 2023 08:00), Max: 98.8 (12 Aug 2023 18:00)  HR: 100 (13 Aug 2023 09:00) (60 - 106)  BP: 146/99 (13 Aug 2023 07:00) (117/79 - 152/85)  BP(mean): 114 (13 Aug 2023 07:00) (85 - 114)  ABP: --  ABP(mean): --  RR: 22 (13 Aug 2023 09:00) (9 - 23)  SpO2: 100% (13 Aug 2023 09:00) (97% - 100%)    O2 Parameters below as of 13 Aug 2023 09:00  Patient On (Oxygen Delivery Method): room air                I&O's Summary    12 Aug 2023 07:01  -  13 Aug 2023 07:00  --------------------------------------------------------  IN: 3900 mL / OUT: 2850 mL / NET: 1050 mL                              9.4    10.51 )-----------( 177      ( 13 Aug 2023 06:31 )             29.0       08-13    134<L>  |  102  |  21  ----------------------------<  164<H>  4.2   |  27  |  1.23    Ca    8.8      13 Aug 2023 06:31  Phos  4.0     08-13  Mg     2.5     08-13    TPro  5.7<L>  /  Alb  2.8<L>  /  TBili  0.4  /  DBili  x   /  AST  21  /  ALT  40  /  AlkPhos  71  08-12                Urinalysis Basic - ( 13 Aug 2023 06:31 )    Color: x / Appearance: x / SG: x / pH: x  Gluc: 164 mg/dL / Ketone: x  / Bili: x / Urobili: x   Blood: x / Protein: x / Nitrite: x   Leuk Esterase: x / RBC: x / WBC x   Sq Epi: x / Non Sq Epi: x / Bacteria: x        MEDICATIONS  (STANDING):  chlorhexidine 4% Liquid 1 Application(s) Topical <User Schedule>  enoxaparin Injectable 100 milliGRAM(s) SubCutaneous every 12 hours  folic acid 2 milliGRAM(s) Oral daily  pantoprazole  Injectable 40 milliGRAM(s) IV Push daily  predniSONE   Tablet 50 milliGRAM(s) Oral two times a day    MEDICATIONS  (PRN):  acetaminophen     Tablet .. 650 milliGRAM(s) Oral every 6 hours PRN Mild Pain (1 - 3)  aluminum hydroxide/magnesium hydroxide/simethicone Suspension 30 milliLiter(s) Oral every 4 hours PRN Dyspepsia      DVT Prophylaxis: Lovenox    Advanced Directives:  Discussed with:    Visit Information:    ** Time is exclusive of billed procedures and/or teaching and/or routine family updates.

## 2023-08-13 NOTE — PROGRESS NOTE ADULT - SUBJECTIVE AND OBJECTIVE BOX
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 He received plasmapheresis and steroids for acute immune mediated TTP and d/c'ed 08/02 with close Heme/Onc follow up with attending Dr Darnell. His Plts were improving as of Friday 08/04 at 400K but then dropped down to approx 3K Monday 08/07 and referred back to the hospital.    8/7/2023- Seen at bedside, in no acute distress. Reports feeling well until yesterday  with tiredness and hematuria x 1 day    08/08/2023: Seen at bedside, no acute distress; s/p 1st pheresis tx this admission     08/09/2023: Seen at bedside, feeling well, no acute distress; S/p 2nd pheresis tx this admission as well as 1st Rituximab     08/10/2023- Seen at bedside with Dr. Darnell. Undergoing plasma pheresis.     08/11/2023: Seen at bedside, feeling well, no acute distress    08/12/2023: Seen at bedside, completed plasmapheresis and developed acute CP, NV, ICU team at bedside     08/13/2023:       PAST MEDICAL & SURGICAL HISTORY:    HLD      FAMILY HISTORY:    No known blood disorders      MEDICATIONS  (STANDING):    chlorhexidine 4% Liquid 1 Application(s) Topical <User Schedule>  enoxaparin Injectable 100 milliGRAM(s) SubCutaneous every 12 hours  folic acid 2 milliGRAM(s) Oral daily  pantoprazole  Injectable 40 milliGRAM(s) IV Push daily  predniSONE   Tablet 50 milliGRAM(s) Oral two times a day      MEDICATIONS  (PRN):    acetaminophen     Tablet .. 650 milliGRAM(s) Oral every 6 hours PRN Mild Pain (1 - 3)  aluminum hydroxide/magnesium hydroxide/simethicone Suspension 30 milliLiter(s) Oral every 4 hours PRN Dyspepsia          ALLERGIES:    No Known Allergies      REVIEW OF SYSTEMS:    Constitutional, Eyes, ENT, Cardiovascular, Respiratory, Gastrointestinal, Genitourinary, Musculoskeletal, Integumentary, Neurological, Psychiatric, Endocrine, Heme/Lymph and Allergic/Immunologic review of systems are otherwise negative except as noted in HPI.       VITALS:    ICU Vital Signs Last 24 Hrs  T(C): 36.6 (13 Aug 2023 08:00), Max: 37.1 (12 Aug 2023 18:00)  T(F): 97.9 (13 Aug 2023 08:00), Max: 98.8 (12 Aug 2023 18:00)  HR: 100 (13 Aug 2023 09:00) (60 - 106)  BP: 146/99 (13 Aug 2023 07:00) (117/79 - 152/85)  BP(mean): 114 (13 Aug 2023 07:00) (85 - 114)  ABP: --  ABP(mean): --  RR: 22 (13 Aug 2023 09:00) (9 - 23)  SpO2: 100% (13 Aug 2023 09:00) (97% - 100%)    O2 Parameters below as of 13 Aug 2023 09:00  Patient On (Oxygen Delivery Method): room air        PHYSICAL:    Constitutional: no acute distress  Eyes: no conjunctival infection, anicteric.   Pulmonary: clear to auscultation bilaterally  Cardiac: RRR  Vascular: no calf tenderness, venous stasis changes, varices; L chest wall tunneled cath in place   Abdomen: normoactive bowel sounds, soft and nontender, no hepatosplenomegaly or masses appreciated  Lymphatic: no peripheral adenopathy appreciated  Musculoskeletal: full range of motion and no deformities appreciated  Skin: normal appearance, no rash/erythema   Neurology: grossly intact  Psychiatric: affect appropriate      LABS:                                                               9.4    10.51 )-----------( 177      ( 13 Aug 2023 06:31 )             29.0     08-13    134<L>  |  102  |  21  ----------------------------<  164<H>  4.2   |  27  |  1.23    Ca    8.8      13 Aug 2023 06:31  Phos  4.0     08-13  Mg     2.5     08-13    TPro  5.7<L>  /  Alb  2.8<L>  /  TBili  0.4  /  DBili  x   /  AST  21  /  ALT  40  /  AlkPhos  71  08-12            RADIOLOGY & ADDITIONAL STUDIES:      US Duplex Venous Upper Ext Ltd, Right (08.07.23 @ 19:25)    IMPRESSION:  Right internal jugular vein thrombosis.        Xray Chest 1 View-PORTABLE IMMEDIATE (Xray Chest 1 View-PORTABLE IMMEDIATE .) (08.07.23 @ 18:37)    Impression:    No acute pulmonary disease.    Tip of right IJ catheter is in the superior vena cava without   pneumothorax.       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 He received plasmapheresis and steroids for acute immune mediated TTP and d/c'ed 08/02 with close Heme/Onc follow up with attending Dr Darnell. His Plts were improving as of Friday 08/04 at 400K but then dropped down to approx 3K Monday 08/07 and referred back to the hospital.    8/7/2023- Seen at bedside, in no acute distress. Reports feeling well until yesterday  with tiredness and hematuria x 1 day    08/08/2023: Seen at bedside, no acute distress; s/p 1st pheresis tx this admission     08/09/2023: Seen at bedside, feeling well, no acute distress; S/p 2nd pheresis tx this admission as well as 1st Rituximab     08/10/2023- Seen at bedside with Dr. Darnell. Undergoing plasma pheresis.     08/11/2023: Seen at bedside, feeling well, no acute distress    08/12/2023: Seen at bedside, completed plasmapheresis and developed acute CP, NV, ICU team at bedside     08/13/2023: Seen at bedside, no acute distress, symptoms from yesterday subsided ---> receiving pheresis tx; spoke with pheresis RN regarding tentative plan starting tomorrow 08/14      PAST MEDICAL & SURGICAL HISTORY:    HLD      FAMILY HISTORY:    No known blood disorders      MEDICATIONS  (STANDING):    chlorhexidine 4% Liquid 1 Application(s) Topical <User Schedule>  enoxaparin Injectable 100 milliGRAM(s) SubCutaneous every 12 hours  folic acid 2 milliGRAM(s) Oral daily  pantoprazole  Injectable 40 milliGRAM(s) IV Push daily  predniSONE   Tablet 50 milliGRAM(s) Oral two times a day      MEDICATIONS  (PRN):    acetaminophen     Tablet .. 650 milliGRAM(s) Oral every 6 hours PRN Mild Pain (1 - 3)  aluminum hydroxide/magnesium hydroxide/simethicone Suspension 30 milliLiter(s) Oral every 4 hours PRN Dyspepsia          ALLERGIES:    No Known Allergies      REVIEW OF SYSTEMS:    Constitutional, Eyes, ENT, Cardiovascular, Respiratory, Gastrointestinal, Genitourinary, Musculoskeletal, Integumentary, Neurological, Psychiatric, Endocrine, Heme/Lymph and Allergic/Immunologic review of systems are otherwise negative except as noted in HPI.       VITALS:    ICU Vital Signs Last 24 Hrs  T(C): 36.6 (13 Aug 2023 08:00), Max: 37.1 (12 Aug 2023 18:00)  T(F): 97.9 (13 Aug 2023 08:00), Max: 98.8 (12 Aug 2023 18:00)  HR: 100 (13 Aug 2023 09:00) (60 - 106)  BP: 146/99 (13 Aug 2023 07:00) (117/79 - 152/85)  BP(mean): 114 (13 Aug 2023 07:00) (85 - 114)  ABP: --  ABP(mean): --  RR: 22 (13 Aug 2023 09:00) (9 - 23)  SpO2: 100% (13 Aug 2023 09:00) (97% - 100%)    O2 Parameters below as of 13 Aug 2023 09:00  Patient On (Oxygen Delivery Method): room air        PHYSICAL:    Constitutional: no acute distress  Eyes: no conjunctival infection, anicteric.   Pulmonary: clear to auscultation bilaterally  Cardiac: RRR  Vascular: no calf tenderness, venous stasis changes, varices; L chest wall tunneled cath in place   Abdomen: normoactive bowel sounds, soft and nontender, no hepatosplenomegaly or masses appreciated  Lymphatic: no peripheral adenopathy appreciated  Musculoskeletal: full range of motion and no deformities appreciated  Skin: normal appearance, no rash/erythema   Neurology: grossly intact  Psychiatric: affect appropriate      LABS:                                                               9.4    10.51 )-----------( 177      ( 13 Aug 2023 06:31 )             29.0     08-13    134<L>  |  102  |  21  ----------------------------<  164<H>  4.2   |  27  |  1.23    Ca    8.8      13 Aug 2023 06:31  Phos  4.0     08-13  Mg     2.5     08-13    TPro  5.7<L>  /  Alb  2.8<L>  /  TBili  0.4  /  DBili  x   /  AST  21  /  ALT  40  /  AlkPhos  71  08-12            RADIOLOGY & ADDITIONAL STUDIES:      US Duplex Venous Upper Ext Ltd, Right (08.07.23 @ 19:25)    IMPRESSION:  Right internal jugular vein thrombosis.        Xray Chest 1 View-PORTABLE IMMEDIATE (Xray Chest 1 View-PORTABLE IMMEDIATE .) (08.07.23 @ 18:37)    Impression:    No acute pulmonary disease.    Tip of right IJ catheter is in the superior vena cava without   pneumothorax.

## 2023-08-14 LAB
ANION GAP SERPL CALC-SCNC: 7 MMOL/L — SIGNIFICANT CHANGE UP (ref 5–17)
BUN SERPL-MCNC: 26 MG/DL — HIGH (ref 7–23)
CALCIUM SERPL-MCNC: 8.9 MG/DL — SIGNIFICANT CHANGE UP (ref 8.5–10.1)
CHLORIDE SERPL-SCNC: 102 MMOL/L — SIGNIFICANT CHANGE UP (ref 96–108)
CO2 SERPL-SCNC: 27 MMOL/L — SIGNIFICANT CHANGE UP (ref 22–31)
CREAT SERPL-MCNC: 1.34 MG/DL — HIGH (ref 0.5–1.3)
EGFR: 67 ML/MIN/1.73M2 — SIGNIFICANT CHANGE UP
GLUCOSE SERPL-MCNC: 199 MG/DL — HIGH (ref 70–99)
HCT VFR BLD CALC: 30.3 % — LOW (ref 39–50)
HGB BLD-MCNC: 9.7 G/DL — LOW (ref 13–17)
MAGNESIUM SERPL-MCNC: 2.6 MG/DL — SIGNIFICANT CHANGE UP (ref 1.6–2.6)
MCHC RBC-ENTMCNC: 31.5 PG — SIGNIFICANT CHANGE UP (ref 27–34)
MCHC RBC-ENTMCNC: 32 GM/DL — SIGNIFICANT CHANGE UP (ref 32–36)
MCV RBC AUTO: 98.4 FL — SIGNIFICANT CHANGE UP (ref 80–100)
NRBC # BLD: 3 /100 WBCS — HIGH (ref 0–0)
PHOSPHATE SERPL-MCNC: 2.9 MG/DL — SIGNIFICANT CHANGE UP (ref 2.5–4.5)
PLATELET # BLD AUTO: 217 K/UL — SIGNIFICANT CHANGE UP (ref 150–400)
POTASSIUM SERPL-MCNC: 4.4 MMOL/L — SIGNIFICANT CHANGE UP (ref 3.5–5.3)
POTASSIUM SERPL-SCNC: 4.4 MMOL/L — SIGNIFICANT CHANGE UP (ref 3.5–5.3)
RBC # BLD: 3.08 M/UL — LOW (ref 4.2–5.8)
RBC # FLD: 23.5 % — HIGH (ref 10.3–14.5)
SODIUM SERPL-SCNC: 136 MMOL/L — SIGNIFICANT CHANGE UP (ref 135–145)
WBC # BLD: 11.65 K/UL — HIGH (ref 3.8–10.5)
WBC # FLD AUTO: 11.65 K/UL — HIGH (ref 3.8–10.5)

## 2023-08-14 PROCEDURE — 99233 SBSQ HOSP IP/OBS HIGH 50: CPT

## 2023-08-14 PROCEDURE — 99232 SBSQ HOSP IP/OBS MODERATE 35: CPT

## 2023-08-14 RX ORDER — DIPHENHYDRAMINE HCL 50 MG
25 CAPSULE ORAL ONCE
Refills: 0 | Status: COMPLETED | OUTPATIENT
Start: 2023-08-14 | End: 2023-08-14

## 2023-08-14 RX ORDER — ACETAMINOPHEN 500 MG
650 TABLET ORAL ONCE
Refills: 0 | Status: COMPLETED | OUTPATIENT
Start: 2023-08-14 | End: 2023-08-14

## 2023-08-14 RX ORDER — CALCIUM GLUCONATE 100 MG/ML
2 VIAL (ML) INTRAVENOUS ONCE
Refills: 0 | Status: COMPLETED | OUTPATIENT
Start: 2023-08-14 | End: 2023-08-14

## 2023-08-14 RX ORDER — CHLORHEXIDINE GLUCONATE 213 G/1000ML
1 SOLUTION TOPICAL
Refills: 0 | Status: DISCONTINUED | OUTPATIENT
Start: 2023-08-14 | End: 2023-08-16

## 2023-08-14 RX ADMIN — Medication 25 MILLIGRAM(S): at 16:18

## 2023-08-14 RX ADMIN — Medication 650 MILLIGRAM(S): at 16:18

## 2023-08-14 RX ADMIN — Medication 50 MILLIGRAM(S): at 10:30

## 2023-08-14 RX ADMIN — Medication 200 GRAM(S): at 16:18

## 2023-08-14 RX ADMIN — ENOXAPARIN SODIUM 100 MILLIGRAM(S): 100 INJECTION SUBCUTANEOUS at 10:31

## 2023-08-14 RX ADMIN — Medication 650 MILLIGRAM(S): at 17:51

## 2023-08-14 RX ADMIN — Medication 50 MILLIGRAM(S): at 21:58

## 2023-08-14 RX ADMIN — CHLORHEXIDINE GLUCONATE 1 APPLICATION(S): 213 SOLUTION TOPICAL at 06:29

## 2023-08-14 RX ADMIN — PANTOPRAZOLE SODIUM 40 MILLIGRAM(S): 20 TABLET, DELAYED RELEASE ORAL at 10:30

## 2023-08-14 RX ADMIN — Medication 2 MILLIGRAM(S): at 10:30

## 2023-08-14 RX ADMIN — ENOXAPARIN SODIUM 100 MILLIGRAM(S): 100 INJECTION SUBCUTANEOUS at 21:58

## 2023-08-14 NOTE — PROGRESS NOTE ADULT - ASSESSMENT
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 Plts recently dropped suddenly from approx 400K to 3K, referred back to hospital.       # Immune Mediated TTP    - Recent admission 07/29 - 08/02  ED with chest pain, and noted with anemia and thrombocytopenia, peripheral smear at presentation c/w large amount of schistocytes  - Confirmatory [id number] deficiency <2 ; c/w TTP caused by severely deficient activity of the [id number] clinically defined as an activity level <10 %  - Received Dex 40mg x1 dose 07/28 ----> methylprednisolone 125mg IV q 6 hrs daily for 2 days 07/30 & 07/31 ---> 08/01 started PO Prednisone 50 mg PO BID x two days (08/01 & 08/02)  - Received Solumedrol 125mg q 12 hrs x 2 days (08/07 & 08/08) total then Prednisone 50mg BID (dose based on 1mg/kg) starting 08/09   - Hep B surface antigen & antibody, core antibody all negative ----> Rituximab q 1 weekly x 4 doses total, first dose started on 08/08 after pheresis Tx --->Plan for  # 2 -8/14 1N- NM notified, pharmacy aware.  - S/p plasmapheresis x 4 days (07/31 - 08/03) during previous admission  - Plasmapheresis resumed daily from 08/07-8/14  - Plasma exchange volume lowered from 5L to 4.5L  - Calcium gluconate ordered (for hypocalcemia with pheresis)  - Folic Acid 2mg QD added   - Tylenol 650 mg PO and Benadryl 25mg IV prior to each exchange    - Hgb- 9.7 g/dl <--9.4 g/dL; s/p 1U PRBC 08/09  - Plts up to 217K<-- 177K<--153K ---> this is day #3 of Plts >150K  - Continue to trend serial CBCs  - Continue close monitoring and supportive measures      # RIJ Thrombosis     - US 08/07 revealed RIJ thrombosis; 2/2 RIJ Shiley in place   - S/p removal with L tunneled cath placement 08/10- plan to be sent home with the tunneled catheter  - Lovenox 100 q 12hrs      Tx Plan:    - Daily pheresis until Plts >150K x 3 days   - Continue steroids PO at 50 mg, will send home with 40 mg PO BID  - Tunneled catheter to stay upon d/c home.  - Continue other medications as ordered    - S/p Rituxan x1 08/08; due for #2 08/15 ---> should be given inpatient as patient did have reaction with first dosing    8/14-->At this time, patients Plts remained >150K for 08/12 & 08/13 & 8/14 ---> continue with plan to receive pheresis- 8/14. Our team will evaluate daily and coordinate with blood bank, [facility] and inform the ICU staff of plans for continuation/discontinuation. 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 Plts recently dropped suddenly from approx 400K to 3K, referred back to hospital.       # Immune Mediated TTP    - Recent admission 07/29 - 08/02  ED with chest pain, and noted with anemia and thrombocytopenia, peripheral smear at presentation c/w large amount of schistocytes  - Confirmatory [id number] deficiency <2 ; c/w TTP caused by severely deficient activity of the [id number] clinically defined as an activity level <10 %  - Received Dex 40mg x1 dose 07/28 ----> methylprednisolone 125mg IV q 6 hrs daily for 2 days 07/30 & 07/31 ---> 08/01 started PO Prednisone 50 mg PO BID x two days (08/01 & 08/02)  - Received Solumedrol 125mg q 12 hrs x 2 days (08/07 & 08/08) total then Prednisone 50mg BID (dose based on 1mg/kg) starting 08/09   - Hep B surface antigen & antibody, core antibody all negative ----> Rituximab qweekly x 4 doses; first dose 08/08 after pheresis --->Plan for  # 2 8/14 1N- NM notified, pharmacy aware.  - S/p plasmapheresis x 4 days (07/31 - 08/03) during previous admission  - Plasmapheresis resumed daily from 08/07-8/14  - Plasma exchange volume lowered from 5L to 4.5L  - Calcium gluconate ordered (for hypocalcemia with pheresis)  - Folic Acid 2mg QD added   - Tylenol 650 mg PO and Benadryl 25mg IV prior to each exchange  - Hgb- 9.7 g/dl <--9.4 g/dL; s/p 1U PRBC 08/09  - Plts up to 217K<-- 177K<--153K ---> this is day #3 of Plts >150K  - Continue to trend serial CBCs  - Continue close monitoring and supportive measures      # RIJ Thrombosis     - US 08/07 revealed RIJ thrombosis; 2/2 RISURY Pérez in place   - S/p removal with L tunneled cath placement 08/10- plan to be sent home with the tunneled catheter  - Lovenox 100 q 12hrs      Tx Plan:    - Daily pheresis until Plts >150K x 3 days - ok to hold pheresis if plt >150K in AM  - Continue steroids PO at 50 mg BID; will send home with 40 mg PO BID  - Tunneled catheter to stay upon d/c home.  - Continue other medications as ordered    - S/p Rituxan x1 08/08; due for #2 08/15 ---> should be given inpatient as patient had hives reaction with first dose    8/14-->patients Plts remained >150K for 08/12 & 08/13 & 8/14 ---> continue with plan to receive pheresis- 8/14. Our team will evaluate daily and coordinate with blood bank, [facility] and inform the ICU staff of plans for discontinuation.

## 2023-08-14 NOTE — PROGRESS NOTE ADULT - SUBJECTIVE AND OBJECTIVE BOX
Patient is a 43y old  Male who presents with a chief complaint of TTP (14 Aug 2023 13:10)    SAVANNAH PEREZ   43y    Male  All allergies reviewed No Known Allergies        BRIEF HOSPITAL COURSE:        PAST MEDICAL & SURGICAL HISTORY:  No pertinent past medical history          Review of Systems:                       All other ROS are negative.        ICU Vital Signs Last 24 Hrs  T(C): 36.7 (14 Aug 2023 19:23), Max: 36.8 (14 Aug 2023 13:30)  T(F): 98.1 (14 Aug 2023 19:23), Max: 98.3 (14 Aug 2023 13:30)  HR: 87 (14 Aug 2023 19:23) (76 - 98)  BP: 141/87 (14 Aug 2023 19:23) (132/81 - 158/81)  BP(mean): 103 (14 Aug 2023 19:23) (94 - 109)  ABP: --  ABP(mean): --  RR: 12 (14 Aug 2023 19:23) (11 - 15)  SpO2: 100% (14 Aug 2023 19:23) (100% - 100%)    O2 Parameters below as of 14 Aug 2023 19:23  Patient On (Oxygen Delivery Method): room air            I&O's Detail    13 Aug 2023 07:01  -  14 Aug 2023 07:00  --------------------------------------------------------  IN:    IV PiggyBack: 100 mL  Total IN: 100 mL    OUT:    Voided (mL): 925 mL  Total OUT: 925 mL    Total NET: -825 mL        Physical Examination:    General: NAD    HEENT: L CW cath     PULM: bilateral BS     CVS: s1 s2 reg    ABD: soft NT     EXT: no edema     SKIN: warm     Neuro: Alert NF        LABS:                        9.7    11.65 )-----------( 217      ( 14 Aug 2023 05:13 )             30.3     08-14    136  |  102  |  26<H>  ----------------------------<  199<H>  4.4   |  27  |  1.34<H>    Ca    8.9      14 Aug 2023 05:13  Phos  2.9     08-14  Mg     2.6     08-14            CAPILLARY BLOOD GLUCOSE          Urinalysis Basic - ( 14 Aug 2023 05:13 )    Color: x / Appearance: x / SG: x / pH: x  Gluc: 199 mg/dL / Ketone: x  / Bili: x / Urobili: x   Blood: x / Protein: x / Nitrite: x   Leuk Esterase: x / RBC: x / WBC x   Sq Epi: x / Non Sq Epi: x / Bacteria: x      CULTURES:    Medications:  MEDICATIONS  (STANDING):  chlorhexidine 4% Liquid 1 Application(s) Topical <User Schedule>  enoxaparin Injectable 100 milliGRAM(s) SubCutaneous every 12 hours  folic acid 2 milliGRAM(s) Oral daily  pantoprazole  Injectable 40 milliGRAM(s) IV Push daily  predniSONE   Tablet 50 milliGRAM(s) Oral two times a day    MEDICATIONS  (PRN):  acetaminophen     Tablet .. 650 milliGRAM(s) Oral every 6 hours PRN Mild Pain (1 - 3)  aluminum hydroxide/magnesium hydroxide/simethicone Suspension 30 milliLiter(s) Oral every 4 hours PRN Dyspepsia    RADIOLOGY/IMAGING/ECHO    Appropriate Imaging reviewed.      Health issues     HEALTH ISSUES - PROBLEM Dx:  TTP (thrombotic thrombopenic purpura)    Paroxysmal atrial fibrillation    Elevated troponin        < from: US Duplex Venous Upper Ext Ltd, Right (08.07.23 @ 19:25) >  IMPRESSION:  Right internal jugular vein thrombosis.          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  ELIF due to microangiopathy.        Acquired TTP (immune mediated)  RIJ DVT  (Previous cath at that site)     Plts recovered   S/p last PLEX treatment  today   2nd dose of Rituxan tomorrow.       Will discuss with hematology change  Lovenox to DOAC     Possible D/C tomorrow.            Risks associated:    Therapies initiated:  AC transition to PO   Results interpreted: CBC chemistries           Time spent on this patient encounter, which includes documenting this note in the electronic medical record, was 42 minutes including assessing the presenting problems with associated risks, reviewing the medical record to prepare for the encounter, and meeting face to face with the patient to obtain additional history.  I have also performed an appropriate physical exam, made interventions listed and ordered and interpreted appropriate diagnostic studies as documented.     To improve communication and patient safety, I have coordinated care with the multidisciplinary team including the bedside nurse, appropriate attending of record and consultants as needed.       Patient is a 43y old  Male who presents with a chief complaint of TTP (14 Aug 2023 13:10)    SAVANNAH PEREZ   43y    Male  All allergies reviewed No Known Allergies        BRIEF HOSPITAL COURSE:        PAST MEDICAL & SURGICAL HISTORY:  No pertinent past medical history          Review of Systems:  No hematuria no CP                      All other ROS are negative.        ICU Vital Signs Last 24 Hrs  T(C): 36.7 (14 Aug 2023 19:23), Max: 36.8 (14 Aug 2023 13:30)  T(F): 98.1 (14 Aug 2023 19:23), Max: 98.3 (14 Aug 2023 13:30)  HR: 87 (14 Aug 2023 19:23) (76 - 98)  BP: 141/87 (14 Aug 2023 19:23) (132/81 - 158/81)  BP(mean): 103 (14 Aug 2023 19:23) (94 - 109)  ABP: --  ABP(mean): --  RR: 12 (14 Aug 2023 19:23) (11 - 15)  SpO2: 100% (14 Aug 2023 19:23) (100% - 100%)    O2 Parameters below as of 14 Aug 2023 19:23  Patient On (Oxygen Delivery Method): room air            I&O's Detail    13 Aug 2023 07:01  -  14 Aug 2023 07:00  --------------------------------------------------------  IN:    IV PiggyBack: 100 mL  Total IN: 100 mL    OUT:    Voided (mL): 925 mL  Total OUT: 925 mL    Total NET: -825 mL        Physical Examination:    General: NAD    HEENT: L CW cath     PULM: bilateral BS     CVS: s1 s2 reg    ABD: soft NT     EXT: no edema     SKIN: warm     Neuro: Alert NF        LABS:                        9.7    11.65 )-----------( 217      ( 14 Aug 2023 05:13 )             30.3     08-14    136  |  102  |  26<H>  ----------------------------<  199<H>  4.4   |  27  |  1.34<H>    Ca    8.9      14 Aug 2023 05:13  Phos  2.9     08-14  Mg     2.6     08-14            CAPILLARY BLOOD GLUCOSE          Urinalysis Basic - ( 14 Aug 2023 05:13 )    Color: x / Appearance: x / SG: x / pH: x  Gluc: 199 mg/dL / Ketone: x  / Bili: x / Urobili: x   Blood: x / Protein: x / Nitrite: x   Leuk Esterase: x / RBC: x / WBC x   Sq Epi: x / Non Sq Epi: x / Bacteria: x      CULTURES:    Medications:  MEDICATIONS  (STANDING):  chlorhexidine 4% Liquid 1 Application(s) Topical <User Schedule>  enoxaparin Injectable 100 milliGRAM(s) SubCutaneous every 12 hours  folic acid 2 milliGRAM(s) Oral daily  pantoprazole  Injectable 40 milliGRAM(s) IV Push daily  predniSONE   Tablet 50 milliGRAM(s) Oral two times a day    MEDICATIONS  (PRN):  acetaminophen     Tablet .. 650 milliGRAM(s) Oral every 6 hours PRN Mild Pain (1 - 3)  aluminum hydroxide/magnesium hydroxide/simethicone Suspension 30 milliLiter(s) Oral every 4 hours PRN Dyspepsia    RADIOLOGY/IMAGING/ECHO    Appropriate Imaging reviewed.      Health issues     HEALTH ISSUES - PROBLEM Dx:  TTP (thrombotic thrombopenic purpura)    Paroxysmal atrial fibrillation    Elevated troponin        < from: US Duplex Venous Upper Ext Ltd, Right (08.07.23 @ 19:25) >  IMPRESSION:  Right internal jugular vein thrombosis.          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  ELIF due to microangiopathy Improved       Acquired TTP (immune mediated)  RIJ DVT  (Previous cath at that site)     Plts recovered   S/p last PLEX treatment  today   2nd dose of Rituxan tomorrow.       Will discuss with hematology change  Lovenox to DOAC   Prednisone 40 BID as OP     Possible D/C tomorrow.      Risks associated:    Therapies initiated:  AC transition to PO   Results interpreted: CBC chemistries         Time spent on this patient encounter, which includes documenting this note in the electronic medical record, was 42 minutes including assessing the presenting problems with associated risks, reviewing the medical record to prepare for the encounter, and meeting face to face with the patient to obtain additional history.  I have also performed an appropriate physical exam, made interventions listed and ordered and interpreted appropriate diagnostic studies as documented.     To improve communication and patient safety, I have coordinated care with the multidisciplinary team including the bedside nurse, appropriate attending of record and consultants as needed.

## 2023-08-14 NOTE — PROGRESS NOTE ADULT - SUBJECTIVE AND OBJECTIVE BOX
Patient is a 43y old  Male who presents with a chief complaint of TTP (14 Aug 2023 10:54)    24 hour events:     Allergies    No Known Allergies    Intolerances      REVIEW OF SYSTEMS: SEE BELOW       ICU Vital Signs Last 24 Hrs  T(C): 36.7 (14 Aug 2023 04:00), Max: 36.9 (13 Aug 2023 16:00)  T(F): 98.1 (14 Aug 2023 04:00), Max: 98.5 (13 Aug 2023 16:00)  HR: 98 (14 Aug 2023 07:45) (79 - 98)  BP: 139/98 (14 Aug 2023 07:45) (128/98 - 158/81)  BP(mean): 106 (14 Aug 2023 07:45) (94 - 109)  ABP: --  ABP(mean): --  RR: 12 (14 Aug 2023 04:00) (11 - 18)  SpO2: 100% (14 Aug 2023 07:45) (99% - 100%)    O2 Parameters below as of 14 Aug 2023 07:45  Patient On (Oxygen Delivery Method): room air            CAPILLARY BLOOD GLUCOSE          I&O's Summary    13 Aug 2023 07:01  -  14 Aug 2023 07:00  --------------------------------------------------------  IN: 100 mL / OUT: 925 mL / NET: -825 mL            MEDICATIONS  (STANDING):  acetaminophen     Tablet .. 650 milliGRAM(s) Oral once  calcium gluconate IVPB 2 Gram(s) IV Intermittent once  chlorhexidine 4% Liquid 1 Application(s) Topical <User Schedule>  diphenhydrAMINE Injectable 25 milliGRAM(s) IV Push once  enoxaparin Injectable 100 milliGRAM(s) SubCutaneous every 12 hours  folic acid 2 milliGRAM(s) Oral daily  pantoprazole  Injectable 40 milliGRAM(s) IV Push daily  predniSONE   Tablet 50 milliGRAM(s) Oral two times a day      MEDICATIONS  (PRN):  acetaminophen     Tablet .. 650 milliGRAM(s) Oral every 6 hours PRN Mild Pain (1 - 3)  aluminum hydroxide/magnesium hydroxide/simethicone Suspension 30 milliLiter(s) Oral every 4 hours PRN Dyspepsia      PHYSICAL EXAM: SEE BELOW                          9.7    11.65 )-----------( 217      ( 14 Aug 2023 05:13 )             30.3       08-14    136  |  102  |  26<H>  ----------------------------<  199<H>  4.4   |  27  |  1.34<H>    Ca    8.9      14 Aug 2023 05:13  Phos  2.9     08-14  Mg     2.6     08-14              Urinalysis Basic - ( 14 Aug 2023 05:13 )    Color: x / Appearance: x / SG: x / pH: x  Gluc: 199 mg/dL / Ketone: x  / Bili: x / Urobili: x   Blood: x / Protein: x / Nitrite: x   Leuk Esterase: x / RBC: x / WBC x   Sq Epi: x / Non Sq Epi: x / Bacteria: x

## 2023-08-14 NOTE — PROGRESS NOTE ADULT - ASSESSMENT
44 y/o male with TTP and ELIF       Plan:     Neuro, HD, resp stable     S/p plasmapheresis since 8/7  Plt > 150, 1 more session of pheresis scheduled per hematology   S/p rituxan 8/8, repeat dose 8/15   Steroids per hematology     RIJ DVT, likely from recent central line from previous hospitalization   Continue tx lovenox     PO diet     OOB     DVT ppx with lovenox       Anticipate discharge home 8/15 if tolerates pheresis and rituxan  42 y/o male with TTP and ELIF       Plan:     Neuro, HD, resp stable     S/p plasmapheresis since 8/7  Plt > 150, 1 more session of pheresis scheduled per hematology   S/p rituxan 8/8, repeat dose 8/15   Steroids per hematology     RIJ DVT, likely from recent central line from previous hospitalization   Continue tx lovenox     UO adequate, lytes WNL, trend Cr, avoid nephrotoxic meds     PO diet     OOB     DVT ppx with lovenox       Anticipate discharge home 8/15 if tolerates pheresis and rituxan  44 y/o male with immune mediated TTP and ELIF       Plan:     Neuro, HD, resp stable     S/p plasmapheresis since 8/7  Plt > 150, 1 more session of pheresis scheduled per hematology   S/p rituxan 8/8, repeat dose 8/15   Steroids per hematology     RIJ DVT, likely from recent central line from previous hospitalization   Continue tx lovenox     UO adequate, lytes WNL, trend Cr, avoid nephrotoxic meds     PO diet     OOB     DVT ppx with lovenox       Anticipate discharge home 8/15 if tolerates pheresis and rituxan

## 2023-08-14 NOTE — PROGRESS NOTE ADULT - SUBJECTIVE AND OBJECTIVE BOX
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 He received plasmapheresis and steroids for acute immune mediated TTP and d/c'ed 08/02 with close Heme/Onc follow up with attending Dr Darnell. His Plts were improving as of Friday 08/04 at 400K but then dropped down to approx 3K Monday 08/07 and referred back to the hospital.    8/7/2023- Seen at bedside, in no acute distress. Reports feeling well until yesterday  with tiredness and hematuria x 1 day    08/08/2023: Seen at bedside, no acute distress; s/p 1st pheresis tx this admission     08/09/2023: Seen at bedside, feeling well, no acute distress; S/p 2nd pheresis tx this admission as well as 1st Rituximab     08/10/2023- Seen at bedside with Dr. aDrnell. Undergoing plasma pheresis.     08/11/2023: Seen at bedside, feeling well, no acute distress    08/12/2023: Seen at bedside, completed plasmapheresis and developed acute CP, NV, ICU team at bedside     08/13/2023: Seen at bedside, no acute distress, symptoms from yesterday subsided ---> receiving pheresis tx; spoke with pheresis RN regarding tentative plan starting tomorrow 08/14 8/14/2023- Seen at bedside, in no acute distress. Aware of plans for receiving plasma pheresis today and Rituxan tomorrow.     PAST MEDICAL & SURGICAL HISTORY:    HLD      FAMILY HISTORY:    No known blood disorders      MEDICATIONS  (STANDING):    acetaminophen     Tablet .. 650 milliGRAM(s) Oral once  calcium gluconate IVPB 2 Gram(s) IV Intermittent once  chlorhexidine 4% Liquid 1 Application(s) Topical <User Schedule>  diphenhydrAMINE Injectable 25 milliGRAM(s) IV Push once  enoxaparin Injectable 100 milliGRAM(s) SubCutaneous every 12 hours  folic acid 2 milliGRAM(s) Oral daily  pantoprazole  Injectable 40 milliGRAM(s) IV Push daily  predniSONE   Tablet 50 milliGRAM(s) Oral two times a day    MEDICATIONS  (PRN):    acetaminophen Tablet 650 milliGRAM(s) Oral every 6 hours PRN Mild Pain (1 - 3)  aluminum hydroxide/magnesium hydroxide/simethicone Suspension 30 milliLiter(s) Oral every 4 hours PRN Dyspepsia      ALLERGIES:    No Known Allergies      REVIEW OF SYSTEMS:    Constitutional, Eyes, ENT, Cardiovascular, Respiratory, Gastrointestinal, Genitourinary, Musculoskeletal, Integumentary, Neurological, Psychiatric, Endocrine, Heme/Lymph and Allergic/Immunologic review of systems are otherwise negative except as noted in HPI.       VITALS:      T(C): 36.7 (14 Aug 2023 04:00), Max: 36.9 (13 Aug 2023 12:00)  T(F): 98.1 (14 Aug 2023 04:00), Max: 98.5 (13 Aug 2023 16:00)  HR: 98 (14 Aug 2023 07:45) (77 - 98)  BP: 139/98 (14 Aug 2023 07:45) (123/84 - 158/81)  BP(mean): 106 (14 Aug 2023 07:45) (94 - 109)  RR: 12 (14 Aug 2023 04:00) (11 - 21)  SpO2: 100% (14 Aug 2023 07:45) (97% - 100%)    Parameters below as of 14 Aug 2023 07:45  Patient On (Oxygen Delivery Method): room air      PHYSICAL:    Constitutional: no acute distress  Eyes: no conjunctival infection, anicteric.   Pulmonary: clear to auscultation bilaterally  Cardiac: RRR  Vascular: no calf tenderness, venous stasis changes, varices; L chest wall tunneled cath in place   Abdomen: normoactive bowel sounds, soft and nontender, no hepatosplenomegaly or masses appreciated  Lymphatic: no peripheral adenopathy appreciated  Musculoskeletal: full range of motion and no deformities appreciated  Skin: normal appearance, no rash/erythema   Neurology: grossly intact  Psychiatric: affect appropriate      LABS:                          9.7    11.65 )-----------( 217      ( 14 Aug 2023 05:13 )             30.3     08-14    136  |  102  |  26<H>  ----------------------------<  199<H>  4.4   |  27  |  1.34<H>    Ca    8.9      14 Aug 2023 05:13  Phos  2.9     08-14  Mg     2.6     08-14      RADIOLOGY & ADDITIONAL STUDIES:      US Duplex Venous Upper Ext Ltd, Right (08.07.23 @ 19:25)    IMPRESSION:  Right internal jugular vein thrombosis.        Xray Chest 1 View-PORTABLE IMMEDIATE (Xray Chest 1 View-PORTABLE IMMEDIATE .) (08.07.23 @ 18:37)    Impression:    No acute pulmonary disease.    Tip of right IJ catheter is in the superior vena cava without   pneumothorax.

## 2023-08-15 ENCOUNTER — APPOINTMENT (OUTPATIENT)
Dept: INFUSION THERAPY | Facility: CLINIC | Age: 43
End: 2023-08-15

## 2023-08-15 LAB
ANION GAP SERPL CALC-SCNC: 6 MMOL/L — SIGNIFICANT CHANGE UP (ref 5–17)
BUN SERPL-MCNC: 26 MG/DL — HIGH (ref 7–23)
CALCIUM SERPL-MCNC: 9.1 MG/DL — SIGNIFICANT CHANGE UP (ref 8.5–10.1)
CHLORIDE SERPL-SCNC: 103 MMOL/L — SIGNIFICANT CHANGE UP (ref 96–108)
CO2 SERPL-SCNC: 26 MMOL/L — SIGNIFICANT CHANGE UP (ref 22–31)
CREAT SERPL-MCNC: 1.2 MG/DL — SIGNIFICANT CHANGE UP (ref 0.5–1.3)
EGFR: 76 ML/MIN/1.73M2 — SIGNIFICANT CHANGE UP
GLUCOSE SERPL-MCNC: 173 MG/DL — HIGH (ref 70–99)
HCT VFR BLD CALC: 32.6 % — LOW (ref 39–50)
HGB BLD-MCNC: 10.6 G/DL — LOW (ref 13–17)
MAGNESIUM SERPL-MCNC: 2.5 MG/DL — SIGNIFICANT CHANGE UP (ref 1.6–2.6)
MCHC RBC-ENTMCNC: 32.2 PG — SIGNIFICANT CHANGE UP (ref 27–34)
MCHC RBC-ENTMCNC: 32.5 GM/DL — SIGNIFICANT CHANGE UP (ref 32–36)
MCV RBC AUTO: 99.1 FL — SIGNIFICANT CHANGE UP (ref 80–100)
NRBC # BLD: 1 /100 WBCS — HIGH (ref 0–0)
PHOSPHATE SERPL-MCNC: 3.8 MG/DL — SIGNIFICANT CHANGE UP (ref 2.5–4.5)
PLATELET # BLD AUTO: 215 K/UL — SIGNIFICANT CHANGE UP (ref 150–400)
POTASSIUM SERPL-MCNC: 4.6 MMOL/L — SIGNIFICANT CHANGE UP (ref 3.5–5.3)
POTASSIUM SERPL-SCNC: 4.6 MMOL/L — SIGNIFICANT CHANGE UP (ref 3.5–5.3)
RBC # BLD: 3.29 M/UL — LOW (ref 4.2–5.8)
RBC # FLD: 22.6 % — HIGH (ref 10.3–14.5)
SODIUM SERPL-SCNC: 135 MMOL/L — SIGNIFICANT CHANGE UP (ref 135–145)
WBC # BLD: 11.49 K/UL — HIGH (ref 3.8–10.5)
WBC # FLD AUTO: 11.49 K/UL — HIGH (ref 3.8–10.5)

## 2023-08-15 PROCEDURE — 99232 SBSQ HOSP IP/OBS MODERATE 35: CPT

## 2023-08-15 RX ORDER — RITUXIMAB 10 MG/ML
825 INJECTION, SOLUTION INTRAVENOUS ONCE
Refills: 0 | Status: COMPLETED | OUTPATIENT
Start: 2023-08-15 | End: 2023-08-15

## 2023-08-15 RX ORDER — ACETAMINOPHEN 500 MG
650 TABLET ORAL ONCE
Refills: 0 | Status: COMPLETED | OUTPATIENT
Start: 2023-08-15 | End: 2023-08-15

## 2023-08-15 RX ORDER — DIPHENHYDRAMINE HCL 50 MG
50 CAPSULE ORAL ONCE
Refills: 0 | Status: COMPLETED | OUTPATIENT
Start: 2023-08-15 | End: 2023-08-15

## 2023-08-15 RX ADMIN — PANTOPRAZOLE SODIUM 40 MILLIGRAM(S): 20 TABLET, DELAYED RELEASE ORAL at 10:46

## 2023-08-15 RX ADMIN — Medication 50 MILLIGRAM(S): at 10:47

## 2023-08-15 RX ADMIN — Medication 650 MILLIGRAM(S): at 10:47

## 2023-08-15 RX ADMIN — ENOXAPARIN SODIUM 100 MILLIGRAM(S): 100 INJECTION SUBCUTANEOUS at 22:55

## 2023-08-15 RX ADMIN — Medication 50 MILLIGRAM(S): at 10:46

## 2023-08-15 RX ADMIN — RITUXIMAB 137.5 MILLIGRAM(S): 10 INJECTION, SOLUTION INTRAVENOUS at 11:33

## 2023-08-15 RX ADMIN — Medication 2 MILLIGRAM(S): at 10:47

## 2023-08-15 RX ADMIN — Medication 50 MILLIGRAM(S): at 22:55

## 2023-08-15 RX ADMIN — Medication 650 MILLIGRAM(S): at 11:17

## 2023-08-15 RX ADMIN — ENOXAPARIN SODIUM 100 MILLIGRAM(S): 100 INJECTION SUBCUTANEOUS at 10:47

## 2023-08-15 NOTE — PROGRESS NOTE ADULT - RESPIRATORY
clear to auscultation bilaterally/no wheezes/no rales/no rhonchi
no respiratory distress
no respiratory distress

## 2023-08-15 NOTE — PROGRESS NOTE ADULT - SUBJECTIVE AND OBJECTIVE BOX
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 He received plasmapheresis and steroids for acute immune mediated TTP and d/c'ed 08/02 with close Heme/Onc follow up with attending Dr Darnell. His Plts were improving as of Friday 08/04 at 400K but then dropped down to approx 3K Monday 08/07 and referred back to the hospital.    8/7/2023- Seen at bedside, in no acute distress. Reports feeling well until yesterday  with tiredness and hematuria x 1 day    08/08/2023: Seen at bedside, no acute distress; s/p 1st pheresis tx this admission     08/09/2023: Seen at bedside, feeling well, no acute distress; S/p 2nd pheresis tx this admission as well as 1st Rituximab     08/10/2023- Seen at bedside with Dr. Darnell. Undergoing plasma pheresis.     08/11/2023: Seen at bedside, feeling well, no acute distress    08/12/2023: Seen at bedside, completed plasmapheresis and developed acute CP, NV, ICU team at bedside     08/13/2023: Seen at bedside, no acute distress, symptoms from yesterday subsided ---> receiving pheresis tx; spoke with pheresis RN regarding tentative plan starting tomorrow 08/14 8/14/2023- Seen at bedside, in no acute distress. Aware of plans for receiving plasma pheresis today and Rituxan tomorrow.     08/15/2023:      PAST MEDICAL & SURGICAL HISTORY:    HLD      FAMILY HISTORY:    No known blood disorders      MEDICATIONS  (STANDING):    acetaminophen     Tablet .. 650 milliGRAM(s) Oral once  chlorhexidine 4% Liquid 1 Application(s) Topical <User Schedule>  diphenhydrAMINE Injectable 50 milliGRAM(s) IV Push once  enoxaparin Injectable 100 milliGRAM(s) SubCutaneous every 12 hours  folic acid 2 milliGRAM(s) Oral daily  pantoprazole  Injectable 40 milliGRAM(s) IV Push daily  predniSONE   Tablet 50 milliGRAM(s) Oral two times a day  riTUXimab IVPB (Non - oncologic) 825 milliGRAM(s) IV Intermittent once      MEDICATIONS  (PRN):    acetaminophen     Tablet .. 650 milliGRAM(s) Oral every 6 hours PRN Mild Pain (1 - 3)  aluminum hydroxide/magnesium hydroxide/simethicone Suspension 30 milliLiter(s) Oral every 4 hours PRN Dyspepsia        ALLERGIES:    No Known Allergies      REVIEW OF SYSTEMS:    Constitutional, Eyes, ENT, Cardiovascular, Respiratory, Gastrointestinal, Genitourinary, Musculoskeletal, Integumentary, Neurological, Psychiatric, Endocrine, Heme/Lymph and Allergic/Immunologic review of systems are otherwise negative except as noted in HPI.       VITALS:      ICU Vital Signs Last 24 Hrs  T(C): 36.6 (15 Aug 2023 08:30), Max: 36.8 (14 Aug 2023 13:30)  T(F): 97.9 (15 Aug 2023 08:30), Max: 98.3 (14 Aug 2023 13:30)  HR: 82 (15 Aug 2023 08:30) (76 - 94)  BP: 138/106 (15 Aug 2023 08:30) (133/88 - 155/102)  BP(mean): 117 (15 Aug 2023 08:30) (101 - 117)  ABP: --  ABP(mean): --  RR: 18 (15 Aug 2023 08:30) (12 - 18)  SpO2: 100% (15 Aug 2023 08:30) (100% - 100%)    O2 Parameters below as of 15 Aug 2023 08:30  Patient On (Oxygen Delivery Method): room air      PHYSICAL:    Constitutional: no acute distress  Eyes: no conjunctival infection, anicteric.   Pulmonary: clear to auscultation bilaterally  Cardiac: RRR  Vascular: no calf tenderness, venous stasis changes, varices; L chest wall tunneled cath in place   Abdomen: normoactive bowel sounds, soft and nontender, no hepatosplenomegaly or masses appreciated  Lymphatic: no peripheral adenopathy appreciated  Musculoskeletal: full range of motion and no deformities appreciated  Skin: normal appearance, no rash/erythema   Neurology: grossly intact  Psychiatric: affect appropriate      LABS:                                     10.6   11.49 )-----------( 215      ( 15 Aug 2023 05:43 )             32.6     08-15    135  |  103  |  26<H>  ----------------------------<  173<H>  4.6   |  26  |  1.20    Ca    9.1      15 Aug 2023 05:43  Phos  3.8     08-15  Mg     2.5     08-15                    RADIOLOGY & ADDITIONAL STUDIES:      US Duplex Venous Upper Ext Ltd, Right (08.07.23 @ 19:25)    IMPRESSION:  Right internal jugular vein thrombosis.        Xray Chest 1 View-PORTABLE IMMEDIATE (Xray Chest 1 View-PORTABLE IMMEDIATE .) (08.07.23 @ 18:37)    Impression:    No acute pulmonary disease.    Tip of right IJ catheter is in the superior vena cava without   pneumothorax.       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 He received plasmapheresis and steroids for acute immune mediated TTP and d/c'ed 08/02 with close Heme/Onc follow up with attending Dr Darnell. His Plts were improving as of Friday 08/04 at 400K but then dropped down to approx 3K Monday 08/07 and referred back to the hospital.    8/7/2023- Seen at bedside, in no acute distress. Reports feeling well until yesterday  with tiredness and hematuria x 1 day    08/08/2023: Seen at bedside, no acute distress; s/p 1st pheresis tx this admission     08/09/2023: Seen at bedside, feeling well, no acute distress; S/p 2nd pheresis tx this admission as well as 1st Rituximab     08/10/2023- Seen at bedside with Dr. Darnell. Undergoing plasma pheresis.     08/11/2023: Seen at bedside, feeling well, no acute distress    08/12/2023: Seen at bedside, completed plasmapheresis and developed acute CP, NV, ICU team at bedside     08/13/2023: Seen at bedside, no acute distress, symptoms from yesterday subsided ---> receiving pheresis tx; spoke with pheresis RN regarding tentative plan starting tomorrow 08/14 8/14/2023- Seen at bedside, in no acute distress. Aware of plans for receiving plasma pheresis today and Rituxan tomorrow.     08/15/2023: Seen at bedside, no acute distress, understanding of current plan to receive Rituximab today      PAST MEDICAL & SURGICAL HISTORY:    HLD      FAMILY HISTORY:    No known blood disorders      MEDICATIONS  (STANDING):    acetaminophen     Tablet .. 650 milliGRAM(s) Oral once  chlorhexidine 4% Liquid 1 Application(s) Topical <User Schedule>  diphenhydrAMINE Injectable 50 milliGRAM(s) IV Push once  enoxaparin Injectable 100 milliGRAM(s) SubCutaneous every 12 hours  folic acid 2 milliGRAM(s) Oral daily  pantoprazole  Injectable 40 milliGRAM(s) IV Push daily  predniSONE   Tablet 50 milliGRAM(s) Oral two times a day  riTUXimab IVPB (Non - oncologic) 825 milliGRAM(s) IV Intermittent once      MEDICATIONS  (PRN):    acetaminophen     Tablet .. 650 milliGRAM(s) Oral every 6 hours PRN Mild Pain (1 - 3)  aluminum hydroxide/magnesium hydroxide/simethicone Suspension 30 milliLiter(s) Oral every 4 hours PRN Dyspepsia        ALLERGIES:    No Known Allergies      REVIEW OF SYSTEMS:    Constitutional, Eyes, ENT, Cardiovascular, Respiratory, Gastrointestinal, Genitourinary, Musculoskeletal, Integumentary, Neurological, Psychiatric, Endocrine, Heme/Lymph and Allergic/Immunologic review of systems are otherwise negative except as noted in HPI.       VITALS:      ICU Vital Signs Last 24 Hrs  T(C): 36.6 (15 Aug 2023 08:30), Max: 36.8 (14 Aug 2023 13:30)  T(F): 97.9 (15 Aug 2023 08:30), Max: 98.3 (14 Aug 2023 13:30)  HR: 82 (15 Aug 2023 08:30) (76 - 94)  BP: 138/106 (15 Aug 2023 08:30) (133/88 - 155/102)  BP(mean): 117 (15 Aug 2023 08:30) (101 - 117)  ABP: --  ABP(mean): --  RR: 18 (15 Aug 2023 08:30) (12 - 18)  SpO2: 100% (15 Aug 2023 08:30) (100% - 100%)    O2 Parameters below as of 15 Aug 2023 08:30  Patient On (Oxygen Delivery Method): room air      PHYSICAL:    Constitutional: no acute distress  Eyes: no conjunctival infection, anicteric.   Pulmonary: clear to auscultation bilaterally  Cardiac: RRR  Vascular: no calf tenderness, venous stasis changes, varices; L chest wall tunneled cath in place   Abdomen: normoactive bowel sounds, soft and nontender, no hepatosplenomegaly or masses appreciated  Lymphatic: no peripheral adenopathy appreciated  Musculoskeletal: full range of motion and no deformities appreciated  Skin: normal appearance, no rash/erythema   Neurology: grossly intact  Psychiatric: affect appropriate      LABS:                                     10.6   11.49 )-----------( 215      ( 15 Aug 2023 05:43 )             32.6     08-15    135  |  103  |  26<H>  ----------------------------<  173<H>  4.6   |  26  |  1.20    Ca    9.1      15 Aug 2023 05:43  Phos  3.8     08-15  Mg     2.5     08-15                    RADIOLOGY & ADDITIONAL STUDIES:      US Duplex Venous Upper Ext Ltd, Right (08.07.23 @ 19:25)    IMPRESSION:  Right internal jugular vein thrombosis.        Xray Chest 1 View-PORTABLE IMMEDIATE (Xray Chest 1 View-PORTABLE IMMEDIATE .) (08.07.23 @ 18:37)    Impression:    No acute pulmonary disease.    Tip of right IJ catheter is in the superior vena cava without   pneumothorax.

## 2023-08-15 NOTE — PROGRESS NOTE ADULT - ASSESSMENT
44 y/o male with immune mediated TTP and ELIF       Plan:     Neuro, HD, resp stable     S/p plasmapheresis since 8/7  Plt consistently > 150  S/p rituxan 8/8, repeat dose today   Steroids per hematology     RIJ DVT, likely from recent central line from previous hospitalization   On tx lovenox - suggest to d/c as DVT provoked, check IJ duplex in 2-3 weeks, d/w hematology     Renal fn normal     PO diet     OOB     DVT ppx with lovenox       Anticipate discharge home tomorrow

## 2023-08-15 NOTE — PROGRESS NOTE ADULT - ASSESSMENT
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 Plts recently dropped suddenly from approx 400K to 3K, referred back to hospital.       # Immune Mediated TTP    - Recent admission 07/29 - 08/02  ED with chest pain, and noted with anemia and thrombocytopenia, peripheral smear at presentation c/w large amount of schistocytes  - Confirmatory [id number] deficiency <2 ; c/w TTP caused by severely deficient activity of the ZEKTLR84 clinically defined as an activity level <10 %  - Received Dex 40mg x1 dose 07/28 ----> methylprednisolone 125mg IV q 6 hrs daily for 2 days 07/30 & 07/31 ---> 08/01 started PO Prednisone 50 mg PO BID x two days (08/01 & 08/02)  - Received Solumedrol 125mg q 12 hrs x 2 days (08/07 & 08/08) total then Prednisone 50mg BID (dose based on 1mg/kg) starting 08/09   - Hep B surface antigen & antibody, core antibody all negative ----> Rituximab q weekly x 4 doses; first dose 08/08 after pheresis ---> Plan for #2 8/15 1N [facility] notified, pharmacy aware.  - S/p plasmapheresis x 4 days (07/31 - 08/03) during previous admission    - Plasma exchange volume lowered from 5L to 4.5L  - S/p plasmapheresis (08/07- 08/14) this admission  - Calcium gluconate d/c'ed  - Folic Acid 2mg QD  - Tylenol 650 mg PO and Benadryl 25mg IV prior to each exchange & Rituximab doses    - Hgb up to 10.4 g/dL   - S/p 1U PRBC 08/09  - Plts 215K, stable  - Continue to trend serial CBCs  - Continue close monitoring and supportive measures      # RIJ Thrombosis     - US 08/07 revealed RIJ thrombosis; 2/2 RIJ Shiley in place   - S/p removal with L tunneled cath placement 08/10; keep in place upon d/c  - Lovenox 100 q 12hrs      Tx Plan:    - Daily pheresis until Plts >150K x 2 days   - Continue steroids PO at 50 mg BID ---> change to 40mg PO BID upon d/c  - Tunneled catheter to stay upon d/c home  - Continue other medications as ordered    - S/p Rituxan x1 08/08; due for #2 08/15 ---> should be given inpatient as patient had hives reaction with first dose    As of 08/15 AM; patients Plts remain >150K, can d/c pheresis ([facility] aware), scheduled to receive Rituximab today, all teams aware  44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 Plts recently dropped suddenly from approx 400K to 3K, referred back to hospital.       # Immune Mediated TTP    - Recent admission 07/29 - 08/02  ED with chest pain, and noted with anemia and thrombocytopenia, peripheral smear at presentation c/w large amount of schistocytes  - Confirmatory [id number] deficiency <2 ; c/w TTP caused by severely deficient activity of the CTGIZE49 clinically defined as an activity level <10 %  - Received Dex 40mg x1 dose 07/28 ----> methylprednisolone 125mg IV q 6 hrs daily for 2 days 07/30 & 07/31 ---> 08/01 started PO Prednisone 50 mg PO BID x two days (08/01 & 08/02)  - Received Solumedrol 125mg q 12 hrs x 2 days (08/07 & 08/08) total then Prednisone 50mg BID (dose based on 1mg/kg) starting 08/09   - Hep B surface antigen & antibody, core antibody all negative ----> Rituximab q weekly x 4 doses; first dose 08/08 after pheresis ---> Plan for #2 8/15 1N [facility] notified, pharmacy aware.  - S/p plasmapheresis x 4 days (07/31 - 08/03) during previous admission    - Plasma exchange volume lowered from 5L to 4.5L  - S/p plasmapheresis (08/07- 08/14) this admission  - Calcium gluconate d/c'ed  - Folic Acid 2mg QD  - Tylenol 650 mg PO and Benadryl 25mg IV prior to each exchange & Rituximab doses    - Hgb up to 10.4 g/dL   - S/p 1U PRBC 08/09  - Plts 215K, stable  - Continue to trend serial CBCs  - Continue close monitoring and supportive measures      # RIJ Thrombosis     - US 08/07 revealed RIJ thrombosis; 2/2 RIJ Shiley in place   - S/p removal with L tunneled cath placement 08/10; keep in place upon d/c  - Lovenox 100 q 12hrs      Tx Plan:    - Daily pheresis until Plts >150K x 2 days   - Continue steroids PO at 50 mg BID ---> change to 40mg PO BID upon d/c  - Tunneled catheter to stay upon d/c home  - Continue other medications as ordered    - S/p Rituxan x1 08/08; due for #2 08/15 ---> should be given inpatient as patient had hives reaction with first dose    As of 08/15 AM; patients Plts remain >150K, d/c pheresis ([facility] aware), scheduled to receive Rituximab today, all teams aware, tentative d/c later this afternoon if stable post tx  42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 Plts recently dropped suddenly from approx 400K to 3K, referred back to hospital.       # Immune Mediated TTP    - Recent admission 07/29 - 08/02  ED with chest pain, and noted with anemia and thrombocytopenia, peripheral smear at presentation c/w large amount of schistocytes  - Confirmatory [id number] deficiency <2 ; c/w TTP caused by severely deficient activity of the YTPQRL25 clinically defined as an activity level <10 %  - Received Dex 40mg x1 dose 07/28 ----> methylprednisolone 125mg IV q 6 hrs daily for 2 days 07/30 & 07/31 ---> 08/01 started PO Prednisone 50 mg PO BID x two days (08/01 & 08/02)  - Received Solumedrol 125mg q 12 hrs x 2 days (08/07 & 08/08) total then Prednisone 50mg BID (dose based on 1mg/kg) starting 08/09   - Hep B surface antigen & antibody, core antibody all negative ----> Rituximab q weekly x 4 doses; first dose 08/08 after pheresis ---> Plan for #2 8/15 1N [facility] notified, pharmacy aware.  - S/p plasmapheresis x 4 days (07/31 - 08/03) during previous admission    - Plasma exchange volume lowered from 5L to 4.5L  - S/p plasmapheresis (08/07- 08/14) this admission  - Calcium gluconate d/c'ed  - Folic Acid 2mg QD  - Tylenol 650 mg PO and Benadryl 25mg IV prior to each exchange & Rituximab doses    - Hgb up to 10.4 g/dL   - S/p 1U PRBC 08/09  - Plts 215K, stable  - Continue to trend serial CBCs  - Continue close monitoring and supportive measures      # RIJ Thrombosis     - US 08/07 revealed RIJ thrombosis; 2/2 RIJ Shiley in place   - S/p removal with L tunneled cath placement 08/10; keep in place upon d/c  - Lovenox 100 q 12hrs ---> likely change to PO medication, will discuss with attending and have plan prior to d/c      Tx Plan:    - Daily pheresis until Plts >150K x 2 days   - Continue steroids PO at 50 mg BID ---> change to 40mg PO BID upon d/c  - Tunneled catheter to stay upon d/c home  - Continue other medications as ordered    - S/p Rituxan x1 08/08; due for #2 08/15 ---> should be given inpatient as patient had hives reaction with first dose    As of 08/15 AM; patients Plts remain >150K, d/c pheresis ([facility] aware), scheduled to receive Rituximab today, all teams aware, tentative d/c later this afternoon if stable post tx  44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 Plts recently dropped suddenly from approx 400K to 3K, referred back to hospital.       # Immune Mediated TTP    - Recent admission 07/29 - 08/02  ED with chest pain, and noted with anemia and thrombocytopenia, peripheral smear at presentation c/w large amount of schistocytes  - Confirmatory NOMFVS84 deficiency <2 ; c/w TTP caused by severely deficient activity of the MYOLGV67 clinically defined as an activity level <10 %  - Received Dex 40mg x1 dose 07/28 ----> methylprednisolone 125mg IV q 6 hrs daily for 2 days 07/30 & 07/31 ---> 08/01 started PO Prednisone 50 mg PO BID x two days (08/01 & 08/02)  - Received Solumedrol 125mg q 12 hrs x 2 days (08/07 & 08/08) total then Prednisone 50mg BID (dose based on 1mg/kg) starting 08/09   - Hep B surface antigen & antibody, core antibody all negative ----> Rituximab q weekly x 4 doses; first dose 08/08 after pheresis ---> Plan for #2 8/15 1N [facility] notified, pharmacy aware.  - S/p plasmapheresis x 4 days (07/31 - 08/03) during previous admission    - Plasma exchange volume lowered from 5L to 4.5L  - S/p plasmapheresis (08/07- 08/14) this admission  - Calcium gluconate d/c'ed  - Folic Acid 2mg QD  - Tylenol 650 mg PO and Benadryl 25mg IV prior to each exchange & Rituximab doses    - Hgb up to 10.4 g/dL   - S/p 1U PRBC 08/09  - Plts 215K, stable  - Continue to trend serial CBCs  - Continue close monitoring and supportive measures      # RIJ Thrombosis     - US 08/07 revealed RIJ thrombosis; 2/2 RIJ Shiley in place   - S/p removal with L tunneled cath placement 08/10; keep in place upon d/c  - Lovenox 100 q 12hrs ---> keep with d/c , needs to follow up daily in outpatient setting with Heme/Onc Dr Darnell      Tx Plan:    - Daily pheresis until Plts >150K x 2 days   - Continue steroids PO at 50 mg BID ---> change to 40mg PO BID upon d/c  - Tunneled catheter to stay upon d/c home  - Continue other medications as ordered    - S/p Rituxan x1 08/08; due for #2 08/15 ---> should be given inpatient as patient had hives reaction with first dose    As of 08/15 AM; patients Plts remain >150K, d/c pheresis ([facility] aware), scheduled to receive Rituximab today, all teams aware, tentative d/c later this afternoon if stable post tx  44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 Plts recently dropped suddenly from approx 400K to 3K, referred back to hospital.       # Immune Mediated TTP    - Recent admission 07/29 - 08/02  ED with chest pain, and noted with anemia and thrombocytopenia, peripheral smear at presentation c/w large amount of schistocytes  - Confirmatory [id number] deficiency <2 ; c/w TTP caused by severely deficient activity of the DTMEHK03 clinically defined as an activity level <10 %  - Received Dex 40mg x1 dose 07/28 ----> methylprednisolone 125mg IV q 6 hrs daily for 2 days 07/30 & 07/31 ---> 08/01 started PO Prednisone 50 mg PO BID x two days (08/01 & 08/02)  - Received Solumedrol 125mg q 12 hrs x 2 days (08/07 & 08/08) total then Prednisone 50mg BID (dose based on 1mg/kg) starting 08/09   - Hep B surface antigen & antibody, core antibody all negative ----> Rituximab q weekly x 4 doses; first dose 08/08 after pheresis ---> Plan for #2 8/15 1N [facility] notified, pharmacy aware.  - S/p plasmapheresis x 4 days (07/31 - 08/03) during previous admission    - Plasma exchange volume lowered from 5L to 4.5L  - S/p plasmapheresis (08/07- 08/14) this admission  - Calcium gluconate d/c'ed  - Folic Acid 2mg QD  - Tylenol 650 mg PO and Benadryl 25mg IV prior to each exchange & Rituximab doses    - Hgb up to 10.4 g/dL   - S/p 1U PRBC 08/09  - Plts 215K, stable  - Continue to trend serial CBCs  - Continue close monitoring and supportive measures  - Close outpatient daily follow up       # RIJ Thrombosis     - US 08/07 revealed RIJ thrombosis; 2/2 RISURY Pérez in place   - S/p removal with L tunneled cath placement 08/10; keep in place upon d/c  - Lovenox 100 q 12hrs ---> keep with d/c ; ACCP guidelines favor anticoagulation for up to x3 months if the thrombosis is symptomatic, is associated with malignancy, or the catheter remains in place  - Needs to follow up daily in outpatient setting with Heme/Onc Dr Darnell      Tx Plan:    - Daily pheresis until Plts >150K x 2 days   - Continue steroids PO at 50 mg BID ---> change to 40mg PO BID upon d/c  - Tunneled catheter to stay upon d/c home  - Continue other medications as ordered    - S/p Rituxan x1 08/08; due for #2 08/15 ---> should be given inpatient as patient had hives reaction with first dose    As of 08/15 AM; patients Plts remain >150K, d/c pheresis ([facility] aware), scheduled to receive Rituximab today, all teams aware, tentative d/c later this afternoon if stable post tx  44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 Plts recently dropped suddenly from approx 400K to 3K, referred back to hospital.       # Immune Mediated TTP    - Recent admission 07/29 - 08/02  ED with chest pain, and noted with anemia and thrombocytopenia, peripheral smear at presentation c/w large amount of schistocytes  - Confirmatory OZBEBY53 deficiency <2 ; c/w TTP caused by severely deficient activity of the WIBXVO59 clinically defined as an activity level <10 %  - Received Dex 40mg x1 dose 07/28 ----> methylprednisolone 125mg IV q 6 hrs daily for 2 days 07/30 & 07/31 ---> 08/01 started PO Prednisone 50 mg PO BID x two days (08/01 & 08/02)  - Received Solumedrol 125mg q 12 hrs x 2 days (08/07 & 08/08) total then Prednisone 50mg BID (dose based on 1mg/kg) starting 08/09   - Hep B surface antigen & antibody, core antibody all negative ----> Rituximab q weekly x 4 doses; first dose 08/08 after pheresis ---> Plan for #2 8/15 1N [facility] notified, pharmacy aware.  - S/p plasmapheresis x 4 days (07/31 - 08/03) during previous admission    - Plasma exchange volume lowered from 5L to 4.5L  - S/p plasmapheresis (08/07- 08/14) this admission  - Calcium gluconate d/c'ed  - Folic Acid 2mg QD  - Tylenol 650 mg PO and Benadryl 25mg IV prior to each exchange & Rituximab doses    - Hgb up to 10.4 g/dL   - S/p 1U PRBC 08/09  - Plts 215K, stable  - Continue to trend serial CBCs  - Continue close monitoring and supportive measures  - Close outpatient daily follow up       # RIJ Thrombosis     - US 08/07 revealed RIJ thrombosis; 2/2 RISURY Pérez in place   - S/p removal with L tunneled cath placement 08/10; keep in place upon d/c  - Lovenox 100 q 12hrs ---> keep with d/c ; ACCP guidelines favor anticoagulation for up to x3 months if the thrombosis is symptomatic, is associated with malignancy, or the catheter remains in place  - Needs to follow up daily in outpatient setting with Heme/Onc Dr Darnell      Tx Plan:    - Daily pheresis until Plts >150K x 2 days   - Continue steroids PO at 50 mg BID ---> change to 40mg PO BID upon d/c  - Tunneled catheter to stay upon d/c home  - Continue other medications as ordered    - S/p Rituxan x1 08/08; due for #2 08/15 ---> should be given inpatient as patient had hives reaction with first dose    As of 08/15 AM; patients Plts remain >150K, d/c pheresis ([facility] aware), scheduled to receive Rituximab today, all teams aware  Keep overnight, check Plts in AM and then d/c 44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 Plts recently dropped suddenly from approx 400K to 3K, referred back to hospital.       # Immune Mediated TTP    - Recent admission 07/29 - 08/02  ED with chest pain, and noted with anemia and thrombocytopenia, peripheral smear at presentation c/w large amount of schistocytes  - Confirmatory [id number] deficiency <2 ; c/w TTP caused by severely deficient activity of the BFFGHZ58 clinically defined as an activity level <10 %  - Received Dex 40mg x1 dose 07/28 ----> methylprednisolone 125mg IV q 6 hrs daily for 2 days 07/30 & 07/31 ---> 08/01 started PO Prednisone 50 mg PO BID x two days (08/01 & 08/02)  - Received Solumedrol 125mg q 12 hrs x 2 days (08/07 & 08/08) total then Prednisone 50mg BID (dose based on 1mg/kg) starting 08/09   - Hep B surface antigen & antibody, core antibody all negative ----> Rituximab q weekly x 4 doses; first dose 08/08 after pheresis ---> Plan for #2 8/15 1N [facility] notified, pharmacy aware.  - S/p plasmapheresis x 4 days (07/31 - 08/03) during previous admission    - Plasma exchange volume lowered from 5L to 4.5L  - S/p plasmapheresis (08/07- 08/14) this admission  - s/p C2 Rituxan 8/15/23.  Cont weekly outpatient with Dr. Darnell.  - Calcium gluconate d/c'ed  - Folic Acid 2mg QD  - Tylenol 650 mg PO and Benadryl 25mg IV prior to each exchange & Rituximab doses    - Hgb up to 10.4 g/dL   - S/p 1U PRBC 08/09  - Plts 215K, stable  - Continue to trend serial CBCs  - Continue close monitoring and supportive measures  - Close outpatient daily follow up       # RIJ Thrombosis     - US 08/07 revealed RIJ thrombosis; 2/2 BEE Pérez in place   - S/p removal with L tunneled cath placement 08/10; keep in place upon d/c  - Lovenox 100 q 12hrs ---> keep with d/c ; ACCP guidelines favor anticoagulation for up to x3 months if the thrombosis is symptomatic, is associated with malignancy, or the catheter remains in place  - Needs to follow up daily in outpatient setting with Heme/Onc Dr Darnell for CBC      Tx Plan:    - s/p daily pheresis until Plts >150K x > 2 days   - Continue steroids PO at 50 mg BID ---> change to 40mg PO BID upon d/c  - Tunneled catheter to stay upon d/c home  - Continue other medications as ordered    - S/p Rituxan x1 08/08; due for #2 08/15 ---> should be given inpatient as patient had hives reaction with first dose    As of 08/15 AM; patients Plts remain >150K, d/c pheresis ([facility] aware), scheduled to receive Rituximab today, all teams aware  Keep overnight, check Plts in AM and then d/c. Notify outpatient office needs to follow up daily in outpatient setting with Heme/Onc Dr Darnell/ [facility]

## 2023-08-16 ENCOUNTER — TRANSCRIPTION ENCOUNTER (OUTPATIENT)
Age: 43
End: 2023-08-16

## 2023-08-16 VITALS
RESPIRATION RATE: 17 BRPM | DIASTOLIC BLOOD PRESSURE: 88 MMHG | OXYGEN SATURATION: 99 % | SYSTOLIC BLOOD PRESSURE: 144 MMHG | HEART RATE: 98 BPM

## 2023-08-16 LAB
ANION GAP SERPL CALC-SCNC: 5 MMOL/L — SIGNIFICANT CHANGE UP (ref 5–17)
BUN SERPL-MCNC: 25 MG/DL — HIGH (ref 7–23)
CALCIUM SERPL-MCNC: 8.6 MG/DL — SIGNIFICANT CHANGE UP (ref 8.5–10.1)
CHLORIDE SERPL-SCNC: 105 MMOL/L — SIGNIFICANT CHANGE UP (ref 96–108)
CO2 SERPL-SCNC: 26 MMOL/L — SIGNIFICANT CHANGE UP (ref 22–31)
CREAT SERPL-MCNC: 1.16 MG/DL — SIGNIFICANT CHANGE UP (ref 0.5–1.3)
EGFR: 79 ML/MIN/1.73M2 — SIGNIFICANT CHANGE UP
GLUCOSE SERPL-MCNC: 184 MG/DL — HIGH (ref 70–99)
HCT VFR BLD CALC: 29.4 % — LOW (ref 39–50)
HGB BLD-MCNC: 9.7 G/DL — LOW (ref 13–17)
MAGNESIUM SERPL-MCNC: 2.2 MG/DL — SIGNIFICANT CHANGE UP (ref 1.6–2.6)
MCHC RBC-ENTMCNC: 32.1 PG — SIGNIFICANT CHANGE UP (ref 27–34)
MCHC RBC-ENTMCNC: 33 GM/DL — SIGNIFICANT CHANGE UP (ref 32–36)
MCV RBC AUTO: 97.4 FL — SIGNIFICANT CHANGE UP (ref 80–100)
PHOSPHATE SERPL-MCNC: 3.4 MG/DL — SIGNIFICANT CHANGE UP (ref 2.5–4.5)
PLATELET # BLD AUTO: 206 K/UL — SIGNIFICANT CHANGE UP (ref 150–400)
POTASSIUM SERPL-MCNC: 4.6 MMOL/L — SIGNIFICANT CHANGE UP (ref 3.5–5.3)
POTASSIUM SERPL-SCNC: 4.6 MMOL/L — SIGNIFICANT CHANGE UP (ref 3.5–5.3)
RBC # BLD: 3.02 M/UL — LOW (ref 4.2–5.8)
RBC # FLD: 21.4 % — HIGH (ref 10.3–14.5)
SODIUM SERPL-SCNC: 136 MMOL/L — SIGNIFICANT CHANGE UP (ref 135–145)
WBC # BLD: 9.75 K/UL — SIGNIFICANT CHANGE UP (ref 3.8–10.5)
WBC # FLD AUTO: 9.75 K/UL — SIGNIFICANT CHANGE UP (ref 3.8–10.5)

## 2023-08-16 PROCEDURE — 99239 HOSP IP/OBS DSCHRG MGMT >30: CPT

## 2023-08-16 PROCEDURE — 99232 SBSQ HOSP IP/OBS MODERATE 35: CPT

## 2023-08-16 RX ORDER — ENOXAPARIN SODIUM 100 MG/ML
100 INJECTION SUBCUTANEOUS
Qty: 60 | Refills: 2
Start: 2023-08-16 | End: 2023-11-13

## 2023-08-16 RX ADMIN — ENOXAPARIN SODIUM 100 MILLIGRAM(S): 100 INJECTION SUBCUTANEOUS at 09:13

## 2023-08-16 RX ADMIN — Medication 2 MILLIGRAM(S): at 09:14

## 2023-08-16 RX ADMIN — Medication 50 MILLIGRAM(S): at 09:13

## 2023-08-16 NOTE — DISCHARGE NOTE PROVIDER - NSDCMRMEDTOKEN_GEN_ALL_CORE_FT
enoxaparin 100 mg/mL injectable solution: 100 milligram(s) subcutaneously 2 times a day  folic acid 1 mg oral tablet: 2 tab(s) orally once a day  predniSONE 20 mg oral tablet: 2 tab(s) orally 2 times a day

## 2023-08-16 NOTE — PROGRESS NOTE ADULT - SUBJECTIVE AND OBJECTIVE BOX
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 He received plasmapheresis and steroids for acute immune mediated TTP and d/c'ed 08/02 with close Heme/Onc follow up with attending Dr Darnell. His Plts were improving as of Friday 08/04 at 400K but then dropped down to approx 3K Monday 08/07 and referred back to the hospital.    8/7/2023- Seen at bedside, in no acute distress. Reports feeling well until yesterday  with tiredness and hematuria x 1 day    08/08/2023: Seen at bedside, no acute distress; s/p 1st pheresis tx this admission     08/09/2023: Seen at bedside, feeling well, no acute distress; S/p 2nd pheresis tx this admission as well as 1st Rituximab     08/10/2023- Seen at bedside with Dr. Darnell. Undergoing plasma pheresis.     08/11/2023: Seen at bedside, feeling well, no acute distress    08/12/2023: Seen at bedside, completed plasmapheresis and developed acute CP, NV, ICU team at bedside     08/13/2023: Seen at bedside, no acute distress, symptoms from yesterday subsided ---> receiving pheresis tx; spoke with pheresis RN regarding tentative plan starting tomorrow 08/14 8/14/2023- Seen at bedside, in no acute distress. Aware of plans for receiving plasma pheresis today and Rituxan tomorrow.     08/15/2023: Seen at bedside, no acute distress, understanding of current plan to receive Rituximab today    8/16/2023- Seen at bedside, in no acute distress, aware of d/c home today to f/u outpatient daily for CBC monitoring.    PAST MEDICAL & SURGICAL HISTORY:    HLD      FAMILY HISTORY:    No known blood disorders      MEDICATIONS  (STANDING):    chlorhexidine 4% Liquid 1 Application(s) Topical <User Schedule>  enoxaparin Injectable 100 milliGRAM(s) SubCutaneous every 12 hours  folic acid 2 milliGRAM(s) Oral daily  pantoprazole  Injectable 40 milliGRAM(s) IV Push daily  predniSONE   Tablet 50 milliGRAM(s) Oral two times a day    MEDICATIONS  (PRN):    acetaminophen Tablet 650 milliGRAM(s) Oral every 6 hours PRN Mild Pain (1 - 3)  aluminum hydroxide/magnesium hydroxide/simethicone Suspension 30 milliLiter(s) Oral every 4 hours PRN Dyspepsia      ALLERGIES:    No Known Allergies      REVIEW OF SYSTEMS:    Constitutional, Eyes, ENT, Cardiovascular, Respiratory, Gastrointestinal, Genitourinary, Musculoskeletal, Integumentary, Neurological, Psychiatric, Endocrine, Heme/Lymph and Allergic/Immunologic review of systems are otherwise negative except as noted in HPI.       VITALS:    ICU Vital Signs Last 24 Hrs  T(C): 36.9 (16 Aug 2023 12:00), Max: 37.1 (16 Aug 2023 04:00)  T(F): 98.4 (16 Aug 2023 12:00), Max: 98.7 (16 Aug 2023 04:00)  HR: 80 (16 Aug 2023 08:00) (68 - 105)  BP: 147/91 (16 Aug 2023 08:00) (130/76 - 162/89)  BP(mean): 106 (16 Aug 2023 08:00) (91 - 106)  ABP: --  ABP(mean): --  RR: 14 (16 Aug 2023 08:00) (13 - 18)  SpO2: 100% (16 Aug 2023 08:00) (100% - 100%)    O2 Parameters below as of 16 Aug 2023 08:00  Patient On (Oxygen Delivery Method): room air      PHYSICAL:    Constitutional: no acute distress  Eyes: no conjunctival infection, anicteric.   Pulmonary: clear to auscultation bilaterally  Cardiac: RRR  Vascular: no calf tenderness, venous stasis changes, varices; L chest wall tunneled cath in place   Abdomen: normoactive bowel sounds, soft and nontender, no hepatosplenomegaly or masses appreciated  Lymphatic: no peripheral adenopathy appreciated  Musculoskeletal: full range of motion and no deformities appreciated  Skin: normal appearance, no rash/erythema   Neurology: grossly intact  Psychiatric: affect appropriate      LABS:                                     9.7    9.75  )-----------( 206      ( 16 Aug 2023 05:45 )             29.4     08-16    136  |  105  |  25<H>  ----------------------------<  184<H>  4.6   |  26  |  1.16    Ca    8.6      16 Aug 2023 05:45  Phos  3.4     08-16  Mg     2.2     08-16      RADIOLOGY & ADDITIONAL STUDIES:      US Duplex Venous Upper Ext Ltd, Right (08.07.23 @ 19:25)    IMPRESSION:  Right internal jugular vein thrombosis.        Xray Chest 1 View-PORTABLE IMMEDIATE (Xray Chest 1 View-PORTABLE IMMEDIATE .) (08.07.23 @ 18:37)    Impression:    No acute pulmonary disease.    Tip of right IJ catheter is in the superior vena cava without   pneumothorax.

## 2023-08-16 NOTE — DISCHARGE NOTE PROVIDER - ATTENDING DISCHARGE PHYSICAL EXAMINATION:
T(C): 36.9 (08-16-23 @ 12:00), Max: 37.1 (08-16-23 @ 04:00)  HR: 80 (08-16-23 @ 08:00) (68 - 105)  BP: 147/91 (08-16-23 @ 08:00) (130/76 - 162/89)  RR: 14 (08-16-23 @ 08:00) (13 - 18)  SpO2: 100% (08-16-23 @ 08:00) (100% - 100%)  08-15-23 @ 07:01  -  08-16-23 @ 07:00  --------------------------------------------------------  IN: 240 mL / OUT: 0 mL / NET: 240 mL    PHYSICAL EXAM  General: NAD  CNS: AAOx3, no focal deficit  HEENT: PERRL  Resp: CTA b/l   CVS: S1S2 reg  Abd: soft, NT, +BS  Ext: no edema   Skin: no cyanosis                           9.7    9.75  )-----------( 206      ( 16 Aug 2023 05:45 )             29.4       08-16    136  |  105  |  25<H>  ----------------------------<  184<H>  4.6   |  26  |  1.16    Ca    8.6      16 Aug 2023 05:45  Phos  3.4     08-16  Mg     2.2     08-16              Urinalysis Basic - ( 16 Aug 2023 05:45 )    Color: x / Appearance: x / SG: x / pH: x  Gluc: 184 mg/dL / Ketone: x  / Bili: x / Urobili: x   Blood: x / Protein: x / Nitrite: x   Leuk Esterase: x / RBC: x / WBC x   Sq Epi: x / Non Sq Epi: x / Bacteria: x

## 2023-08-16 NOTE — DISCHARGE NOTE PROVIDER - PROVIDER TOKENS
PROVIDER:[TOKEN:[[id number]],FOLLOWUP:[2 weeks],ESTABLISHEDPATIENT:[T]],PROVIDER:[TOKEN:[[id number]],FOLLOWUP:[1-3 days],ESTABLISHEDPATIENT:[T]]

## 2023-08-16 NOTE — DISCHARGE NOTE PROVIDER - CARE PROVIDERS DIRECT ADDRESSES
[email or web address]

## 2023-08-16 NOTE — DISCHARGE NOTE PROVIDER - HOSPITAL COURSE
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 Darnell/Sarkis as outpatient

## 2023-08-16 NOTE — PROGRESS NOTE ADULT - NS ATTEND OPT1A GEN_ALL_CORE
Medical decision making
Exam/Medical decision making
Exam/Medical decision making
Medical decision making

## 2023-08-16 NOTE — DISCHARGE NOTE PROVIDER - CARE PROVIDER_API CALL
Reyes Jackson  Internal Medicine  [address] [place] [address]  Phone: [phone]  Fax: [phone]  Established Patient  Follow Up Time: 2 weeks    Marsha Deshpande  Hematology  [address], [place] [address]  Phone: [phone]  Fax: [phone]  Established Patient  Follow Up Time: 1-3 days

## 2023-08-16 NOTE — DISCHARGE NOTE PROVIDER - NSDCCPCAREPLAN_GEN_ALL_CORE_FT
PRINCIPAL DISCHARGE DIAGNOSIS  Diagnosis: TTP (thrombotic thrombopenic purpura)  Assessment and Plan of Treatment:

## 2023-08-16 NOTE — DISCHARGE NOTE NURSING/CASE MANAGEMENT/SOCIAL WORK - PATIENT PORTAL LINK FT
You can access the FollowMyHealth Patient Portal offered by [facility] by registering at the following website: [email or web address]. By joining [facility]’s FollowMyHealth portal, you will also be able to view your health information using other applications (apps) compatible with our system.

## 2023-08-16 NOTE — PROGRESS NOTE ADULT - ASSESSMENT
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 Plts recently dropped suddenly from approx. 400K to 3K, referred back to hospital.       # Immune Mediated TTP    - Recent admission 07/29 - 08/02  ED with chest pain, and noted with anemia and thrombocytopenia, peripheral smear at presentation c/w large amount of schistocytes  - Confirmatory [id number] deficiency <2 ; c/w TTP caused by severely deficient activity of the [id number] clinically defined as an activity level <10 %  - Received Dex 40mg x1 dose 07/28 ----> methylprednisolone 125mg IV q 6 hrs daily for 2 days 07/30 & 07/31 ---> 08/01 started PO Prednisone 50 mg PO BID x two days (08/01 & 08/02)  - Received Solumedrol 125mg q 12 hrs x 2 days (08/07 & 08/08) total then Prednisone 50mg BID (dose based on 1mg/kg) starting 08/09   - Hep B surface antigen & antibody, core antibody all negative ----> Rituximab q weekly x 4 doses; first dose 08/08 after pheresis ---> Plan for #2 8/15 1N NM notified, pharmacy aware.  - S/p plasmapheresis x 4 days (07/31 - 08/03) during previous admission    - Plasma exchange volume lowered from 5L to 4.5L  - S/p plasmapheresis (08/07- 08/14) this admission  - s/p C2 Rituxan 8/15/23.  Cont weekly outpatient with Dr. Darnell.  - Calcium gluconate d/c'ed  - Folic Acid 2mg QD  - Tylenol 650 mg PO and Benadryl 25mg IV prior to each exchange & Rituximab doses    - Hgb- 9.7 g/dl as of 8/16   - S/p 1U PRBC 08/09  - Plts 206 K, stable as of 8/16  - Continue to trend serial CBCs  - Continue close monitoring and supportive measures  - Close outpatient daily follow up       # RIJ Thrombosis     - US 08/07 revealed RIJ thrombosis; 2/2 RIJ Shiley in place   - S/p removal with L tunneled cath placement 08/10; keep in place upon d/c  - Lovenox 100 q 12hrs ---> to be continued at home upon d/c ; ACCP guidelines favor anticoagulation for up to x 3 months if the thrombosis is symptomatic, is associated with malignancy, or the catheter remains in place. Although RIJ noted with thrombosis and had catheter removed, since now with LIJ tunneled catheter, risk for thrombose formation recommend to continue on Lovenox 100 mp SQ BID. d/w Dr. Arnold in this regards.  - To follow up daily in outpatient setting with Heme/Onc Dr Darnell for CBC      Tx Plan:    - s/p daily pheresis until Plts >150K x > 2 days   - Continue steroids PO at 50 mg BID ---> change to 40mg PO BID upon d/c  - Tunneled catheter to stay upon d/c home  - Continue other medications as ordered    - S/p Rituxan C1- 08/08; C2- 08/15     As of 08/16 AM; patients Plts remain >150K, d/c'd pheresis ([facility] aware), scheduled to receive Rituximab C3- 8/22/23- as outpatient  Notified outpatient for patient's ongoing daily follow up in outpatient setting for CBC monitoring followed by appointment with Heme/Onc Dr Darnell/ GENNY Muse

## 2023-08-17 ENCOUNTER — RESULT REVIEW (OUTPATIENT)
Age: 43
End: 2023-08-17

## 2023-08-17 ENCOUNTER — APPOINTMENT (OUTPATIENT)
Dept: HEMATOLOGY ONCOLOGY | Facility: CLINIC | Age: 43
End: 2023-08-17

## 2023-08-17 ENCOUNTER — TRANSCRIPTION ENCOUNTER (OUTPATIENT)
Age: 43
End: 2023-08-17

## 2023-08-17 LAB
BASOPHILS # BLD AUTO: 0.01 K/UL — SIGNIFICANT CHANGE UP (ref 0–0.2)
BASOPHILS NFR BLD AUTO: 0.1 % — SIGNIFICANT CHANGE UP (ref 0–2)
EOSINOPHIL # BLD AUTO: 0 K/UL — SIGNIFICANT CHANGE UP (ref 0–0.5)
EOSINOPHIL NFR BLD AUTO: 0 % — SIGNIFICANT CHANGE UP (ref 0–6)
HCT VFR BLD CALC: 32.4 % — LOW (ref 39–50)
HGB BLD-MCNC: 10.7 G/DL — LOW (ref 13–17)
IMM GRANULOCYTES NFR BLD AUTO: 0.9 % — SIGNIFICANT CHANGE UP (ref 0–0.9)
LYMPHOCYTES # BLD AUTO: 0.21 K/UL — LOW (ref 1–3.3)
LYMPHOCYTES # BLD AUTO: 1.6 % — LOW (ref 13–44)
MCHC RBC-ENTMCNC: 32 PG — SIGNIFICANT CHANGE UP (ref 27–34)
MCHC RBC-ENTMCNC: 33 GM/DL — SIGNIFICANT CHANGE UP (ref 32–36)
MCV RBC AUTO: 97 FL — SIGNIFICANT CHANGE UP (ref 80–100)
MONOCYTES # BLD AUTO: 0.57 K/UL — SIGNIFICANT CHANGE UP (ref 0–0.9)
MONOCYTES NFR BLD AUTO: 4.4 % — SIGNIFICANT CHANGE UP (ref 2–14)
NEUTROPHILS # BLD AUTO: 11.97 K/UL — HIGH (ref 1.8–7.4)
NEUTROPHILS NFR BLD AUTO: 93 % — HIGH (ref 43–77)
NRBC # BLD: 0 /100 WBCS — SIGNIFICANT CHANGE UP (ref 0–0)
PLATELET # BLD AUTO: 244 K/UL — SIGNIFICANT CHANGE UP (ref 150–400)
RBC # BLD: 3.34 M/UL — LOW (ref 4.2–5.8)
RBC # FLD: 21 % — HIGH (ref 10.3–14.5)
WBC # BLD: 12.88 K/UL — HIGH (ref 3.8–10.5)
WBC # FLD AUTO: 12.88 K/UL — HIGH (ref 3.8–10.5)

## 2023-08-17 RX ORDER — PREDNISONE 20 MG/1
20 TABLET ORAL
Qty: 28 | Refills: 0 | Status: DISCONTINUED | COMMUNITY
Start: 2023-08-02 | End: 2023-08-17

## 2023-08-17 RX ORDER — PREDNISONE 20 MG/1
20 TABLET ORAL TWICE DAILY
Qty: 120 | Refills: 1 | Status: ACTIVE | COMMUNITY
Start: 2023-08-17 | End: 1900-01-01

## 2023-08-18 ENCOUNTER — RESULT REVIEW (OUTPATIENT)
Age: 43
End: 2023-08-18

## 2023-08-18 ENCOUNTER — APPOINTMENT (OUTPATIENT)
Dept: HEMATOLOGY ONCOLOGY | Facility: CLINIC | Age: 43
End: 2023-08-18

## 2023-08-18 LAB
BASOPHILS # BLD AUTO: 0 K/UL — SIGNIFICANT CHANGE UP (ref 0–0.2)
BASOPHILS NFR BLD AUTO: 0 % — SIGNIFICANT CHANGE UP (ref 0–2)
EOSINOPHIL # BLD AUTO: 0 K/UL — SIGNIFICANT CHANGE UP (ref 0–0.5)
EOSINOPHIL NFR BLD AUTO: 0 % — SIGNIFICANT CHANGE UP (ref 0–6)
HCT VFR BLD CALC: 31.1 % — LOW (ref 39–50)
HGB BLD-MCNC: 10.2 G/DL — LOW (ref 13–17)
IMM GRANULOCYTES NFR BLD AUTO: 0.9 % — SIGNIFICANT CHANGE UP (ref 0–0.9)
LYMPHOCYTES # BLD AUTO: 0.61 K/UL — LOW (ref 1–3.3)
LYMPHOCYTES # BLD AUTO: 6.2 % — LOW (ref 13–44)
MCHC RBC-ENTMCNC: 32.1 PG — SIGNIFICANT CHANGE UP (ref 27–34)
MCHC RBC-ENTMCNC: 32.8 GM/DL — SIGNIFICANT CHANGE UP (ref 32–36)
MCV RBC AUTO: 97.8 FL — SIGNIFICANT CHANGE UP (ref 80–100)
MONOCYTES # BLD AUTO: 0.57 K/UL — SIGNIFICANT CHANGE UP (ref 0–0.9)
MONOCYTES NFR BLD AUTO: 5.8 % — SIGNIFICANT CHANGE UP (ref 2–14)
NEUTROPHILS # BLD AUTO: 8.61 K/UL — HIGH (ref 1.8–7.4)
NEUTROPHILS NFR BLD AUTO: 87.1 % — HIGH (ref 43–77)
NRBC # BLD: 0 /100 WBCS — SIGNIFICANT CHANGE UP (ref 0–0)
PLATELET # BLD AUTO: 233 K/UL — SIGNIFICANT CHANGE UP (ref 150–400)
RBC # BLD: 3.18 M/UL — LOW (ref 4.2–5.8)
RBC # FLD: 20.4 % — HIGH (ref 10.3–14.5)
WBC # BLD: 9.88 K/UL — SIGNIFICANT CHANGE UP (ref 3.8–10.5)
WBC # FLD AUTO: 9.88 K/UL — SIGNIFICANT CHANGE UP (ref 3.8–10.5)

## 2023-08-21 ENCOUNTER — RESULT REVIEW (OUTPATIENT)
Age: 43
End: 2023-08-21

## 2023-08-21 ENCOUNTER — NON-APPOINTMENT (OUTPATIENT)
Age: 43
End: 2023-08-21

## 2023-08-21 ENCOUNTER — APPOINTMENT (OUTPATIENT)
Dept: HEMATOLOGY ONCOLOGY | Facility: CLINIC | Age: 43
End: 2023-08-21

## 2023-08-21 DIAGNOSIS — D64.9 ANEMIA, UNSPECIFIED: ICD-10-CM

## 2023-08-21 DIAGNOSIS — N14.11 CONTRAST-INDUCED NEPHROPATHY: ICD-10-CM
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
## 2023-08-21 DIAGNOSIS — T50.8X5A ADVERSE EFFECT OF DIAGNOSTIC AGENTS, INITIAL ENCOUNTER: ICD-10-CM
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
## 2023-08-21 DIAGNOSIS — I48.0 PAROXYSMAL ATRIAL FIBRILLATION: ICD-10-CM

## 2023-08-21 DIAGNOSIS — I82.C11 ACUTE EMBOLISM AND THROMBOSIS OF RIGHT INTERNAL JUGULAR VEIN: ICD-10-CM

## 2023-08-21 DIAGNOSIS — R77.8 OTHER SPECIFIED ABNORMALITIES OF PLASMA PROTEINS: ICD-10-CM

## 2023-08-21 DIAGNOSIS — D69.3 IMMUNE THROMBOCYTOPENIC PURPURA: ICD-10-CM

## 2023-08-21 DIAGNOSIS — T82.868A THROMBOSIS DUE TO VASCULAR PROSTHETIC DEVICES, IMPLANTS AND GRAFTS, INITIAL ENCOUNTER: ICD-10-CM

## 2023-08-21 DIAGNOSIS — E78.5 HYPERLIPIDEMIA, UNSPECIFIED: ICD-10-CM

## 2023-08-21 DIAGNOSIS — D69.6 THROMBOCYTOPENIA, UNSPECIFIED: ICD-10-CM

## 2023-08-21 LAB
BASOPHILS # BLD AUTO: 0.01 K/UL — SIGNIFICANT CHANGE UP (ref 0–0.2)
BASOPHILS NFR BLD AUTO: 0.1 % — SIGNIFICANT CHANGE UP (ref 0–2)
EOSINOPHIL # BLD AUTO: 0 K/UL — SIGNIFICANT CHANGE UP (ref 0–0.5)
EOSINOPHIL NFR BLD AUTO: 0 % — SIGNIFICANT CHANGE UP (ref 0–6)
HCT VFR BLD CALC: 33.9 % — LOW (ref 39–50)
HGB BLD-MCNC: 10.9 G/DL — LOW (ref 13–17)
IMM GRANULOCYTES NFR BLD AUTO: 1.3 % — HIGH (ref 0–0.9)
LYMPHOCYTES # BLD AUTO: 0.14 K/UL — LOW (ref 1–3.3)
LYMPHOCYTES # BLD AUTO: 1.1 % — LOW (ref 13–44)
MCHC RBC-ENTMCNC: 32 PG — SIGNIFICANT CHANGE UP (ref 27–34)
MCHC RBC-ENTMCNC: 32.2 GM/DL — SIGNIFICANT CHANGE UP (ref 32–36)
MCV RBC AUTO: 99.4 FL — SIGNIFICANT CHANGE UP (ref 80–100)
MONOCYTES # BLD AUTO: 0.48 K/UL — SIGNIFICANT CHANGE UP (ref 0–0.9)
MONOCYTES NFR BLD AUTO: 3.7 % — SIGNIFICANT CHANGE UP (ref 2–14)
NEUTROPHILS # BLD AUTO: 12.11 K/UL — HIGH (ref 1.8–7.4)
NEUTROPHILS NFR BLD AUTO: 93.8 % — HIGH (ref 43–77)
NRBC # BLD: 0 /100 WBCS — SIGNIFICANT CHANGE UP (ref 0–0)
PLATELET # BLD AUTO: 241 K/UL — SIGNIFICANT CHANGE UP (ref 150–400)
RBC # BLD: 3.41 M/UL — LOW (ref 4.2–5.8)
RBC # FLD: 18.6 % — HIGH (ref 10.3–14.5)
WBC # BLD: 12.91 K/UL — HIGH (ref 3.8–10.5)
WBC # FLD AUTO: 12.91 K/UL — HIGH (ref 3.8–10.5)

## 2023-08-22 ENCOUNTER — APPOINTMENT (OUTPATIENT)
Dept: HEMATOLOGY ONCOLOGY | Facility: CLINIC | Age: 43
End: 2023-08-22

## 2023-08-22 LAB
ALBUMIN SERPL ELPH-MCNC: 4.5 G/DL — SIGNIFICANT CHANGE UP (ref 3.3–5)
ALP SERPL-CCNC: 93 U/L — SIGNIFICANT CHANGE UP (ref 40–120)
ALT FLD-CCNC: 61 U/L — HIGH (ref 10–45)
ANION GAP SERPL CALC-SCNC: 16 MMOL/L — SIGNIFICANT CHANGE UP (ref 5–17)
AST SERPL-CCNC: 19 U/L — SIGNIFICANT CHANGE UP (ref 10–40)
BILIRUB SERPL-MCNC: 0.2 MG/DL — SIGNIFICANT CHANGE UP (ref 0.2–1.2)
BUN SERPL-MCNC: 22 MG/DL — SIGNIFICANT CHANGE UP (ref 7–23)
CALCIUM SERPL-MCNC: 10.1 MG/DL — SIGNIFICANT CHANGE UP (ref 8.4–10.5)
CHLORIDE SERPL-SCNC: 99 MMOL/L — SIGNIFICANT CHANGE UP (ref 96–108)
CO2 SERPL-SCNC: 21 MMOL/L — LOW (ref 22–31)
CREAT SERPL-MCNC: 1.18 MG/DL — SIGNIFICANT CHANGE UP (ref 0.5–1.3)
EGFR: 78 ML/MIN/1.73M2 — SIGNIFICANT CHANGE UP
GLUCOSE SERPL-MCNC: 299 MG/DL — HIGH (ref 70–99)
LDH SERPL L TO P-CCNC: 355 U/L — HIGH (ref 50–242)
POTASSIUM SERPL-MCNC: 5.1 MMOL/L — SIGNIFICANT CHANGE UP (ref 3.5–5.3)
POTASSIUM SERPL-SCNC: 5.1 MMOL/L — SIGNIFICANT CHANGE UP (ref 3.5–5.3)
PROT SERPL-MCNC: 6.8 G/DL — SIGNIFICANT CHANGE UP (ref 6–8.3)
SODIUM SERPL-SCNC: 136 MMOL/L — SIGNIFICANT CHANGE UP (ref 135–145)

## 2023-08-23 ENCOUNTER — RESULT REVIEW (OUTPATIENT)
Age: 43
End: 2023-08-23

## 2023-08-23 ENCOUNTER — APPOINTMENT (OUTPATIENT)
Dept: INFUSION THERAPY | Facility: CLINIC | Age: 43
End: 2023-08-23
Payer: COMMERCIAL

## 2023-08-23 ENCOUNTER — NON-APPOINTMENT (OUTPATIENT)
Age: 43
End: 2023-08-23

## 2023-08-23 ENCOUNTER — APPOINTMENT (OUTPATIENT)
Dept: HEMATOLOGY ONCOLOGY | Facility: CLINIC | Age: 43
End: 2023-08-23
Payer: COMMERCIAL

## 2023-08-23 VITALS
WEIGHT: 257 LBS | OXYGEN SATURATION: 100 % | TEMPERATURE: 98.1 F | RESPIRATION RATE: 18 BRPM | HEART RATE: 71 BPM | SYSTOLIC BLOOD PRESSURE: 156 MMHG | BODY MASS INDEX: 35.84 KG/M2 | DIASTOLIC BLOOD PRESSURE: 90 MMHG

## 2023-08-23 DIAGNOSIS — Z51.11 ENCOUNTER FOR ANTINEOPLASTIC CHEMOTHERAPY: ICD-10-CM

## 2023-08-23 DIAGNOSIS — R11.2 NAUSEA WITH VOMITING, UNSPECIFIED: ICD-10-CM

## 2023-08-23 LAB
BASOPHILS # BLD AUTO: 0.01 K/UL — SIGNIFICANT CHANGE UP (ref 0–0.2)
BASOPHILS NFR BLD AUTO: 0.1 % — SIGNIFICANT CHANGE UP (ref 0–2)
EOSINOPHIL # BLD AUTO: 0.01 K/UL — SIGNIFICANT CHANGE UP (ref 0–0.5)
EOSINOPHIL NFR BLD AUTO: 0.1 % — SIGNIFICANT CHANGE UP (ref 0–6)
HCT VFR BLD CALC: 34.4 % — LOW (ref 39–50)
HGB BLD-MCNC: 11.3 G/DL — LOW (ref 13–17)
IMM GRANULOCYTES NFR BLD AUTO: 2.1 % — HIGH (ref 0–0.9)
LYMPHOCYTES # BLD AUTO: 0.39 K/UL — LOW (ref 1–3.3)
LYMPHOCYTES # BLD AUTO: 4 % — LOW (ref 13–44)
MCHC RBC-ENTMCNC: 32.4 PG — SIGNIFICANT CHANGE UP (ref 27–34)
MCHC RBC-ENTMCNC: 32.8 GM/DL — SIGNIFICANT CHANGE UP (ref 32–36)
MCV RBC AUTO: 98.6 FL — SIGNIFICANT CHANGE UP (ref 80–100)
MONOCYTES # BLD AUTO: 0.54 K/UL — SIGNIFICANT CHANGE UP (ref 0–0.9)
MONOCYTES NFR BLD AUTO: 5.5 % — SIGNIFICANT CHANGE UP (ref 2–14)
NEUTROPHILS # BLD AUTO: 8.66 K/UL — HIGH (ref 1.8–7.4)
NEUTROPHILS NFR BLD AUTO: 88.2 % — HIGH (ref 43–77)
NRBC # BLD: 0 /100 WBCS — SIGNIFICANT CHANGE UP (ref 0–0)
PLATELET # BLD AUTO: 205 K/UL — SIGNIFICANT CHANGE UP (ref 150–400)
RBC # BLD: 3.49 M/UL — LOW (ref 4.2–5.8)
RBC # FLD: 17.9 % — HIGH (ref 10.3–14.5)
WBC # BLD: 9.82 K/UL — SIGNIFICANT CHANGE UP (ref 3.8–10.5)
WBC # FLD AUTO: 9.82 K/UL — SIGNIFICANT CHANGE UP (ref 3.8–10.5)

## 2023-08-23 PROCEDURE — 99214 OFFICE O/P EST MOD 30 MIN: CPT

## 2023-08-24 ENCOUNTER — NON-APPOINTMENT (OUTPATIENT)
Age: 43
End: 2023-08-24

## 2023-08-24 NOTE — ASSESSMENT
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 S/p plasmapheresis. On Rituxan- today dose 3/4. On steroids slow taper. Clinically responding. Complete Rituxan cycle 4/4 next week. Taper prednisone by 10 mg weekly Reduce prednisone to 30 mg daily. CBC in one week. Keep pheresis catheter for now.  Right IJV thrombosis catheter related- catheter removed. Plan for Ac Lovenox 100 mg bid x 3 months. Return visit next week.

## 2023-08-24 NOTE — PHYSICAL EXAM
[Fully active, able to carry on all pre-disease performance without restriction] : Status 0 - Fully active, able to carry on all pre-disease performance without restriction [Normal] : affect appropriate [de-identified] : tunneled left subclavian catheter  [de-identified] : no edema  [de-identified] : no petechiae

## 2023-08-24 NOTE — HISTORY OF PRESENT ILLNESS
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    7/29/23 - 08/02/23 - Admitted to  with chest pain, and noted with anemia and thrombocytopenia, peripheral smear at presentation c/w large amount of schistocytes -> TTP. Confirmatory [id number] < 2.    Hgb 8.3, plts 7K, LDH >1500, Hapto <20, D-Dimer > 3000, retic elevated.   Hgb dino 6.8 s/p 1U PRBC's.  7/28/23 - Dex 40mg x1, then methylprednisolone 125mg IV q 6 hrs daily for 2 days 07/30 & 07/31.  8/01/23 - Started PO Prednisone 50 mg PO BID x two days (08/01 & 08/02). S/p plasmapheresis x 4 days (07/31 - 08/03).  Plats to 53K after 1st pheresis ->174K -> 260K -> 312K.  8/2/23 - Discharged on prednisone 40mg BID.  8/3/23 - Platelets checked as outpatient - 329K. 8/4/23 - Platelets checked as outpatient - 396K (Friday).  8/7/23 - 8/16/23 - Patient went back to  ER as developed hematuria.  Platelets 3K.  Admitted and plasmapheresis restarted.  Hgb dino 6.9 -> 1U PRBC's Received Solumedrol 125mg q 12 hrs x 2 days (08/07 & 08/08) total then Prednisone 50mg BID (dose based on 1mg/kg) starting 08/09 8/8/23 - Started Rituximab q week x 4 doses, had 2nd dose 8/15/23.  Had right Shiley - US with RIJ thrombosis. Catheter changed, now with Left tunneled catheter.  Started on Lovenox ( 100mg BID)  once platelets recovered.   Pheresis was stopped after platelets were >150K x 2 days.  Discharged on prednisone 40mg BID.  Catheter left in.  [de-identified] : Today for Rituxan # 3  On Prednisone  40 mg daily.  No bleeding. Had infusion reaction with first and second Rituxan ( mild).

## 2023-08-25 ENCOUNTER — APPOINTMENT (OUTPATIENT)
Dept: INTERNAL MEDICINE | Facility: CLINIC | Age: 43
End: 2023-08-25
Payer: COMMERCIAL

## 2023-08-25 VITALS — DIASTOLIC BLOOD PRESSURE: 92 MMHG | HEART RATE: 78 BPM | RESPIRATION RATE: 12 BRPM | SYSTOLIC BLOOD PRESSURE: 140 MMHG

## 2023-08-25 VITALS — WEIGHT: 255 LBS | BODY MASS INDEX: 35.7 KG/M2 | HEIGHT: 71 IN

## 2023-08-25 DIAGNOSIS — I82.C11 ACUTE EMBOLISM AND THROMBOSIS OF RIGHT INTERNAL JUGULAR VEIN: ICD-10-CM

## 2023-08-25 DIAGNOSIS — E66.9 OBESITY, UNSPECIFIED: ICD-10-CM

## 2023-08-25 DIAGNOSIS — I10 ESSENTIAL (PRIMARY) HYPERTENSION: ICD-10-CM

## 2023-08-25 LAB
ADAMTS13 ACTIVITY: 37.4 % — LOW
ADAMTS13-COMMENT: SIGNIFICANT CHANGE UP

## 2023-08-25 PROCEDURE — 99214 OFFICE O/P EST MOD 30 MIN: CPT

## 2023-08-25 NOTE — ASSESSMENT
[FreeTextEntry1] : TTP improving will need to watch for other autoimmune conditions as he ages bp borderline monitor at home follow up cpe when completes heme treatment

## 2023-08-25 NOTE — HISTORY OF PRESENT ILLNESS
Yes [FreeTextEntry1] : was in [facility] TTP [de-identified] : hospital with TTP had episode low platelets, two hospitalizations, s/p plasmaphereis, left port had clot right IJ port since removed on lovenox currently on steroids and rituxin with postive lab findings Initail complaint was severe chest pain on first hospitalzation ct neg for Pulm emb but platerlets were less than 10

## 2023-08-25 NOTE — HEALTH RISK ASSESSMENT
[No] : No [No falls in past year] : Patient reported no falls in the past year [Little interest or pleasure doing things] : 1) Little interest or pleasure doing things [Feeling down, depressed, or hopeless] : 2) Feeling down, depressed, or hopeless [0] : 2) Feeling down, depressed, or hopeless: Not at all (0) [Never] : Never

## 2023-08-30 ENCOUNTER — RESULT REVIEW (OUTPATIENT)
Age: 43
End: 2023-08-30

## 2023-08-30 ENCOUNTER — APPOINTMENT (OUTPATIENT)
Dept: INFUSION THERAPY | Facility: CLINIC | Age: 43
End: 2023-08-30

## 2023-08-30 VITALS
RESPIRATION RATE: 16 BRPM | DIASTOLIC BLOOD PRESSURE: 88 MMHG | TEMPERATURE: 97.2 F | SYSTOLIC BLOOD PRESSURE: 144 MMHG | HEART RATE: 100 BPM | WEIGHT: 252.56 LBS | OXYGEN SATURATION: 98 % | BODY MASS INDEX: 35.23 KG/M2

## 2023-08-30 LAB
BASOPHILS # BLD AUTO: 0.01 K/UL — SIGNIFICANT CHANGE UP (ref 0–0.2)
BASOPHILS NFR BLD AUTO: 0.1 % — SIGNIFICANT CHANGE UP (ref 0–2)
EOSINOPHIL # BLD AUTO: 0 K/UL — SIGNIFICANT CHANGE UP (ref 0–0.5)
EOSINOPHIL NFR BLD AUTO: 0 % — SIGNIFICANT CHANGE UP (ref 0–6)
HCT VFR BLD CALC: 31.2 % — LOW (ref 39–50)
HGB BLD-MCNC: 10.2 G/DL — LOW (ref 13–17)
IMM GRANULOCYTES NFR BLD AUTO: 2.9 % — HIGH (ref 0–0.9)
LYMPHOCYTES # BLD AUTO: 0.28 K/UL — LOW (ref 1–3.3)
LYMPHOCYTES # BLD AUTO: 3.2 % — LOW (ref 13–44)
MCHC RBC-ENTMCNC: 31.6 PG — SIGNIFICANT CHANGE UP (ref 27–34)
MCHC RBC-ENTMCNC: 32.7 GM/DL — SIGNIFICANT CHANGE UP (ref 32–36)
MCV RBC AUTO: 96.6 FL — SIGNIFICANT CHANGE UP (ref 80–100)
MONOCYTES # BLD AUTO: 0.31 K/UL — SIGNIFICANT CHANGE UP (ref 0–0.9)
MONOCYTES NFR BLD AUTO: 3.6 % — SIGNIFICANT CHANGE UP (ref 2–14)
NEUTROPHILS # BLD AUTO: 7.78 K/UL — HIGH (ref 1.8–7.4)
NEUTROPHILS NFR BLD AUTO: 90.2 % — HIGH (ref 43–77)
NRBC # BLD: 0 /100 WBCS — SIGNIFICANT CHANGE UP (ref 0–0)
PLATELET # BLD AUTO: 193 K/UL — SIGNIFICANT CHANGE UP (ref 150–400)
RBC # BLD: 3.23 M/UL — LOW (ref 4.2–5.8)
RBC # FLD: 15.9 % — HIGH (ref 10.3–14.5)
WBC # BLD: 8.63 K/UL — SIGNIFICANT CHANGE UP (ref 3.8–10.5)
WBC # FLD AUTO: 8.63 K/UL — SIGNIFICANT CHANGE UP (ref 3.8–10.5)

## 2023-08-31 ENCOUNTER — TRANSCRIPTION ENCOUNTER (OUTPATIENT)
Age: 43
End: 2023-08-31

## 2023-08-31 LAB
ALBUMIN SERPL ELPH-MCNC: 4.4 G/DL — SIGNIFICANT CHANGE UP (ref 3.3–5)
ALP SERPL-CCNC: 68 U/L — SIGNIFICANT CHANGE UP (ref 40–120)
ALT FLD-CCNC: 41 U/L — SIGNIFICANT CHANGE UP (ref 10–45)
ANION GAP SERPL CALC-SCNC: 18 MMOL/L — HIGH (ref 5–17)
AST SERPL-CCNC: 12 U/L — SIGNIFICANT CHANGE UP (ref 10–40)
BILIRUB SERPL-MCNC: 0.2 MG/DL — SIGNIFICANT CHANGE UP (ref 0.2–1.2)
BUN SERPL-MCNC: 22 MG/DL — SIGNIFICANT CHANGE UP (ref 7–23)
CALCIUM SERPL-MCNC: 9.6 MG/DL — SIGNIFICANT CHANGE UP (ref 8.4–10.5)
CHLORIDE SERPL-SCNC: 97 MMOL/L — SIGNIFICANT CHANGE UP (ref 96–108)
CO2 SERPL-SCNC: 19 MMOL/L — LOW (ref 22–31)
CREAT SERPL-MCNC: 1.14 MG/DL — SIGNIFICANT CHANGE UP (ref 0.5–1.3)
EGFR: 81 ML/MIN/1.73M2 — SIGNIFICANT CHANGE UP
GLUCOSE SERPL-MCNC: 373 MG/DL — HIGH (ref 70–99)
MAGNESIUM SERPL-MCNC: 2.1 MG/DL — SIGNIFICANT CHANGE UP (ref 1.6–2.6)
POTASSIUM SERPL-MCNC: 4.4 MMOL/L — SIGNIFICANT CHANGE UP (ref 3.5–5.3)
POTASSIUM SERPL-SCNC: 4.4 MMOL/L — SIGNIFICANT CHANGE UP (ref 3.5–5.3)
PROT SERPL-MCNC: 6.1 G/DL — SIGNIFICANT CHANGE UP (ref 6–8.3)
SODIUM SERPL-SCNC: 134 MMOL/L — LOW (ref 135–145)

## 2023-08-31 RX ORDER — FOLIC ACID 1 MG/1
1 TABLET ORAL DAILY
Qty: 180 | Refills: 3 | Status: ACTIVE | COMMUNITY
Start: 2023-08-02 | End: 1900-01-01

## 2023-09-06 ENCOUNTER — RESULT REVIEW (OUTPATIENT)
Age: 43
End: 2023-09-06

## 2023-09-06 ENCOUNTER — APPOINTMENT (OUTPATIENT)
Dept: HEMATOLOGY ONCOLOGY | Facility: CLINIC | Age: 43
End: 2023-09-06

## 2023-09-06 LAB
ALBUMIN SERPL ELPH-MCNC: 4.3 G/DL — SIGNIFICANT CHANGE UP (ref 3.3–5)
ALP SERPL-CCNC: 55 U/L — SIGNIFICANT CHANGE UP (ref 40–120)
ALT FLD-CCNC: 58 U/L — HIGH (ref 10–45)
ANION GAP SERPL CALC-SCNC: 15 MMOL/L — SIGNIFICANT CHANGE UP (ref 5–17)
AST SERPL-CCNC: 17 U/L — SIGNIFICANT CHANGE UP (ref 10–40)
BASOPHILS # BLD AUTO: 0.02 K/UL — SIGNIFICANT CHANGE UP (ref 0–0.2)
BASOPHILS NFR BLD AUTO: 0.3 % — SIGNIFICANT CHANGE UP (ref 0–2)
BILIRUB SERPL-MCNC: <0.2 MG/DL — SIGNIFICANT CHANGE UP (ref 0.2–1.2)
BUN SERPL-MCNC: 26 MG/DL — HIGH (ref 7–23)
CALCIUM SERPL-MCNC: 9.8 MG/DL — SIGNIFICANT CHANGE UP (ref 8.4–10.5)
CHLORIDE SERPL-SCNC: 99 MMOL/L — SIGNIFICANT CHANGE UP (ref 96–108)
CO2 SERPL-SCNC: 22 MMOL/L — SIGNIFICANT CHANGE UP (ref 22–31)
CREAT SERPL-MCNC: 1.17 MG/DL — SIGNIFICANT CHANGE UP (ref 0.5–1.3)
EGFR: 78 ML/MIN/1.73M2 — SIGNIFICANT CHANGE UP
EOSINOPHIL # BLD AUTO: 0.02 K/UL — SIGNIFICANT CHANGE UP (ref 0–0.5)
EOSINOPHIL NFR BLD AUTO: 0.3 % — SIGNIFICANT CHANGE UP (ref 0–6)
GLUCOSE SERPL-MCNC: 174 MG/DL — HIGH (ref 70–99)
HCT VFR BLD CALC: 30.1 % — LOW (ref 39–50)
HGB BLD-MCNC: 9.8 G/DL — LOW (ref 13–17)
IMM GRANULOCYTES NFR BLD AUTO: 4.8 % — HIGH (ref 0–0.9)
LYMPHOCYTES # BLD AUTO: 1.3 K/UL — SIGNIFICANT CHANGE UP (ref 1–3.3)
LYMPHOCYTES # BLD AUTO: 20.7 % — SIGNIFICANT CHANGE UP (ref 13–44)
MCHC RBC-ENTMCNC: 31.1 PG — SIGNIFICANT CHANGE UP (ref 27–34)
MCHC RBC-ENTMCNC: 32.6 GM/DL — SIGNIFICANT CHANGE UP (ref 32–36)
MCV RBC AUTO: 95.6 FL — SIGNIFICANT CHANGE UP (ref 80–100)
MONOCYTES # BLD AUTO: 0.55 K/UL — SIGNIFICANT CHANGE UP (ref 0–0.9)
MONOCYTES NFR BLD AUTO: 8.8 % — SIGNIFICANT CHANGE UP (ref 2–14)
NEUTROPHILS # BLD AUTO: 4.08 K/UL — SIGNIFICANT CHANGE UP (ref 1.8–7.4)
NEUTROPHILS NFR BLD AUTO: 65.1 % — SIGNIFICANT CHANGE UP (ref 43–77)
NRBC # BLD: 0 /100 WBCS — SIGNIFICANT CHANGE UP (ref 0–0)
PLATELET # BLD AUTO: 171 K/UL — SIGNIFICANT CHANGE UP (ref 150–400)
POTASSIUM SERPL-MCNC: 4.1 MMOL/L — SIGNIFICANT CHANGE UP (ref 3.5–5.3)
POTASSIUM SERPL-SCNC: 4.1 MMOL/L — SIGNIFICANT CHANGE UP (ref 3.5–5.3)
PROT SERPL-MCNC: 6.2 G/DL — SIGNIFICANT CHANGE UP (ref 6–8.3)
RBC # BLD: 3.15 M/UL — LOW (ref 4.2–5.8)
RBC # FLD: 15.5 % — HIGH (ref 10.3–14.5)
SODIUM SERPL-SCNC: 136 MMOL/L — SIGNIFICANT CHANGE UP (ref 135–145)
WBC # BLD: 6.27 K/UL — SIGNIFICANT CHANGE UP (ref 3.8–10.5)
WBC # FLD AUTO: 6.27 K/UL — SIGNIFICANT CHANGE UP (ref 3.8–10.5)

## 2023-09-13 ENCOUNTER — APPOINTMENT (OUTPATIENT)
Dept: HEMATOLOGY ONCOLOGY | Facility: CLINIC | Age: 43
End: 2023-09-13

## 2023-09-20 ENCOUNTER — RESULT REVIEW (OUTPATIENT)
Age: 43
End: 2023-09-20

## 2023-09-20 ENCOUNTER — APPOINTMENT (OUTPATIENT)
Dept: HEMATOLOGY ONCOLOGY | Facility: CLINIC | Age: 43
End: 2023-09-20

## 2023-09-20 VITALS — OXYGEN SATURATION: 98 % | HEART RATE: 120 BPM | DIASTOLIC BLOOD PRESSURE: 90 MMHG | SYSTOLIC BLOOD PRESSURE: 118 MMHG

## 2023-09-20 LAB
ANION GAP SERPL CALC-SCNC: 16 MMOL/L — SIGNIFICANT CHANGE UP (ref 5–17)
BASOPHILS # BLD AUTO: 0.02 K/UL — SIGNIFICANT CHANGE UP (ref 0–0.2)
BASOPHILS NFR BLD AUTO: 0.3 % — SIGNIFICANT CHANGE UP (ref 0–2)
BUN SERPL-MCNC: 17 MG/DL — SIGNIFICANT CHANGE UP (ref 7–23)
CALCIUM SERPL-MCNC: 9.5 MG/DL — SIGNIFICANT CHANGE UP (ref 8.4–10.5)
CHLORIDE SERPL-SCNC: 102 MMOL/L — SIGNIFICANT CHANGE UP (ref 96–108)
CO2 SERPL-SCNC: 22 MMOL/L — SIGNIFICANT CHANGE UP (ref 22–31)
CREAT SERPL-MCNC: 1.31 MG/DL — HIGH (ref 0.5–1.3)
EGFR: 68 ML/MIN/1.73M2 — SIGNIFICANT CHANGE UP
EOSINOPHIL # BLD AUTO: 0.06 K/UL — SIGNIFICANT CHANGE UP (ref 0–0.5)
EOSINOPHIL NFR BLD AUTO: 0.9 % — SIGNIFICANT CHANGE UP (ref 0–6)
GLUCOSE SERPL-MCNC: 103 MG/DL — HIGH (ref 70–99)
HCT VFR BLD CALC: 32.2 % — LOW (ref 39–50)
HGB BLD-MCNC: 10.2 G/DL — LOW (ref 13–17)
IMM GRANULOCYTES NFR BLD AUTO: 2.2 % — HIGH (ref 0–0.9)
LYMPHOCYTES # BLD AUTO: 1.02 K/UL — SIGNIFICANT CHANGE UP (ref 1–3.3)
LYMPHOCYTES # BLD AUTO: 15.8 % — SIGNIFICANT CHANGE UP (ref 13–44)
MCHC RBC-ENTMCNC: 29 PG — SIGNIFICANT CHANGE UP (ref 27–34)
MCHC RBC-ENTMCNC: 31.7 GM/DL — LOW (ref 32–36)
MCV RBC AUTO: 91.5 FL — SIGNIFICANT CHANGE UP (ref 80–100)
MONOCYTES # BLD AUTO: 0.71 K/UL — SIGNIFICANT CHANGE UP (ref 0–0.9)
MONOCYTES NFR BLD AUTO: 11 % — SIGNIFICANT CHANGE UP (ref 2–14)
NEUTROPHILS # BLD AUTO: 4.5 K/UL — SIGNIFICANT CHANGE UP (ref 1.8–7.4)
NEUTROPHILS NFR BLD AUTO: 69.8 % — SIGNIFICANT CHANGE UP (ref 43–77)
NRBC # BLD: 0 /100 WBCS — SIGNIFICANT CHANGE UP (ref 0–0)
PLATELET # BLD AUTO: 337 K/UL — SIGNIFICANT CHANGE UP (ref 150–400)
POTASSIUM SERPL-MCNC: 3.8 MMOL/L — SIGNIFICANT CHANGE UP (ref 3.5–5.3)
POTASSIUM SERPL-SCNC: 3.8 MMOL/L — SIGNIFICANT CHANGE UP (ref 3.5–5.3)
RBC # BLD: 3.52 M/UL — LOW (ref 4.2–5.8)
RBC # FLD: 15.3 % — HIGH (ref 10.3–14.5)
SODIUM SERPL-SCNC: 140 MMOL/L — SIGNIFICANT CHANGE UP (ref 135–145)
WBC # BLD: 6.45 K/UL — SIGNIFICANT CHANGE UP (ref 3.8–10.5)
WBC # FLD AUTO: 6.45 K/UL — SIGNIFICANT CHANGE UP (ref 3.8–10.5)

## 2023-09-24 ENCOUNTER — INPATIENT (INPATIENT)
Facility: HOSPITAL | Age: 43
LOS: 4 days | Discharge: HOME | DRG: 871 | End: 2023-09-29
Attending: HOSPITALIST | Admitting: INTERNAL MEDICINE
Payer: COMMERCIAL

## 2023-09-24 VITALS
HEIGHT: 71 IN | HEART RATE: 137 BPM | TEMPERATURE: 100 F | RESPIRATION RATE: 20 BRPM | SYSTOLIC BLOOD PRESSURE: 142 MMHG | OXYGEN SATURATION: 95 % | DIASTOLIC BLOOD PRESSURE: 88 MMHG

## 2023-09-24 DIAGNOSIS — J18.9 PNEUMONIA, UNSPECIFIED ORGANISM: ICD-10-CM

## 2023-09-24 LAB
ANION GAP SERPL CALC-SCNC: 5 MMOL/L — SIGNIFICANT CHANGE UP (ref 5–17)
BASOPHILS # BLD AUTO: 0.02 K/UL — SIGNIFICANT CHANGE UP (ref 0–0.2)
BASOPHILS NFR BLD AUTO: 0.2 % — SIGNIFICANT CHANGE UP (ref 0–2)
BUN SERPL-MCNC: 14 MG/DL — SIGNIFICANT CHANGE UP (ref 7–23)
CALCIUM SERPL-MCNC: 9.6 MG/DL — SIGNIFICANT CHANGE UP (ref 8.5–10.1)
CHLORIDE SERPL-SCNC: 110 MMOL/L — HIGH (ref 96–108)
CO2 SERPL-SCNC: 27 MMOL/L — SIGNIFICANT CHANGE UP (ref 22–31)
CREAT SERPL-MCNC: 1.16 MG/DL — SIGNIFICANT CHANGE UP (ref 0.5–1.3)
D DIMER BLD IA.RAPID-MCNC: 226 NG/ML DDU — SIGNIFICANT CHANGE UP
EGFR: 79 ML/MIN/1.73M2 — SIGNIFICANT CHANGE UP
EOSINOPHIL # BLD AUTO: 0.07 K/UL — SIGNIFICANT CHANGE UP (ref 0–0.5)
EOSINOPHIL NFR BLD AUTO: 0.7 % — SIGNIFICANT CHANGE UP (ref 0–6)
GLUCOSE SERPL-MCNC: 118 MG/DL — HIGH (ref 70–99)
HCT VFR BLD CALC: 33.7 % — LOW (ref 39–50)
HGB BLD-MCNC: 10.4 G/DL — LOW (ref 13–17)
IMM GRANULOCYTES NFR BLD AUTO: 1 % — HIGH (ref 0–0.9)
INR BLD: 1.16 RATIO — SIGNIFICANT CHANGE UP (ref 0.85–1.18)
LACTATE SERPL-SCNC: 2.1 MMOL/L — HIGH (ref 0.7–2)
LYMPHOCYTES # BLD AUTO: 1.21 K/UL — SIGNIFICANT CHANGE UP (ref 1–3.3)
LYMPHOCYTES # BLD AUTO: 11.4 % — LOW (ref 13–44)
MCHC RBC-ENTMCNC: 28 PG — SIGNIFICANT CHANGE UP (ref 27–34)
MCHC RBC-ENTMCNC: 30.9 GM/DL — LOW (ref 32–36)
MCV RBC AUTO: 90.8 FL — SIGNIFICANT CHANGE UP (ref 80–100)
MONOCYTES # BLD AUTO: 0.93 K/UL — HIGH (ref 0–0.9)
MONOCYTES NFR BLD AUTO: 8.8 % — SIGNIFICANT CHANGE UP (ref 2–14)
NEUTROPHILS # BLD AUTO: 8.28 K/UL — HIGH (ref 1.8–7.4)
NEUTROPHILS NFR BLD AUTO: 77.9 % — HIGH (ref 43–77)
NT-PROBNP SERPL-SCNC: 88 PG/ML — SIGNIFICANT CHANGE UP (ref 0–125)
PLATELET # BLD AUTO: 403 K/UL — HIGH (ref 150–400)
POTASSIUM SERPL-MCNC: 3.9 MMOL/L — SIGNIFICANT CHANGE UP (ref 3.5–5.3)
POTASSIUM SERPL-SCNC: 3.9 MMOL/L — SIGNIFICANT CHANGE UP (ref 3.5–5.3)
PROTHROM AB SERPL-ACNC: 13 SEC — SIGNIFICANT CHANGE UP (ref 9.5–13)
RAPID RVP RESULT: SIGNIFICANT CHANGE UP
RBC # BLD: 3.71 M/UL — LOW (ref 4.2–5.8)
RBC # FLD: 16.4 % — HIGH (ref 10.3–14.5)
SARS-COV-2 RNA SPEC QL NAA+PROBE: SIGNIFICANT CHANGE UP
SODIUM SERPL-SCNC: 142 MMOL/L — SIGNIFICANT CHANGE UP (ref 135–145)
WBC # BLD: 10.62 K/UL — HIGH (ref 3.8–10.5)
WBC # FLD AUTO: 10.62 K/UL — HIGH (ref 3.8–10.5)

## 2023-09-24 PROCEDURE — 93010 ELECTROCARDIOGRAM REPORT: CPT

## 2023-09-24 PROCEDURE — 87206 SMEAR FLUORESCENT/ACID STAI: CPT

## 2023-09-24 PROCEDURE — 71250 CT THORAX DX C-: CPT

## 2023-09-24 PROCEDURE — 87102 FUNGUS ISOLATION CULTURE: CPT

## 2023-09-24 PROCEDURE — 88312 SPECIAL STAINS GROUP 1: CPT

## 2023-09-24 PROCEDURE — 99285 EMERGENCY DEPT VISIT HI MDM: CPT

## 2023-09-24 PROCEDURE — 71045 X-RAY EXAM CHEST 1 VIEW: CPT

## 2023-09-24 PROCEDURE — 85027 COMPLETE CBC AUTOMATED: CPT

## 2023-09-24 PROCEDURE — 88108 CYTOPATH CONCENTRATE TECH: CPT

## 2023-09-24 PROCEDURE — 36415 COLL VENOUS BLD VENIPUNCTURE: CPT

## 2023-09-24 PROCEDURE — 99222 1ST HOSP IP/OBS MODERATE 55: CPT

## 2023-09-24 PROCEDURE — 87077 CULTURE AEROBIC IDENTIFY: CPT

## 2023-09-24 PROCEDURE — 71046 X-RAY EXAM CHEST 2 VIEWS: CPT | Mod: 26

## 2023-09-24 PROCEDURE — 87015 SPECIMEN INFECT AGNT CONCNTJ: CPT

## 2023-09-24 PROCEDURE — 87186 SC STD MICRODIL/AGAR DIL: CPT

## 2023-09-24 PROCEDURE — 87449 NOS EACH ORGANISM AG IA: CPT

## 2023-09-24 PROCEDURE — 87116 MYCOBACTERIA CULTURE: CPT

## 2023-09-24 PROCEDURE — 80048 BASIC METABOLIC PNL TOTAL CA: CPT

## 2023-09-24 PROCEDURE — 87070 CULTURE OTHR SPECIMN AEROBIC: CPT

## 2023-09-24 PROCEDURE — 71250 CT THORAX DX C-: CPT | Mod: 26

## 2023-09-24 PROCEDURE — 80069 RENAL FUNCTION PANEL: CPT

## 2023-09-24 RX ORDER — FOLIC ACID 0.8 MG
1 TABLET ORAL DAILY
Refills: 0 | Status: DISCONTINUED | OUTPATIENT
Start: 2023-09-24 | End: 2023-09-29

## 2023-09-24 RX ORDER — ONDANSETRON 8 MG/1
4 TABLET, FILM COATED ORAL EVERY 8 HOURS
Refills: 0 | Status: DISCONTINUED | OUTPATIENT
Start: 2023-09-24 | End: 2023-09-29

## 2023-09-24 RX ORDER — ACETAMINOPHEN 500 MG
650 TABLET ORAL EVERY 6 HOURS
Refills: 0 | Status: DISCONTINUED | OUTPATIENT
Start: 2023-09-24 | End: 2023-09-29

## 2023-09-24 RX ORDER — AZITHROMYCIN 500 MG/1
500 TABLET, FILM COATED ORAL EVERY 24 HOURS
Refills: 0 | Status: DISCONTINUED | OUTPATIENT
Start: 2023-09-24 | End: 2023-09-27

## 2023-09-24 RX ORDER — CEFEPIME 1 G/1
2000 INJECTION, POWDER, FOR SOLUTION INTRAMUSCULAR; INTRAVENOUS EVERY 8 HOURS
Refills: 0 | Status: DISCONTINUED | OUTPATIENT
Start: 2023-09-24 | End: 2023-09-24

## 2023-09-24 RX ORDER — SODIUM CHLORIDE 9 MG/ML
1000 INJECTION INTRAMUSCULAR; INTRAVENOUS; SUBCUTANEOUS
Refills: 0 | Status: DISCONTINUED | OUTPATIENT
Start: 2023-09-24 | End: 2023-09-27

## 2023-09-24 RX ORDER — ALBUTEROL 90 UG/1
2.5 AEROSOL, METERED ORAL ONCE
Refills: 0 | Status: COMPLETED | OUTPATIENT
Start: 2023-09-24 | End: 2023-09-24

## 2023-09-24 RX ORDER — SODIUM CHLORIDE 9 MG/ML
1000 INJECTION INTRAMUSCULAR; INTRAVENOUS; SUBCUTANEOUS ONCE
Refills: 0 | Status: COMPLETED | OUTPATIENT
Start: 2023-09-24 | End: 2023-09-24

## 2023-09-24 RX ORDER — ENOXAPARIN SODIUM 100 MG/ML
100 INJECTION SUBCUTANEOUS EVERY 24 HOURS
Refills: 0 | Status: DISCONTINUED | OUTPATIENT
Start: 2023-09-24 | End: 2023-09-25

## 2023-09-24 RX ORDER — LANOLIN ALCOHOL/MO/W.PET/CERES
3 CREAM (GRAM) TOPICAL AT BEDTIME
Refills: 0 | Status: DISCONTINUED | OUTPATIENT
Start: 2023-09-24 | End: 2023-09-29

## 2023-09-24 RX ORDER — CEFEPIME 1 G/1
2000 INJECTION, POWDER, FOR SOLUTION INTRAMUSCULAR; INTRAVENOUS EVERY 8 HOURS
Refills: 0 | Status: DISCONTINUED | OUTPATIENT
Start: 2023-09-24 | End: 2023-09-29

## 2023-09-24 RX ORDER — ACETAMINOPHEN 500 MG
650 TABLET ORAL ONCE
Refills: 0 | Status: COMPLETED | OUTPATIENT
Start: 2023-09-24 | End: 2023-09-24

## 2023-09-24 RX ORDER — VANCOMYCIN HCL 1 G
1750 VIAL (EA) INTRAVENOUS ONCE
Refills: 0 | Status: COMPLETED | OUTPATIENT
Start: 2023-09-24 | End: 2023-09-24

## 2023-09-24 RX ORDER — PIPERACILLIN AND TAZOBACTAM 4; .5 G/20ML; G/20ML
3.38 INJECTION, POWDER, LYOPHILIZED, FOR SOLUTION INTRAVENOUS ONCE
Refills: 0 | Status: COMPLETED | OUTPATIENT
Start: 2023-09-24 | End: 2023-09-24

## 2023-09-24 RX ORDER — DEXAMETHASONE 0.5 MG/5ML
6 ELIXIR ORAL ONCE
Refills: 0 | Status: COMPLETED | OUTPATIENT
Start: 2023-09-24 | End: 2023-09-24

## 2023-09-24 RX ORDER — VANCOMYCIN HCL 1 G
1000 VIAL (EA) INTRAVENOUS ONCE
Refills: 0 | Status: DISCONTINUED | OUTPATIENT
Start: 2023-09-24 | End: 2023-09-24

## 2023-09-24 RX ADMIN — CEFEPIME 2000 MILLIGRAM(S): 1 INJECTION, POWDER, FOR SOLUTION INTRAMUSCULAR; INTRAVENOUS at 21:39

## 2023-09-24 RX ADMIN — ONDANSETRON 4 MILLIGRAM(S): 8 TABLET, FILM COATED ORAL at 16:19

## 2023-09-24 RX ADMIN — Medication 6 MILLIGRAM(S): at 08:16

## 2023-09-24 RX ADMIN — Medication 2 TABLET(S): at 13:28

## 2023-09-24 RX ADMIN — SODIUM CHLORIDE 80 MILLILITER(S): 9 INJECTION INTRAMUSCULAR; INTRAVENOUS; SUBCUTANEOUS at 14:57

## 2023-09-24 RX ADMIN — Medication 250 MILLIGRAM(S): at 10:18

## 2023-09-24 RX ADMIN — AZITHROMYCIN 255 MILLIGRAM(S): 500 TABLET, FILM COATED ORAL at 14:09

## 2023-09-24 RX ADMIN — Medication 650 MILLIGRAM(S): at 07:18

## 2023-09-24 RX ADMIN — Medication 1 MILLIGRAM(S): at 11:48

## 2023-09-24 RX ADMIN — Medication 2 TABLET(S): at 21:36

## 2023-09-24 RX ADMIN — SODIUM CHLORIDE 2000 MILLILITER(S): 9 INJECTION INTRAMUSCULAR; INTRAVENOUS; SUBCUTANEOUS at 07:19

## 2023-09-24 RX ADMIN — SODIUM CHLORIDE 1000 MILLILITER(S): 9 INJECTION INTRAMUSCULAR; INTRAVENOUS; SUBCUTANEOUS at 08:17

## 2023-09-24 RX ADMIN — ALBUTEROL 2.5 MILLIGRAM(S): 90 AEROSOL, METERED ORAL at 07:19

## 2023-09-24 RX ADMIN — ENOXAPARIN SODIUM 100 MILLIGRAM(S): 100 INJECTION SUBCUTANEOUS at 11:47

## 2023-09-24 RX ADMIN — CEFEPIME 2000 MILLIGRAM(S): 1 INJECTION, POWDER, FOR SOLUTION INTRAMUSCULAR; INTRAVENOUS at 13:29

## 2023-09-24 RX ADMIN — PIPERACILLIN AND TAZOBACTAM 200 GRAM(S): 4; .5 INJECTION, POWDER, LYOPHILIZED, FOR SOLUTION INTRAVENOUS at 08:16

## 2023-09-24 NOTE — ED PROVIDER NOTE - CLINICAL SUMMARY MEDICAL DECISION MAKING FREE TEXT BOX
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  Patient states that he started experiencing "flulike symptoms" about 4 to 5 days ago.  Patient states that he had Tmax of 103 °F.  Patient notes that he was not experiencing any significant shortness of breath until the last 12 to 24 hours.  Patient notes that his oxygen saturation got as low as 88% on room air with ambulation at home.  Patient notes a severe dry cough and some chest discomfort when taking deep breaths and with cough.  Patient is  currently taking Lovenox once daily for DVT.  He was previously on twice daily dosing but had GI bleeding.  Differential diagnosis includes viral syndrome, PE, pulmonary edema, HCAP.  we will get chest x-ray, EKG, CBC, BMP, D-dimer and if elevated CTA of the chest with IV contrast.

## 2023-09-24 NOTE — ED ADULT NURSE NOTE - CCCP TRG CHIEF CMPLNT
Occupational Therapy Contact Note    Discussed OT orders with RN. Pt is not medically stable and likely will not be able to participate in OT in the coming days. RN is OK with OT completing order and putting in new therapy orders when patient is medically stable to participate.    Angela Dhaliwal, OTR/L   flu-like symptoms

## 2023-09-24 NOTE — ED ADULT NURSE NOTE - OBJECTIVE STATEMENT
44 y/o male with significant past medical history of immune mediated TTP, underwent several sessions of plasmapheresis and received 2 doses of rituxan, hx IJ DVT secondary to insertion. Patient is febrile, SOB and coughing. With complication of right IJ DVT during the admission discharged last month presents for evaluation of fever, cough, shortness of breath overall flu-like symptoms. Patient states that he had Tmax of 103 °F. Patient notes that his oxygen saturation was as low as 88% on room air with ambulation at home. + chest discomfort when taking deep breaths, currently taking Lovenox once daily for DVT.

## 2023-09-24 NOTE — ED PROVIDER NOTE - CHIEF COMPLAINT
The patient is a 43y Male complaining of flu-like symptoms. To get better and follow your care plan as instructed.

## 2023-09-24 NOTE — H&P ADULT - NSHPPHYSICALEXAM_GEN_ALL_CORE
Vital Signs Last 24 Hrs  T(C): 37.8 (24 Sep 2023 09:45), Max: 37.8 (24 Sep 2023 06:26)  T(F): 100 (24 Sep 2023 09:45), Max: 100.1 (24 Sep 2023 06:26)  HR: 119 (24 Sep 2023 09:45) (119 - 137)  BP: 141/85 (24 Sep 2023 09:45) (141/85 - 150/88)  BP(mean): 106 (24 Sep 2023 09:06) (106 - 106)  RR: 18 (24 Sep 2023 09:45) (18 - 20)  SpO2: 93% (24 Sep 2023 09:45) (93% - 98%)    Parameters below as of 24 Sep 2023 09:45  Patient On (Oxygen Delivery Method): nasal cannula  O2 Flow (L/min): 3      PHYSICAL EXAM:      Constitutional: NAD  Eyes: perrl, no conjunctival changes  ENMT: no exudates, moist oral muc, uvula midline  Neck: no JVD, no LAD  Back: no cva tenderness  Respiratory: CTA, no exp wheezes  Cardiovascular: S1S2 reg atchy,  no murmur gallop or rub  Gastrointestinal: abd soft, NT/ND + BS  Genitourinary: voiding  Extremities: FROM, no joint effusions, no edema, no clubbing , no cyanosis  Vascular: pedal pulses + bilateral, warm extremities  Neurological: non focal, mot str 5/5/ all extr  Skin: no rashes  Lymph Nodes: no LAD

## 2023-09-24 NOTE — H&P ADULT - ASSESSMENT
* Fever, hypoxia steroid use since July diff dx: GNR PNA, vs PCP vs atypical vs non cardiogenic pulm edema vs viral complication  - i think think the non cardiogenic pulm edema is the least likely given the fever and low BNP  - Rituxan depletes B cell population and causes hypogammaglobulinemia  ID consult re: further Rx  Pulm consult re: r/o PCP bronch  IVF for the insensible losses     * ITTP and provoked DVT upper extr  c/w LMWH  low dose steroids c\dose from home continued

## 2023-09-24 NOTE — ED PROVIDER NOTE - PHYSICAL EXAMINATION
CONSTITUTIONAL: Well appearing, awake, alert, oriented to person, place, time/situation and in no apparent distress.  · ENMT: Airway patent, Nasal mucosa clear. Mouth with normal mucosa. Throat has no vesicles, no oropharyngeal exudates and uvula is midline.    Throat is erythematous  · EYES: Clear bilaterally, pupils equal, round and reactive to light.   · CARDIAC: tachycardia,, regular rhythm.  Heart sounds S1, S2.  No murmurs, rubs or gallops.  · RESPIRATORY: Breath sounds clear and equal bilaterally.  · GASTROINTESTINAL: Abdomen soft, non-tender, no guarding.  · MUSCULOSKELETAL: Spine appears normal, range of motion is not limited, no muscle or joint tenderness  · NEUROLOGICAL: Alert and oriented, no focal deficits, no motor or sensory deficits.  · SKIN: Skin normal color for race, warm, dry and intact. No evidence of rash

## 2023-09-24 NOTE — H&P ADULT - NSHPLABSRESULTS_GEN_ALL_CORE
10.4   10.62 )-----------( 403      ( 24 Sep 2023 07:06 )             33.7     09-24    142  |  110<H>  |  14  ----------------------------<  118<H>  3.9   |  27  |  1.16    Ca    9.6      24 Sep 2023 07:06    < from: Xray Chest 2 Views PA/Lat (09.24.23 @ 08:24) >    Pulmonary vascular congestion/pulmonary edema type pattern with   underlying atelectasis and/or infiltrates not excluded.

## 2023-09-24 NOTE — H&P ADULT - HISTORY OF PRESENT ILLNESS
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  Patient states that he started experiencing "flulike symptoms" about 4 to 5 days ago.  Patient states that he had Tmax of 103 °F.  Patient notes that he was not experiencing any significant shortness of breath until the last 12 to 24 hours.  Patient notes that his oxygen saturation got as low as 88% on room air with ambulation at home.  Patient notes a severe dry cough and some chest discomfort when taking deep breaths and with cough.  Patient is  currently taking Lovenox once daily for DVT.  He was previously on twice daily dosing but had GI bleeding.  He is been on steroids since July and no PCP prophylaxis, adm for poss GNR PNA vs PCP PNA. The cough is dry.

## 2023-09-24 NOTE — ED PROVIDER NOTE - OBJECTIVE STATEMENT
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  Patient states that he started experiencing "flulike symptoms" about 4 to 5 days ago.  Patient states that he had Tmax of 103 °F.  Patient notes that he was not experiencing any significant shortness of breath until the last 12 to 24 hours.  Patient notes that his oxygen saturation got as low as 88% on room air with ambulation at home.  Patient notes a severe dry cough and some chest discomfort when taking deep breaths and with cough.  Patient is  currently taking Lovenox once daily for DVT.  He was previously on twice daily dosing but had GI bleeding.

## 2023-09-24 NOTE — ED ADULT TRIAGE NOTE - CHIEF COMPLAINT QUOTE
pt presents to the ED c/o flu like symptoms. as per pt he had an at home pulse ox of 88%, fever, and cough. reports wife is sick as well. reports taking tylenol and motrin last night as well as an albuterol inhaler. no medications reported this AM. pt is well appearing, A&Ox4 and ambulatory to triage with no further complaints or discomforts reported at this time.

## 2023-09-24 NOTE — ED ADULT NURSE NOTE - PRIMARY CARE PROVIDER
Progress Note    SUBJECTIVE:  Kaycee Martínez is an 37 year old, , who requests an Annual Preventive Exam.  Was on Nuvaring x 4 months for cycle control, but didn't like the side effects so stopped it 2 weeks ago.  Her main concern today is a bloating sensation, could be food related but not sure.       Concerns today include: bloating    Menstrual History:  Menstrual History 2018   LAST MENSTRUAL PERIOD 2018   Menarche Age - - -   Period Cycle (Days) - - -   Period Duration (Days) - - -   Method of Contraception - - -   Period Pattern - - -   Menstrual Flow - - -   Menstrual Control - - -   Dysmenorrhea - - -   PMS Symptoms - - -   Reviewed Today - - -       Last  No results found for: PAP  History of abnormal Pap smear: NO - age 30-65 PAP every 5 years with negative HPV co-testing recommended    Last No results found for: HPV16  Last No results found for: HPV18  Last No results found for: HRHPV    Mammogram current: today  Last Mammogram:   No results found.     Last Colonoscopy:  No results found for this or any previous visit.      HISTORY:    Current Outpatient Medications on File Prior to Visit:  sertraline (ZOLOFT) 50 MG tablet Take 1 tablet orally daily   cyclobenzaprine (FLEXERIL) 5 MG tablet Take 1 tablet (5 mg) by mouth 3 times daily as needed for muscle spasms (Patient not taking: Reported on 2019)   etonogestrel-ethinyl estradiol (NUVARING) 0.12-0.015 MG/24HR vaginal ring Place 1 each vaginally every 28 days (Patient not taking: Reported on 2019)   sertraline (ZOLOFT) 100 MG tablet Take 1.5 tablets (150 mg) by mouth daily (Patient not taking: Reported on 2019)   sertraline (ZOLOFT) 100 MG tablet Take 1 tablet (100 mg) by mouth daily (Patient not taking: Reported on 2019)     No current facility-administered medications on file prior to visit.   Allergies   Allergen Reactions     Liquid Adhesive Itching and Rash     Adhesive pad  gels used for EKG monitors has caused rash and itching which spreads if scratched.  Adhesive pad gels used for EKG monitors has caused rash and itching which spreads if scratched.       There is no immunization history on file for this patient.    OB History    Para Term  AB Living   2 2 2 0 0 2   SAB TAB Ectopic Multiple Live Births   0 0 0 0 2     Past Medical History:   Diagnosis Date     Anxiety      Depression      Varicose vein of leg     on right leg     Past Surgical History:   Procedure Laterality Date     KNEE SURGERY Bilateral     ACL repair     Family History   Problem Relation Age of Onset     Cerebrovascular Disease Father         Mini stroke     Breast Cancer Maternal Grandmother 60     Social History     Socioeconomic History     Marital status:      Spouse name: None     Number of children: None     Years of education: None     Highest education level: None   Occupational History     Occupation: Merchandising supervisor   Social Needs     Financial resource strain: None     Food insecurity:     Worry: None     Inability: None     Transportation needs:     Medical: None     Non-medical: None   Tobacco Use     Smoking status: Never Smoker     Smokeless tobacco: Never Used   Substance and Sexual Activity     Alcohol use: Yes     Comment: 1 drink twice a month     Drug use: No     Sexual activity: Yes     Partners: Male     Birth control/protection: None   Lifestyle     Physical activity:     Days per week: None     Minutes per session: None     Stress: None   Relationships     Social connections:     Talks on phone: None     Gets together: None     Attends Congregation service: None     Active member of club or organization: None     Attends meetings of clubs or organizations: None     Relationship status: None     Intimate partner violence:     Fear of current or ex partner: None     Emotionally abused: None     Physically abused: None     Forced sexual activity: None   Other Topics  "Concern     None   Social History Narrative     None       ROS  [unfilled]  PHQ-9 SCORE 4/23/2018 7/24/2018   PHQ-9 Total Score 16 7     YARITZA-7 SCORE 4/23/2018 7/24/2018   Total Score 20 10         EXAM:  Blood pressure 119/75, pulse 72, height 1.626 m (5' 4\"), weight 85.5 kg (188 lb 8 oz), last menstrual period 04/18/2019, not currently breastfeeding. Body mass index is 32.36 kg/m .  General - pleasant female in no acute distress.  Skin - no suspicious lesions or rashes  EENT-  PERRLA, euthyroid with out palpable nodules  Neck - supple without lymphadenopathy.  Lungs - clear to auscultation bilaterally.  Heart - regular rate and rhythm without murmur.  Abdomen - soft, nontender, nondistended, no masses or organomegaly noted.  Musculoskeletal - no gross deformities.  Neurological - normal strength, sensation, and mental status.    Breast Exam:  Breast: Without visible skin changes. No dimpling or lesions seen.   Breasts supple, non-tender with palpation, no dominant mass, nodularity, or nipple discharge noted bilaterally. Axillary nodes negative.      Pelvic Exam:  EG/BUS: Normal genital architecture without lesions, erythema or abnormal secretions Bartholin's, Urethra, Leadore's normal   Urethral meatus: normal   Urethra: no masses, tenderness, or scarring   Bladder: no masses or tenderness  Vagina: moist, pink, rugae with creamy, white and odorless  secretions  Cervix: Nulliparous, and no lesions  Uterus: anteverted,  and small, smooth, firm, mobile w/o pain  Adnexa: Within normal limits and No masses, nodularity, tenderness  Rectum:anus normal       ASSESSMENT:  Encounter Diagnoses   Name Primary?     Visit for preventive health examination Yes     Screening for diabetes mellitus      Screening for lipoid disorders      Encounter for vitamin deficiency screening      Screening for thyroid disorder      Abdominal bloating      Dysuria         PLAN:   Orders Placed This Encounter   Procedures     US Pelvic Complete with " Transvaginal     Lipid panel reflex to direct LDL Fasting     TSH with free T4 reflex     Vitamin D Deficiency     Discussed establishing care with a primary care provider at [facility] if her ultrasound is normal and her symptoms persist.      Additional teaching done at this visit regarding history of menorrhagia, cycle control.    Return to clinic in one year.  Follow-up as needed.    Bre Forbes MD, FACOG       see MD note

## 2023-09-25 DIAGNOSIS — Z86.2 PERSONAL HISTORY OF DISEASES OF THE BLOOD AND BLOOD-FORMING ORGANS AND CERTAIN DISORDERS INVOLVING THE IMMUNE MECHANISM: ICD-10-CM

## 2023-09-25 DIAGNOSIS — R91.8 OTHER NONSPECIFIC ABNORMAL FINDING OF LUNG FIELD: ICD-10-CM

## 2023-09-25 DIAGNOSIS — D84.821 IMMUNODEFICIENCY DUE TO DRUGS: ICD-10-CM

## 2023-09-25 DIAGNOSIS — J96.01 ACUTE RESPIRATORY FAILURE WITH HYPOXIA: ICD-10-CM

## 2023-09-25 LAB
ANION GAP SERPL CALC-SCNC: 5 MMOL/L — SIGNIFICANT CHANGE UP (ref 5–17)
BUN SERPL-MCNC: 11 MG/DL — SIGNIFICANT CHANGE UP (ref 7–23)
CALCIUM SERPL-MCNC: 9 MG/DL — SIGNIFICANT CHANGE UP (ref 8.5–10.1)
CHLORIDE SERPL-SCNC: 111 MMOL/L — HIGH (ref 96–108)
CO2 SERPL-SCNC: 23 MMOL/L — SIGNIFICANT CHANGE UP (ref 22–31)
CREAT SERPL-MCNC: 1.12 MG/DL — SIGNIFICANT CHANGE UP (ref 0.5–1.3)
EGFR: 83 ML/MIN/1.73M2 — SIGNIFICANT CHANGE UP
GLUCOSE SERPL-MCNC: 123 MG/DL — HIGH (ref 70–99)
GRAM STN FLD: SIGNIFICANT CHANGE UP
HCT VFR BLD CALC: 30.6 % — LOW (ref 39–50)
HGB BLD-MCNC: 9.6 G/DL — LOW (ref 13–17)
MCHC RBC-ENTMCNC: 28.2 PG — SIGNIFICANT CHANGE UP (ref 27–34)
MCHC RBC-ENTMCNC: 31.4 GM/DL — LOW (ref 32–36)
MCV RBC AUTO: 89.7 FL — SIGNIFICANT CHANGE UP (ref 80–100)
PLATELET # BLD AUTO: 434 K/UL — HIGH (ref 150–400)
POTASSIUM SERPL-MCNC: 4 MMOL/L — SIGNIFICANT CHANGE UP (ref 3.5–5.3)
POTASSIUM SERPL-SCNC: 4 MMOL/L — SIGNIFICANT CHANGE UP (ref 3.5–5.3)
RBC # BLD: 3.41 M/UL — LOW (ref 4.2–5.8)
RBC # FLD: 16.5 % — HIGH (ref 10.3–14.5)
SODIUM SERPL-SCNC: 139 MMOL/L — SIGNIFICANT CHANGE UP (ref 135–145)
SPECIMEN SOURCE: SIGNIFICANT CHANGE UP
WBC # BLD: 9.04 K/UL — SIGNIFICANT CHANGE UP (ref 3.8–10.5)
WBC # FLD AUTO: 9.04 K/UL — SIGNIFICANT CHANGE UP (ref 3.8–10.5)

## 2023-09-25 PROCEDURE — 99222 1ST HOSP IP/OBS MODERATE 55: CPT

## 2023-09-25 PROCEDURE — 99232 SBSQ HOSP IP/OBS MODERATE 35: CPT

## 2023-09-25 RX ORDER — BENZOCAINE AND MENTHOL 5; 1 G/100ML; G/100ML
1 LIQUID ORAL EVERY 4 HOURS
Refills: 0 | Status: DISCONTINUED | OUTPATIENT
Start: 2023-09-25 | End: 2023-09-29

## 2023-09-25 RX ADMIN — AZITHROMYCIN 255 MILLIGRAM(S): 500 TABLET, FILM COATED ORAL at 14:19

## 2023-09-25 RX ADMIN — CEFEPIME 2000 MILLIGRAM(S): 1 INJECTION, POWDER, FOR SOLUTION INTRAMUSCULAR; INTRAVENOUS at 20:55

## 2023-09-25 RX ADMIN — Medication 2 TABLET(S): at 20:54

## 2023-09-25 RX ADMIN — Medication 2 TABLET(S): at 06:23

## 2023-09-25 RX ADMIN — ENOXAPARIN SODIUM 100 MILLIGRAM(S): 100 INJECTION SUBCUTANEOUS at 09:27

## 2023-09-25 RX ADMIN — CEFEPIME 2000 MILLIGRAM(S): 1 INJECTION, POWDER, FOR SOLUTION INTRAMUSCULAR; INTRAVENOUS at 06:23

## 2023-09-25 RX ADMIN — Medication 10 MILLIGRAM(S): at 06:29

## 2023-09-25 RX ADMIN — BENZOCAINE AND MENTHOL 1 LOZENGE: 5; 1 LIQUID ORAL at 16:44

## 2023-09-25 RX ADMIN — Medication 2 TABLET(S): at 14:19

## 2023-09-25 RX ADMIN — ONDANSETRON 4 MILLIGRAM(S): 8 TABLET, FILM COATED ORAL at 15:37

## 2023-09-25 RX ADMIN — CEFEPIME 2000 MILLIGRAM(S): 1 INJECTION, POWDER, FOR SOLUTION INTRAMUSCULAR; INTRAVENOUS at 14:19

## 2023-09-25 RX ADMIN — Medication 1 MILLIGRAM(S): at 09:27

## 2023-09-25 NOTE — PROGRESS NOTE ADULT - ASSESSMENT
1. Acute respiratory failure. Fever. Multifocal pneumonia. TTP s/p Rituxan. Immunosuppressed host  -ct abx  -bronchoscopy tomorrow      * ITTP and provoked DVT upper extr  c/w LMWH  low dose steroids c\dose from home continued   1. Acute respiratory failure. Fever. Multifocal pneumonia. TTP s/p Rituxan. Immunosuppressed host  -ct abx  -bronchoscopy tomorrow      * ITTP and provoked DVT upper extr  c/w LMWH- has got today's dose and further dose on hold in view of bronchoscopy tomorrow   low dose steroids c\dose from home continued

## 2023-09-25 NOTE — CONSULT NOTE ADULT - SUBJECTIVE AND OBJECTIVE BOX
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  Patient states that he started experiencing "flulike symptoms" about 4 to 5 days ago.  Patient states that he had Tmax of 103 °F.  Patient notes that he was not experiencing any significant shortness of breath until the last 12 to 24 hours.  Patient notes that his oxygen saturation got as low as 88% on room air with ambulation at home.  Patient notes a severe dry cough and some chest discomfort when taking deep breaths and with cough.  Patient is currently taking Lovenox once daily for DVT.  He was previously on twice daily dosing but had GI bleeding. He is been on steroids since July and no PCP prophylaxis, adm for poss GNR PNA vs PCP PNA. The cough is dry. Xray shows bilateral lung infiltrates, was given IV cefepime/azithromycin.       PMH: as above  PSH: as above  Meds: per reconciliation sheet, noted below  MEDICATIONS  (STANDING):  azithromycin  IVPB 500 milliGRAM(s) IV Intermittent every 24 hours  cefepime  Injectable. 2000 milliGRAM(s) IV Push every 8 hours  enoxaparin Injectable 100 milliGRAM(s) SubCutaneous every 24 hours  folic acid 1 milliGRAM(s) Oral daily  predniSONE   Tablet 10 milliGRAM(s) Oral daily      Allergies    No Known Allergies    Intolerances      Social: no smoking, no alcohol, no illegal drugs; no recent travel, no exposure to TB  FAMILY HISTORY:     no history of premature cardiovascular disease in first degree relatives    ROS: +fever + chills, no HA, no dizziness, no sore throat, no blurry vision, no CP, no palpitations, + SOB, + cough, no abdominal pain, no diarrhea, no N/V, no dysuria, no leg pain, no claudication, no rash, no joint aches, no rectal pain or bleeding, no night sweats    All other systems reviewed and are negative    Vital Signs Last 24 Hrs  T(C): 37.8 (24 Sep 2023 09:45), Max: 37.8 (24 Sep 2023 06:26)  T(F): 100 (24 Sep 2023 09:45), Max: 100.1 (24 Sep 2023 06:26)  HR: 119 (24 Sep 2023 09:45) (119 - 137)  BP: 141/85 (24 Sep 2023 09:45) (141/85 - 150/88)  BP(mean): 106 (24 Sep 2023 09:06) (106 - 106)  RR: 18 (24 Sep 2023 09:45) (18 - 20)  SpO2: 93% (24 Sep 2023 09:45) (93% - 98%)    Parameters below as of 24 Sep 2023 09:45  Patient On (Oxygen Delivery Method): nasal cannula  O2 Flow (L/min): 3    Daily Height in cm: 180.34 (24 Sep 2023 06:26)    Daily     PE:  Constitutional: NAD   HEENT: NC/AT, EOMI, PERRLA, conjunctivae clear; ears and nose atraumatic; pharynx benign  Neck: supple; thyroid not palpable  Back: no tenderness  Respiratory: decreased breath sounds rales  Cardiovascular: S1S2 regular, no murmurs  Abdomen: soft, not tender, not distended, positive BS; liver and spleen WNL  Genitourinary: no suprapubic tenderness  Lymphatic: no LN palpable  Musculoskeletal: no muscle tenderness, no joint swelling or tenderness  Extremities: no pedal edema  Neurological/ Psychiatric: AxOx3, Judgement and insight normal;  moving all extremities  Skin: no rashes; no palpable lesions    Labs: all available labs reviewed                        10.4   10.62 )-----------( 403      ( 24 Sep 2023 07:06 )             33.7     09-24    142  |  110<H>  |  14  ----------------------------<  118<H>  3.9   |  27  |  1.16    Ca    9.6      24 Sep 2023 07:06         Urinalysis Basic - ( 24 Sep 2023 07:06 )    Color: x / Appearance: x / SG: x / pH: x  Gluc: 118 mg/dL / Ketone: x  / Bili: x / Urobili: x   Blood: x / Protein: x / Nitrite: x   Leuk Esterase: x / RBC: x / WBC x   Sq Epi: x / Non Sq Epi: x / Bacteria: x          Radiology: all available radiological tests reviewed  < from: Xray Chest 2 Views PA/Lat (09.24.23 @ 08:24) >  ACC: [id number] EXAM:  XR CHEST PA LAT 2V   ORDERED BY: SOHA DELGADO     PROCEDURE DATE:  09/24/2023          INTERPRETATION:  CLINICAL INDICATION: cough with SOB    TECHNIQUE: Frontal and lateral chest radiographs on 9/24/2023 8:24 AM    COMPARISON:8/11/2023    FINDINGS:  Left chest wall tunneled IJ hemodialysis catheter with tip at the SVC/RA   junction.    Pulmonary vascular congestion/pulmonary edema type pattern with   underlying atelectasis and/or infiltrates not excluded. No pneumothorax.    The cardiomediastinal silhouette is within normal limits.    No significant osseous abnormality.    IMPRESSION:    Pulmonary vascular congestion/pulmonary edema type pattern with   underlying atelectasis and/or infiltrates not excluded.    --- Endof Report ---        < end of copied text >    Advanced directives addressed: full resuscitation
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  Patient states that he started experiencing "flulike symptoms" about 4 to 5 days ago.  Patient states that he had Tmax of 103 °F.  Patient notes that he was not experiencing any significant shortness of breath until the last 12 to 24 hours.  Patient notes that his oxygen saturation got as low as 88% on room air with ambulation at home.  Patient notes a severe dry cough and some chest discomfort when taking deep breaths and with cough.  Patient is  currently taking Lovenox once daily for DVT.  He was previously on twice daily dosing but had GI bleeding.  He is been on steroids since July and no PCP prophylaxis, adm for poss GNR PNA vs PCP PNA. The cough is dry. (24 Sep 2023 10:47)    I reviewed the HPI above and concur.     Furthermore the patient has no significant pulmonary history. Never diagnosed with asthma, COPD.   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 he had two potential exposures to TB but they are cleared by the health department.     Uses appropriate respiratory protections.  No risk factors of HIV  no recent travels except [place]  No hemoptysis    Function status has been okay until last week.       REVIEW OF SYSTEMS:  Constitutional: + fever, chills, fatigue, malaise   Eyes: No itching or discharge from the eyes  ENT: No ear pain. No ear discharge. No nasal congestion. No post nasal drip. No epistaxis. No throat pain.    CV: No chest pain. No palpitations. No lightheadedness or dizziness.   Resp: as above  GI: No nausea. No vomiting. diarrhea x 2 days now resolved  MSK: No joint pain or pain in any extremities  Integumentary: No skin lesions. No pedal edema.  Neurological: No gross motor weakness. No sensory changes.  Rest of review of symptoms were unremarkable      PHYSICAL EXAM:  General: Awake, alert, oriented X 3.   HEENT: Atraumatic, normocephalic.                  No tonsillar or pharyngeal exudates.                Mallampati IV  Lymph Nodes: No palpable lymphadenopathy  Neck: No JVD. No carotid bruit.   Respiratory: Normal chest expansion                         Normal percussion                         Normal and equal air entry                         No wheeze, rhonchi or rales.  Cardiovascular: S1 S2 normal. No murmurs, rubs or gallops.   Abdomen: Soft, non-tender, non-distended. No organomegaly.  Extremities: Warm to touch.  No pedal edema.   Neurological: Non-focal  Psychiatry: Appropriate mood and affect.      PAST MEDICAL & SURGICAL HISTORY:  ITP  right IJ DVT related to a central line in past  s/p plasmapharesis  s/p Rituximab  On chronic prednisone    FAMILY HISTORY:  mon contributory      SOCIAL HISTORY:  Smoking:  denies    Occupation: ER physician  Exposures: denies  Recent Travel: none    Allergies    No Known Allergies    Intolerances            OBJECTIVE:  ICU Vital Signs Last 24 Hrs  T(C): 36.9 (25 Sep 2023 08:13), Max: 37.6 (24 Sep 2023 11:30)  T(F): 98.4 (25 Sep 2023 08:13), Max: 99.7 (24 Sep 2023 11:30)  HR: 93 (25 Sep 2023 08:13) (93 - 106)  BP: 141/91 (25 Sep 2023 08:13) (132/85 - 141/91)    RR: 19 (25 Sep 2023 10:51) (18 - 19)  SpO2: 98% (25 Sep 2023 10:51) (94% - 100%)    O2 Parameters below as of 25 Sep 2023 10:51  Patient On (Oxygen Delivery Method): nasal cannula  O2 Flow (L/min): 2    HOSPITAL MEDICATIONS:  MEDICATIONS  (STANDING):  azithromycin  IVPB 500 milliGRAM(s) IV Intermittent every 24 hours  cefepime  Injectable. 2000 milliGRAM(s) IV Push every 8 hours  folic acid 1 milliGRAM(s) Oral daily  predniSONE   Tablet 10 milliGRAM(s) Oral daily  sodium chloride 0.9%. 1000 milliLiter(s) (80 mL/Hr) IV Continuous <Continuous>  trimethoprim  160 mG/sulfamethoxazole 800 mG 2 Tablet(s) Oral every 8 hours    MEDICATIONS  (PRN):  acetaminophen     Tablet .. 650 milliGRAM(s) Oral every 6 hours PRN Temp greater or equal to 38C (100.4F), Mild Pain (1 - 3)  aluminum hydroxide/magnesium hydroxide/simethicone Suspension 30 milliLiter(s) Oral every 4 hours PRN Dyspepsia  melatonin 3 milliGRAM(s) Oral at bedtime PRN Insomnia  ondansetron Injectable 4 milliGRAM(s) IV Push every 8 hours PRN Nausea and/or Vomiting      LABS:                        9.6    9.04  )-----------( 434      ( 25 Sep 2023 06:40 )             30.6     09-25    139  |  111<H>  |  11  ----------------------------<  123<H>  4.0   |  23  |  1.12    Ca    9.0      25 Sep 2023 06:40      PT/INR - ( 24 Sep 2023 07:06 )   PT: 13.0 sec;   INR: 1.16 ratio                 MICROBIOLOGY:     RADIOLOGY:   Reviewed and interpreted myself      < from: CT Chest No Cont (09.24.23 @ 13:28) >    ACC: [id number] EXAM:  CT CHEST   ORDERED BY: RENE DYSON     PROCEDURE DATE:  09/24/2023          INTERPRETATION:  EXAMINATION: CT CHEST    CLINICAL INDICATION: Dyspnea.    TECHNIQUE: Noncontrast CT of the chest was obtained.    COMPARISON: 7/29/2023.    FINDINGS:    AIRWAYS AND LUNGS: The central tracheobronchial tree is patent.    Extensive bilateral lung opacities with a central predominance    MEDIASTINUM AND PLEURA: There are no enlarged mediastinal, hilar or   axillary lymph nodes. The visualized portion of the thyroid gland is   unremarkable. There is no pleural effusion. There is no pneumothorax.    HEART AND VESSELS: The heart is normal in size.  There are no   atherosclerotic calcifications of the aorta.  There is a small   pericardial effusion.    UPPER ABDOMEN: Images of the upper abdomen demonstrate no abnormality.    BONES AND SOFT TISSUES: The bones are unremarkable.  Left-sided central   venous catheter with tip in SVC.    IMPRESSION:  Extensive bilateral lungopacities with a central predominance    --- End of Report ---            NAVIN CABALLERO MD; Attending Radiologist  This document has been electronically signed. Sep 24 2023  2:22PM    < end of copied text >  < from: TTE Echo Complete w/o Contrast w/ Doppler (07.29.23 @ 09:31) >    ACC: [id number] EXAM:  ECHO TTE WO CON COMP W DOPP   ORDERED BY: RAYMOND CROSS     PROCEDURE DATE:  07/29/2023          INTERPRETATION:  Transthoracic Echocardiography Report (TTE)     Demographics     Patient name            CHRIS NUÑEZ  Age            43 year(s)     Med Rec #               [id number]            Gender         Male     Account #               [id number]         Date of Birth  1980     Interpreting Physician  Raymond Alberts MD     Room Number    0072     ReferringPhysician     Raymond Cross        Sonographer    Nedra Smith     Date of study           07/29/2023 08:12 AM     Height                  70.87 in             Weight         264.56 pounds    Type of Study:     TTE procedure: ECHO TTE WO CON COMP W DOP     BP: 144/72 mmHg     Technical Quality: Technically Difficullt    Indications   1) I42.8 - Other cardiomyopathies    M-Mode Measurements (cm)     LVEDd: 3.16 cm            LVESd: 2.06 cm   IVSEd: 1.6 cm   LVPWd: 1.55 cm            AO Root Dimension: 3.1 cm                             ACS: 2.3 cm                             LA: 3.6 cm    Doppler Measurements:     AV Velocity:133 cm/s               MV Peak E-Wave: 78.6 cm/s   AV Peak Gradient: 7.08 mmHg        MV Peak A-Wave: 58.7 cm/s                                MV E/A Ratio: 1.34 %   TR Velocity:203 cm/s               MV Peak Gradient: 2.47 mmHg   TR Gradient:16.4836 mmHg           MV P1/2t: 56 msec   Estimated RAP:3 mmHg               MVA by PHT3.93   RVSP:19 mmHg     Findings     Mitral Valve   The mitral valve leaflets appear thin and normal.   Mild (1+) mitral regurgitation is present.     Aortic Valve   The aortic valve is trileaflet with thin pliable leaflets.     Tricuspid Valve   Normal appearing tricuspid valve structure.   Mild to Moderate Tricuspid regurgitation is present.     Pulmonic Valve   Normal appearing pulmonic valve structure.   Trace to mild pulmonic valvular regurgitation is present.     Left Atrium   Normal appearing left atrium.     Left Ventricle   Estimated left ventricular ejection fraction is 60-65 %.   Mild to Moderate concentric left ventricular hypertrophy is present.   The left ventricle is normal in size, wall motion and contractility as   seen in limited views.     Right Atrium   Normal appearing right atrium.     Right Ventricle   Normal appearing right ventricle structure and function.     Pericardial Effusion   Trace to small pericardial effusion is present.   There is no evidence of tamponade.     Pleural Effusion   No evidence of pleural effusion.     Miscellaneous   All visualized extra cardiac structures appears to be normal.     Impression     Summary     Estimated left ventricular ejection fraction is 60-65 %.   Mild to Moderate concentric left ventricular hypertrophy is present.   The left ventricle is normal in size, wall motion and contractility as   seen in limited views.   The mitral valve leaflets appear thin and normal.   Mild (1+) mitral regurgitation is present.   Normal appearing tricuspid valve structure.   Mild to Moderate Tricuspid regurgitation is present.   Normal appearing pulmonic valve structure.   Trace to mild pulmonic valvular regurgitation is present.   Trace to small pericardial effusion is present.   There is no evidence of tamponade.     Signature     ----------------------------------------------------------------   Electronically signed by Raymond Alberts MD(Interpreting   physician) on 07/29/2023 06:56 PM   ----------------------------------------------------------------    Valves     Mitral Valve     Area (PHT): 3.93 cm^2   Peak E-Wave: 78.6 cm/s              Deceleration Time: 192 msec   Peak A-Wave: 58.7 cm/s   Peak Gradient: 2.47 mmHg            E/A Ratio: 1.34   P1/2t: 56 msec     Aortic Valve     Peak Velocity: 133 cm/s   Peak Gradient: 7.08 mmHg     Cusp Separation: 2.3 cm     Tricuspid Valve     TR Velocity: 203 cm/s                  Estimated RAP: 3 mmHg   TR Gradient: 16.4836 mmHg              Estimated RVSP: 19 mmHg     Pulmonic Valve              Estimated PASP: 19.48 mmHg     LVOT     Peak Velocity: 128 cm/s   Peak Gradient: 6 mmHg    Structures     Left Atrium     LA Dimension: 3.6 cm        LA Area: 17 cm^2   LA/Aorta: 1.16              LA Volume/Index: 41.9 ml /18m^2     Left Ventricle     Diastolic Dimension: 3.16 cm          Systolic Dimension: 2.06 cm   Septum Diastolic: 1.6 cm   PW Diastolic: 1.55 cm     FS: 34.81 %     Right Atrium     RA Systolic Pressure: 3 mmHg     Right Ventricle              RV Systolic Pressure: 19.48 mmHg     Miscellaneous     Aorta     Aortic Root: 3.1 cm   Ascending Aorta: 3.5 cm              RAYMOND ALBERTS MD; Attending Interventional Cardiologist  This document has been electronically signed. Jul 29 2023  6:56PM    < end of copied text >

## 2023-09-25 NOTE — CONSULT NOTE ADULT - ASSESSMENT
Differential of bilateral patchy opacities include:  a- infections (viral, bacterial, PCP, fungal etc.)   b- volume overload -- last ECHo had good EF but had concentric hypertrophy -- may have diastolic dysfunction and HEpEF  c- pulmonary hemorrhage -- had been on chronic anticoagulation -- no hemoptysis though  d- Drug induced lung injury -- Rituximab can potentially cause it  e- Acute interstitial pneumonia -- less likely    Recommendations --    -Agree PJP is the high in differential  - currently the cough is dry  - Bronch tomorrow, NPO after midnight and hold AM lovenox (order placed), risk, benefits and alternates discussed with the patient.   - if patient is able to produce sputum will cancel the bronch.  - Monitor clinically  - on 2L supplemental oxygen    # Likely MERLYN  he has risk factors for MERLYN, sleep study when stable.   
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  Patient states that he started experiencing "flulike symptoms" about 4 to 5 days ago.  Patient states that he had Tmax of 103 °F.  Patient notes that he was not experiencing any significant shortness of breath until the last 12 to 24 hours.  Patient notes that his oxygen saturation got as low as 88% on room air with ambulation at home.  Patient notes a severe dry cough and some chest discomfort when taking deep breaths and with cough.  Patient is currently taking Lovenox once daily for DVT.  He was previously on twice daily dosing but had GI bleeding. He is been on steroids since July and no PCP prophylaxis, adm for poss GNR PNA vs PCP PNA. The cough is dry. Xray shows bilateral lung infiltrates, was given IV cefepime/azithromycin.     1. Acute respiratory failure. Fever. Multifocal pneumonia. TTP s/p Rituxan. Immunosuppressed host  - imaging reviewed  - on chronic steroids   - suggest CT chest for further eval  - agree with IV cefepime/azithromycin  - add po bactrim 2 DS BID for possible PCP coverage  - will check pcp pcr, ldh mildly elevated, check sputum cx  - pulmonary eval   - continue with abx coverage  - monitor temps  - tolerating abx well so far; no side effects noted  - reason for abx use and side effects reviewed with patient  - supportive care    2. other issues - care per medicine

## 2023-09-25 NOTE — PROGRESS NOTE ADULT - ASSESSMENT
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  Patient states that he started experiencing "flulike symptoms" about 4 to 5 days ago.  Patient states that he had Tmax of 103 °F.  Patient notes that he was not experiencing any significant shortness of breath until the last 12 to 24 hours.  Patient notes that his oxygen saturation got as low as 88% on room air with ambulation at home.  Patient notes a severe dry cough and some chest discomfort when taking deep breaths and with cough.  Patient is currently taking Lovenox once daily for DVT.  He was previously on twice daily dosing but had GI bleeding. He is been on steroids since July and no PCP prophylaxis, adm for poss GNR PNA vs PCP PNA. The cough is dry. Xray shows bilateral lung infiltrates, was given IV cefepime/azithromycin.     1. Acute respiratory failure. Fever. Multifocal pneumonia. TTP s/p Rituxan. Immunosuppressed host  - imaging reviewed  - on chronic steroids   - CT chest reviewed   - on IV cefepime/azithromycin #2  - on po bactrim 2 DS BID for possible PCP coverage #2  - f/u sputum pcp pcr if able to produce sputum, f/u sputum cx  - pulmonary eval appreciated bronch planned for tomorrow   - continue with abx coverage  - monitor temps  - tolerating abx well so far; no side effects noted  - reason for abx use and side effects reviewed with patient  - supportive care    2. other issues - care per medicine

## 2023-09-26 LAB
GRAM STN FLD: SIGNIFICANT CHANGE UP
LEGIONELLA AG UR QL: NEGATIVE — SIGNIFICANT CHANGE UP
SPECIMEN SOURCE: SIGNIFICANT CHANGE UP

## 2023-09-26 PROCEDURE — 88312 SPECIAL STAINS GROUP 1: CPT | Mod: 26

## 2023-09-26 PROCEDURE — 88108 CYTOPATH CONCENTRATE TECH: CPT | Mod: 26

## 2023-09-26 PROCEDURE — 31624 DX BRONCHOSCOPE/LAVAGE: CPT

## 2023-09-26 PROCEDURE — 99232 SBSQ HOSP IP/OBS MODERATE 35: CPT

## 2023-09-26 PROCEDURE — 99233 SBSQ HOSP IP/OBS HIGH 50: CPT | Mod: 25

## 2023-09-26 RX ORDER — DIPHENHYDRAMINE HCL 50 MG
25 CAPSULE ORAL AT BEDTIME
Refills: 0 | Status: DISCONTINUED | OUTPATIENT
Start: 2023-09-26 | End: 2023-09-29

## 2023-09-26 RX ORDER — FENTANYL CITRATE 50 UG/ML
25 INJECTION INTRAVENOUS
Refills: 0 | Status: DISCONTINUED | OUTPATIENT
Start: 2023-09-26 | End: 2023-09-26

## 2023-09-26 RX ORDER — OXYCODONE HYDROCHLORIDE 5 MG/1
5 TABLET ORAL ONCE
Refills: 0 | Status: DISCONTINUED | OUTPATIENT
Start: 2023-09-26 | End: 2023-09-26

## 2023-09-26 RX ORDER — SODIUM CHLORIDE 9 MG/ML
1000 INJECTION, SOLUTION INTRAVENOUS
Refills: 0 | Status: DISCONTINUED | OUTPATIENT
Start: 2023-09-26 | End: 2023-09-26

## 2023-09-26 RX ORDER — FAMOTIDINE 10 MG/ML
20 INJECTION INTRAVENOUS
Refills: 0 | Status: DISCONTINUED | OUTPATIENT
Start: 2023-09-26 | End: 2023-09-29

## 2023-09-26 RX ORDER — ONDANSETRON 8 MG/1
4 TABLET, FILM COATED ORAL ONCE
Refills: 0 | Status: DISCONTINUED | OUTPATIENT
Start: 2023-09-26 | End: 2023-09-26

## 2023-09-26 RX ORDER — ACETAMINOPHEN 500 MG
1000 TABLET ORAL ONCE
Refills: 0 | Status: COMPLETED | OUTPATIENT
Start: 2023-09-26 | End: 2023-09-26

## 2023-09-26 RX ADMIN — Medication 2 TABLET(S): at 21:45

## 2023-09-26 RX ADMIN — Medication 2 TABLET(S): at 06:47

## 2023-09-26 RX ADMIN — Medication 1 MILLIGRAM(S): at 15:10

## 2023-09-26 RX ADMIN — Medication 1000 MILLIGRAM(S): at 14:30

## 2023-09-26 RX ADMIN — AZITHROMYCIN 255 MILLIGRAM(S): 500 TABLET, FILM COATED ORAL at 15:11

## 2023-09-26 RX ADMIN — CEFEPIME 2000 MILLIGRAM(S): 1 INJECTION, POWDER, FOR SOLUTION INTRAMUSCULAR; INTRAVENOUS at 15:11

## 2023-09-26 RX ADMIN — CEFEPIME 2000 MILLIGRAM(S): 1 INJECTION, POWDER, FOR SOLUTION INTRAMUSCULAR; INTRAVENOUS at 21:45

## 2023-09-26 RX ADMIN — ONDANSETRON 4 MILLIGRAM(S): 8 TABLET, FILM COATED ORAL at 06:46

## 2023-09-26 RX ADMIN — Medication 30 MILLILITER(S): at 21:56

## 2023-09-26 RX ADMIN — Medication 10 MILLIGRAM(S): at 06:41

## 2023-09-26 RX ADMIN — Medication 400 MILLIGRAM(S): at 14:14

## 2023-09-26 RX ADMIN — FAMOTIDINE 20 MILLIGRAM(S): 10 INJECTION INTRAVENOUS at 15:10

## 2023-09-26 RX ADMIN — ONDANSETRON 4 MILLIGRAM(S): 8 TABLET, FILM COATED ORAL at 21:55

## 2023-09-26 RX ADMIN — Medication 2 TABLET(S): at 15:10

## 2023-09-26 RX ADMIN — CEFEPIME 2000 MILLIGRAM(S): 1 INJECTION, POWDER, FOR SOLUTION INTRAMUSCULAR; INTRAVENOUS at 06:41

## 2023-09-26 RX ADMIN — FAMOTIDINE 20 MILLIGRAM(S): 10 INJECTION INTRAVENOUS at 21:45

## 2023-09-26 NOTE — PROCEDURE NOTE - NSBRONCHPROCDETAILS_GEN_A_CORE_FT
A time out was performed. ET tube size 8 was placed by anesthesia. Bronchoscopy was advanced through the endotracheal tube without difficulty.   ETT was in appropriate position.   Sequential examination of both the lungs was performed. minimal secretions were noted. No abnormality, erythema, masses were noted in the airways.    Following this the bronchoscope was wedged at the left lower lobe.   sequential lavage was done without progressive bleeding.  total 50 saline was pushed  30 ml was removed through BAL.  During the initial inspection the patient had a transient hypoxia which quickly resolved spontaneously.

## 2023-09-26 NOTE — PROGRESS NOTE ADULT - ASSESSMENT
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  Patient states that he started experiencing "flulike symptoms" about 4 to 5 days ago.  Patient states that he had Tmax of 103 °F.  Patient notes that he was not experiencing any significant shortness of breath until the last 12 to 24 hours.  Patient notes that his oxygen saturation got as low as 88% on room air with ambulation at home.  Patient notes a severe dry cough and some chest discomfort when taking deep breaths and with cough.  Patient is currently taking Lovenox once daily for DVT.  He was previously on twice daily dosing but had GI bleeding. He is been on steroids since July and no PCP prophylaxis, adm for poss GNR PNA vs PCP PNA. The cough is dry. Xray shows bilateral lung infiltrates, was given IV cefepime/azithromycin.     1. Acute respiratory failure. Fever. Multifocal pneumonia. TTP s/p Rituxan. Immunosuppressed host  - imaging reviewed  - on chronic steroids   - CT chest reviewed   - on IV cefepime/azithromycin #3  - on po bactrim 2 DS BID for possible PCP coverage #3   - sputum cx nl sharon plan for bronch f/u BAL cx, pcp pcr   - pulmonary eval appreciated  - continue with abx coverage  - monitor temps  - tolerating abx well so far; no side effects noted  - reason for abx use and side effects reviewed with patient  - supportive care    2. other issues - care per medicine

## 2023-09-26 NOTE — PROGRESS NOTE ADULT - ASSESSMENT
1. Acute respiratory failure. Fever. Multifocal pneumonia. TTP s/p Rituxan. Immunosuppressed host  -ct abx  -bronchoscopy today     * ITTP and provoked DVT upper extr  c/w LMWH-  on hold in view of bronchoscopy t  low dose steroids c\dose from home continued    #dvt pr -scd for now

## 2023-09-26 NOTE — PROCEDURE NOTE - NSICDXPROBLEMMLMBOX_GEN
IPSTART~^~1~^~[id number]~^~~^~IPEND  PKSTART~^~[id number]~^~PKEND  IPSTART~^~2~^~[id number]~^~~^~IPEND  PKSTART~^~[id number]~^~PKEND  IPSTART~^~3~^~[id number]~^~~^~IPEND  PKSTART~^~[id number]~^~PKEND  IPSTART~^~4~^~[id number]~^~~^~IPEND  PKSTART~^~[id number]~^~PKEND  IPSTART~^~5~^~[id number]~^~~^~IPEND

## 2023-09-26 NOTE — PROGRESS NOTE ADULT - ASSESSMENT
Differential of bilateral patchy opacities include:  a- infections (viral, bacterial, PCP, fungal etc.)   b- volume overload -- last ECHO had good EF but had concentric hypertrophy -- may have diastolic dysfunction and HEpEF  c- pulmonary hemorrhage -- had been on chronic anticoagulation -- no hemoptysis though  d- Drug induced lung injury -- Rituximab can potentially cause it  e- Acute interstitial pneumonia -- less likely    Recommendations --    - Clinically feels worse today, though supplemental oxygen demands have not changed significantly.  -Bronch today. patient agreeable. Lovenox has been held for today morning, NPO  - Consider diuresis as tolerates.   - Abx per ID appreciated consult.     # Likely MERLYN  he has risk factors for MERLYN, sleep study when stable.

## 2023-09-26 NOTE — PROCEDURE NOTE - NSBRONCHHISTORY_GEN_A_CORE_FT
The patient is a 44 yo AA gentleman who was on chronic steroids for ITP. He was noted to be hypoxemic and chest CT showed B/L pulmonary opacities

## 2023-09-27 ENCOUNTER — APPOINTMENT (OUTPATIENT)
Dept: HEMATOLOGY ONCOLOGY | Facility: CLINIC | Age: 43
End: 2023-09-27

## 2023-09-27 LAB
NIGHT BLUE STAIN TISS: SIGNIFICANT CHANGE UP
SPECIMEN SOURCE: SIGNIFICANT CHANGE UP

## 2023-09-27 PROCEDURE — 99233 SBSQ HOSP IP/OBS HIGH 50: CPT

## 2023-09-27 RX ORDER — BUDESONIDE AND FORMOTEROL FUMARATE DIHYDRATE 160; 4.5 UG/1; UG/1
1 AEROSOL RESPIRATORY (INHALATION) EVERY 12 HOURS
Refills: 0 | Status: DISCONTINUED | OUTPATIENT
Start: 2023-09-27 | End: 2023-09-29

## 2023-09-27 RX ORDER — FUROSEMIDE 40 MG
40 TABLET ORAL ONCE
Refills: 0 | Status: COMPLETED | OUTPATIENT
Start: 2023-09-27 | End: 2023-09-27

## 2023-09-27 RX ORDER — ENOXAPARIN SODIUM 100 MG/ML
100 INJECTION SUBCUTANEOUS EVERY 24 HOURS
Refills: 0 | Status: DISCONTINUED | OUTPATIENT
Start: 2023-09-27 | End: 2023-09-29

## 2023-09-27 RX ADMIN — FAMOTIDINE 20 MILLIGRAM(S): 10 INJECTION INTRAVENOUS at 22:10

## 2023-09-27 RX ADMIN — ONDANSETRON 4 MILLIGRAM(S): 8 TABLET, FILM COATED ORAL at 22:18

## 2023-09-27 RX ADMIN — Medication 2 TABLET(S): at 05:55

## 2023-09-27 RX ADMIN — ONDANSETRON 4 MILLIGRAM(S): 8 TABLET, FILM COATED ORAL at 05:55

## 2023-09-27 RX ADMIN — Medication 40 MILLIGRAM(S): at 13:19

## 2023-09-27 RX ADMIN — Medication 25 MILLIGRAM(S): at 00:05

## 2023-09-27 RX ADMIN — FAMOTIDINE 20 MILLIGRAM(S): 10 INJECTION INTRAVENOUS at 09:58

## 2023-09-27 RX ADMIN — ENOXAPARIN SODIUM 100 MILLIGRAM(S): 100 INJECTION SUBCUTANEOUS at 13:19

## 2023-09-27 RX ADMIN — Medication 2 TABLET(S): at 13:19

## 2023-09-27 RX ADMIN — Medication 2 TABLET(S): at 22:10

## 2023-09-27 RX ADMIN — CEFEPIME 2000 MILLIGRAM(S): 1 INJECTION, POWDER, FOR SOLUTION INTRAMUSCULAR; INTRAVENOUS at 05:54

## 2023-09-27 RX ADMIN — CEFEPIME 2000 MILLIGRAM(S): 1 INJECTION, POWDER, FOR SOLUTION INTRAMUSCULAR; INTRAVENOUS at 13:19

## 2023-09-27 RX ADMIN — CEFEPIME 2000 MILLIGRAM(S): 1 INJECTION, POWDER, FOR SOLUTION INTRAMUSCULAR; INTRAVENOUS at 22:10

## 2023-09-27 RX ADMIN — Medication 1 MILLIGRAM(S): at 09:58

## 2023-09-27 RX ADMIN — Medication 10 MILLIGRAM(S): at 05:55

## 2023-09-27 NOTE — PROGRESS NOTE ADULT - ASSESSMENT
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  Patient states that he started experiencing "flulike symptoms" about 4 to 5 days ago.  Patient states that he had Tmax of 103 °F.  Patient notes that he was not experiencing any significant shortness of breath until the last 12 to 24 hours.  Patient notes that his oxygen saturation got as low as 88% on room air with ambulation at home.  Patient notes a severe dry cough and some chest discomfort when taking deep breaths and with cough.  Patient is currently taking Lovenox once daily for DVT.  He was previously on twice daily dosing but had GI bleeding. He is been on steroids since July and no PCP prophylaxis, adm for poss GNR PNA vs PCP PNA. The cough is dry. Xray shows bilateral lung infiltrates, was given IV cefepime/azithromycin.     1. Acute respiratory failure. Fever. Multifocal pneumonia. TTP s/p Rituxan. Immunosuppressed host  - imaging reviewed  - on chronic steroids   - CT chest reviewed sputum cx nl sharon  - s/p bronchoscopy 9/26 f/u BAL cx, PCP pcr, cytology  - s/p azithromycin #3 legionella ag (-) will dc   - on IV cefepime 2gmq8h #4  - on po bactrim 2 DS BID for possible PCP coverage #4  - pulmonary eval appreciated  - continue with abx coverage  - check labs cbc, cmp  - monitor temps  - tolerating abx well so far; no side effects noted  - reason for abx use and side effects reviewed with patient  - supportive care    2. 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  Patient states that he started experiencing "flulike symptoms" about 4 to 5 days ago.  Patient states that he had Tmax of 103 °F.  Patient notes that he was not experiencing any significant shortness of breath until the last 12 to 24 hours.  Patient notes that his oxygen saturation got as low as 88% on room air with ambulation at home.  Patient notes a severe dry cough and some chest discomfort when taking deep breaths and with cough.  Patient is currently taking Lovenox once daily for DVT.  He was previously on twice daily dosing but had GI bleeding. He is been on steroids since July and no PCP prophylaxis, adm for poss GNR PNA vs PCP PNA. The cough is dry. Xray shows bilateral lung infiltrates, was given IV cefepime/azithromycin.     1. Acute respiratory failure. Fever. Multifocal pneumonia. Possible PCP PNA. TTP s/p Rituxan. Immunosuppressed host  - imaging reviewed  - on chronic steroids   - CT chest reviewed sputum cx nl sharon  - s/p bronchoscopy 9/26 f/u BAL cx, PCP pcr, cytology  - s/p azithromycin #3 legionella ag (-) will dc   - on IV cefepime 2gmq8h #4  - on po bactrim 2 DS BID for possible PCP coverage #4  - pulmonary eval appreciated  - continue with abx coverage  - check labs cbc, cmp  - monitor temps  - tolerating abx well so far; no side effects noted  - reason for abx use and side effects reviewed with patient  - supportive care    2. other issues - care per medicine

## 2023-09-27 NOTE — PROGRESS NOTE ADULT - ASSESSMENT
Assessment and Plan:   · Assessment	    1. Acute respiratory failure. Fever. Multifocal pneumonia. TTP s/p Rituxan. Immunosuppressed host  -ct abx  -s/p bronchoscopy on 9/26  -not clear etiology   -ct abx   -trial of lasix today- discussed this with pt and agreed with plan     * ITTP and provoked DVT upper extr  c/w LMWH  -low dose steroids c\dose from home continued

## 2023-09-27 NOTE — PROGRESS NOTE ADULT - ASSESSMENT
Differential of bilateral patchy opacities include:  a- infections (viral, bacterial, PCP, fungal etc.)   b- volume overload -- last ECHO had good EF but had concentric hypertrophy -- may have diastolic dysfunction and HEpEF  c- pulmonary hemorrhage --- less likely -- sequential lavage in bronchoscopy did not suggest.   d- Drug induced lung injury -- Rituximab can potentially cause it  e- Acute interstitial pneumonia -- less likely    Recommendations --    - I ambulated the patient with the following findings;  baseline without oxygen 95%, HR 90s  after 25 feet walking 87%,   Return to bed 94% on RA, HR 100s    Still with significant hypoxia on exertion.   Consider diuresis.   Will add symbicort for reactive airway diseases  Cytology, bronch studies pending.   CXR in AM -- ordered  Can resume anticoagulation -  will order.   Abx per ID    # Likely MERLYN  he has risk factors for MERLYN, sleep study when stable. d/w the patient.

## 2023-09-28 LAB
-  AMIKACIN: SIGNIFICANT CHANGE UP
-  AMPICILLIN/SULBACTAM: SIGNIFICANT CHANGE UP
-  AZTREONAM: SIGNIFICANT CHANGE UP
-  CEFAZOLIN: SIGNIFICANT CHANGE UP
-  CEFEPIME: SIGNIFICANT CHANGE UP
-  CEFTRIAXONE: SIGNIFICANT CHANGE UP
-  CIPROFLOXACIN: SIGNIFICANT CHANGE UP
-  CLINDAMYCIN: SIGNIFICANT CHANGE UP
-  ERYTHROMYCIN: SIGNIFICANT CHANGE UP
-  GENTAMICIN: SIGNIFICANT CHANGE UP
-  GENTAMICIN: SIGNIFICANT CHANGE UP
-  LEVOFLOXACIN: SIGNIFICANT CHANGE UP
-  MEROPENEM: SIGNIFICANT CHANGE UP
-  OXACILLIN: SIGNIFICANT CHANGE UP
-  PIPERACILLIN/TAZOBACTAM: SIGNIFICANT CHANGE UP
-  RIFAMPIN: SIGNIFICANT CHANGE UP
-  TETRACYCLINE: SIGNIFICANT CHANGE UP
-  TOBRAMYCIN: SIGNIFICANT CHANGE UP
-  TRIMETHOPRIM/SULFAMETHOXAZOLE: SIGNIFICANT CHANGE UP
-  TRIMETHOPRIM/SULFAMETHOXAZOLE: SIGNIFICANT CHANGE UP
-  VANCOMYCIN: SIGNIFICANT CHANGE UP
ALBUMIN SERPL ELPH-MCNC: 2.8 G/DL — LOW (ref 3.3–5)
ANION GAP SERPL CALC-SCNC: 7 MMOL/L — SIGNIFICANT CHANGE UP (ref 5–17)
BUN SERPL-MCNC: 15 MG/DL — SIGNIFICANT CHANGE UP (ref 7–23)
CALCIUM SERPL-MCNC: 9.7 MG/DL — SIGNIFICANT CHANGE UP (ref 8.5–10.1)
CHLORIDE SERPL-SCNC: 106 MMOL/L — SIGNIFICANT CHANGE UP (ref 96–108)
CO2 SERPL-SCNC: 23 MMOL/L — SIGNIFICANT CHANGE UP (ref 22–31)
CREAT SERPL-MCNC: 1.45 MG/DL — HIGH (ref 0.5–1.3)
CULTURE RESULTS: SIGNIFICANT CHANGE UP
CULTURE RESULTS: SIGNIFICANT CHANGE UP
EGFR: 61 ML/MIN/1.73M2 — SIGNIFICANT CHANGE UP
GLUCOSE SERPL-MCNC: 114 MG/DL — HIGH (ref 70–99)
METHOD TYPE: SIGNIFICANT CHANGE UP
METHOD TYPE: SIGNIFICANT CHANGE UP
NON-GYNECOLOGICAL CYTOLOGY STUDY: SIGNIFICANT CHANGE UP
ORGANISM # SPEC MICROSCOPIC CNT: SIGNIFICANT CHANGE UP
PHOSPHATE SERPL-MCNC: 2.5 MG/DL — SIGNIFICANT CHANGE UP (ref 2.5–4.5)
POTASSIUM SERPL-MCNC: 3.7 MMOL/L — SIGNIFICANT CHANGE UP (ref 3.5–5.3)
POTASSIUM SERPL-SCNC: 3.7 MMOL/L — SIGNIFICANT CHANGE UP (ref 3.5–5.3)
SODIUM SERPL-SCNC: 136 MMOL/L — SIGNIFICANT CHANGE UP (ref 135–145)
SPECIMEN SOURCE: SIGNIFICANT CHANGE UP
SPECIMEN SOURCE: SIGNIFICANT CHANGE UP

## 2023-09-28 PROCEDURE — 99232 SBSQ HOSP IP/OBS MODERATE 35: CPT

## 2023-09-28 PROCEDURE — 71045 X-RAY EXAM CHEST 1 VIEW: CPT | Mod: 26

## 2023-09-28 PROCEDURE — 99233 SBSQ HOSP IP/OBS HIGH 50: CPT

## 2023-09-28 RX ORDER — RIVAROXABAN 20 MG/1
20 TABLET, FILM COATED ORAL
Qty: 90 | Refills: 0 | Status: ACTIVE | COMMUNITY
Start: 2023-09-28 | End: 1900-01-01

## 2023-09-28 RX ADMIN — Medication 1 MILLIGRAM(S): at 10:09

## 2023-09-28 RX ADMIN — Medication 2 TABLET(S): at 05:30

## 2023-09-28 RX ADMIN — FAMOTIDINE 20 MILLIGRAM(S): 10 INJECTION INTRAVENOUS at 10:09

## 2023-09-28 RX ADMIN — FAMOTIDINE 20 MILLIGRAM(S): 10 INJECTION INTRAVENOUS at 21:30

## 2023-09-28 RX ADMIN — CEFEPIME 2000 MILLIGRAM(S): 1 INJECTION, POWDER, FOR SOLUTION INTRAMUSCULAR; INTRAVENOUS at 05:30

## 2023-09-28 RX ADMIN — ONDANSETRON 4 MILLIGRAM(S): 8 TABLET, FILM COATED ORAL at 06:23

## 2023-09-28 RX ADMIN — Medication 2 TABLET(S): at 14:54

## 2023-09-28 RX ADMIN — ENOXAPARIN SODIUM 100 MILLIGRAM(S): 100 INJECTION SUBCUTANEOUS at 14:54

## 2023-09-28 RX ADMIN — Medication 30 MILLILITER(S): at 06:23

## 2023-09-28 RX ADMIN — CEFEPIME 2000 MILLIGRAM(S): 1 INJECTION, POWDER, FOR SOLUTION INTRAMUSCULAR; INTRAVENOUS at 14:54

## 2023-09-28 RX ADMIN — Medication 10 MILLIGRAM(S): at 05:30

## 2023-09-28 NOTE — PROGRESS NOTE ADULT - ASSESSMENT
1. Acute respiratory failure. Fever. Multifocal pneumonia. TTP s/p Rituxan. Immunosuppressed host  -ct abx  -s/p bronchoscopy on 9/26  -not clear etiology   -ct abx   -trial of lasix-much clinical improvement     * ITTP and provoked DVT upper extr  c/w LMWH  -low dose steroids c\dose from home continued

## 2023-09-28 NOTE — PROGRESS NOTE ADULT - ASSESSMENT
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  Patient states that he started experiencing "flulike symptoms" about 4 to 5 days ago.  Patient states that he had Tmax of 103 °F.  Patient notes that he was not experiencing any significant shortness of breath until the last 12 to 24 hours.  Patient notes that his oxygen saturation got as low as 88% on room air with ambulation at home.  Patient notes a severe dry cough and some chest discomfort when taking deep breaths and with cough.  Patient is currently taking Lovenox once daily for DVT.  He was previously on twice daily dosing but had GI bleeding. He is been on steroids since July and no PCP prophylaxis, adm for poss GNR PNA vs PCP PNA. The cough is dry. Xray shows bilateral lung infiltrates, was given IV cefepime/azithromycin.     1. Acute respiratory failure improving. Fever. Multifocal pneumonia with MSSA and PSAE. Possible PCP PNA. TTP s/p Rituxan. Immunosuppressed host  - imaging reviewed  - on chronic steroids   - CT chest reviewed sputum cx nl sharon  - s/p bronchoscopy 9/26 f/u BAL cx, PCP pcr, cytology  - s/p azithromycin #3 legionella ag (-)   - on IV cefepime 2gmq8h # 5  - on po bactrim 2 DS BID for possible PCP coverage # 5  - pulmonary eval appreciated  -cultures reviewed  - continue with abx coverage  - check labs cbc, cmp  - monitor temps  - tolerating abx well so far; no side effects noted  - reason for abx use and side effects reviewed with patient  - supportive care    2. other issues - care per medicine

## 2023-09-28 NOTE — PROGRESS NOTE ADULT - ASSESSMENT
Given the dramatic improvment in clinical condition after diuresis, I suspect the pulmonary findings can be directly attributed to HFpEF. He has concentric hypertrophy of his LV. There might be component of viral pulmonary infection with some supppression of cardiomyopathy    Recommendations --    Continue diuresis  Off oxygen at rest  Still with significant hypoxia on exertion.   Cytology, bronch studies pending.   back on his home dose anticoagulation.   Abx per ID    # Likely MERLYN  he has risk factors for MERLYN, sleep study when stable. d/w the patient.

## 2023-09-29 ENCOUNTER — TRANSCRIPTION ENCOUNTER (OUTPATIENT)
Age: 43
End: 2023-09-29

## 2023-09-29 VITALS
DIASTOLIC BLOOD PRESSURE: 90 MMHG | HEART RATE: 94 BPM | SYSTOLIC BLOOD PRESSURE: 140 MMHG | TEMPERATURE: 99 F | OXYGEN SATURATION: 95 % | RESPIRATION RATE: 18 BRPM

## 2023-09-29 LAB
ALBUMIN SERPL ELPH-MCNC: 2.4 G/DL — LOW (ref 3.3–5)
ANION GAP SERPL CALC-SCNC: 6 MMOL/L — SIGNIFICANT CHANGE UP (ref 5–17)
BUN SERPL-MCNC: 14 MG/DL — SIGNIFICANT CHANGE UP (ref 7–23)
CALCIUM SERPL-MCNC: 9 MG/DL — SIGNIFICANT CHANGE UP (ref 8.5–10.1)
CHLORIDE SERPL-SCNC: 107 MMOL/L — SIGNIFICANT CHANGE UP (ref 96–108)
CO2 SERPL-SCNC: 23 MMOL/L — SIGNIFICANT CHANGE UP (ref 22–31)
CREAT SERPL-MCNC: 1.25 MG/DL — SIGNIFICANT CHANGE UP (ref 0.5–1.3)
CULTURE RESULTS: SIGNIFICANT CHANGE UP
CULTURE RESULTS: SIGNIFICANT CHANGE UP
EGFR: 72 ML/MIN/1.73M2 — SIGNIFICANT CHANGE UP
GLUCOSE SERPL-MCNC: 99 MG/DL — SIGNIFICANT CHANGE UP (ref 70–99)
PHOSPHATE SERPL-MCNC: 2.5 MG/DL — SIGNIFICANT CHANGE UP (ref 2.5–4.5)
POTASSIUM SERPL-MCNC: 3.7 MMOL/L — SIGNIFICANT CHANGE UP (ref 3.5–5.3)
POTASSIUM SERPL-SCNC: 3.7 MMOL/L — SIGNIFICANT CHANGE UP (ref 3.5–5.3)
SODIUM SERPL-SCNC: 136 MMOL/L — SIGNIFICANT CHANGE UP (ref 135–145)
SPECIMEN SOURCE: SIGNIFICANT CHANGE UP
SPECIMEN SOURCE: SIGNIFICANT CHANGE UP

## 2023-09-29 PROCEDURE — 99239 HOSP IP/OBS DSCHRG MGMT >30: CPT

## 2023-09-29 PROCEDURE — 99232 SBSQ HOSP IP/OBS MODERATE 35: CPT

## 2023-09-29 RX ORDER — FUROSEMIDE 40 MG
1 TABLET ORAL
Qty: 15 | Refills: 0
Start: 2023-09-29 | End: 2023-10-28

## 2023-09-29 RX ORDER — CIPROFLOXACIN LACTATE 400MG/40ML
1 VIAL (ML) INTRAVENOUS
Qty: 20 | Refills: 0
Start: 2023-09-29 | End: 2023-10-08

## 2023-09-29 RX ORDER — FUROSEMIDE 40 MG
40 TABLET ORAL ONCE
Refills: 0 | Status: COMPLETED | OUTPATIENT
Start: 2023-09-29 | End: 2023-09-29

## 2023-09-29 RX ADMIN — Medication 2 TABLET(S): at 09:24

## 2023-09-29 RX ADMIN — Medication 40 MILLIGRAM(S): at 09:23

## 2023-09-29 RX ADMIN — FAMOTIDINE 20 MILLIGRAM(S): 10 INJECTION INTRAVENOUS at 09:24

## 2023-09-29 RX ADMIN — Medication 1 MILLIGRAM(S): at 09:24

## 2023-09-29 RX ADMIN — Medication 10 MILLIGRAM(S): at 05:48

## 2023-09-29 NOTE — DISCHARGE NOTE PROVIDER - HOSPITAL COURSE
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  Patient states that he started experiencing "flulike symptoms" about 4 to 5 days ago prior to admission.  Patient states that he had Tmax of 103 °F.  Patient notes that he was not experiencing any significant shortness of breath until the last 12 to 24 hours prior to admission.  . Patient notes that his oxygen saturation got as low as 88% on room air with ambulation at home.      #Pt treated here for suspected gram negative pneumonia and PCP pneumonia, underwent bronchoscopy and BAL. He did not improve much with IV abx and hence given lasix IV for possible acute decompensated diastolic heart failure. He is currently felling much better, saturating normal on room air. He is being discharged with further management as an outpt, time spent 45 minutes     #In view of cipro and lasix, I have discussed with pt need for not having strenuous exercise, watching blood work for his kidney function. I also discussed other side effects of these medicine, Advised to follow up in cardiology, nephrology, pulmonary office for ongoing management     Vital Signs Last 24 Hrs  T(C): 37.1 (29 Sep 2023 09:17), Max: 37.2 (28 Sep 2023 16:03)  T(F): 98.8 (29 Sep 2023 09:17), Max: 98.9 (28 Sep 2023 16:03)  HR: 94 (29 Sep 2023 09:17) (93 - 103)  BP: 140/90 (29 Sep 2023 09:17) (124/82 - 140/90)  BP(mean): --  RR: 18 (29 Sep 2023 09:17) (18 - 18)  SpO2: 95% (29 Sep 2023 09:17) (95% - 98%)    Parameters below as of 29 Sep 2023 09:17  Patient On (Oxygen Delivery Method): room air      T(C): 37.1 (09-29-23 @ 09:17), Max: 37.2 (09-28-23 @ 16:03)  HR: 94 (09-29-23 @ 09:17) (93 - 103)  BP: 140/90 (09-29-23 @ 09:17) (124/82 - 140/90)  RR: 18 (09-29-23 @ 09:17) (18 - 18)  SpO2: 95% (09-29-23 @ 09:17) (95% - 98%)    CONSTITUTIONAL: Well groomed, no apparent distress  EYES: PERRLA and symmetric, EOMI, No conjunctival or scleral injection, non-icteric  ENMT: Oral mucosa with moist membranes. Normal dentition; no pharyngeal injection or exudates             NECK: Supple, symmetric and without tracheal deviation   RESP: No respiratory distress, no use of accessory muscles; b/l crackles but much better   CV: RRR, +S1S2, no MRG; no JVD; no peripheral edema  GI: Soft, NT, ND, no rebound, no guarding; no palpable masses; no hepatosplenomegaly; no hernia palpated  LYMPH: No cervical LAD or tenderness; no axillary LAD or tenderness; no inguinal LAD or tenderness  MSK: Normal gait; No digital clubbing or cyanosis; examination of the (head/neck/spine/ribs/pelvis, RUE, LUE, RLE, LLE) without misalignment,            Normal ROM without pain, no spinal tenderness, normal muscle strength/tone  SKIN: No rashes or ulcers noted; no subcutaneous nodules or induration palpable  NEURO: CN II-XII intact; normal reflexes in upper and lower extremities, sensation intact in upper and lower extremities b/l to light touch   PSYCH: Appropriate insight/judgment; A+O x 3, mood and affect appropriate, recent/remote memory intact      09-29-23 @ 11:56

## 2023-09-29 NOTE — PROGRESS NOTE ADULT - PROBLEM SELECTOR PROBLEM 4
Immunosuppression due to chronic steroid use
Normal rate, regular rhythm.  Heart sounds S1, S2.  No murmurs, rubs or gallops.

## 2023-09-29 NOTE — PROGRESS NOTE ADULT - ASSESSMENT
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  Patient states that he started experiencing "flulike symptoms" about 4 to 5 days ago.  Patient states that he had Tmax of 103 °F.  Patient notes that he was not experiencing any significant shortness of breath until the last 12 to 24 hours.  Patient notes that his oxygen saturation got as low as 88% on room air with ambulation at home.  Patient notes a severe dry cough and some chest discomfort when taking deep breaths and with cough.  Patient is currently taking Lovenox once daily for DVT.  He was previously on twice daily dosing but had GI bleeding. He is been on steroids since July and no PCP prophylaxis, adm for poss GNR PNA vs PCP PNA. The cough is dry. Xray shows bilateral lung infiltrates, was given IV cefepime/azithromycin.     1. Acute respiratory failure resolved. Multifocal pneumonia with MSSA and PSAE. Possible PCP PNA. TTP s/p Rituxan. Immunosuppressed host  - imaging reviewed  - on chronic steroids   - CT chest reviewed   -sputum cx noted  - s/p bronchoscopy 9/26 f/u BAL cx, PCP pcr, cytology  - s/p azithromycin #3 legionella ag (-)   - on IV cefepime 2gmq8h # 6  - on po bactrim 2 DS BID for possible PCP coverage # 6  - pulmonary eval appreciated  -cultures reviewed  -may change cefepime to cipro 500 mg PO q12h for 10 more days  -also continue on bactrim PO for 10 more days  - monitor temps  - tolerating abx well so far; no side effects noted  - reason for abx use and side effects reviewed with patient  - supportive care    2. other issues - care per medicine

## 2023-09-29 NOTE — DISCHARGE NOTE PROVIDER - CARE PROVIDER_API CALL
Diane Dimas  Nephrology  [address], [address]  [place] [address]  Phone: [phone]  Fax: [phone]  Follow Up Time: 1 week    Tequila Marcial  Sleep Medicine  Follow Up Time: 1 week    Albaro Arevalo  Cardiovascular Disease  [address], [address]  [place] [address]  Phone: [phone]  Fax: [phone]  Follow Up Time: 2 weeks

## 2023-09-29 NOTE — PROGRESS NOTE ADULT - ASSESSMENT
Respiratory status has significantly improved.     1. Bilateral opacities due to volume overload  2. Possible LRTI (sputum culture positive but bronch negative  3. PCP negative  4. Likely MERLYN    Recommendations --    Continue diuresis  Doesn't need bronchodilators  Outpatient sleep study, he will contact my office when he is ready to do the testing.

## 2023-09-29 NOTE — PROGRESS NOTE ADULT - PROVIDER SPECIALTY LIST ADULT
Hospitalist
Infectious Disease
Hospitalist
Infectious Disease
Pulmonology

## 2023-09-29 NOTE — DISCHARGE NOTE PROVIDER - CARE PROVIDERS DIRECT ADDRESSES
,DirectAddress_Unknown,DirectAddress_Unknown,[email or web address]

## 2023-09-29 NOTE — DISCHARGE NOTE NURSING/CASE MANAGEMENT/SOCIAL WORK - NSDCPEFALRISK_GEN_ALL_CORE
For information on Fall & Injury Prevention, visit: [email or web address] OR  [email or web address] OR  https://www.cdc.gov/steadi/patient.html

## 2023-09-29 NOTE — PROGRESS NOTE ADULT - PROBLEM SELECTOR PROBLEM 5
R/O MERLYN (obstructive sleep apnea)

## 2023-09-29 NOTE — PROGRESS NOTE ADULT - SUBJECTIVE AND OBJECTIVE BOX
Date of service: 09-28-23 @ 14:50    Lying in bed in NAD  Weak looking  Has low grade fever  SOB is improving  Has dry cough    ROS: denies dizziness, no HA, no abdominal pain, no diarrhea or constipation; no dysuria, no legs pain, no rashes    MEDICATIONS  (STANDING):  budesonide  80 MICROgram(s)/formoterol 4.5 MICROgram(s) Inhaler 1 Puff(s) Inhalation every 12 hours  cefepime  Injectable. 2000 milliGRAM(s) IV Push every 8 hours  enoxaparin Injectable 100 milliGRAM(s) SubCutaneous every 24 hours  famotidine    Tablet 20 milliGRAM(s) Oral two times a day  folic acid 1 milliGRAM(s) Oral daily  predniSONE   Tablet 10 milliGRAM(s) Oral daily  trimethoprim  160 mG/sulfamethoxazole 800 mG 2 Tablet(s) Oral every 8 hours    Vital Signs Last 24 Hrs  T(C): 37 (28 Sep 2023 07:41), Max: 37.2 (27 Sep 2023 15:51)  T(F): 98.6 (28 Sep 2023 07:41), Max: 99 (27 Sep 2023 23:59)  HR: 97 (28 Sep 2023 07:41) (97 - 114)  BP: 128/87 (28 Sep 2023 07:41) (119/87 - 132/91)  BP(mean): --  RR: 18 (28 Sep 2023 07:41) (18 - 18)  SpO2: 95% (28 Sep 2023 07:41) (92% - 97%)    Parameters below as of 28 Sep 2023 07:41  Patient On (Oxygen Delivery Method): room air     Physical exam:    Constitutional: NAD   HEENT: NC/AT, EOMI, PERRLA, conjunctivae clear; ears and nose atraumatic  Neck: supple; thyroid not palpable  Back: no tenderness  Respiratory: decreased breath sounds rales  Cardiovascular: S1S2 regular, no murmurs  Abdomen: soft, not tender, not distended, positive BS; liver and spleen WNL  Genitourinary: no suprapubic tenderness  Lymphatic: no LN palpable  Musculoskeletal: no muscle tenderness, no joint swelling or tenderness  Extremities: no pedal edema  Neurological/ Psychiatric: AxOx3, Judgement and insight normal;  moving all extremities  Skin: no rashes; no palpable lesions    Labs: all available labs reviewed               Urinalysis Basic - ( 24 Sep 2023 07:06 )    Color: x / Appearance: x / SG: x / pH: x  Gluc: 118 mg/dL / Ketone: x  / Bili: x / Urobili: x   Blood: x / Protein: x / Nitrite: x   Leuk Esterase: x / RBC: x / WBC x   Sq Epi: x / Non Sq Epi: x / Bacteria: x    Culture - Fungal, Bronchial (collected 26 Sep 2023 14:22)  Source: .Bronchial LEFT LOWER LOBE  Preliminary Report (27 Sep 2023 06:54):    Testing in progress    Culture - Acid Fast - Bronchial w/Smear (collected 26 Sep 2023 14:22)  Source: .Bronchial LEFT LOWER LOBE    Culture - Bronchial (collected 26 Sep 2023 14:22)  Source: .Bronchial LEFT LOWER LOBE  Gram Stain (26 Sep 2023 23:03):    Few polymorphonuclear leukocytes per low power field    No squamous epithelial cells    No organisms seen  Preliminary Report (27 Sep 2023 15:30):    No growth to date.    Culture - Sputum (collected 25 Sep 2023 09:40)  Source: .Sputum Sputum  Gram Stain (25 Sep 2023 20:26):    Rare polymorphonuclear leukocytes per low power field    Few Squamous epithelial cells per low power field    Rare Gram Positive Cocci in Pairs and Chains seen per oil power field    Rare Gram Variable Rods seen per oil power field  Final Report (28 Sep 2023 12:05):    Moderate Pseudomonas species    Moderate Staphylococcus aureus    Normal Respiratory Linda present  Organism: Pseudomonas species  Staphylococcus aureus (28 Sep 2023 12:05)  Organism: Staphylococcus aureus (28 Sep 2023 12:05)      Method Type: DEL      -  Ampicillin/Sulbactam: S <=8/4      -  Cefazolin: S <=4      -  Clindamycin: S 0.5      -  Erythromycin: S <=0.25      -  Gentamicin: S <=1 Should not be used as monotherapy      -  Oxacillin: S <=0.25 Oxacillin predicts susceptibility for dicloxacillin, methicillin, and nafcillin      -  Rifampin: S <=1 Should not be used as monotherapy      -  Tetracycline: S <=1      -  Trimethoprim/Sulfamethoxazole: S <=0.5/9.5      -  Vancomycin: S 2  Organism: Pseudomonas species (28 Sep 2023 12:05)      Method Type: DEL      -  Amikacin: S <=16      -  Aztreonam: R >16      -  Cefepime: S <=2      -  Ceftriaxone: R >32      -  Ciprofloxacin: S <=0.25      -  Gentamicin: S <=2      -  Levofloxacin: S <=0.5      -  Meropenem: S <=1      -  Piperacillin/Tazobactam: S <=8      -  Tobramycin: S <=2      -  Trimethoprim/Sulfamethoxazole: R >2/38    Culture - Blood (collected 24 Sep 2023 08:00)  Source: .Blood None  Preliminary Report (28 Sep 2023 13:01):    No growth at 4 days    Culture - Blood (collected 24 Sep 2023 08:00)  Source: .Blood None  Preliminary Report (28 Sep 2023 13:01):    No growth at 4 days      Radiology: all available radiological tests reviewed  < from: Xray Chest 2 Views PA/Lat (09.24.23 @ 08:24) >  ACC: [id number] EXAM:  XR CHEST PA LAT 2V   ORDERED BY: SOHA DELGADO     PROCEDURE DATE:  09/24/2023          INTERPRETATION:  CLINICAL INDICATION: cough with SOB    TECHNIQUE: Frontal and lateral chest radiographs on 9/24/2023 8:24 AM    COMPARISON:8/11/2023    FINDINGS:  Left chest wall tunneled IJ hemodialysis catheter with tip at the SVC/RA   junction.    Pulmonary vascular congestion/pulmonary edema type pattern with   underlying atelectasis and/or infiltrates not excluded. No pneumothorax.    The cardiomediastinal silhouette is within normal limits.    No significant osseous abnormality.    IMPRESSION:    Pulmonary vascular congestion/pulmonary edema type pattern with   underlying atelectasis and/or infiltrates not excluded.    --- Endof Report ---        < end of copied text >    Advanced directives addressed: full resuscitation
Subjective:   continues to improve significantly, ready to go home.   + cough with clear expectoration       ROS:  Gen: Denies fever, chills, rigors  HEENT: Denies neck swelling, difficulty swallowing, nasal congestion  PULM: As above  Cardiology: Denies palpitation, dizziness,          GENERAL APPEARANCE:  Pt. is not currently dyspneic, in no distress. Pt. is alert, oriented, and pleasant.  HEENT:   No icterus. Mucous membranes moist,   NECK:  Supple.  Jugular venous pressure not elevated.   HEART:    Regular. Normal S1 and S2. There are no murmurs, rubs or gallops appreciated  CHEST:  Chest is clear to auscultation. Normal respiratory effort.    EXTREMITIES:  There is no cyanosis, clubbing or edema.     Vital Signs Last 24 Hrs  T(C): 37.1 (29 Sep 2023 09:17), Max: 37.2 (28 Sep 2023 16:03)  T(F): 98.8 (29 Sep 2023 09:17), Max: 98.9 (28 Sep 2023 16:03)  HR: 94 (29 Sep 2023 09:17) (93 - 103)  BP: 140/90 (29 Sep 2023 09:17) (124/82 - 140/90)  RR: 18 (29 Sep 2023 09:17) (18 - 18)  SpO2: 95% (29 Sep 2023 09:17) (95% - 98%)    Parameters below as of 29 Sep 2023 09:17  Patient On (Oxygen Delivery Method): room air        MEDICATIONS  (STANDING):    budesonide  80 MICROgram(s)/formoterol 4.5 MICROgram(s) Inhaler 1 Puff(s) Inhalation every 12 hours  cefepime  Injectable. 2000 milliGRAM(s) IV Push every 8 hours  enoxaparin Injectable 100 milliGRAM(s) SubCutaneous every 24 hours  famotidine    Tablet 20 milliGRAM(s) Oral two times a day  folic acid 1 milliGRAM(s) Oral daily  predniSONE   Tablet 10 milliGRAM(s) Oral daily  trimethoprim  160 mG/sulfamethoxazole 800 mG 2 Tablet(s) Oral every 8 hours    MEDICATIONS  (PRN):    acetaminophen     Tablet .. 650 milliGRAM(s) Oral every 6 hours PRN Temp greater or equal to 38C (100.4F), Mild Pain (1 - 3)  aluminum hydroxide/magnesium hydroxide/simethicone Suspension 30 milliLiter(s) Oral every 4 hours PRN Dyspepsia  benzocaine/menthol Lozenge 1 Lozenge Oral every 4 hours PRN Sore Throat  diphenhydrAMINE Injectable 25 milliGRAM(s) IV Push at bedtime PRN Insomnia  melatonin 3 milliGRAM(s) Oral at bedtime PRN Insomnia  ondansetron Injectable 4 milliGRAM(s) IV Push every 8 hours PRN Nausea and/or Vomiting      Allergies    No Known Allergies  Intolerances      LABS:  09-29  136  |  107  |  14  ----------------------------<  99  3.7   |  23  |  1.25    Ca    9.0      29 Sep 2023 06:57  Phos  2.5     09-29  Pro  x   /  Alb  2.4<L>  /  TBili  x   /  DBili  x   /  AST  x   /  ALT  x   /  AlkPhos  x   09-29    RADIOLOGY & ADDITIONAL TESTS:    < from: Xray Chest 1 View- PORTABLE-Routine (Xray Chest 1 View- PORTABLE-Routine in AM.) (09.28.23 @ 08:03) >    ACC: [id number] EXAM:  XR CHEST PORTABLE ROUTINE 1V   ORDERED BY: SATURNINO CRAIN     PROCEDURE DATE:  09/28/2023          INTERPRETATION:  AP chest on September 28, 2023 at 7:42 AM. CAT scan of   September 24 showed patchy bilateral infiltrates.    Heart magnified by technique. A left jugular line again noted.    On September 24 there are significant infiltrates in the mid lower lung   fields. These have improved on present study with mild residual.    IMPRESSION: Improving bibasilar infiltrates.    --- End of Report ---            ORION COPPOLA MD; Attending Radiologist  This document has been electronically signed. Sep 28 2023  1:48PM    < end of copied text >  
  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  Patient states that he started experiencing "flulike symptoms" about 4 to 5 days ago.  Patient states that he had Tmax of 103 °F.  Patient notes that he was not experiencing any significant shortness of breath until the last 12 to 24 hours.  Patient notes that his oxygen saturation got as low as 88% on room air with ambulation at home.  Patient notes a severe dry cough and some chest discomfort when taking deep breaths and with cough.  Patient is  currently taking Lovenox once daily for DVT.  He was previously on twice daily dosing but had GI bleeding.  He is been on steroids since July and no PCP prophylaxis, adm for poss GNR PNA vs PCP PNA. The cough is dry. (24 Sep 2023 10:47)      Review of Systems:  CONSTITUTIONAL: , feeling better   EYES/ENT: No visual changes;  No vertigo or throat pain   NECK: No pain or stiffness  RESPIRATORY:  better   CARDIOVASCULAR: No chest pain or palpitations  GASTROINTESTINAL: No abdominal or epigastric pain. No nausea, vomiting, or hematemesis; No diarrhea or constipation.   GENITOURINARY: No dysuria, frequency or hematuria  NEUROLOGICAL: No numbness or weakness  SKIN: No itching, burning, rashes, or lesions   All other review of systems is negative unless indicated above    PHYSICAL EXAM:  Vital Signs Last 24 Hrs  T(C): 37.2 (28 Sep 2023 16:03), Max: 37.2 (27 Sep 2023 23:59)  T(F): 98.9 (28 Sep 2023 16:03), Max: 99 (27 Sep 2023 23:59)  HR: 103 (28 Sep 2023 16:03) (97 - 114)  BP: 124/82 (28 Sep 2023 16:03) (124/82 - 132/91)  BP(mean): --  RR: 18 (28 Sep 2023 16:03) (18 - 18)  SpO2: 96% (28 Sep 2023 16:03) (92% - 96%)    Parameters below as of 28 Sep 2023 16:03  Patient On (Oxygen Delivery Method): room air              GENERAL: comfortable   HEAD:  Atraumatic, Normocephalic  EYES: EOMI, PERRLA, conjunctiva and sclera clear  HEENT: Moist mucous membranes  NECK: Supple, No JVD  NERVOUS SYSTEM:  Alert & Oriented X3, Motor Strength 5/5 B/L upper and lower extremities; DTRs 2+ intact and symmetric  CHEST/LUNG: AEEB, b/l rhonchi present   HEART:S1S2 normal, no murmer  ABDOMEN: Soft, Nontender, Nondistended; Bowel sounds present  GENITOURINARY- Voiding, no palpable bladder  EXTREMITIES:  2+ Peripheral Pulses, No clubbing, cyanosis, or edema  MUSCULOSKELETAL No muscle tenderness, Muscle tone normal, No joint tenderness, no Joint swelling, Joint range of motion-normal  SKIN-no rash, no lesion  PSYCH- Mood stable  LYMPH Node- No palpable lymph node    No growth at 48 Hours              09-28    136  |  106  |  15  ----------------------------<  114<H>  3.7   |  23  |  1.45<H>    Ca    9.7      28 Sep 2023 10:33  Phos  2.5     09-28    TPro  x   /  Alb  2.8<L>  /  TBili  x   /  DBili  x   /  AST  x   /  ALT  x   /  AlkPhos  x   09-28        CAPILLARY BLOOD GLUCOSE          Urinalysis Basic - ( 28 Sep 2023 10:33 )    Color: x / Appearance: x / SG: x / pH: x  Gluc: 114 mg/dL / Ketone: x  / Bili: x / Urobili: x   Blood: x / Protein: x / Nitrite: x   Leuk Esterase: x / RBC: x / WBC x   Sq Epi: x / Non Sq Epi: x / Bacteria: x        Culture - Fungal, Bronchial (collected 26 Sep 2023 14:22)  Source: .Bronchial LEFT LOWER LOBE  Preliminary Report (27 Sep 2023 06:54):    Testing in progress    Culture - Acid Fast - Bronchial w/Smear (collected 26 Sep 2023 14:22)  Source: .Bronchial LEFT LOWER LOBE    Culture - Bronchial (collected 26 Sep 2023 14:22)  Source: .Bronchial LEFT LOWER LOBE  Gram Stain (26 Sep 2023 23:03):    Few polymorphonuclear leukocytes per low power field    No squamous epithelial cells    No organisms seen  Preliminary Report (27 Sep 2023 15:30):    No growth to date.            
  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  Patient states that he started experiencing "flulike symptoms" about 4 to 5 days ago.  Patient states that he had Tmax of 103 °F.  Patient notes that he was not experiencing any significant shortness of breath until the last 12 to 24 hours.  Patient notes that his oxygen saturation got as low as 88% on room air with ambulation at home.  Patient notes a severe dry cough and some chest discomfort when taking deep breaths and with cough.  Patient is  currently taking Lovenox once daily for DVT.  He was previously on twice daily dosing but had GI bleeding.  He is been on steroids since July and no PCP prophylaxis, adm for poss GNR PNA vs PCP PNA. The cough is dry. (24 Sep 2023 10:47)      Review of Systems:  CONSTITUTIONAL: No weakness, fevers or chills  EYES/ENT: No visual changes;  No vertigo or throat pain   NECK: No pain or stiffness  RESPIRATORY:  cough, wheezing, hemoptysis;  shortness of breath, +  CARDIOVASCULAR: No chest pain or palpitations  GASTROINTESTINAL: No abdominal or epigastric pain. No nausea, vomiting, or hematemesis; No diarrhea or constipation.   GENITOURINARY: No dysuria, frequency or hematuria  NEUROLOGICAL: No numbness or weakness  SKIN: No itching, burning, rashes, or lesions   All other review of systems is negative unless indicated above    PHYSICAL EXAM:    Vital Signs Last 24 Hrs  T(C): 36.8 (26 Sep 2023 14:23), Max: 38.1 (26 Sep 2023 08:13)  T(F): 98.2 (26 Sep 2023 14:23), Max: 100.6 (26 Sep 2023 08:13)  HR: 87 (26 Sep 2023 14:31) (87 - 108)  BP: 115/79 (26 Sep 2023 14:31) (103/76 - 131/70)  BP(mean): --  RR: 20 (26 Sep 2023 14:31) (18 - 22)  SpO2: 96% (26 Sep 2023 14:31) (93% - 97%)    Parameters below as of 26 Sep 2023 14:31  Patient On (Oxygen Delivery Method): nasal cannula  O2 Flow (L/min): 3        GENERAL: comfortable   HEAD:  Atraumatic, Normocephalic  EYES: EOMI, PERRLA, conjunctiva and sclera clear  HEENT: Moist mucous membranes  NECK: Supple, No JVD  NERVOUS SYSTEM:  Alert & Oriented X3, Motor Strength 5/5 B/L upper and lower extremities; DTRs 2+ intact and symmetric  CHEST/LUNG: AEEB, b/l rhonchi present   HEART:S1S2 normal, no murmer  ABDOMEN: Soft, Nontender, Nondistended; Bowel sounds present  GENITOURINARY- Voiding, no palpable bladder  EXTREMITIES:  2+ Peripheral Pulses, No clubbing, cyanosis, or edema  MUSCULOSKELTAL- No muscle tenderness, Muscle tone normal, No joint tenderness, no Joint swelling, Joint range of motion-normal  SKIN-no rash, no lesion  PSYCH- Mood stable  LYMPH Node- No palpable lymph node     CAPILLARY BLOOD GLUCOSE                            9.6    9.04  )-----------( 434      ( 25 Sep 2023 06:40 )             30.6     09-25    139  |  111<H>  |  11  ----------------------------<  123<H>  4.0   |  23  |  1.12    Ca    9.0      25 Sep 2023 06:40          CAPILLARY BLOOD GLUCOSE          Urinalysis Basic - ( 25 Sep 2023 06:40 )    Color: x / Appearance: x / SG: x / pH: x  Gluc: 123 mg/dL / Ketone: x  / Bili: x / Urobili: x   Blood: x / Protein: x / Nitrite: x   Leuk Esterase: x / RBC: x / WBC x   Sq Epi: x / Non Sq Epi: x / Bacteria: x        Culture - Sputum (collected 25 Sep 2023 09:40)  Source: .Sputum Sputum  Gram Stain (25 Sep 2023 20:26):    Rare polymorphonuclear leukocytes per low power field    Few Squamous epithelial cells per low power field    Rare Gram Positive Cocci in Pairs and Chains seen per oil power field    Rare Gram Variable Rods seen per oil power field  Preliminary Report (26 Sep 2023 07:39):    Normal Respiratory Linda present    Culture - Blood (collected 24 Sep 2023 08:00)  Source: .Blood None  Preliminary Report (26 Sep 2023 13:02):    No growth at 48 Hours    Culture - Blood (collected 24 Sep 2023 08:00)  Source: .Blood None  Preliminary Report (26 Sep 2023 13:02):    No growth at 48 Hours            
  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  Patient states that he started experiencing "flulike symptoms" about 4 to 5 days ago.  Patient states that he had Tmax of 103 °F.  Patient notes that he was not experiencing any significant shortness of breath until the last 12 to 24 hours.  Patient notes that his oxygen saturation got as low as 88% on room air with ambulation at home.  Patient notes a severe dry cough and some chest discomfort when taking deep breaths and with cough.  Patient is  currently taking Lovenox once daily for DVT.  He was previously on twice daily dosing but had GI bleeding.  He is been on steroids since July and no PCP prophylaxis, adm for poss GNR PNA vs PCP PNA. The cough is dry. (24 Sep 2023 10:47)      Review of Systems:  CONSTITUTIONAL: No weakness, fevers or chills  EYES/ENT: No visual changes;  No vertigo or throat pain   NECK: No pain or stiffness  RESPIRATORY:  cough, wheezing, hemoptysis;  shortness of breath, +  CARDIOVASCULAR: No chest pain or palpitations  GASTROINTESTINAL: No abdominal or epigastric pain. No nausea, vomiting, or hematemesis; No diarrhea or constipation.   GENITOURINARY: No dysuria, frequency or hematuria  NEUROLOGICAL: No numbness or weakness  SKIN: No itching, burning, rashes, or lesions   All other review of systems is negative unless indicated above    PHYSICAL EXAM:    Vital Signs Last 24 Hrs  T(C): 36.9 (25 Sep 2023 08:13), Max: 36.9 (24 Sep 2023 15:32)  T(F): 98.4 (25 Sep 2023 08:13), Max: 98.4 (24 Sep 2023 15:32)  HR: 93 (25 Sep 2023 08:13) (93 - 106)  BP: 141/91 (25 Sep 2023 08:13) (132/85 - 141/91)  BP(mean): --  RR: 19 (25 Sep 2023 10:51) (18 - 19)  SpO2: 98% (25 Sep 2023 10:51) (94% - 100%)    Parameters below as of 25 Sep 2023 10:51  Patient On (Oxygen Delivery Method): nasal cannula  O2 Flow (L/min): 2      GENERAL: comfortable   HEAD:  Atraumatic, Normocephalic  EYES: EOMI, PERRLA, conjunctiva and sclera clear  HEENT: Moist mucous membranes  NECK: Supple, No JVD  NERVOUS SYSTEM:  Alert & Oriented X3, Motor Strength 5/5 B/L upper and lower extremities; DTRs 2+ intact and symmetric  CHEST/LUNG: AEEB, b/l rhonchi present   HEART:S1S2 normal, no murmer  ABDOMEN: Soft, Nontender, Nondistended; Bowel sounds present  GENITOURINARY- Voiding, no palpable bladder  EXTREMITIES:  2+ Peripheral Pulses, No clubbing, cyanosis, or edema  MUSCULOSKELTAL- No muscle tenderness, Muscle tone normal, No joint tenderness, no Joint swelling, Joint range of motion-normal  SKIN-no rash, no lesion  PSYCH- Mood stable  LYMPH Node- No palpable lymph node    LABS:                        9.6    9.04  )-----------( 434      ( 25 Sep 2023 06:40 )             30.6     09-25    139  |  111<H>  |  11  ----------------------------<  123<H>  4.0   |  23  |  1.12    Ca    9.0      25 Sep 2023 06:40      PT/INR - ( 24 Sep 2023 07:06 )   PT: 13.0 sec;   INR: 1.16 ratio           Urinalysis Basic - ( 25 Sep 2023 06:40 )    Color: x / Appearance: x / SG: x / pH: x  Gluc: 123 mg/dL / Ketone: x  / Bili: x / Urobili: x   Blood: x / Protein: x / Nitrite: x   Leuk Esterase: x / RBC: x / WBC x   Sq Epi: x / Non Sq Epi: x / Bacteria: x        CAPILLARY BLOOD GLUCOSE                Standing medicine  acetaminophen     Tablet .. 650 milliGRAM(s) Oral every 6 hours PRN  aluminum hydroxide/magnesium hydroxide/simethicone Suspension 30 milliLiter(s) Oral every 4 hours PRN  azithromycin  IVPB 500 milliGRAM(s) IV Intermittent every 24 hours  cefepime  Injectable. 2000 milliGRAM(s) IV Push every 8 hours  folic acid 1 milliGRAM(s) Oral daily  melatonin 3 milliGRAM(s) Oral at bedtime PRN  ondansetron Injectable 4 milliGRAM(s) IV Push every 8 hours PRN  predniSONE   Tablet 10 milliGRAM(s) Oral daily  sodium chloride 0.9%. 1000 milliLiter(s) IV Continuous <Continuous>  trimethoprim  160 mG/sulfamethoxazole 800 mG 2 Tablet(s) Oral every 8 hours        
  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  Patient states that he started experiencing "flulike symptoms" about 4 to 5 days ago.  Patient states that he had Tmax of 103 °F.  Patient notes that he was not experiencing any significant shortness of breath until the last 12 to 24 hours.  Patient notes that his oxygen saturation got as low as 88% on room air with ambulation at home.  Patient notes a severe dry cough and some chest discomfort when taking deep breaths and with cough.  Patient is  currently taking Lovenox once daily for DVT.  He was previously on twice daily dosing but had GI bleeding.  He is been on steroids since July and no PCP prophylaxis, adm for poss GNR PNA vs PCP PNA. The cough is dry. (24 Sep 2023 10:47)      Review of Systems:  CONSTITUTIONAL: , fevers +  EYES/ENT: No visual changes;  No vertigo or throat pain   NECK: No pain or stiffness  RESPIRATORY:  cough, wheezing, hemoptysis;  shortness of breath, +  CARDIOVASCULAR: No chest pain or palpitations  GASTROINTESTINAL: No abdominal or epigastric pain. No nausea, vomiting, or hematemesis; No diarrhea or constipation.   GENITOURINARY: No dysuria, frequency or hematuria  NEUROLOGICAL: No numbness or weakness  SKIN: No itching, burning, rashes, or lesions   All other review of systems is negative unless indicated above    PHYSICAL EXAM:  Vital Signs Last 24 Hrs  T(C): 37.5 (27 Sep 2023 10:00), Max: 38.1 (27 Sep 2023 08:04)  T(F): 99.5 (27 Sep 2023 10:00), Max: 100.6 (27 Sep 2023 08:04)  HR: 100 (27 Sep 2023 08:04) (87 - 109)  BP: 131/79 (27 Sep 2023 08:04) (101/79 - 131/79)  BP(mean): --  RR: 18 (27 Sep 2023 08:04) (18 - 22)  SpO2: 96% (27 Sep 2023 08:04) (90% - 98%)    Parameters below as of 27 Sep 2023 08:04  Patient On (Oxygen Delivery Method): nasal cannula  O2 Flow (L/min): 4          GENERAL: comfortable   HEAD:  Atraumatic, Normocephalic  EYES: EOMI, PERRLA, conjunctiva and sclera clear  HEENT: Moist mucous membranes  NECK: Supple, No JVD  NERVOUS SYSTEM:  Alert & Oriented X3, Motor Strength 5/5 B/L upper and lower extremities; DTRs 2+ intact and symmetric  CHEST/LUNG: AEEB, b/l rhonchi present   HEART:S1S2 normal, no murmer  ABDOMEN: Soft, Nontender, Nondistended; Bowel sounds present  GENITOURINARY- Voiding, no palpable bladder  EXTREMITIES:  2+ Peripheral Pulses, No clubbing, cyanosis, or edema  MUSCULOSKELETAL No muscle tenderness, Muscle tone normal, No joint tenderness, no Joint swelling, Joint range of motion-normal  SKIN-no rash, no lesion  PSYCH- Mood stable  LYMPH Node- No palpable lymph node    No growth at 48 Hours                CAPILLARY BLOOD GLUCOSE              Culture - Bronchial (collected 26 Sep 2023 14:22)  Source: .Bronchial LEFT LOWER LOBE  Gram Stain (26 Sep 2023 23:03):    Few polymorphonuclear leukocytes per low power field    No squamous epithelial cells    No organisms seen    Culture - Fungal, Bronchial (collected 26 Sep 2023 14:22)  Source: .Bronchial LEFT LOWER LOBE  Preliminary Report (27 Sep 2023 06:54):    Testing in progress    Culture - Sputum (collected 25 Sep 2023 09:40)  Source: .Sputum Sputum  Gram Stain (25 Sep 2023 20:26):    Rare polymorphonuclear leukocytes per low power field    Few Squamous epithelial cells per low power field    Rare Gram Positive Cocci in Pairs and Chains seen per oil power field    Rare Gram Variable Rods seen per oil power field  Preliminary Report (27 Sep 2023 11:23):    Moderate Gram Negative Rods    Moderate Staphylococcus aureus    Normal Respiratory Linda present              
Date of service: 09-25-23 @ 12:00    pt seen and examined   feels slightly better this am  producing scant sputum  plan for bronchoscopy in am     ROS: no fever or chills; denies dizziness, no HA,  no abdominal pain, no diarrhea or constipation; no dysuria, no urinary frequency, no legs pain, no rashes    MEDICATIONS  (STANDING):  azithromycin  IVPB 500 milliGRAM(s) IV Intermittent every 24 hours  cefepime  Injectable. 2000 milliGRAM(s) IV Push every 8 hours  folic acid 1 milliGRAM(s) Oral daily  predniSONE   Tablet 10 milliGRAM(s) Oral daily  sodium chloride 0.9%. 1000 milliLiter(s) (80 mL/Hr) IV Continuous <Continuous>  trimethoprim  160 mG/sulfamethoxazole 800 mG 2 Tablet(s) Oral every 8 hours    Vital Signs Last 24 Hrs  T(C): 36.9 (25 Sep 2023 08:13), Max: 36.9 (24 Sep 2023 15:32)  T(F): 98.4 (25 Sep 2023 08:13), Max: 98.4 (24 Sep 2023 15:32)  HR: 93 (25 Sep 2023 08:13) (93 - 106)  BP: 141/91 (25 Sep 2023 08:13) (132/85 - 141/91)  BP(mean): --  RR: 19 (25 Sep 2023 10:51) (18 - 19)  SpO2: 98% (25 Sep 2023 10:51) (94% - 100%)    Parameters below as of 25 Sep 2023 10:51  Patient On (Oxygen Delivery Method): nasal cannula  O2 Flow (L/min): 2        PE:  Constitutional: NAD   HEENT: NC/AT, EOMI, PERRLA, conjunctivae clear; ears and nose atraumatic; pharynx benign  Neck: supple; thyroid not palpable  Back: no tenderness  Respiratory: decreased breath sounds rales  Cardiovascular: S1S2 regular, no murmurs  Abdomen: soft, not tender, not distended, positive BS; liver and spleen WNL  Genitourinary: no suprapubic tenderness  Lymphatic: no LN palpable  Musculoskeletal: no muscle tenderness, no joint swelling or tenderness  Extremities: no pedal edema  Neurological/ Psychiatric: AxOx3, Judgement and insight normal;  moving all extremities  Skin: no rashes; no palpable lesions    Labs: all available labs reviewed                        9.6    9.04  )-----------( 434      ( 25 Sep 2023 06:40 )             30.6     09-25    139  |  111<H>  |  11  ----------------------------<  123<H>  4.0   |  23  |  1.12    Ca    9.0      25 Sep 2023 06:40        Urinalysis Basic - ( 24 Sep 2023 07:06 )    Color: x / Appearance: x / SG: x / pH: x  Gluc: 118 mg/dL / Ketone: x  / Bili: x / Urobili: x   Blood: x / Protein: x / Nitrite: x   Leuk Esterase: x / RBC: x / WBC x   Sq Epi: x / Non Sq Epi: x / Bacteria: x      Culture - Blood (08.07.23 @ 12:56)   Specimen Source: .Blood Blood-Peripheral  Culture Results:   No growth at 5 days    Radiology: all available radiological tests reviewed  < from: Xray Chest 2 Views PA/Lat (09.24.23 @ 08:24) >  ACC: [id number] EXAM:  XR CHEST PA LAT 2V   ORDERED BY: SOHA DELGADO     PROCEDURE DATE:  09/24/2023          INTERPRETATION:  CLINICAL INDICATION: cough with SOB    TECHNIQUE: Frontal and lateral chest radiographs on 9/24/2023 8:24 AM    COMPARISON:8/11/2023    FINDINGS:  Left chest wall tunneled IJ hemodialysis catheter with tip at the SVC/RA   junction.    Pulmonary vascular congestion/pulmonary edema type pattern with   underlying atelectasis and/or infiltrates not excluded. No pneumothorax.    The cardiomediastinal silhouette is within normal limits.    No significant osseous abnormality.    IMPRESSION:    Pulmonary vascular congestion/pulmonary edema type pattern with   underlying atelectasis and/or infiltrates not excluded.    --- Endof Report ---        < end of copied text >    Advanced directives addressed: full resuscitation
Date of service: 09-26-23 @ 09:16    pt seen and examined  has sob and cough  no fevers  has reflux   plan for bronch today    ROS: no fever or chills; denies dizziness, no HA, no abdominal pain, no diarrhea or constipation; no dysuria, no urinary frequency, no legs pain, no rashes    MEDICATIONS  (STANDING):  azithromycin  IVPB 500 milliGRAM(s) IV Intermittent every 24 hours  cefepime  Injectable. 2000 milliGRAM(s) IV Push every 8 hours  famotidine    Tablet 20 milliGRAM(s) Oral two times a day  folic acid 1 milliGRAM(s) Oral daily  predniSONE   Tablet 10 milliGRAM(s) Oral daily  sodium chloride 0.9%. 1000 milliLiter(s) (80 mL/Hr) IV Continuous <Continuous>  trimethoprim  160 mG/sulfamethoxazole 800 mG 2 Tablet(s) Oral every 8 hours    Vital Signs Last 24 Hrs  T(C): 37.1 (26 Sep 2023 15:00), Max: 38.1 (26 Sep 2023 08:13)  T(F): 98.8 (26 Sep 2023 15:00), Max: 100.6 (26 Sep 2023 08:13)  HR: 87 (26 Sep 2023 15:00) (87 - 108)  BP: 113/82 (26 Sep 2023 15:00) (103/76 - 131/70)  BP(mean): --  RR: 18 (26 Sep 2023 15:00) (18 - 22)  SpO2: 97% (26 Sep 2023 15:00) (93% - 97%)    Parameters below as of 26 Sep 2023 15:00  Patient On (Oxygen Delivery Method): nasal cannula  O2 Flow (L/min): 2          PE:  Constitutional: NAD   HEENT: NC/AT, EOMI, PERRLA, conjunctivae clear; ears and nose atraumatic; pharynx benign  Neck: supple; thyroid not palpable  Back: no tenderness  Respiratory: decreased breath sounds rales  Cardiovascular: S1S2 regular, no murmurs  Abdomen: soft, not tender, not distended, positive BS; liver and spleen WNL  Genitourinary: no suprapubic tenderness  Lymphatic: no LN palpable  Musculoskeletal: no muscle tenderness, no joint swelling or tenderness  Extremities: no pedal edema  Neurological/ Psychiatric: AxOx3, Judgement and insight normal;  moving all extremities  Skin: no rashes; no palpable lesions    Labs: all available labs reviewed                                   9.6    9.04  )-----------( 434      ( 25 Sep 2023 06:40 )             30.6     09-25    139  |  111<H>  |  11  ----------------------------<  123<H>  4.0   |  23  |  1.12    Ca    9.0      25 Sep 2023 06:40          Urinalysis Basic - ( 24 Sep 2023 07:06 )    Color: x / Appearance: x / SG: x / pH: x  Gluc: 118 mg/dL / Ketone: x  / Bili: x / Urobili: x   Blood: x / Protein: x / Nitrite: x   Leuk Esterase: x / RBC: x / WBC x   Sq Epi: x / Non Sq Epi: x / Bacteria: x    cCulture - Sputum . (09.25.23 @ 09:40)   Gram Stain:   Rare polymorphonuclear leukocytes per low power field   Few Squamous epithelial cells per low power field   Rare Gram Positive Cocci in Pairs and Chains seen per oil power field   Rare Gram Variable Rods seen per oil power field  Specimen Source: .Sputum Sputum  Culture Results:   Normal Respiratory Linda present  Culture - Blood (09.24.23 @ 08:00)   Specimen Source: .Blood None  Culture Results:   No growth at 48 Hours  Culture - Blood (09.24.23 @ 08:00)   Specimen Source: .Blood None  Culture Results:   No growth at 48 Hours      Radiology: all available radiological tests reviewed  < from: Xray Chest 2 Views PA/Lat (09.24.23 @ 08:24) >  ACC: [id number] EXAM:  XR CHEST PA LAT 2V   ORDERED BY: SOHA DELGADO     PROCEDURE DATE:  09/24/2023          INTERPRETATION:  CLINICAL INDICATION: cough with SOB    TECHNIQUE: Frontal and lateral chest radiographs on 9/24/2023 8:24 AM    COMPARISON:8/11/2023    FINDINGS:  Left chest wall tunneled IJ hemodialysis catheter with tip at the SVC/RA   junction.    Pulmonary vascular congestion/pulmonary edema type pattern with   underlying atelectasis and/or infiltrates not excluded. No pneumothorax.    The cardiomediastinal silhouette is within normal limits.    No significant osseous abnormality.    IMPRESSION:    Pulmonary vascular congestion/pulmonary edema type pattern with   underlying atelectasis and/or infiltrates not excluded.    --- Endof Report ---        < end of copied text >    Advanced directives addressed: full resuscitation
Date of service: 09-27-23 @ 12:13    pt seen and examined  has dyspnea on exertion  has productive cough  s/p bronchoscopy 9/26  has low grade fever    ROS: +fever/chills; denies dizziness, no HA, no abdominal pain, no diarrhea or constipation; no dysuria, no urinary frequency, no legs pain, no rashes      MEDICATIONS  (STANDING):  budesonide  80 MICROgram(s)/formoterol 4.5 MICROgram(s) Inhaler 1 Puff(s) Inhalation every 12 hours  cefepime  Injectable. 2000 milliGRAM(s) IV Push every 8 hours  enoxaparin Injectable 100 milliGRAM(s) SubCutaneous every 24 hours  famotidine    Tablet 20 milliGRAM(s) Oral two times a day  folic acid 1 milliGRAM(s) Oral daily  predniSONE   Tablet 10 milliGRAM(s) Oral daily  trimethoprim  160 mG/sulfamethoxazole 800 mG 2 Tablet(s) Oral every 8 hours    Vital Signs Last 24 Hrs  T(C): 37.5 (27 Sep 2023 10:00), Max: 38.1 (27 Sep 2023 08:04)  T(F): 99.5 (27 Sep 2023 10:00), Max: 100.6 (27 Sep 2023 08:04)  HR: 100 (27 Sep 2023 08:04) (87 - 109)  BP: 131/79 (27 Sep 2023 08:04) (101/79 - 131/79)  BP(mean): --  RR: 18 (27 Sep 2023 08:04) (18 - 22)  SpO2: 96% (27 Sep 2023 08:04) (90% - 98%)    Parameters below as of 27 Sep 2023 08:04  Patient On (Oxygen Delivery Method): nasal cannula  O2 Flow (L/min): 4    PE:  Constitutional: NAD   HEENT: NC/AT, EOMI, PERRLA, conjunctivae clear; ears and nose atraumatic; pharynx benign  Neck: supple; thyroid not palpable  Back: no tenderness  Respiratory: decreased breath sounds rales  Cardiovascular: S1S2 regular, no murmurs  Abdomen: soft, not tender, not distended, positive BS; liver and spleen WNL  Genitourinary: no suprapubic tenderness  Lymphatic: no LN palpable  Musculoskeletal: no muscle tenderness, no joint swelling or tenderness  Extremities: no pedal edema  Neurological/ Psychiatric: AxOx3, Judgement and insight normal;  moving all extremities  Skin: no rashes; no palpable lesions    Labs: all available labs reviewed               Urinalysis Basic - ( 24 Sep 2023 07:06 )    Color: x / Appearance: x / SG: x / pH: x  Gluc: 118 mg/dL / Ketone: x  / Bili: x / Urobili: x   Blood: x / Protein: x / Nitrite: x   Leuk Esterase: x / RBC: x / WBC x   Sq Epi: x / Non Sq Epi: x / Bacteria: x    cCulture - Sputum . (09.25.23 @ 09:40)   Gram Stain:   Rare polymorphonuclear leukocytes per low power field   Few Squamous epithelial cells per low power field   Rare Gram Positive Cocci in Pairs and Chains seen per oil power field   Rare Gram Variable Rods seen per oil power field  Specimen Source: .Sputum Sputum  Culture Results:   Normal Respiratory Linda present  Culture - Blood (09.24.23 @ 08:00)   Specimen Source: .Blood None  Culture Results:   No growth at 48 Hours  Culture - Blood (09.24.23 @ 08:00)   Specimen Source: .Blood None  Culture Results:   No growth at 48 Hours      Radiology: all available radiological tests reviewed  < from: Xray Chest 2 Views PA/Lat (09.24.23 @ 08:24) >  ACC: [id number] EXAM:  XR CHEST PA LAT 2V   ORDERED BY: SOHA DELGADO     PROCEDURE DATE:  09/24/2023          INTERPRETATION:  CLINICAL INDICATION: cough with SOB    TECHNIQUE: Frontal and lateral chest radiographs on 9/24/2023 8:24 AM    COMPARISON:8/11/2023    FINDINGS:  Left chest wall tunneled IJ hemodialysis catheter with tip at the SVC/RA   junction.    Pulmonary vascular congestion/pulmonary edema type pattern with   underlying atelectasis and/or infiltrates not excluded. No pneumothorax.    The cardiomediastinal silhouette is within normal limits.    No significant osseous abnormality.    IMPRESSION:    Pulmonary vascular congestion/pulmonary edema type pattern with   underlying atelectasis and/or infiltrates not excluded.    --- Endof Report ---        < end of copied text >    Advanced directives addressed: full resuscitation
Date of service: 09-28-23 @ 14:50    Lying in bed in NAD  Weak looking  Has low grade fever  SOB is improving  Has dry cough    ROS: denies dizziness, no HA, no abdominal pain, no diarrhea or constipation; no dysuria, no legs pain, no rashes    MEDICATIONS  (STANDING):  budesonide  80 MICROgram(s)/formoterol 4.5 MICROgram(s) Inhaler 1 Puff(s) Inhalation every 12 hours  cefepime  Injectable. 2000 milliGRAM(s) IV Push every 8 hours  enoxaparin Injectable 100 milliGRAM(s) SubCutaneous every 24 hours  famotidine    Tablet 20 milliGRAM(s) Oral two times a day  folic acid 1 milliGRAM(s) Oral daily  predniSONE   Tablet 10 milliGRAM(s) Oral daily  trimethoprim  160 mG/sulfamethoxazole 800 mG 2 Tablet(s) Oral every 8 hours    Vital Signs Last 24 Hrs  T(C): 37 (28 Sep 2023 07:41), Max: 37.2 (27 Sep 2023 15:51)  T(F): 98.6 (28 Sep 2023 07:41), Max: 99 (27 Sep 2023 23:59)  HR: 97 (28 Sep 2023 07:41) (97 - 114)  BP: 128/87 (28 Sep 2023 07:41) (119/87 - 132/91)  BP(mean): --  RR: 18 (28 Sep 2023 07:41) (18 - 18)  SpO2: 95% (28 Sep 2023 07:41) (92% - 97%)    Parameters below as of 28 Sep 2023 07:41  Patient On (Oxygen Delivery Method): room air     Physical exam:    Constitutional: NAD   HEENT: NC/AT, EOMI, PERRLA, conjunctivae clear; ears and nose atraumatic  Neck: supple; thyroid not palpable  Back: no tenderness  Respiratory: decreased breath sounds rales  Cardiovascular: S1S2 regular, no murmurs  Abdomen: soft, not tender, not distended, positive BS; liver and spleen WNL  Genitourinary: no suprapubic tenderness  Lymphatic: no LN palpable  Musculoskeletal: no muscle tenderness, no joint swelling or tenderness  Extremities: no pedal edema  Neurological/ Psychiatric: AxOx3, Judgement and insight normal;  moving all extremities  Skin: no rashes; no palpable lesions    Labs: all available labs reviewed               Urinalysis Basic - ( 24 Sep 2023 07:06 )    Color: x / Appearance: x / SG: x / pH: x  Gluc: 118 mg/dL / Ketone: x  / Bili: x / Urobili: x   Blood: x / Protein: x / Nitrite: x   Leuk Esterase: x / RBC: x / WBC x   Sq Epi: x / Non Sq Epi: x / Bacteria: x    Culture - Fungal, Bronchial (collected 26 Sep 2023 14:22)  Source: .Bronchial LEFT LOWER LOBE  Preliminary Report (27 Sep 2023 06:54):    Testing in progress    Culture - Acid Fast - Bronchial w/Smear (collected 26 Sep 2023 14:22)  Source: .Bronchial LEFT LOWER LOBE    Culture - Bronchial (collected 26 Sep 2023 14:22)  Source: .Bronchial LEFT LOWER LOBE  Gram Stain (26 Sep 2023 23:03):    Few polymorphonuclear leukocytes per low power field    No squamous epithelial cells    No organisms seen  Preliminary Report (27 Sep 2023 15:30):    No growth to date.    Culture - Sputum (collected 25 Sep 2023 09:40)  Source: .Sputum Sputum  Gram Stain (25 Sep 2023 20:26):    Rare polymorphonuclear leukocytes per low power field    Few Squamous epithelial cells per low power field    Rare Gram Positive Cocci in Pairs and Chains seen per oil power field    Rare Gram Variable Rods seen per oil power field  Final Report (28 Sep 2023 12:05):    Moderate Pseudomonas species    Moderate Staphylococcus aureus    Normal Respiratory Linda present  Organism: Pseudomonas species  Staphylococcus aureus (28 Sep 2023 12:05)  Organism: Staphylococcus aureus (28 Sep 2023 12:05)      Method Type: DEL      -  Ampicillin/Sulbactam: S <=8/4      -  Cefazolin: S <=4      -  Clindamycin: S 0.5      -  Erythromycin: S <=0.25      -  Gentamicin: S <=1 Should not be used as monotherapy      -  Oxacillin: S <=0.25 Oxacillin predicts susceptibility for dicloxacillin, methicillin, and nafcillin      -  Rifampin: S <=1 Should not be used as monotherapy      -  Tetracycline: S <=1      -  Trimethoprim/Sulfamethoxazole: S <=0.5/9.5      -  Vancomycin: S 2  Organism: Pseudomonas species (28 Sep 2023 12:05)      Method Type: DEL      -  Amikacin: S <=16      -  Aztreonam: R >16      -  Cefepime: S <=2      -  Ceftriaxone: R >32      -  Ciprofloxacin: S <=0.25      -  Gentamicin: S <=2      -  Levofloxacin: S <=0.5      -  Meropenem: S <=1      -  Piperacillin/Tazobactam: S <=8      -  Tobramycin: S <=2      -  Trimethoprim/Sulfamethoxazole: R >2/38    Culture - Blood (collected 24 Sep 2023 08:00)  Source: .Blood None  Preliminary Report (28 Sep 2023 13:01):    No growth at 4 days    Culture - Blood (collected 24 Sep 2023 08:00)  Source: .Blood None  Preliminary Report (28 Sep 2023 13:01):    No growth at 4 days      Radiology: all available radiological tests reviewed  < from: Xray Chest 2 Views PA/Lat (09.24.23 @ 08:24) >  ACC: [id number] EXAM:  XR CHEST PA LAT 2V   ORDERED BY: SOHA DELGADO     PROCEDURE DATE:  09/24/2023          INTERPRETATION:  CLINICAL INDICATION: cough with SOB    TECHNIQUE: Frontal and lateral chest radiographs on 9/24/2023 8:24 AM    COMPARISON:8/11/2023    FINDINGS:  Left chest wall tunneled IJ hemodialysis catheter with tip at the SVC/RA   junction.    Pulmonary vascular congestion/pulmonary edema type pattern with   underlying atelectasis and/or infiltrates not excluded. No pneumothorax.    The cardiomediastinal silhouette is within normal limits.    No significant osseous abnormality.    IMPRESSION:    Pulmonary vascular congestion/pulmonary edema type pattern with   underlying atelectasis and/or infiltrates not excluded.    --- Endof Report ---        < end of copied text >    Advanced directives addressed: full resuscitation
Subjective:   patient seen and examined,   He feels that he is a bit worse today compared to yesterday. He became short of breath with minimal walking.   No chest pain, dry cough    Denies  expectoration, hemoptysis, pleuritic chest pain.    ROS:  Gen: Denies fever, chills, rigors  HEENT: Denies neck swelling, difficulty swallowing, nasal congestion  PULM: As above  Cardiology: Denies palpitation, dizziness,         GENERAL APPEARANCE:  Pt. is not currently dyspneic, in no distress. Pt. is alert, oriented, and pleasant.  HEENT:   No icterus. Mucous membranes moist,   NECK:  Supple.  Jugular venous pressure not elevated.   HEART:    Regular. Normal S1 and S2. There are no murmurs, rubs or gallops appreciated  CHEST:  Chest is clear to auscultation. Normal respiratory effort. no rales, rhonchi or wheezes.   EXTREMITIES:  There is no cyanosis, clubbing or edema.     Vital Signs Last 24 Hrs  T(C): 38.1 (26 Sep 2023 08:13), Max: 38.1 (26 Sep 2023 08:13)  T(F): 100.6 (26 Sep 2023 08:13), Max: 100.6 (26 Sep 2023 08:13)  HR: 103 (26 Sep 2023 08:13) (103 - 105)  BP: 130/86 (26 Sep 2023 08:13) (125/91 - 131/70)  BP(mean): --  RR: 19 (26 Sep 2023 08:13) (18 - 19)  SpO2: 97% (26 Sep 2023 08:13) (94% - 98%)    Parameters below as of 26 Sep 2023 08:13  Patient On (Oxygen Delivery Method): nasal cannula  O2 Flow (L/min): 2      MEDICATIONS  (STANDING):  azithromycin  IVPB 500 milliGRAM(s) IV Intermittent every 24 hours  cefepime  Injectable. 2000 milliGRAM(s) IV Push every 8 hours  folic acid 1 milliGRAM(s) Oral daily  predniSONE   Tablet 10 milliGRAM(s) Oral daily  sodium chloride 0.9%. 1000 milliLiter(s) (80 mL/Hr) IV Continuous <Continuous>  trimethoprim  160 mG/sulfamethoxazole 800 mG 2 Tablet(s) Oral every 8 hours    MEDICATIONS  (PRN):  acetaminophen     Tablet .. 650 milliGRAM(s) Oral every 6 hours PRN Temp greater or equal to 38C (100.4F), Mild Pain (1 - 3)  aluminum hydroxide/magnesium hydroxide/simethicone Suspension 30 milliLiter(s) Oral every 4 hours PRN Dyspepsia  benzocaine/menthol Lozenge 1 Lozenge Oral every 4 hours PRN Sore Throat  melatonin 3 milliGRAM(s) Oral at bedtime PRN Insomnia  ondansetron Injectable 4 milliGRAM(s) IV Push every 8 hours PRN Nausea and/or Vomiting      Allergies    No Known Allergies  LABS:                        9.6    9.04  )-----------( 434      ( 25 Sep 2023 06:40 )             30.6     09-25    139  |  111<H>  |  11  ----------------------------<  123<H>  4.0   |  23  |  1.12    Ca    9.0      25 Sep 2023 06:40        RADIOLOGY & ADDITIONAL TESTS:    < from: CT Chest No Cont (09.24.23 @ 13:28) >    ACC: [id number] EXAM:  CT CHEST   ORDERED BY: RENE DYSON     PROCEDURE DATE:  09/24/2023          INTERPRETATION:  EXAMINATION: CT CHEST    CLINICAL INDICATION: Dyspnea.    TECHNIQUE: Noncontrast CT of the chest was obtained.    COMPARISON: 7/29/2023.    FINDINGS:    AIRWAYS AND LUNGS: The central tracheobronchial tree is patent.    Extensive bilateral lung opacities with a central predominance    MEDIASTINUM AND PLEURA: There are no enlarged mediastinal, hilar or   axillary lymph nodes. The visualized portion of the thyroid gland is   unremarkable. There is no pleural effusion. There is no pneumothorax.    HEART AND VESSELS: The heart is normal in size.  There are no   atherosclerotic calcifications of the aorta.  There is a small   pericardial effusion.    UPPER ABDOMEN: Images of the upper abdomen demonstrate no abnormality.    BONES AND SOFT TISSUES: The bones are unremarkable.  Left-sided central   venous catheter with tip in SVC.    IMPRESSION:  Extensive bilateral lungopacities with a central predominance    --- End of Report ---            NAVIN CABALLERO MD; Attending Radiologist  This document has been electronically signed. Sep 24 2023  2:22PM    < end of copied text >  
Subjective:   patient seen and examined, Complains of cough with expectoration, +GONZALEZ  Denies   hemoptysis, pleuritic chest pain.    ROS:  Gen: Denies fever, chills, rigors  HEENT: Denies neck swelling, difficulty swallowing, nasal congestion  PULM: As above  Cardiology: Denies palpitation, dizziness,          GENERAL APPEARANCE:  Pt. is not currently dyspneic, in no distress. Pt. is alert, oriented, and pleasant.  HEENT:   No icterus. Mucous membranes moist,   NECK:  Supple.  Jugular venous pressure not elevated.   HEART:    Regular. Normal S1 and S2. There are no murmurs, rubs or gallops appreciated  CHEST:  Chest is clear to auscultation. Normal respiratory effort.  occasional coarse breath sounds  EXTREMITIES:  There is no cyanosis, clubbing or edema.     Vital Signs Last 24 Hrs  T(C): 38.1 (27 Sep 2023 08:04), Max: 38.1 (27 Sep 2023 08:04)  T(F): 100.6 (27 Sep 2023 08:04), Max: 100.6 (27 Sep 2023 08:04)  HR: 100 (27 Sep 2023 08:04) (87 - 109)  BP: 131/79 (27 Sep 2023 08:04) (101/79 - 131/79)  RR: 18 (27 Sep 2023 08:04) (18 - 22)  SpO2: 96% (27 Sep 2023 08:04) (90% - 98%)    Parameters below as of 27 Sep 2023 08:04  Patient On (Oxygen Delivery Method): nasal cannula  O2 Flow (L/min): 4      MEDICATIONS  (STANDING):  azithromycin  IVPB 500 milliGRAM(s) IV Intermittent every 24 hours  cefepime  Injectable. 2000 milliGRAM(s) IV Push every 8 hours  famotidine    Tablet 20 milliGRAM(s) Oral two times a day  folic acid 1 milliGRAM(s) Oral daily  predniSONE   Tablet 10 milliGRAM(s) Oral daily  trimethoprim  160 mG/sulfamethoxazole 800 mG 2 Tablet(s) Oral every 8 hours    MEDICATIONS  (PRN):  acetaminophen     Tablet .. 650 milliGRAM(s) Oral every 6 hours PRN Temp greater or equal to 38C (100.4F), Mild Pain (1 - 3)  aluminum hydroxide/magnesium hydroxide/simethicone Suspension 30 milliLiter(s) Oral every 4 hours PRN Dyspepsia  benzocaine/menthol Lozenge 1 Lozenge Oral every 4 hours PRN Sore Throat  diphenhydrAMINE Injectable 25 milliGRAM(s) IV Push at bedtime PRN Insomnia  melatonin 3 milliGRAM(s) Oral at bedtime PRN Insomnia  ondansetron Injectable 4 milliGRAM(s) IV Push every 8 hours PRN Nausea and/or Vomiting      Allergies    No Known Allergies    Intolerances    RADIOLOGY & ADDITIONAL TESTS:  
Subjective:   Reports that he feels much better after diuresis. Cough is better,   denies any new cough symptoms  Able to walk in the hallway without desaturation    ROS:  Gen: Denies fever, chills, rigors  HEENT: Denies neck swelling, difficulty swallowing, nasal congestion  PULM: As above  Cardiology: Denies palpitation, dizziness,          GENERAL APPEARANCE:  Pt. is not currently dyspneic, in no distress. Pt. is alert, oriented, and pleasant.  HEENT:   No icterus. Mucous membranes moist,   NECK:  Supple.  Jugular venous pressure not elevated.   HEART:    Regular. Normal S1 and S2. There are no murmurs, rubs or gallops appreciated  CHEST:  Chest is clear to auscultation. Normal respiratory effort.    EXTREMITIES:  There is no cyanosis, clubbing or edema.     Vital Signs Last 24 Hrs  T(C): 37 (28 Sep 2023 07:41), Max: 37.2 (27 Sep 2023 15:51)  T(F): 98.6 (28 Sep 2023 07:41), Max: 99 (27 Sep 2023 23:59)  HR: 97 (28 Sep 2023 07:41) (97 - 114)  BP: 128/87 (28 Sep 2023 07:41) (119/87 - 132/91)  BP(mean): --  RR: 18 (28 Sep 2023 07:41) (18 - 18)  SpO2: 95% (28 Sep 2023 07:41) (92% - 97%)    Parameters below as of 28 Sep 2023 07:41  Patient On (Oxygen Delivery Method): room air        MEDICATIONS  (STANDING):  budesonide  80 MICROgram(s)/formoterol 4.5 MICROgram(s) Inhaler 1 Puff(s) Inhalation every 12 hours  cefepime  Injectable. 2000 milliGRAM(s) IV Push every 8 hours  enoxaparin Injectable 100 milliGRAM(s) SubCutaneous every 24 hours  famotidine    Tablet 20 milliGRAM(s) Oral two times a day  folic acid 1 milliGRAM(s) Oral daily  predniSONE   Tablet 10 milliGRAM(s) Oral daily  trimethoprim  160 mG/sulfamethoxazole 800 mG 2 Tablet(s) Oral every 8 hours    MEDICATIONS  (PRN):  acetaminophen     Tablet .. 650 milliGRAM(s) Oral every 6 hours PRN Temp greater or equal to 38C (100.4F), Mild Pain (1 - 3)  aluminum hydroxide/magnesium hydroxide/simethicone Suspension 30 milliLiter(s) Oral every 4 hours PRN Dyspepsia  benzocaine/menthol Lozenge 1 Lozenge Oral every 4 hours PRN Sore Throat  diphenhydrAMINE Injectable 25 milliGRAM(s) IV Push at bedtime PRN Insomnia  melatonin 3 milliGRAM(s) Oral at bedtime PRN Insomnia  ondansetron Injectable 4 milliGRAM(s) IV Push every 8 hours PRN Nausea and/or Vomiting      Allergies    No Known Allergies    Intolerances      LABS:    I called the cytology lab. They will likly report the PCP today.   Other cultures are negative    RADIOLOGY & ADDITIONAL TESTS:    today's CXR looks much improved than prior CT scan. Official read is pending.

## 2023-09-29 NOTE — DISCHARGE NOTE NURSING/CASE MANAGEMENT/SOCIAL WORK - PATIENT PORTAL LINK FT
You can access the FollowMyHealth Patient Portal offered by [facility] by registering at the following website: [email or web address]. By joining [facility]’s FollowMyHealth portal, you will also be able to view your health information using other applications (apps) compatible with our system.

## 2023-09-29 NOTE — DISCHARGE NOTE PROVIDER - NSCORESITESY/N_GEN_A_CORE_RD
Bedside and Verbal shift change report given to self (oncoming nurse) by Montana Thomas (offgoing nurse). Report included the following information SBAR, Kardex, Intake/Output, MAR and Recent Results. Yes

## 2023-09-29 NOTE — DISCHARGE NOTE PROVIDER - NSDCMRMEDTOKEN_GEN_ALL_CORE_FT
ciprofloxacin 500 mg oral tablet: 1 tab(s) orally 2 times a day  enoxaparin 100 mg/mL injectable solution: 100 milligram(s) subcutaneously once a day  Lasix 20 mg oral tablet: 1 tab(s) orally every other day Use PRN basis if you short of breath, follow up with cardiology  Pepcid 20 mg oral tablet: 1 tab(s) orally once a day  predniSONE 10 mg oral tablet: 1 tab(s) orally once a day  sulfamethoxazole-trimethoprim 800 mg-160 mg oral tablet: 2 tab(s) orally every 8 hours

## 2023-09-29 NOTE — DISCHARGE NOTE PROVIDER - PROVIDER TOKENS
PROVIDER:[TOKEN:[[id number]],FOLLOWUP:[1 week]],PROVIDER:[TOKEN:[[id number]],FOLLOWUP:[1 week]],PROVIDER:[TOKEN:[[id number]],FOLLOWUP:[2 weeks]]

## 2023-10-03 ENCOUNTER — TRANSCRIPTION ENCOUNTER (OUTPATIENT)
Age: 43
End: 2023-10-03

## 2023-10-05 ENCOUNTER — OUTPATIENT (OUTPATIENT)
Dept: OUTPATIENT SERVICES | Facility: HOSPITAL | Age: 43
LOS: 1 days | Discharge: HOME | End: 2023-10-05

## 2023-10-05 DIAGNOSIS — M31.19 OTHER THROMBOTIC MICROANGIOPATHY: ICD-10-CM

## 2023-10-06 ENCOUNTER — RESULT REVIEW (OUTPATIENT)
Age: 43
End: 2023-10-06

## 2023-10-06 ENCOUNTER — APPOINTMENT (OUTPATIENT)
Dept: HEMATOLOGY ONCOLOGY | Facility: CLINIC | Age: 43
End: 2023-10-06
Payer: COMMERCIAL

## 2023-10-06 LAB
BASOPHILS # BLD AUTO: 0.05 K/UL — SIGNIFICANT CHANGE UP (ref 0–0.2)
BASOPHILS NFR BLD AUTO: 1 % — SIGNIFICANT CHANGE UP (ref 0–2)
EOSINOPHIL # BLD AUTO: 0.24 K/UL — SIGNIFICANT CHANGE UP (ref 0–0.5)
EOSINOPHIL NFR BLD AUTO: 4.6 % — SIGNIFICANT CHANGE UP (ref 0–6)
HCT VFR BLD CALC: 33.3 % — LOW (ref 39–50)
HGB BLD-MCNC: 10.3 G/DL — LOW (ref 13–17)
IMM GRANULOCYTES NFR BLD AUTO: 1.3 % — HIGH (ref 0–0.9)
LYMPHOCYTES # BLD AUTO: 1.57 K/UL — SIGNIFICANT CHANGE UP (ref 1–3.3)
LYMPHOCYTES # BLD AUTO: 29.8 % — SIGNIFICANT CHANGE UP (ref 13–44)
MCHC RBC-ENTMCNC: 26.9 PG — LOW (ref 27–34)
MCHC RBC-ENTMCNC: 30.9 GM/DL — LOW (ref 32–36)
MCV RBC AUTO: 86.9 FL — SIGNIFICANT CHANGE UP (ref 80–100)
MONOCYTES # BLD AUTO: 0.83 K/UL — SIGNIFICANT CHANGE UP (ref 0–0.9)
MONOCYTES NFR BLD AUTO: 15.8 % — HIGH (ref 2–14)
NEUTROPHILS # BLD AUTO: 2.5 K/UL — SIGNIFICANT CHANGE UP (ref 1.8–7.4)
NEUTROPHILS NFR BLD AUTO: 47.5 % — SIGNIFICANT CHANGE UP (ref 43–77)
NRBC # BLD: 0 /100 WBCS — SIGNIFICANT CHANGE UP (ref 0–0)
PLATELET # BLD AUTO: 505 K/UL — HIGH (ref 150–400)
RBC # BLD: 3.83 M/UL — LOW (ref 4.2–5.8)
RBC # FLD: 17.7 % — HIGH (ref 10.3–14.5)
WBC # BLD: 5.26 K/UL — SIGNIFICANT CHANGE UP (ref 3.8–10.5)
WBC # FLD AUTO: 5.26 K/UL — SIGNIFICANT CHANGE UP (ref 3.8–10.5)

## 2023-10-06 PROCEDURE — 99214 OFFICE O/P EST MOD 30 MIN: CPT

## 2023-10-07 LAB
ALBUMIN SERPL ELPH-MCNC: 4.3 G/DL — SIGNIFICANT CHANGE UP (ref 3.3–5)
ALP SERPL-CCNC: 78 U/L — SIGNIFICANT CHANGE UP (ref 40–120)
ALT FLD-CCNC: 26 U/L — SIGNIFICANT CHANGE UP (ref 10–45)
ANION GAP SERPL CALC-SCNC: 13 MMOL/L — SIGNIFICANT CHANGE UP (ref 5–17)
AST SERPL-CCNC: 21 U/L — SIGNIFICANT CHANGE UP (ref 10–40)
BILIRUB SERPL-MCNC: <0.2 MG/DL — SIGNIFICANT CHANGE UP (ref 0.2–1.2)
BUN SERPL-MCNC: 15 MG/DL — SIGNIFICANT CHANGE UP (ref 7–23)
CALCIUM SERPL-MCNC: 10.1 MG/DL — SIGNIFICANT CHANGE UP (ref 8.4–10.5)
CHLORIDE SERPL-SCNC: 103 MMOL/L — SIGNIFICANT CHANGE UP (ref 96–108)
CO2 SERPL-SCNC: 23 MMOL/L — SIGNIFICANT CHANGE UP (ref 22–31)
CREAT SERPL-MCNC: 1.59 MG/DL — HIGH (ref 0.5–1.3)
EGFR: 54 ML/MIN/1.73M2 — LOW
GLUCOSE SERPL-MCNC: 92 MG/DL — SIGNIFICANT CHANGE UP (ref 70–99)
POTASSIUM SERPL-MCNC: 5 MMOL/L — SIGNIFICANT CHANGE UP (ref 3.5–5.3)
POTASSIUM SERPL-SCNC: 5 MMOL/L — SIGNIFICANT CHANGE UP (ref 3.5–5.3)
PROT SERPL-MCNC: 7 G/DL — SIGNIFICANT CHANGE UP (ref 6–8.3)
SODIUM SERPL-SCNC: 139 MMOL/L — SIGNIFICANT CHANGE UP (ref 135–145)

## 2023-10-09 DIAGNOSIS — I82.C11 ACUTE EMBOLISM AND THROMBOSIS OF RIGHT INTERNAL JUGULAR VEIN: ICD-10-CM

## 2023-10-11 DIAGNOSIS — J15.1 PNEUMONIA DUE TO PSEUDOMONAS: ICD-10-CM

## 2023-10-11 DIAGNOSIS — Z79.01 LONG TERM (CURRENT) USE OF ANTICOAGULANTS: ICD-10-CM

## 2023-10-11 DIAGNOSIS — J15.211 PNEUMONIA DUE TO METHICILLIN SUSCEPTIBLE STAPHYLOCOCCUS AUREUS: ICD-10-CM

## 2023-10-11 DIAGNOSIS — J96.01 ACUTE RESPIRATORY FAILURE WITH HYPOXIA: ICD-10-CM

## 2023-10-11 DIAGNOSIS — A41.9 SEPSIS, UNSPECIFIED ORGANISM: ICD-10-CM

## 2023-10-11 DIAGNOSIS — I50.33 ACUTE ON CHRONIC DIASTOLIC (CONGESTIVE) HEART FAILURE: ICD-10-CM

## 2023-10-11 DIAGNOSIS — Z79.52 LONG TERM (CURRENT) USE OF SYSTEMIC STEROIDS: ICD-10-CM

## 2023-10-11 DIAGNOSIS — D69.3 IMMUNE THROMBOCYTOPENIC PURPURA: ICD-10-CM

## 2023-10-11 DIAGNOSIS — D84.9 IMMUNODEFICIENCY, UNSPECIFIED: ICD-10-CM

## 2023-10-11 DIAGNOSIS — Z86.718 PERSONAL HISTORY OF OTHER VENOUS THROMBOSIS AND EMBOLISM: ICD-10-CM

## 2023-10-20 ENCOUNTER — NON-APPOINTMENT (OUTPATIENT)
Age: 43
End: 2023-10-20

## 2023-10-20 ENCOUNTER — RESULT REVIEW (OUTPATIENT)
Age: 43
End: 2023-10-20

## 2023-10-20 ENCOUNTER — APPOINTMENT (OUTPATIENT)
Dept: HEMATOLOGY ONCOLOGY | Facility: CLINIC | Age: 43
End: 2023-10-20

## 2023-10-20 LAB
BASOPHILS # BLD AUTO: 0.03 K/UL — SIGNIFICANT CHANGE UP (ref 0–0.2)
BASOPHILS # BLD AUTO: 0.03 K/UL — SIGNIFICANT CHANGE UP (ref 0–0.2)
BASOPHILS NFR BLD AUTO: 0.7 % — SIGNIFICANT CHANGE UP (ref 0–2)
BASOPHILS NFR BLD AUTO: 0.7 % — SIGNIFICANT CHANGE UP (ref 0–2)
EOSINOPHIL # BLD AUTO: 0.23 K/UL — SIGNIFICANT CHANGE UP (ref 0–0.5)
EOSINOPHIL # BLD AUTO: 0.23 K/UL — SIGNIFICANT CHANGE UP (ref 0–0.5)
EOSINOPHIL NFR BLD AUTO: 5.4 % — SIGNIFICANT CHANGE UP (ref 0–6)
EOSINOPHIL NFR BLD AUTO: 5.4 % — SIGNIFICANT CHANGE UP (ref 0–6)
HCT VFR BLD CALC: 32.8 % — LOW (ref 39–50)
HCT VFR BLD CALC: 32.8 % — LOW (ref 39–50)
HGB BLD-MCNC: 10.2 G/DL — LOW (ref 13–17)
HGB BLD-MCNC: 10.2 G/DL — LOW (ref 13–17)
IMM GRANULOCYTES NFR BLD AUTO: 0.2 % — SIGNIFICANT CHANGE UP (ref 0–0.9)
IMM GRANULOCYTES NFR BLD AUTO: 0.2 % — SIGNIFICANT CHANGE UP (ref 0–0.9)
LYMPHOCYTES # BLD AUTO: 1.45 K/UL — SIGNIFICANT CHANGE UP (ref 1–3.3)
LYMPHOCYTES # BLD AUTO: 1.45 K/UL — SIGNIFICANT CHANGE UP (ref 1–3.3)
LYMPHOCYTES # BLD AUTO: 34 % — SIGNIFICANT CHANGE UP (ref 13–44)
LYMPHOCYTES # BLD AUTO: 34 % — SIGNIFICANT CHANGE UP (ref 13–44)
MCHC RBC-ENTMCNC: 25.6 PG — LOW (ref 27–34)
MCHC RBC-ENTMCNC: 25.6 PG — LOW (ref 27–34)
MCHC RBC-ENTMCNC: 31.1 GM/DL — LOW (ref 32–36)
MCHC RBC-ENTMCNC: 31.1 GM/DL — LOW (ref 32–36)
MCV RBC AUTO: 82.4 FL — SIGNIFICANT CHANGE UP (ref 80–100)
MCV RBC AUTO: 82.4 FL — SIGNIFICANT CHANGE UP (ref 80–100)
MONOCYTES # BLD AUTO: 0.55 K/UL — SIGNIFICANT CHANGE UP (ref 0–0.9)
MONOCYTES # BLD AUTO: 0.55 K/UL — SIGNIFICANT CHANGE UP (ref 0–0.9)
MONOCYTES NFR BLD AUTO: 12.9 % — SIGNIFICANT CHANGE UP (ref 2–14)
MONOCYTES NFR BLD AUTO: 12.9 % — SIGNIFICANT CHANGE UP (ref 2–14)
NEUTROPHILS # BLD AUTO: 1.99 K/UL — SIGNIFICANT CHANGE UP (ref 1.8–7.4)
NEUTROPHILS # BLD AUTO: 1.99 K/UL — SIGNIFICANT CHANGE UP (ref 1.8–7.4)
NEUTROPHILS NFR BLD AUTO: 46.8 % — SIGNIFICANT CHANGE UP (ref 43–77)
NEUTROPHILS NFR BLD AUTO: 46.8 % — SIGNIFICANT CHANGE UP (ref 43–77)
NRBC # BLD: 0 /100 WBCS — SIGNIFICANT CHANGE UP (ref 0–0)
NRBC # BLD: 0 /100 WBCS — SIGNIFICANT CHANGE UP (ref 0–0)
PLATELET # BLD AUTO: 349 K/UL — SIGNIFICANT CHANGE UP (ref 150–400)
PLATELET # BLD AUTO: 349 K/UL — SIGNIFICANT CHANGE UP (ref 150–400)
RBC # BLD: 3.98 M/UL — LOW (ref 4.2–5.8)
RBC # BLD: 3.98 M/UL — LOW (ref 4.2–5.8)
RBC # FLD: 17.2 % — HIGH (ref 10.3–14.5)
RBC # FLD: 17.2 % — HIGH (ref 10.3–14.5)
WBC # BLD: 4.26 K/UL — SIGNIFICANT CHANGE UP (ref 3.8–10.5)
WBC # BLD: 4.26 K/UL — SIGNIFICANT CHANGE UP (ref 3.8–10.5)
WBC # FLD AUTO: 4.26 K/UL — SIGNIFICANT CHANGE UP (ref 3.8–10.5)
WBC # FLD AUTO: 4.26 K/UL — SIGNIFICANT CHANGE UP (ref 3.8–10.5)

## 2023-10-21 LAB
ALBUMIN SERPL ELPH-MCNC: 4 G/DL — SIGNIFICANT CHANGE UP (ref 3.3–5)
ALBUMIN SERPL ELPH-MCNC: 4 G/DL — SIGNIFICANT CHANGE UP (ref 3.3–5)
ALP SERPL-CCNC: 80 U/L — SIGNIFICANT CHANGE UP (ref 40–120)
ALP SERPL-CCNC: 80 U/L — SIGNIFICANT CHANGE UP (ref 40–120)
ALT FLD-CCNC: 12 U/L — SIGNIFICANT CHANGE UP (ref 10–45)
ALT FLD-CCNC: 12 U/L — SIGNIFICANT CHANGE UP (ref 10–45)
ANION GAP SERPL CALC-SCNC: 11 MMOL/L — SIGNIFICANT CHANGE UP (ref 5–17)
ANION GAP SERPL CALC-SCNC: 11 MMOL/L — SIGNIFICANT CHANGE UP (ref 5–17)
AST SERPL-CCNC: 15 U/L — SIGNIFICANT CHANGE UP (ref 10–40)
AST SERPL-CCNC: 15 U/L — SIGNIFICANT CHANGE UP (ref 10–40)
BILIRUB SERPL-MCNC: <0.2 MG/DL — SIGNIFICANT CHANGE UP (ref 0.2–1.2)
BILIRUB SERPL-MCNC: <0.2 MG/DL — SIGNIFICANT CHANGE UP (ref 0.2–1.2)
BUN SERPL-MCNC: 11 MG/DL — SIGNIFICANT CHANGE UP (ref 7–23)
BUN SERPL-MCNC: 11 MG/DL — SIGNIFICANT CHANGE UP (ref 7–23)
CALCIUM SERPL-MCNC: 9.6 MG/DL — SIGNIFICANT CHANGE UP (ref 8.4–10.5)
CALCIUM SERPL-MCNC: 9.6 MG/DL — SIGNIFICANT CHANGE UP (ref 8.4–10.5)
CHLORIDE SERPL-SCNC: 105 MMOL/L — SIGNIFICANT CHANGE UP (ref 96–108)
CHLORIDE SERPL-SCNC: 105 MMOL/L — SIGNIFICANT CHANGE UP (ref 96–108)
CO2 SERPL-SCNC: 25 MMOL/L — SIGNIFICANT CHANGE UP (ref 22–31)
CO2 SERPL-SCNC: 25 MMOL/L — SIGNIFICANT CHANGE UP (ref 22–31)
CREAT SERPL-MCNC: 1.12 MG/DL — SIGNIFICANT CHANGE UP (ref 0.5–1.3)
CREAT SERPL-MCNC: 1.12 MG/DL — SIGNIFICANT CHANGE UP (ref 0.5–1.3)
EGFR: 83 ML/MIN/1.73M2 — SIGNIFICANT CHANGE UP
EGFR: 83 ML/MIN/1.73M2 — SIGNIFICANT CHANGE UP
GLUCOSE SERPL-MCNC: 98 MG/DL — SIGNIFICANT CHANGE UP (ref 70–99)
GLUCOSE SERPL-MCNC: 98 MG/DL — SIGNIFICANT CHANGE UP (ref 70–99)
POTASSIUM SERPL-MCNC: 4.1 MMOL/L — SIGNIFICANT CHANGE UP (ref 3.5–5.3)
POTASSIUM SERPL-MCNC: 4.1 MMOL/L — SIGNIFICANT CHANGE UP (ref 3.5–5.3)
POTASSIUM SERPL-SCNC: 4.1 MMOL/L — SIGNIFICANT CHANGE UP (ref 3.5–5.3)
POTASSIUM SERPL-SCNC: 4.1 MMOL/L — SIGNIFICANT CHANGE UP (ref 3.5–5.3)
PROT SERPL-MCNC: 6.5 G/DL — SIGNIFICANT CHANGE UP (ref 6–8.3)
PROT SERPL-MCNC: 6.5 G/DL — SIGNIFICANT CHANGE UP (ref 6–8.3)
SODIUM SERPL-SCNC: 140 MMOL/L — SIGNIFICANT CHANGE UP (ref 135–145)
SODIUM SERPL-SCNC: 140 MMOL/L — SIGNIFICANT CHANGE UP (ref 135–145)

## 2023-10-25 LAB
CULTURE RESULTS: SIGNIFICANT CHANGE UP
CULTURE RESULTS: SIGNIFICANT CHANGE UP
SPECIMEN SOURCE: SIGNIFICANT CHANGE UP
SPECIMEN SOURCE: SIGNIFICANT CHANGE UP

## 2023-10-26 ENCOUNTER — NON-APPOINTMENT (OUTPATIENT)
Age: 43
End: 2023-10-26

## 2023-10-26 NOTE — ASU PATIENT PROFILE, ADULT - FALL HARM RISK - UNIVERSAL INTERVENTIONS
Bed in lowest position, wheels locked, appropriate side rails in place/Call bell, personal items and telephone in reach/Instruct patient to call for assistance before getting out of bed or chair/Non-slip footwear when patient is out of bed/[place] to call system/Physically safe environment - no spills, clutter or unnecessary equipment/Purposeful Proactive Rounding/Room/bathroom lighting operational, light cord in reach

## 2023-10-26 NOTE — ASU PATIENT PROFILE, ADULT - NSICDXPASTMEDICALHX_GEN_ALL_CORE_FT
PAST MEDICAL HISTORY:  H/O low back pain     H/O migraine     History of thrombosis of internal jugular vein right    HTN (hypertension)     Lumbar spondylosis     TTP (thrombotic thrombopenic purpura)

## 2023-10-27 ENCOUNTER — RESULT REVIEW (OUTPATIENT)
Age: 43
End: 2023-10-27

## 2023-10-27 ENCOUNTER — TRANSCRIPTION ENCOUNTER (OUTPATIENT)
Age: 43
End: 2023-10-27

## 2023-10-27 ENCOUNTER — OUTPATIENT (OUTPATIENT)
Dept: INPATIENT UNIT | Facility: HOSPITAL | Age: 43
LOS: 1 days | Discharge: HOME | End: 2023-10-27
Payer: COMMERCIAL

## 2023-10-27 VITALS
OXYGEN SATURATION: 100 % | SYSTOLIC BLOOD PRESSURE: 138 MMHG | RESPIRATION RATE: 16 BRPM | TEMPERATURE: 98 F | HEART RATE: 71 BPM | DIASTOLIC BLOOD PRESSURE: 102 MMHG

## 2023-10-27 VITALS
TEMPERATURE: 98 F | DIASTOLIC BLOOD PRESSURE: 98 MMHG | SYSTOLIC BLOOD PRESSURE: 156 MMHG | OXYGEN SATURATION: 100 % | WEIGHT: 251.99 LBS | HEIGHT: 71 IN | HEART RATE: 95 BPM | RESPIRATION RATE: 15 BRPM

## 2023-10-27 DIAGNOSIS — M31.19 OTHER THROMBOTIC MICROANGIOPATHY: ICD-10-CM

## 2023-10-27 DIAGNOSIS — Z48.03 ENCOUNTER FOR CHANGE OR REMOVAL OF DRAINS: ICD-10-CM

## 2023-10-27 LAB
INR BLD: 1.01 RATIO — SIGNIFICANT CHANGE UP (ref 0.85–1.18)
INR BLD: 1.01 RATIO — SIGNIFICANT CHANGE UP (ref 0.85–1.18)
PROTHROM AB SERPL-ACNC: 11.4 SEC — SIGNIFICANT CHANGE UP (ref 9.5–13)
PROTHROM AB SERPL-ACNC: 11.4 SEC — SIGNIFICANT CHANGE UP (ref 9.5–13)

## 2023-10-27 PROCEDURE — 32552 REMOVE LUNG CATHETER: CPT | Mod: LT

## 2023-10-27 PROCEDURE — 85610 PROTHROMBIN TIME: CPT

## 2023-10-27 PROCEDURE — 36415 COLL VENOUS BLD VENIPUNCTURE: CPT

## 2023-10-27 RX ORDER — RIVAROXABAN 15 MG-20MG
1 KIT ORAL
Refills: 0 | DISCHARGE

## 2023-10-27 RX ORDER — ENOXAPARIN SODIUM 100 MG/ML
100 INJECTION SUBCUTANEOUS
Refills: 0 | DISCHARGE

## 2023-10-27 RX ORDER — FOLIC ACID 0.8 MG
2 TABLET ORAL
Refills: 0 | DISCHARGE

## 2023-10-27 RX ORDER — FAMOTIDINE 10 MG/ML
1 INJECTION INTRAVENOUS
Refills: 0 | DISCHARGE

## 2023-10-27 NOTE — ASU DISCHARGE PLAN (ADULT/PEDIATRIC) - NS MD DC FALL RISK RISK
For information on Fall & Injury Prevention, visit: [email or web address] OR  [email or web address] OR  https://www.cdc.gov/steadi/patient.html

## 2023-11-02 ENCOUNTER — APPOINTMENT (OUTPATIENT)
Dept: HEMATOLOGY ONCOLOGY | Facility: CLINIC | Age: 43
End: 2023-11-02

## 2023-11-13 ENCOUNTER — NON-APPOINTMENT (OUTPATIENT)
Age: 43
End: 2023-11-13

## 2023-11-14 LAB
INR PPP: 1.16
PT BLD: 13

## 2023-11-17 ENCOUNTER — RESULT REVIEW (OUTPATIENT)
Age: 43
End: 2023-11-17

## 2023-11-17 ENCOUNTER — APPOINTMENT (OUTPATIENT)
Dept: HEMATOLOGY ONCOLOGY | Facility: CLINIC | Age: 43
End: 2023-11-17

## 2023-11-17 LAB
ALBUMIN SERPL ELPH-MCNC: 4.3 G/DL — SIGNIFICANT CHANGE UP (ref 3.3–5)
ALBUMIN SERPL ELPH-MCNC: 4.3 G/DL — SIGNIFICANT CHANGE UP (ref 3.3–5)
ALP SERPL-CCNC: 87 U/L — SIGNIFICANT CHANGE UP (ref 40–120)
ALP SERPL-CCNC: 87 U/L — SIGNIFICANT CHANGE UP (ref 40–120)
ALT FLD-CCNC: 11 U/L — SIGNIFICANT CHANGE UP (ref 10–45)
ALT FLD-CCNC: 11 U/L — SIGNIFICANT CHANGE UP (ref 10–45)
ANION GAP SERPL CALC-SCNC: 13 MMOL/L — SIGNIFICANT CHANGE UP (ref 5–17)
ANION GAP SERPL CALC-SCNC: 13 MMOL/L — SIGNIFICANT CHANGE UP (ref 5–17)
AST SERPL-CCNC: 15 U/L — SIGNIFICANT CHANGE UP (ref 10–40)
AST SERPL-CCNC: 15 U/L — SIGNIFICANT CHANGE UP (ref 10–40)
BASOPHILS # BLD AUTO: 0.03 K/UL — SIGNIFICANT CHANGE UP (ref 0–0.2)
BASOPHILS # BLD AUTO: 0.03 K/UL — SIGNIFICANT CHANGE UP (ref 0–0.2)
BASOPHILS NFR BLD AUTO: 0.7 % — SIGNIFICANT CHANGE UP (ref 0–2)
BASOPHILS NFR BLD AUTO: 0.7 % — SIGNIFICANT CHANGE UP (ref 0–2)
BILIRUB SERPL-MCNC: 0.2 MG/DL — SIGNIFICANT CHANGE UP (ref 0.2–1.2)
BILIRUB SERPL-MCNC: 0.2 MG/DL — SIGNIFICANT CHANGE UP (ref 0.2–1.2)
BUN SERPL-MCNC: 13 MG/DL — SIGNIFICANT CHANGE UP (ref 7–23)
BUN SERPL-MCNC: 13 MG/DL — SIGNIFICANT CHANGE UP (ref 7–23)
CALCIUM SERPL-MCNC: 9.6 MG/DL — SIGNIFICANT CHANGE UP (ref 8.4–10.5)
CALCIUM SERPL-MCNC: 9.6 MG/DL — SIGNIFICANT CHANGE UP (ref 8.4–10.5)
CHLORIDE SERPL-SCNC: 103 MMOL/L — SIGNIFICANT CHANGE UP (ref 96–108)
CHLORIDE SERPL-SCNC: 103 MMOL/L — SIGNIFICANT CHANGE UP (ref 96–108)
CO2 SERPL-SCNC: 24 MMOL/L — SIGNIFICANT CHANGE UP (ref 22–31)
CO2 SERPL-SCNC: 24 MMOL/L — SIGNIFICANT CHANGE UP (ref 22–31)
CREAT SERPL-MCNC: 1.08 MG/DL — SIGNIFICANT CHANGE UP (ref 0.5–1.3)
CREAT SERPL-MCNC: 1.08 MG/DL — SIGNIFICANT CHANGE UP (ref 0.5–1.3)
EGFR: 86 ML/MIN/1.73M2 — SIGNIFICANT CHANGE UP
EGFR: 86 ML/MIN/1.73M2 — SIGNIFICANT CHANGE UP
EOSINOPHIL # BLD AUTO: 0.11 K/UL — SIGNIFICANT CHANGE UP (ref 0–0.5)
EOSINOPHIL # BLD AUTO: 0.11 K/UL — SIGNIFICANT CHANGE UP (ref 0–0.5)
EOSINOPHIL NFR BLD AUTO: 2.7 % — SIGNIFICANT CHANGE UP (ref 0–6)
EOSINOPHIL NFR BLD AUTO: 2.7 % — SIGNIFICANT CHANGE UP (ref 0–6)
GLUCOSE SERPL-MCNC: 81 MG/DL — SIGNIFICANT CHANGE UP (ref 70–99)
GLUCOSE SERPL-MCNC: 81 MG/DL — SIGNIFICANT CHANGE UP (ref 70–99)
HCT VFR BLD CALC: 34.3 % — LOW (ref 39–50)
HCT VFR BLD CALC: 34.3 % — LOW (ref 39–50)
HGB BLD-MCNC: 10.3 G/DL — LOW (ref 13–17)
HGB BLD-MCNC: 10.3 G/DL — LOW (ref 13–17)
IMM GRANULOCYTES NFR BLD AUTO: 0.2 % — SIGNIFICANT CHANGE UP (ref 0–0.9)
IMM GRANULOCYTES NFR BLD AUTO: 0.2 % — SIGNIFICANT CHANGE UP (ref 0–0.9)
LYMPHOCYTES # BLD AUTO: 1.56 K/UL — SIGNIFICANT CHANGE UP (ref 1–3.3)
LYMPHOCYTES # BLD AUTO: 1.56 K/UL — SIGNIFICANT CHANGE UP (ref 1–3.3)
LYMPHOCYTES # BLD AUTO: 38.5 % — SIGNIFICANT CHANGE UP (ref 13–44)
LYMPHOCYTES # BLD AUTO: 38.5 % — SIGNIFICANT CHANGE UP (ref 13–44)
MAGNESIUM SERPL-MCNC: 2 MG/DL — SIGNIFICANT CHANGE UP (ref 1.6–2.6)
MAGNESIUM SERPL-MCNC: 2 MG/DL — SIGNIFICANT CHANGE UP (ref 1.6–2.6)
MCHC RBC-ENTMCNC: 22.7 PG — LOW (ref 27–34)
MCHC RBC-ENTMCNC: 22.7 PG — LOW (ref 27–34)
MCHC RBC-ENTMCNC: 30 GM/DL — LOW (ref 32–36)
MCHC RBC-ENTMCNC: 30 GM/DL — LOW (ref 32–36)
MCV RBC AUTO: 75.6 FL — LOW (ref 80–100)
MCV RBC AUTO: 75.6 FL — LOW (ref 80–100)
MONOCYTES # BLD AUTO: 0.41 K/UL — SIGNIFICANT CHANGE UP (ref 0–0.9)
MONOCYTES # BLD AUTO: 0.41 K/UL — SIGNIFICANT CHANGE UP (ref 0–0.9)
MONOCYTES NFR BLD AUTO: 10.1 % — SIGNIFICANT CHANGE UP (ref 2–14)
MONOCYTES NFR BLD AUTO: 10.1 % — SIGNIFICANT CHANGE UP (ref 2–14)
NEUTROPHILS # BLD AUTO: 1.93 K/UL — SIGNIFICANT CHANGE UP (ref 1.8–7.4)
NEUTROPHILS # BLD AUTO: 1.93 K/UL — SIGNIFICANT CHANGE UP (ref 1.8–7.4)
NEUTROPHILS NFR BLD AUTO: 47.8 % — SIGNIFICANT CHANGE UP (ref 43–77)
NEUTROPHILS NFR BLD AUTO: 47.8 % — SIGNIFICANT CHANGE UP (ref 43–77)
NRBC # BLD: 0 /100 WBCS — SIGNIFICANT CHANGE UP (ref 0–0)
NRBC # BLD: 0 /100 WBCS — SIGNIFICANT CHANGE UP (ref 0–0)
PHOSPHATE SERPL-MCNC: 2.9 MG/DL — SIGNIFICANT CHANGE UP (ref 2.5–4.5)
PHOSPHATE SERPL-MCNC: 2.9 MG/DL — SIGNIFICANT CHANGE UP (ref 2.5–4.5)
PLATELET # BLD AUTO: 387 K/UL — SIGNIFICANT CHANGE UP (ref 150–400)
PLATELET # BLD AUTO: 387 K/UL — SIGNIFICANT CHANGE UP (ref 150–400)
POTASSIUM SERPL-MCNC: 4 MMOL/L — SIGNIFICANT CHANGE UP (ref 3.5–5.3)
POTASSIUM SERPL-MCNC: 4 MMOL/L — SIGNIFICANT CHANGE UP (ref 3.5–5.3)
POTASSIUM SERPL-SCNC: 4 MMOL/L — SIGNIFICANT CHANGE UP (ref 3.5–5.3)
POTASSIUM SERPL-SCNC: 4 MMOL/L — SIGNIFICANT CHANGE UP (ref 3.5–5.3)
PROT SERPL-MCNC: 6.8 G/DL — SIGNIFICANT CHANGE UP (ref 6–8.3)
PROT SERPL-MCNC: 6.8 G/DL — SIGNIFICANT CHANGE UP (ref 6–8.3)
RBC # BLD: 4.54 M/UL — SIGNIFICANT CHANGE UP (ref 4.2–5.8)
RBC # BLD: 4.54 M/UL — SIGNIFICANT CHANGE UP (ref 4.2–5.8)
RBC # FLD: 18 % — HIGH (ref 10.3–14.5)
RBC # FLD: 18 % — HIGH (ref 10.3–14.5)
SODIUM SERPL-SCNC: 140 MMOL/L — SIGNIFICANT CHANGE UP (ref 135–145)
SODIUM SERPL-SCNC: 140 MMOL/L — SIGNIFICANT CHANGE UP (ref 135–145)
WBC # BLD: 4.05 K/UL — SIGNIFICANT CHANGE UP (ref 3.8–10.5)
WBC # BLD: 4.05 K/UL — SIGNIFICANT CHANGE UP (ref 3.8–10.5)
WBC # FLD AUTO: 4.05 K/UL — SIGNIFICANT CHANGE UP (ref 3.8–10.5)
WBC # FLD AUTO: 4.05 K/UL — SIGNIFICANT CHANGE UP (ref 3.8–10.5)

## 2023-11-22 ENCOUNTER — APPOINTMENT (OUTPATIENT)
Dept: HEMATOLOGY ONCOLOGY | Facility: CLINIC | Age: 43
End: 2023-11-22
Payer: COMMERCIAL

## 2023-11-22 VITALS
SYSTOLIC BLOOD PRESSURE: 126 MMHG | WEIGHT: 253 LBS | RESPIRATION RATE: 15 BRPM | HEIGHT: 71 IN | HEART RATE: 88 BPM | OXYGEN SATURATION: 100 % | DIASTOLIC BLOOD PRESSURE: 85 MMHG | BODY MASS INDEX: 35.42 KG/M2 | TEMPERATURE: 97.7 F

## 2023-11-22 PROCEDURE — 99214 OFFICE O/P EST MOD 30 MIN: CPT

## 2024-01-05 PROBLEM — M47.816 SPONDYLOSIS WITHOUT MYELOPATHY OR RADICULOPATHY, LUMBAR REGION: Chronic | Status: ACTIVE | Noted: 2023-10-26

## 2024-01-05 PROBLEM — Z86.718 PERSONAL HISTORY OF OTHER VENOUS THROMBOSIS AND EMBOLISM: Chronic | Status: ACTIVE | Noted: 2023-10-26

## 2024-01-05 PROBLEM — M31.19: Chronic | Status: ACTIVE | Noted: 2023-10-26

## 2024-01-05 PROBLEM — Z87.39 PERSONAL HISTORY OF OTHER DISEASES OF THE MUSCULOSKELETAL SYSTEM AND CONNECTIVE TISSUE: Chronic | Status: ACTIVE | Noted: 2023-10-26

## 2024-01-05 PROBLEM — I10 ESSENTIAL (PRIMARY) HYPERTENSION: Chronic | Status: ACTIVE | Noted: 2023-10-26

## 2024-01-05 PROBLEM — Z86.69 PERSONAL HISTORY OF OTHER DISEASES OF THE NERVOUS SYSTEM AND SENSE ORGANS: Chronic | Status: ACTIVE | Noted: 2023-10-26

## 2024-01-31 ENCOUNTER — OUTPATIENT (OUTPATIENT)
Dept: OUTPATIENT SERVICES | Facility: HOSPITAL | Age: 44
LOS: 1 days | Discharge: HOME | End: 2024-01-31

## 2024-01-31 DIAGNOSIS — M31.19 OTHER THROMBOTIC MICROANGIOPATHY: ICD-10-CM

## 2024-02-02 ENCOUNTER — RESULT REVIEW (OUTPATIENT)
Age: 44
End: 2024-02-02

## 2024-02-02 ENCOUNTER — APPOINTMENT (OUTPATIENT)
Dept: HEMATOLOGY ONCOLOGY | Facility: CLINIC | Age: 44
End: 2024-02-02

## 2024-02-02 LAB
ALBUMIN SERPL ELPH-MCNC: 5.1 G/DL — HIGH (ref 3.3–5)
ALP SERPL-CCNC: 98 U/L — SIGNIFICANT CHANGE UP (ref 40–120)
ALT FLD-CCNC: 14 U/L — SIGNIFICANT CHANGE UP (ref 10–45)
ANION GAP SERPL CALC-SCNC: 15 MMOL/L — SIGNIFICANT CHANGE UP (ref 5–17)
AST SERPL-CCNC: 24 U/L — SIGNIFICANT CHANGE UP (ref 10–40)
BASOPHILS # BLD AUTO: 0.01 K/UL — SIGNIFICANT CHANGE UP (ref 0–0.2)
BASOPHILS NFR BLD AUTO: 0.2 % — SIGNIFICANT CHANGE UP (ref 0–2)
BILIRUB SERPL-MCNC: 0.2 MG/DL — SIGNIFICANT CHANGE UP (ref 0.2–1.2)
BUN SERPL-MCNC: 18 MG/DL — SIGNIFICANT CHANGE UP (ref 7–23)
CALCIUM SERPL-MCNC: 10.2 MG/DL — SIGNIFICANT CHANGE UP (ref 8.4–10.5)
CHLORIDE SERPL-SCNC: 102 MMOL/L — SIGNIFICANT CHANGE UP (ref 96–108)
CO2 SERPL-SCNC: 22 MMOL/L — SIGNIFICANT CHANGE UP (ref 22–31)
CREAT SERPL-MCNC: 1.26 MG/DL — SIGNIFICANT CHANGE UP (ref 0.5–1.3)
EGFR: 72 ML/MIN/1.73M2 — SIGNIFICANT CHANGE UP
EOSINOPHIL # BLD AUTO: 0.1 K/UL — SIGNIFICANT CHANGE UP (ref 0–0.5)
EOSINOPHIL NFR BLD AUTO: 2.2 % — SIGNIFICANT CHANGE UP (ref 0–6)
GLUCOSE SERPL-MCNC: 85 MG/DL — SIGNIFICANT CHANGE UP (ref 70–99)
HCT VFR BLD CALC: 38.9 % — LOW (ref 39–50)
HGB BLD-MCNC: 11.7 G/DL — LOW (ref 13–17)
IMM GRANULOCYTES NFR BLD AUTO: 0.7 % — SIGNIFICANT CHANGE UP (ref 0–0.9)
LDH SERPL L TO P-CCNC: 240 U/L — SIGNIFICANT CHANGE UP (ref 50–242)
LYMPHOCYTES # BLD AUTO: 1.51 K/UL — SIGNIFICANT CHANGE UP (ref 1–3.3)
LYMPHOCYTES # BLD AUTO: 33.6 % — SIGNIFICANT CHANGE UP (ref 13–44)
MCHC RBC-ENTMCNC: 20.5 PG — LOW (ref 27–34)
MCHC RBC-ENTMCNC: 30.1 GM/DL — LOW (ref 32–36)
MCV RBC AUTO: 68.1 FL — LOW (ref 80–100)
MONOCYTES # BLD AUTO: 0.3 K/UL — SIGNIFICANT CHANGE UP (ref 0–0.9)
MONOCYTES NFR BLD AUTO: 6.7 % — SIGNIFICANT CHANGE UP (ref 2–14)
NEUTROPHILS # BLD AUTO: 2.54 K/UL — SIGNIFICANT CHANGE UP (ref 1.8–7.4)
NEUTROPHILS NFR BLD AUTO: 56.6 % — SIGNIFICANT CHANGE UP (ref 43–77)
NRBC # BLD: 0 /100 WBCS — SIGNIFICANT CHANGE UP (ref 0–0)
PLATELET # BLD AUTO: 391 K/UL — SIGNIFICANT CHANGE UP (ref 150–400)
POTASSIUM SERPL-MCNC: 4.3 MMOL/L — SIGNIFICANT CHANGE UP (ref 3.5–5.3)
POTASSIUM SERPL-SCNC: 4.3 MMOL/L — SIGNIFICANT CHANGE UP (ref 3.5–5.3)
PROT SERPL-MCNC: 8.1 G/DL — SIGNIFICANT CHANGE UP (ref 6–8.3)
RBC # BLD: 5.71 M/UL — SIGNIFICANT CHANGE UP (ref 4.2–5.8)
RBC # FLD: 20.1 % — HIGH (ref 10.3–14.5)
SODIUM SERPL-SCNC: 140 MMOL/L — SIGNIFICANT CHANGE UP (ref 135–145)
WBC # BLD: 4.49 K/UL — SIGNIFICANT CHANGE UP (ref 3.8–10.5)
WBC # FLD AUTO: 4.49 K/UL — SIGNIFICANT CHANGE UP (ref 3.8–10.5)

## 2024-02-09 ENCOUNTER — APPOINTMENT (OUTPATIENT)
Dept: HEMATOLOGY ONCOLOGY | Facility: CLINIC | Age: 44
End: 2024-02-09

## 2024-02-09 ENCOUNTER — RESULT REVIEW (OUTPATIENT)
Age: 44
End: 2024-02-09

## 2024-02-16 ENCOUNTER — APPOINTMENT (OUTPATIENT)
Dept: INTERNAL MEDICINE | Facility: CLINIC | Age: 44
End: 2024-02-16
Payer: COMMERCIAL

## 2024-02-16 VITALS — DIASTOLIC BLOOD PRESSURE: 82 MMHG | HEART RATE: 72 BPM | RESPIRATION RATE: 12 BRPM | SYSTOLIC BLOOD PRESSURE: 116 MMHG

## 2024-02-16 VITALS — HEIGHT: 71 IN | WEIGHT: 258 LBS | BODY MASS INDEX: 36.12 KG/M2

## 2024-02-16 DIAGNOSIS — H81.13 BENIGN PAROXYSMAL VERTIGO, BILATERAL: ICD-10-CM

## 2024-02-16 LAB
ADAMTS13 ACTIVITY: 11.6 % — SIGNIFICANT CHANGE UP
ADAMTS13-COMMENT: SIGNIFICANT CHANGE UP
VWF CP INHIB PPP-ACNC: 5 UNITS/ML — SIGNIFICANT CHANGE UP

## 2024-02-16 PROCEDURE — 99212 OFFICE O/P EST SF 10 MIN: CPT

## 2024-02-16 NOTE — HISTORY OF PRESENT ILLNESS
[FreeTextEntry1] : dizziness [de-identified] : dizziness no chest pain nvd or palpitations no passing out dizziness is positional

## 2024-02-16 NOTE — ASSESSMENT
[FreeTextEntry1] : vertigo vestibular exercises should recover with exercises no need for imaging yet unless does not improve

## 2024-02-20 ENCOUNTER — NON-APPOINTMENT (OUTPATIENT)
Age: 44
End: 2024-02-20

## 2024-02-28 ENCOUNTER — APPOINTMENT (OUTPATIENT)
Dept: HEMATOLOGY ONCOLOGY | Facility: CLINIC | Age: 44
End: 2024-02-28
Payer: COMMERCIAL

## 2024-02-28 VITALS
BODY MASS INDEX: 36.75 KG/M2 | TEMPERATURE: 98.1 F | HEART RATE: 76 BPM | OXYGEN SATURATION: 100 % | SYSTOLIC BLOOD PRESSURE: 140 MMHG | DIASTOLIC BLOOD PRESSURE: 89 MMHG | WEIGHT: 262.5 LBS | RESPIRATION RATE: 16 BRPM | HEIGHT: 71 IN

## 2024-02-28 DIAGNOSIS — I82.C11 ACUTE EMBOLISM AND THROMBOSIS OF RIGHT INTERNAL JUGULAR VEIN: ICD-10-CM

## 2024-02-28 DIAGNOSIS — M31.19 OTHER THROMBOTIC MICROANGIOPATHY: ICD-10-CM

## 2024-02-28 PROCEDURE — 99214 OFFICE O/P EST MOD 30 MIN: CPT

## 2024-02-28 NOTE — ASSESSMENT
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 S/p plasmapheresis. Needed prolonged steroid taper. Off steroids early October. S/p Rituxan x 4 in August 2023. Responded well.  Monitoring : Monitor for symptoms of recurrent TTP- urgent evaluation if any suspicion. CBC , LDH, Haptoglobin , CRMEBQ17 every 2-3 months x 2 years next yearly.  Recent IIOEUJ44 is low- 11 but CBC normal, no thrombocytopenia. ACKRDM65 < 20 is associated with an increased risk of relapse- but  time to relapse might be quite long - up to several years. Unclear if  treatment intervention / increase in level of QSRCAO19 will have significant impact at this point.  Consideration for rituxan retreatment versus Rituxan " maintenance" every 2 months to maintain FHLZHV72 at > 20 level -  done in clinical practice although strong data to support this strategy is missing.  D/w patient. For now- he prefers to continue close monitoring and consideration for Rituxan if ANRTXH97 levels continue to drop.  Next bllod work in 2 months ( CBC LDH Haptoglobin  [id number] )

## 2024-02-28 NOTE — REVIEW OF SYSTEMS
[Patient Intake Form Reviewed] : Patient intake form was reviewed [Fatigue] : no fatigue [Negative] : ENT [FreeTextEntry9] : per HPI

## 2024-02-28 NOTE — HISTORY OF PRESENT ILLNESS
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    7/29/23 - 08/02/23 - Admitted to  with chest pain, and noted with anemia and thrombocytopenia, peripheral smear at presentation c/w large amount of schistocytes -> TTP. Confirmatory LYAMND53 < 2.    Hgb 8.3, plts 7K, LDH >1500, Hapto <20, D-Dimer > 3000, retic elevated.   Hgb dino 6.8 s/p 1U PRBC's.  7/28/23 - Dex 40mg x1, then methylprednisolone 125mg IV q 6 hrs daily for 2 days 07/30 & 07/31.  8/01/23 - Started PO Prednisone 50 mg PO BID x two days (08/01 & 08/02). S/p plasmapheresis x 4 days (07/31 - 08/03).  Plats to 53K after 1st pheresis ->174K -> 260K -> 312K.  8/2/23 - Discharged on prednisone 40mg BID.  8/3/23 - Platelets checked as outpatient - 329K. 8/4/23 - Platelets checked as outpatient - 396K (Friday).  8/7/23 - 8/16/23 - Patient went back to  ER as developed hematuria.  Platelets 3K.  Admitted and plasmapheresis restarted.  Hgb dino 6.9 -> 1U PRBC's Received Solumedrol 125mg q 12 hrs x 2 days (08/07 & 08/08) total then Prednisone 50mg BID (dose based on 1mg/kg) starting 08/09 8/8/23 - Started Rituximab q week x 4 doses.  Had right Shiley - US with RIJ thrombosis. Catheter changed, now with Left tunneled catheter.  Started on Lovenox ( 100mg BID)  once platelets recovered.   Pheresis was stopped after platelets were >150K x 2 days.  Discharged on prednisone 40mg BID.  Catheter left in.  On prednisone taper.  Admitted to  9/24-9/29/23 with SOB/ hypoxia. Treated for presumptive G neg pneumonia and PCP pneumonia. Bronch/ lavage - negative for PCP. No improvement with antibiotics. Given lasix for possible acute decompensated diastolic heart failure - improved. Discharged home on Cipro, Bactrim to complete 10 day course , diuretics PRN. S/p prednisone taper.  Left chest  Shiley catheter removed 10/27/23.  Monitored.   [de-identified] :  Feels well. No bleeding, no petechiae.  Mild stiffness and diffuse arthralgias  for last few months- but stable and improve with activity.

## 2024-03-26 NOTE — DISCHARGE NOTE PROVIDER - NSDCQMSTROKE_NEU_ALL_CORE
Patient left a VM asking for codes for upcomming EGD that is scheduled on 4/3/24. Detailed VM left with codes for EGD and Jeanine estimates line. Patient advised to call back for any further questions/concerns.    No

## 2024-03-26 NOTE — ED PROVIDER NOTE - NS ED ATTENDING STATEMENT MOD
[FreeTextEntry1] : In a summary Ana Christianson is a middle- aged- female with on and off palpitations, Echo done showed normal LV systolic function. Echo results explained. Avoid drinking Tea, eating dark chocolate and sweets. Breathing exercises. Any recurrent palpitations reconsult. 
Attending Only

## 2024-05-22 ENCOUNTER — OUTPATIENT (OUTPATIENT)
Dept: OUTPATIENT SERVICES | Facility: HOSPITAL | Age: 44
LOS: 1 days | Discharge: HOME | End: 2024-05-22

## 2024-05-22 DIAGNOSIS — M31.19 OTHER THROMBOTIC MICROANGIOPATHY: ICD-10-CM

## 2024-05-30 ENCOUNTER — APPOINTMENT (OUTPATIENT)
Dept: HEMATOLOGY ONCOLOGY | Facility: CLINIC | Age: 44
End: 2024-05-30

## 2024-07-28 ENCOUNTER — OUTPATIENT (OUTPATIENT)
Dept: OUTPATIENT SERVICES | Facility: HOSPITAL | Age: 44
LOS: 1 days | Discharge: HOME | End: 2024-07-28

## 2024-07-28 DIAGNOSIS — M31.19 OTHER THROMBOTIC MICROANGIOPATHY: ICD-10-CM

## 2024-08-05 ENCOUNTER — RESULT REVIEW (OUTPATIENT)
Age: 44
End: 2024-08-05

## 2024-08-05 ENCOUNTER — APPOINTMENT (OUTPATIENT)
Dept: HEMATOLOGY ONCOLOGY | Facility: CLINIC | Age: 44
End: 2024-08-05

## 2024-08-05 PROBLEM — D64.9 ANEMIA, UNSPECIFIED TYPE: Status: ACTIVE | Noted: 2024-08-05

## 2024-08-05 LAB
BASOPHILS # BLD AUTO: 0.01 K/UL — SIGNIFICANT CHANGE UP (ref 0–0.2)
BASOPHILS NFR BLD AUTO: 0.2 % — SIGNIFICANT CHANGE UP (ref 0–2)
EOSINOPHIL # BLD AUTO: 0.22 K/UL — SIGNIFICANT CHANGE UP (ref 0–0.5)
EOSINOPHIL NFR BLD AUTO: 5 % — SIGNIFICANT CHANGE UP (ref 0–6)
HCT VFR BLD CALC: 32 % — LOW (ref 39–50)
HGB BLD-MCNC: 9.6 G/DL — LOW (ref 13–17)
IMM GRANULOCYTES NFR BLD AUTO: 0 % — SIGNIFICANT CHANGE UP (ref 0–0.9)
LYMPHOCYTES # BLD AUTO: 1.67 K/UL — SIGNIFICANT CHANGE UP (ref 1–3.3)
LYMPHOCYTES # BLD AUTO: 38.1 % — SIGNIFICANT CHANGE UP (ref 13–44)
MCHC RBC-ENTMCNC: 20 PG — LOW (ref 27–34)
MCHC RBC-ENTMCNC: 30 GM/DL — LOW (ref 32–36)
MCV RBC AUTO: 66.7 FL — LOW (ref 80–100)
MONOCYTES # BLD AUTO: 0.35 K/UL — SIGNIFICANT CHANGE UP (ref 0–0.9)
MONOCYTES NFR BLD AUTO: 8 % — SIGNIFICANT CHANGE UP (ref 2–14)
NEUTROPHILS # BLD AUTO: 2.13 K/UL — SIGNIFICANT CHANGE UP (ref 1.8–7.4)
NEUTROPHILS NFR BLD AUTO: 48.7 % — SIGNIFICANT CHANGE UP (ref 43–77)
NRBC # BLD: 0 /100 WBCS — SIGNIFICANT CHANGE UP (ref 0–0)
PLATELET # BLD AUTO: 277 K/UL — SIGNIFICANT CHANGE UP (ref 150–400)
RBC # BLD: 4.8 M/UL — SIGNIFICANT CHANGE UP (ref 4.2–5.8)
RBC # FLD: 17.2 % — HIGH (ref 10.3–14.5)
WBC # BLD: 4.38 K/UL — SIGNIFICANT CHANGE UP (ref 3.8–10.5)
WBC # FLD AUTO: 4.38 K/UL — SIGNIFICANT CHANGE UP (ref 3.8–10.5)

## 2024-08-06 LAB
ADAMTS13 ACTIVITY: 2 % — LOW (ref 40–130)
ERYTHROCYTE [SEDIMENTATION RATE] IN BLOOD: 40 MM/HR — HIGH (ref 0–15)
HAPTOGLOB SERPL-MCNC: 193 MG/DL — SIGNIFICANT CHANGE UP (ref 34–200)
LDH SERPL L TO P-CCNC: 168 U/L — SIGNIFICANT CHANGE UP (ref 50–242)

## 2024-08-07 LAB
FERRITIN SERPL-MCNC: 6 NG/ML — LOW (ref 30–400)
IRON SATN MFR SERPL: 21 UG/DL — LOW (ref 45–165)
IRON SATN MFR SERPL: 5 % — LOW (ref 16–55)
TIBC SERPL-MCNC: 415 UG/DL — SIGNIFICANT CHANGE UP (ref 220–430)
UIBC SERPL-MCNC: 394 UG/DL — HIGH (ref 110–370)
VWF CP INHIB PPP QL: POSITIVE

## 2024-08-13 ENCOUNTER — NON-APPOINTMENT (OUTPATIENT)
Age: 44
End: 2024-08-13

## 2024-08-14 ENCOUNTER — RESULT REVIEW (OUTPATIENT)
Age: 44
End: 2024-08-14

## 2024-08-14 ENCOUNTER — APPOINTMENT (OUTPATIENT)
Dept: HEMATOLOGY ONCOLOGY | Facility: CLINIC | Age: 44
End: 2024-08-14
Payer: COMMERCIAL

## 2024-08-14 ENCOUNTER — APPOINTMENT (OUTPATIENT)
Dept: INFUSION THERAPY | Facility: CLINIC | Age: 44
End: 2024-08-14
Payer: COMMERCIAL

## 2024-08-14 ENCOUNTER — INPATIENT (INPATIENT)
Facility: HOSPITAL | Age: 44
LOS: 5 days | Discharge: HOME | DRG: 813 | End: 2024-08-20
Attending: FAMILY MEDICINE | Admitting: SURGERY
Payer: COMMERCIAL

## 2024-08-14 VITALS — WEIGHT: 260.59 LBS | HEIGHT: 71 IN

## 2024-08-14 VITALS
BODY MASS INDEX: 36.12 KG/M2 | TEMPERATURE: 98.2 F | SYSTOLIC BLOOD PRESSURE: 136 MMHG | DIASTOLIC BLOOD PRESSURE: 86 MMHG | OXYGEN SATURATION: 100 % | WEIGHT: 258 LBS | HEIGHT: 71 IN | HEART RATE: 77 BPM

## 2024-08-14 DIAGNOSIS — D69.9 HEMORRHAGIC CONDITION, UNSPECIFIED: ICD-10-CM

## 2024-08-14 DIAGNOSIS — E61.1 IRON DEFICIENCY: ICD-10-CM

## 2024-08-14 DIAGNOSIS — M31.19 OTHER THROMBOTIC MICROANGIOPATHY: ICD-10-CM

## 2024-08-14 LAB
ALBUMIN SERPL ELPH-MCNC: 3.7 G/DL — SIGNIFICANT CHANGE UP (ref 3.3–5)
ALP SERPL-CCNC: 72 U/L — SIGNIFICANT CHANGE UP (ref 40–120)
ALT FLD-CCNC: 24 U/L — SIGNIFICANT CHANGE UP (ref 12–78)
ANION GAP SERPL CALC-SCNC: 4 MMOL/L — LOW (ref 5–17)
APPEARANCE UR: CLEAR — SIGNIFICANT CHANGE UP
AST SERPL-CCNC: 31 U/L — SIGNIFICANT CHANGE UP (ref 15–37)
BACTERIA # UR AUTO: NEGATIVE /HPF — SIGNIFICANT CHANGE UP
BASOPHILS # BLD AUTO: 0.01 K/UL — SIGNIFICANT CHANGE UP (ref 0–0.2)
BASOPHILS # BLD AUTO: 0.02 K/UL — SIGNIFICANT CHANGE UP (ref 0–0.2)
BASOPHILS NFR BLD AUTO: 0.2 % — SIGNIFICANT CHANGE UP (ref 0–2)
BASOPHILS NFR BLD AUTO: 0.4 % — SIGNIFICANT CHANGE UP (ref 0–2)
BILIRUB SERPL-MCNC: 1.8 MG/DL — HIGH (ref 0.2–1.2)
BILIRUB UR-MCNC: NEGATIVE — SIGNIFICANT CHANGE UP
BUN SERPL-MCNC: 23 MG/DL — SIGNIFICANT CHANGE UP (ref 7–23)
CALCIUM SERPL-MCNC: 9.2 MG/DL — SIGNIFICANT CHANGE UP (ref 8.5–10.1)
CAST: 2 /LPF — SIGNIFICANT CHANGE UP (ref 0–4)
CHLORIDE SERPL-SCNC: 109 MMOL/L — HIGH (ref 96–108)
CO2 SERPL-SCNC: 27 MMOL/L — SIGNIFICANT CHANGE UP (ref 22–31)
COLOR SPEC: YELLOW — SIGNIFICANT CHANGE UP
CREAT SERPL-MCNC: 1.84 MG/DL — HIGH (ref 0.5–1.3)
DIFF PNL FLD: ABNORMAL
EGFR: 45 ML/MIN/1.73M2 — LOW
EOSINOPHIL # BLD AUTO: 0.14 K/UL — SIGNIFICANT CHANGE UP (ref 0–0.5)
EOSINOPHIL # BLD AUTO: 0.17 K/UL — SIGNIFICANT CHANGE UP (ref 0–0.5)
EOSINOPHIL NFR BLD AUTO: 2.6 % — SIGNIFICANT CHANGE UP (ref 0–6)
EOSINOPHIL NFR BLD AUTO: 3.6 % — SIGNIFICANT CHANGE UP (ref 0–6)
GLUCOSE SERPL-MCNC: 105 MG/DL — HIGH (ref 70–99)
GLUCOSE UR QL: NEGATIVE MG/DL — SIGNIFICANT CHANGE UP
GRAN CASTS # UR COMP ASSIST: PRESENT
HCT VFR BLD CALC: 28.3 % — LOW (ref 39–50)
HCT VFR BLD CALC: 29.2 % — LOW (ref 39–50)
HGB BLD-MCNC: 8.6 G/DL — LOW (ref 13–17)
HGB BLD-MCNC: 9.1 G/DL — LOW (ref 13–17)
IMM GRANULOCYTES NFR BLD AUTO: 0.6 % — SIGNIFICANT CHANGE UP (ref 0–0.9)
IMM GRANULOCYTES NFR BLD AUTO: 0.7 % — SIGNIFICANT CHANGE UP (ref 0–0.9)
KETONES UR-MCNC: ABNORMAL MG/DL
LEUKOCYTE ESTERASE UR-ACNC: NEGATIVE — SIGNIFICANT CHANGE UP
LYMPHOCYTES # BLD AUTO: 0.89 K/UL — LOW (ref 1–3.3)
LYMPHOCYTES # BLD AUTO: 0.94 K/UL — LOW (ref 1–3.3)
LYMPHOCYTES # BLD AUTO: 16.5 % — SIGNIFICANT CHANGE UP (ref 13–44)
LYMPHOCYTES # BLD AUTO: 20.1 % — SIGNIFICANT CHANGE UP (ref 13–44)
MCHC RBC-ENTMCNC: 20.9 PG — LOW (ref 27–34)
MCHC RBC-ENTMCNC: 21.5 PG — LOW (ref 27–34)
MCHC RBC-ENTMCNC: 30.4 GM/DL — LOW (ref 32–36)
MCHC RBC-ENTMCNC: 31.2 GM/DL — LOW (ref 32–36)
MCV RBC AUTO: 68.9 FL — LOW (ref 80–100)
MCV RBC AUTO: 69 FL — LOW (ref 80–100)
MONOCYTES # BLD AUTO: 0.28 K/UL — SIGNIFICANT CHANGE UP (ref 0–0.9)
MONOCYTES # BLD AUTO: 0.3 K/UL — SIGNIFICANT CHANGE UP (ref 0–0.9)
MONOCYTES NFR BLD AUTO: 5.2 % — SIGNIFICANT CHANGE UP (ref 2–14)
MONOCYTES NFR BLD AUTO: 6.4 % — SIGNIFICANT CHANGE UP (ref 2–14)
NEUTROPHILS # BLD AUTO: 3.23 K/UL — SIGNIFICANT CHANGE UP (ref 1.8–7.4)
NEUTROPHILS # BLD AUTO: 4.02 K/UL — SIGNIFICANT CHANGE UP (ref 1.8–7.4)
NEUTROPHILS NFR BLD AUTO: 69.1 % — SIGNIFICANT CHANGE UP (ref 43–77)
NEUTROPHILS NFR BLD AUTO: 74.6 % — SIGNIFICANT CHANGE UP (ref 43–77)
NITRITE UR-MCNC: NEGATIVE — SIGNIFICANT CHANGE UP
NRBC # BLD: 0 /100 WBCS — SIGNIFICANT CHANGE UP (ref 0–0)
PH UR: 6 — SIGNIFICANT CHANGE UP (ref 5–8)
PLATELET # BLD AUTO: 15 K/UL — CRITICAL LOW (ref 150–400)
PLATELET # BLD AUTO: 16 K/UL — CRITICAL LOW (ref 150–400)
POTASSIUM SERPL-MCNC: 3.6 MMOL/L — SIGNIFICANT CHANGE UP (ref 3.5–5.3)
POTASSIUM SERPL-SCNC: 3.6 MMOL/L — SIGNIFICANT CHANGE UP (ref 3.5–5.3)
PROT SERPL-MCNC: 7.3 GM/DL — SIGNIFICANT CHANGE UP (ref 6–8.3)
PROT UR-MCNC: 100 MG/DL
RBC # BLD: 4.11 M/UL — LOW (ref 4.2–5.8)
RBC # BLD: 4.23 M/UL — SIGNIFICANT CHANGE UP (ref 4.2–5.8)
RBC # FLD: 22.2 % — HIGH (ref 10.3–14.5)
RBC # FLD: 22.5 % — HIGH (ref 10.3–14.5)
RBC CASTS # UR COMP ASSIST: 8 /HPF — HIGH (ref 0–4)
SODIUM SERPL-SCNC: 140 MMOL/L — SIGNIFICANT CHANGE UP (ref 135–145)
SP GR SPEC: 1.02 — SIGNIFICANT CHANGE UP (ref 1–1.03)
SQUAMOUS # UR AUTO: 3 /HPF — SIGNIFICANT CHANGE UP (ref 0–5)
UROBILINOGEN FLD QL: 1 MG/DL — SIGNIFICANT CHANGE UP (ref 0.2–1)
WBC # BLD: 4.68 K/UL — SIGNIFICANT CHANGE UP (ref 3.8–10.5)
WBC # BLD: 5.39 K/UL — SIGNIFICANT CHANGE UP (ref 3.8–10.5)
WBC # FLD AUTO: 4.68 K/UL — SIGNIFICANT CHANGE UP (ref 3.8–10.5)
WBC # FLD AUTO: 5.39 K/UL — SIGNIFICANT CHANGE UP (ref 3.8–10.5)
WBC UR QL: 3 /HPF — SIGNIFICANT CHANGE UP (ref 0–5)

## 2024-08-14 PROCEDURE — P9017: CPT

## 2024-08-14 PROCEDURE — 83735 ASSAY OF MAGNESIUM: CPT

## 2024-08-14 PROCEDURE — 82728 ASSAY OF FERRITIN: CPT

## 2024-08-14 PROCEDURE — 83550 IRON BINDING TEST: CPT

## 2024-08-14 PROCEDURE — 86704 HEP B CORE ANTIBODY TOTAL: CPT

## 2024-08-14 PROCEDURE — 83010 ASSAY OF HAPTOGLOBIN QUANT: CPT

## 2024-08-14 PROCEDURE — 99215 OFFICE O/P EST HI 40 MIN: CPT

## 2024-08-14 PROCEDURE — 76770 US EXAM ABDO BACK WALL COMP: CPT

## 2024-08-14 PROCEDURE — 83615 LACTATE (LD) (LDH) ENZYME: CPT

## 2024-08-14 PROCEDURE — 86901 BLOOD TYPING SEROLOGIC RH(D): CPT

## 2024-08-14 PROCEDURE — 81001 URINALYSIS AUTO W/SCOPE: CPT

## 2024-08-14 PROCEDURE — 85335 FACTOR INHIBITOR TEST: CPT

## 2024-08-14 PROCEDURE — 83036 HEMOGLOBIN GLYCOSYLATED A1C: CPT

## 2024-08-14 PROCEDURE — 84443 ASSAY THYROID STIM HORMONE: CPT

## 2024-08-14 PROCEDURE — 86706 HEP B SURFACE ANTIBODY: CPT

## 2024-08-14 PROCEDURE — 71045 X-RAY EXAM CHEST 1 VIEW: CPT

## 2024-08-14 PROCEDURE — P9011: CPT

## 2024-08-14 PROCEDURE — 83540 ASSAY OF IRON: CPT

## 2024-08-14 PROCEDURE — 86985 SPLIT BLOOD OR PRODUCTS: CPT

## 2024-08-14 PROCEDURE — 80048 BASIC METABOLIC PNL TOTAL CA: CPT

## 2024-08-14 PROCEDURE — 36415 COLL VENOUS BLD VENIPUNCTURE: CPT

## 2024-08-14 PROCEDURE — 86140 C-REACTIVE PROTEIN: CPT

## 2024-08-14 PROCEDURE — 85025 COMPLETE CBC W/AUTO DIFF WBC: CPT

## 2024-08-14 PROCEDURE — 99285 EMERGENCY DEPT VISIT HI MDM: CPT

## 2024-08-14 PROCEDURE — 99255 IP/OBS CONSLTJ NEW/EST HI 80: CPT

## 2024-08-14 PROCEDURE — 85027 COMPLETE CBC AUTOMATED: CPT

## 2024-08-14 PROCEDURE — 86850 RBC ANTIBODY SCREEN: CPT

## 2024-08-14 PROCEDURE — 80053 COMPREHEN METABOLIC PANEL: CPT

## 2024-08-14 PROCEDURE — 36514 APHERESIS PLASMA: CPT

## 2024-08-14 PROCEDURE — 86900 BLOOD TYPING SEROLOGIC ABO: CPT

## 2024-08-14 PROCEDURE — 82746 ASSAY OF FOLIC ACID SERUM: CPT

## 2024-08-14 PROCEDURE — 84100 ASSAY OF PHOSPHORUS: CPT

## 2024-08-14 PROCEDURE — 87340 HEPATITIS B SURFACE AG IA: CPT

## 2024-08-14 PROCEDURE — 80074 ACUTE HEPATITIS PANEL: CPT

## 2024-08-14 PROCEDURE — 99233 SBSQ HOSP IP/OBS HIGH 50: CPT

## 2024-08-14 PROCEDURE — 36430 TRANSFUSION BLD/BLD COMPNT: CPT

## 2024-08-14 PROCEDURE — P9059: CPT

## 2024-08-14 PROCEDURE — 85397 CLOTTING FUNCT ACTIVITY: CPT

## 2024-08-14 PROCEDURE — 36512 APHERESIS RBC: CPT

## 2024-08-14 PROCEDURE — 82550 ASSAY OF CK (CPK): CPT

## 2024-08-14 PROCEDURE — 71045 X-RAY EXAM CHEST 1 VIEW: CPT | Mod: 26

## 2024-08-14 PROCEDURE — 85045 AUTOMATED RETICULOCYTE COUNT: CPT

## 2024-08-14 PROCEDURE — 82607 VITAMIN B-12: CPT

## 2024-08-14 RX ORDER — SODIUM CHLORIDE 9 MG/ML
10 INJECTION INTRAMUSCULAR; INTRAVENOUS; SUBCUTANEOUS
Refills: 0 | Status: DISCONTINUED | OUTPATIENT
Start: 2024-08-14 | End: 2024-08-20

## 2024-08-14 RX ORDER — CHLORHEXIDINE GLUCONATE 40 MG/ML
1 SOLUTION TOPICAL
Refills: 0 | Status: DISCONTINUED | OUTPATIENT
Start: 2024-08-15 | End: 2024-08-20

## 2024-08-14 RX ORDER — PREDNISONE 10 MG
100 TABLET, DOSE PACK ORAL DAILY
Refills: 0 | Status: DISCONTINUED | OUTPATIENT
Start: 2024-08-15 | End: 2024-08-19

## 2024-08-14 RX ORDER — FAMOTIDINE 10 MG/ML
40 INJECTION INTRAVENOUS ONCE
Refills: 0 | Status: COMPLETED | OUTPATIENT
Start: 2024-08-14 | End: 2024-08-14

## 2024-08-14 RX ORDER — ACETAMINOPHEN 325 MG/1
650 TABLET ORAL EVERY 6 HOURS
Refills: 0 | Status: DISCONTINUED | OUTPATIENT
Start: 2024-08-14 | End: 2024-08-20

## 2024-08-14 RX ORDER — DIPHENHYDRAMINE HCL 50 MG
25 CAPSULE ORAL DAILY
Refills: 0 | Status: DISCONTINUED | OUTPATIENT
Start: 2024-08-14 | End: 2024-08-15

## 2024-08-14 RX ORDER — CALCIUM GLUCONATE 61(648) MG
2 TABLET ORAL DAILY
Refills: 0 | Status: DISCONTINUED | OUTPATIENT
Start: 2024-08-14 | End: 2024-08-19

## 2024-08-14 RX ORDER — DIPHENHYDRAMINE HCL 50 MG
25 CAPSULE ORAL ONCE
Refills: 0 | Status: COMPLETED | OUTPATIENT
Start: 2024-08-14 | End: 2024-08-14

## 2024-08-14 RX ADMIN — ACETAMINOPHEN 650 MILLIGRAM(S): 325 TABLET ORAL at 17:25

## 2024-08-14 RX ADMIN — Medication 1 TABLET(S): at 18:39

## 2024-08-14 RX ADMIN — ACETAMINOPHEN 650 MILLIGRAM(S): 325 TABLET ORAL at 16:55

## 2024-08-14 RX ADMIN — Medication 200 GRAM(S): at 18:39

## 2024-08-14 RX ADMIN — FAMOTIDINE 40 MILLIGRAM(S): 10 INJECTION INTRAVENOUS at 22:35

## 2024-08-14 RX ADMIN — Medication 25 MILLIGRAM(S): at 18:39

## 2024-08-14 RX ADMIN — Medication 25 MILLIGRAM(S): at 20:38

## 2024-08-14 NOTE — CONSULT NOTE ADULT - SUBJECTIVE AND OBJECTIVE BOX
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 Darnell's office for platelets of 16K/ul     8/14/24; Seen in ED along with Dr. Bruno & GENNY Turner. Patient with mild buccal petechia, in no acute distress. Aware of the plan for plasmapheresis/ plts infusion & tx    PAST MEDICAL & SURGICAL HISTORY:    HTN (hypertension)    H/O low back pain    Lumbar spondylosis    H/O migraine    TTP (thrombotic thrombopenic purpura)    History of thrombosis of internal jugular vein  right      FAMILY HISTORY:      MEDICATIONS  (STANDING):    MEDICATIONS  (PRN):      Allergies    No Known Allergies    Intolerances      REVIEW OF SYSTEM:    Constitutional, Eyes, ENT, Cardiovascular, Respiratory, Gastrointestinal, Genitourinary, Musculoskeletal, Integumentary, Neurological, Psychiatric, Endocrine, Heme/Lymph and Allergic/Immunologic review of systems are otherwise negative except as noted in HPI.     Vital Signs Last 24 Hrs    T(C): 36.8 (14 Aug 2024 14:12), Max: 36.8 (14 Aug 2024 14:12)  T(F): 98.3 (14 Aug 2024 14:12), Max: 98.3 (14 Aug 2024 14:12)  HR: 87 (14 Aug 2024 15:12) (87 - 89)  BP: 156/78 (14 Aug 2024 15:12) (136/92 - 156/78)  BP(mean): 107 (14 Aug 2024 14:12) (107 - 107)  RR: 18 (14 Aug 2024 15:12) (18 - 18)  SpO2: 100% (14 Aug 2024 15:12) (100% - 100%)    Parameters below as of 14 Aug 2024 15:12  Patient On (Oxygen Delivery Method): room air    PHYSICAL EXAM:    Constitutional: no acute distress  Eyes: no conjunctival infection, anicteric.   ENT: pharynx is unremarkable  Neck: supple without JVD  Pulmonary: clear to auscultation bilaterally   Cardiac: RRR  Vascular: no calf tenderness, venous stasis changes, varices  Abdomen: normoactive bowel sounds, soft and nontender, no hepatosplenomegaly or masses appreciated  Lymphatic: no peripheral adenopathy appreciated  Musculoskeletal: full range of motion and no deformities appreciated  Skin: scattered buccal petechia, otherwise normal appearance, no rash/erythema  Neurology: awake, alert, oriented; no focal deficits    LABS:    CBC Full  -  ( 14 Aug 2024 15:01 )  WBC Count : 5.39 K/uL  RBC Count : 4.23 M/uL  Hemoglobin : 9.1 g/dL  Hematocrit : 29.2 %  Platelet Count - Automated : x  Mean Cell Volume : 69.0 fl  Mean Cell Hemoglobin : 21.5 pg  Mean Cell Hemoglobin Concentration : 31.2 gm/dL  Auto Neutrophil # : x  Auto Lymphocyte # : x  Auto Monocyte # : x  Auto Eosinophil # : x  Auto Basophil # : x  Auto Neutrophil % : x  Auto Lymphocyte % : x  Auto Monocyte % : x  Auto Eosinophil % : x  Auto Basophil % : x    08-14    140  |  109<H>  |  23  ----------------------------<  105<H>  3.6   |  27  |  1.84<H>    Ca    9.2      14 Aug 2024 15:01    TPro  7.3  /  Alb  3.7  /  TBili  1.8<H>  /  DBili  x   /  AST  31  /  ALT  24  /  AlkPhos  72  08-14      Urinalysis Basic - ( 14 Aug 2024 15:01 )    Color: x / Appearance: x / SG: x / pH: x  Gluc: 105 mg/dL / Ketone: x  / Bili: x / Urobili: x   Blood: x / Protein: x / Nitrite: x   Leuk Esterase: x / RBC: x / WBC x   Sq Epi: x / Non Sq Epi: x / Bacteria: x    RADIOLOGY & ADDITIONAL STUDIES:

## 2024-08-14 NOTE — ED ADULT NURSE NOTE - OBJECTIVE STATEMENT
Pt presents to ED AOx4 from home c/o abnormal lab result. As per pt, he was notified tht his PLT were low and was told to come to ED. PMHx of TTP. Pt states he has had plasmapheresis in the past. No other pertinent PMHx. No acute distress noted.  NP Noel at bedside. NP states pt will be directly to SICU. NP transported pt directly up and stated pt does not require zoll or RN for transport. Unit given report and notified of pt and orders to be placed by NP upon arrival to unit.

## 2024-08-14 NOTE — CONSULT NOTE ADULT - NS ATTEND AMEND GEN_ALL_CORE FT
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   -Off steroids early October, 2023, s/p Rituxan x 4 in August 2023. Responded well.  -Outpatient f/u plan ; Monitor CBC , LDH, Haptoglobin , TNVOEY45 every 2-3 months x 2 years next yearly.  -plan to start Rituxan     Plan per primary Hematologist Dr. Darnell:  -  Start Prednisone 1 mg/ kg daily- to be started tomorrow (patient took 120 mg PO today at home). will need to be tapered likely in few days based on Tx response.  -  Urgent plasmapheresis today and next daily until platelets normalize.  -  IV pheresis catheter placed by SICU  - To start Rituxan in 1 -2 days ( 375 mg/m2 weekly x 4)-plan for starting either tomorrow or day after; to be infused after plasmapheresis      * Replace with 4.5 liters of FFP.     * Premedicate with Benadryl 25 mg IVP daily.     * Calcium gluconate 2 gram daily.  -[facility] pheresis services [phone])-    ?

## 2024-08-14 NOTE — H&P ADULT - HISTORY OF PRESENT ILLNESS
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 Pt doing well until recently, reports GI "bug" x 1 weekWent to Dr Darnell's office for routine visit where plt count noted to be 16K/ul (Platelet Count 277 K/uL on 08.05.24 @ 12:01). Pt sent to ED for admission. Plan includes plasmapheresis, steroids and rituximab.   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 Diagnosed with acute TTP in July 2023.   7/29/23 - 08/02/23 - Admitted to  with chest pain, and noted with anemia and thrombocytopenia, peripheral smear at presentation c/w large amount of schistocytes -> TTP.  Confirmatory EOJIQI10 < 2., s/p plasmapheresis and required prolonged steroid taper. Off steroids early October, 2023, s/p Rituxan x 4 in August 2023. Responded well.    Pt doing well until recently, though [id number] low and starting rituxan was discussed. Pt reports GI distress including abd discomfort and diarrhea starting 8/10 which has since resolved. Pt does report some mild pain from his exercise regimen and chronic LBP for which he took a few doses of ibuprofen. He denies headache, myalgias, CP, SOB, palpitations, N/V, fever/chills, numbness/tingling/weakness, other c/o at this time. Pt noticed petechia on lip this am and went to Dr Darnell's office for his routine/previously scheduled visit where plt count noted to be 16K/ul (Platelet Count 277 K/uL on 08.05.24 @ 12:01). Pt sent to ED for admission. Plan includes plasmapheresis, steroids and rituximab.   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 Diagnosed with acute TTP in July 2023 (admission c/b RIJ DVT, completed xarelto).   7/29/23 - 08/02/23 - Admitted to  with chest pain, and noted with anemia and thrombocytopenia, peripheral smear at presentation c/w large amount of schistocytes -> TTP.  Confirmatory LBSMGQ29 < 2., s/p plasmapheresis and required prolonged steroid taper. Off steroids early October, 2023, s/p Rituxan x 4 in August 2023. Responded well.    Pt doing well until recently, though COXFWR11 low and starting rituxan was discussed. Pt reports GI distress including abd discomfort and diarrhea starting 8/10 which has since resolved. Pt does report some mild pain from his exercise regimen and chronic LBP for which he took a few doses of ibuprofen. He denies headache, myalgias, CP, SOB, palpitations, N/V, fever/chills, numbness/tingling/weakness, other c/o at this time. Pt noticed petechia on lip this am and went to Dr Darnell's office for his routine/previously scheduled visit where plt count noted to be 16K/ul (Platelet Count 277 K/uL on 08.05.24 @ 12:01). Pt sent to ED for admission. Plan includes plasmapheresis, steroids and rituximab.

## 2024-08-14 NOTE — PROGRESS NOTE ADULT - ASSESSMENT
Patient with hx. TTP, platelet was normal in early august, now 16.   also with low dickson 13    Thrombocytopenia,  Likely recurrent TTP  ELIF likley from ttp    1. Plan steroids 100 prednisone  2. Plasmapheresis tonight  3. Rituxin in am  4. monitor kidney function

## 2024-08-14 NOTE — ED STATDOCS - ATTENDING APP SHARED VISIT CONTRIBUTION OF CARE
I personally saw the patient with the GALINDO, and completed the key components of the history and physical exam. I then discussed the management plan with the GALINDO.

## 2024-08-14 NOTE — HISTORY OF PRESENT ILLNESS
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    7/29/23 - 08/02/23 - Admitted to  with chest pain, and noted with anemia and thrombocytopenia, peripheral smear at presentation c/w large amount of schistocytes -> TTP. Confirmatory MXNVPR66 < 2.    Hgb 8.3, plts 7K, LDH >1500, Hapto <20, D-Dimer > 3000, retic elevated.   Hgb dino 6.8 s/p 1U PRBC's.  7/28/23 - Dex 40mg x1, then methylprednisolone 125mg IV q 6 hrs daily for 2 days 07/30 & 07/31.  8/01/23 - Started PO Prednisone 50 mg PO BID x two days (08/01 & 08/02). S/p plasmapheresis x 4 days (07/31 - 08/03).  Plats to 53K after 1st pheresis ->174K -> 260K -> 312K.  8/2/23 - Discharged on prednisone 40mg BID.  8/3/23 - Platelets checked as outpatient - 329K. 8/4/23 - Platelets checked as outpatient - 396K (Friday).  8/7/23 - 8/16/23 - Patient went back to  ER as developed hematuria.  Platelets 3K.  Admitted and plasmapheresis restarted.  Hgb dino 6.9 -> 1U PRBC's Received Solumedrol 125mg q 12 hrs x 2 days (08/07 & 08/08) total then Prednisone 50mg BID (dose based on 1mg/kg) starting 08/09 8/8/23 - Started Rituximab q week x 4 doses.  Had right Shiley - US with RIJ thrombosis. Catheter changed, now with Left tunneled catheter.  Started on Lovenox ( 100mg BID)  once platelets recovered.   Pheresis was stopped after platelets were >150K x 2 days.  Discharged on prednisone 40mg BID.  Catheter left in.  On prednisone taper.  Admitted to  9/24-9/29/23 with SOB/ hypoxia. Treated for presumptive G neg pneumonia and PCP pneumonia. Bronch/ lavage - negative for PCP. No improvement with antibiotics. Given lasix for possible acute decompensated diastolic heart failure - improved. Discharged home on Cipro, Bactrim to complete 10 day course , diuretics PRN. S/p prednisone taper.  Left chest  Shiley catheter removed 10/27/23.  Monitored.   Plan : CBC , LDH, Haptoglobin , QYOGTB40 every 2-3 months x 2 years next yearly.  In last several months  OLFGCZ05  low and dropping but there was no evidence of clinical relapse. We discussed option of Rituxan " maintenance" ( controversial but done in clinical practice although no data . Most recent RNJNVX79 from few days ago was down to 2 % with evidence of inhibitor ( platelets were normal and there was no hemolysis) and plan was to start RItuxan planned for today.  [de-identified] : Had mild gastroenteritis for few days now better, Noticed one petechiae on his lips today. Otherwise he feels OK

## 2024-08-14 NOTE — H&P ADULT - TIME BILLING
Time spent: 57 minutes including evaluation of presenting problem(s) and associated risks, reviewing medical record, face-to-face encounter with patient to conduct physical examination and obtain additional history as able. Appropriate diagnostic studies ordered, reviewed, and interpreted as needed. Recommendations and/or interventions made as documented. I have coordinated care with the multidisciplinary team including bedside RN, attending, and consultants as needed. Patient and family education and counseling provided as needed and as appropriate. This excludes any time spent on separate procedures or teaching. Date of entry of this note is equal to date of services rendered.

## 2024-08-14 NOTE — ED STATDOCS - SCRIBE NAME
[Nasal Endoscopy Performed] : nasal endoscopy was performed, see procedure section for findings [Midline] : trachea located in midline position Vivi Alvarado [Normal] : no rashes [FreeTextEntry2] : face symmetric contour [de-identified] : EOMI [de-identified] : CN 2-12 grossly symmetric and intact [de-identified] : V2 intact [FreeTextEntry1] : Ad: EAC clear, TM intact and hypermobile, ME clear\par As: EAC clear, TM intact w sclerosis, mobile, ME clear

## 2024-08-14 NOTE — PROGRESS NOTE ADULT - SUBJECTIVE AND OBJECTIVE BOX
asked to see patient by Dr. 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 Went to  Hematologist, Dr. Darnell's office  and found to have platelets of 16  no other symptoms, sent to ED. creatinine 1.8  patient admitted to SDU for plasmapharesis    ROS no chest pain, no sob, no abdominal pain no fevers, no chills, recent gi symptoms     PAST MEDICAL & SURGICAL HISTORY:    was on no meds    HTN (hypertension)  H/O low back pain  Lumbar spondylosis  H/O migraine  TTP (thrombotic thrombopenic purpura)  History of thrombosis of internal jugular vein  right    MEDICATIONS  (STANDING):  calcium gluconate IVPB 2 Gram(s) IV Intermittent daily  diphenhydrAMINE Injectable 25 milliGRAM(s) IV Push daily  multivitamin 1 Tablet(s) Oral daily    MEDICATIONS  (PRN):  acetaminophen     Tablet .. 650 milliGRAM(s) Oral every 6 hours PRN Mild Pain (1 - 3)  sodium chloride 0.9% lock flush 10 milliLiter(s) IV Push every 1 hour PRN Pre/post blood products, medications, blood draw, and to maintain line patency    Height (cm): 180.3 (08-14 @ 14:11), 180.34 (08-14 @ 13:00)  Weight (kg): 118.2 (08-14 @ 14:11), 117.03 (08-14 @ 13:00)  BMI (kg/m2): 36.4 (08-14 @ 14:11), 36 (08-14 @ 13:00)    ICU Vital Signs Last 24 Hrs  T(C): 36.8 (14 Aug 2024 15:47), Max: 36.8 (14 Aug 2024 14:12)  T(F): 98.2 (14 Aug 2024 15:47), Max: 98.3 (14 Aug 2024 14:12)  HR: 75 (14 Aug 2024 17:00) (75 - 93)  BP: 155/92 (14 Aug 2024 17:00) (136/92 - 156/78)  BP(mean): 113 (14 Aug 2024 17:00) (104 - 113)  ABP: --  ABP(mean): --  RR: 13 (14 Aug 2024 17:00) (13 - 25)  SpO2: 100% (14 Aug 2024 17:00) (100% - 100%)    O2 Parameters below as of 14 Aug 2024 17:00  Patient On (Oxygen Delivery Method): room air    Physical Exam    general obese, no distress, looks well  HEENT nc/at small petechial on small area of lip  neck supple  lungs clear  cv rrr  abdomen soft, non tender  extrmetieis warm  gu voiding  neuro awake, alert no deficits                           9.1    5.39  )-----------( 15       ( 14 Aug 2024 15:01 )             29.2       08-14    140  |  109<H>  |  23  ----------------------------<  105<H>  3.6   |  27  |  1.84<H>    Ca    9.2      14 Aug 2024 15:01    TPro  7.3  /  Alb  3.7  /  TBili  1.8<H>  /  DBili  x   /  AST  31  /  ALT  24  /  AlkPhos  72  08-14    Urinalysis Basic - ( 14 Aug 2024 15:01 )    Color: x / Appearance: x / SG: x / pH: x  Gluc: 105 mg/dL / Ketone: x  / Bili: x / Urobili: x   Blood: x / Protein: x / Nitrite: x   Leuk Esterase: x / RBC: x / WBC x   Sq Epi: x / Non Sq Epi: x / Bacteria: x      DVT Prophylaxis:    held forthrombocytopenia                                                                       Labs, Notes, Orders, radiologic studies reviewed and care coordinated with multidisciplinary team

## 2024-08-14 NOTE — ASSESSMENT
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 S/p plasmapheresis. Needed prolonged steroid taper. Off steroids early October. S/p Rituxan x 4 in August 2023. Responded well.  Low and dropping [id number] - plan was to start Rituxan today to prevent clinical relapse ( as per few days ago his plts were normal and there was no hemolysis). Today he has significant thrombocytopenia. reviewed blood smear- positive for schistocytes.  Relapsed TTP. Needs to start treatment ASAP.  He will start Prednisone 1 mg/ kg daily- first dose now. Urgent plasmapheresis  today and next daily until platelets normalize.  Start Rituxan in 1 -2 days  ( 375 mg/m2 weekly x 4)   Referred patient to  ER to be admitted to step down unit.  Spoke with intensivist Dr Bruno.  Needs iv pheresis catheter to be placed. Start pheresis today. Replace with 4.5 liters of FFP. Premedicate with Benadryl 25 mg IVP daily. Also give CAlcium gluconate 2 gram daily.  D/w Dr Bruno. Spoke with [facility] by [facility] pheresis services ( [phone]) Spoke with [facility].   Addendum :  has iron deficiency - will benefit from iron supplementation and will need GI w/up  at some point when stable ( not ugent)

## 2024-08-14 NOTE — H&P ADULT - NSHPSOCIALHISTORY_GEN_ALL_CORE
Denita is a 26 year old year old female here for an OB check.  She is      .  Gestational Age: 35w2d.  Concerns today include:     No concerns     She reports fetal movement.   She denies bleeding.  She denies cramping.  She denies nausea.    Vaccine Information Statement(s) or the Emergency Use Authorization was given today. This has been reviewed, questions answered, and verbal consent given by Patient for injection(s) and administration of Tetanus/Diphtheria/Pertussis (Tdap).        Patient tolerated without incident. See immunization grid for documentation.      If your visit includes an exam today, would you like an assistant to be present in the room during that time?      No    Marital status:   Lives with: wife  Occupation: ED ohysician  ADLs: independent  Assistive Devices:eyeglasses    Tobacco use: denies/never  Alcohol use: denies  Illicit drug use: denies    Code status: full code  HCP/surrogate:

## 2024-08-14 NOTE — CONSULT NOTE ADULT - ASSESSMENT
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  Confirmatory [id number] < 2., s/p plasmapheresis and required prolonged steroid taper. Off steroids early October, 2023, s/p Rituxan x 4 in August 2023. Responded well.  -Outpatient f/u plan ; Monitor CBC , LDH, Haptoglobin , [id number] every 2-3 months x 2 years next yearly.  -In last several months [id number] has been low & dropping but there has been no evidence of clinical relapse. Rituxan " maintenance" was discussed  and with most recent [id number] from 8/5/24 was down to 2 % with evidence of inhibitor and platelets were normal and no hemolysis) plan was to start Rituxan today-8/14, but CBC revealed plts at 16k and patient instructed to go to ED for platelets transfusion and plasmapheresis.    Plan per primary Hematologist Dr. Darnell    -  Start Prednisone 1 mg/ kg daily- to be started tomorrow (patient took 120 mg PO today at home). will need to be tapered likely in few days based on Tx response.  -  Urgent plasmapheresis today and next daily until platelets normalize.  -  IV pheresis catheter placed by SICU  - To start Rituxan in 1 -2 days ( 375 mg/m2 weekly x 4)-plan for starting either tomorrow or day after; to be infused after plasmapheresis     -Orders in place      * Replace with 4.5 liters of FFP.     * Premedicate with Benadryl 25 mg IVP daily.     * Calcium gluconate 2 gram daily.    -Spoke with [facility] pheresis services ([phone])- ANU Guerra is on his way and be here soon. SICU Nursing staff aware  -Spoke with [facility].- FFP is being processed and will be ready soon  -d/w Dr Bruno.  ?  ?# Microcytic Anemia    - Blood work from 8/5/24 c/w iron deficiency   -Will benefit from iron supplementation.  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  Confirmatory [id number] < 2., s/p plasmapheresis and required prolonged steroid taper. Off steroids early October, 2023, s/p Rituxan x 4 in August 2023. Responded well.  -Outpatient f/u plan ; Monitor CBC , LDH, Haptoglobin , [id number] every 2-3 months x 2 years next yearly.  -In last several months [id number] has been low & dropping but there has been no evidence of clinical relapse. Rituxan " maintenance" was discussed  and with most recent [id number] from 8/5/24 was down to 2 % with evidence of inhibitor and platelets were normal and no hemolysis) plan was to start Rituxan today-8/14, but CBC revealed plts at 16k and patient instructed to go to ED for platelets transfusion and plasmapheresis.    Plan per primary Hematologist Dr. Darnell    -  Start Prednisone 1 mg/ kg daily- to be started tomorrow (patient took 120 mg PO today at home). will need to be tapered likely in few days based on Tx response.  -  Urgent plasmapheresis today and next daily until platelets normalize.  -  IV pheresis catheter placed by SICU  - To start Rituxan in 1 -2 days ( 375 mg/m2 weekly x 4)-plan for starting either tomorrow or day after; to be infused after plasmapheresis     -Orders in place      * Replace with 4.5 liters of FFP.     * Premedicate with Benadryl 25 mg IVP daily.     * Calcium gluconate 2 gram daily.    -Spoke with [facility] pheresis services ([phone])- ANU Guerra is on his way and be here soon. SICU Nursing staff aware  -Spoke with [facility].- FFP is being processed and will be ready soon  -d/w Dr Bruno.  ?  ?# Microcytic Anemia    - Blood work from 8/5/24 c/w iron deficiency   -Will benefit from iron supplementation.  - Likely need GI w/up at some point when stable

## 2024-08-14 NOTE — PATIENT PROFILE ADULT - FALL HARM RISK - UNIVERSAL INTERVENTIONS
Bed in lowest position, wheels locked, appropriate side rails in place/Call bell, personal items and telephone in reach/Instruct patient to call for assistance before getting out of bed or chair/Non-slip footwear when patient is out of bed/[place] to call system/Physically safe environment - no spills, clutter or unnecessary equipment/Purposeful Proactive Rounding/Room/bathroom lighting operational, light cord in reach

## 2024-08-14 NOTE — H&P ADULT - NSVTERISKASSESS_GEN_ALL_CORE FT
BMT History & Physical     Patient Demographics   Patient ID:  Jennifer Ward   Age:  47 year old   Sex:  female  Reason for Admission/CC: Hodgkin Lymphoma  Primary BMT physician: Tammy Gonzalez  Date:  10/6/2023  Service: BMT   Informant:  Patient and Chart  Resuscitation Status: Full Code    Patient ID:  Jennifer Ward is a 47 year old female, currently day -6 of her autologous hematopoietic cell transplant as rescue after BEAM therapy for Hodgkin Lymphoma. Co-enrolled on [employer] study.     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 PET showed lymphoma limited to left cervical, supraclavicular, and axillary lymph nodes. BmBx negative.      Late relapse in May 2023 presenting with right neck swelling. Right cervical LN biopsy showed recurrent vs new primary cHL (nodular sclerosing subtype). PET with FDG-avid right level II and level V cervical lymph nodes only (SUVmax 13.2)     Date Treatment Name Response Side Effects / Toxicities   12/2008 to 5/2009 ABVD x 6 cycles followed by adjuvant radiation CR Fatigue, N/V   6/2023 to 8/2023 Brentuximab/nivolumab x 2 cycles, nivolumab alone x 2 cycles CR Brentuximab infusion reaction (difficulty breathing)           BMT Work Up Results     Blood Counts Recent Labs   Lab Test 10/04/23  1006 10/03/23  1018 09/25/23  0946 09/20/23  1023   HGB 10.6* 11.5* 12.6 11.4*   HCT 32.5* 34.3* 38.1 35.1   WBC 28.8* 28.1* 4.1 4.3   ANEUTAUTO  --   --  2.3 2.4   ALYMPAUTO  --   --  1.1 1.2   AMONOAUTO  --   --  0.3 0.3   AEOSAUTO  --   --  0.3 0.3   ABSBASO  --   --  0.1 0.1   NRBCMAN 0.1  --  0.0 0.0    189 221 220      Bone Marrow 9/25/23 Bone marrow biopsy:  Bone marrow, posterior iliac crest, left decalcified trephine biopsy and touch imprint; left direct aspirate smear, and concentrated aspirate smear; and  peripheral smear:  - No morphologic evidence of marrow involvement by classic Hodgkin lymphoma  - Normocellular marrow for age (overall 50%) with trilineage hematopoiesis and no increase in blasts  - Peripheral blood showing normal hemogram and differential    Flow: not performed    FISH/[employer] from 9/25/23 bone marrow biopsy are pending.    Blood Type Recent Labs   Lab Test 09/20/23  1023   ABORH B POS        Chemistries Recent Labs   Lab Test 10/04/23  1006 09/20/23  1023    139   POTASSIUM 3.7 4.2   CHLORIDE 104 105   CO2 23 24   BUN 14.8 21.3*   CR 1.00* 0.99*   * 93       Liver Tests Recent Labs   Lab Test 09/20/23  1023   BILITOTAL 0.5   ALKPHOS 69   AST 14   ALT 11   ALBUMIN 4.4      PET/CT: IMPRESSION:      In this patient with history of Hodgkin's lymphoma relapse currently  on chemotherapy:     Interval resolution of right-sided cervical adenopathy.     New minimal FDG uptake by nonenlarged right and left axillary nodes  and bilateral inguinal nodes are likely reactive given their sizes,  morphology and uptake level.   PFTs FVC%  Recent Labs   Lab Test 09/20/23  1135   94099 102       FEV1%  Recent Labs   Lab Test 09/20/23  1135   05031 106       DLCO%  Recent Labs   Lab Test 09/20/23  1135   73727 109        ECHO or MUGA:   Global and regional left ventricular function is normal with an EF of 55-60%.  Right ventricular function, chamber size, wall motion, and thickness are  normal.  Pulmonary artery systolic pressure is normal.  The inferior vena cava was normal in size with preserved respiratory  variability.  No pericardial effusion is present.     EKG ECG results from 09/20/23   EKG 12-lead complete w/read - Clinics     Value    Systolic Blood Pressure     Diastolic Blood Pressure     Ventricular Rate 51    Atrial Rate 51    VA Interval 142    QRS Duration 84        QTc 403    P Axis 41    R AXIS 25    T Axis 34    Interpretation ECG      Sinus bradycardia  Otherwise normal  ECG  No previous ECGs available  Confirmed by MD LAIRD JANE ([id number]) on 9/22/2023 5:08:43 PM        Serologies: CMV: No lab results found.  EBV:   Recent Labs   Lab Test 09/20/23  1023   EBVCAG Positive*     HSV:   Recent Labs   Lab Test 09/20/23  1023   C7GLYYO 0.64   H1IGG No HSV-1 IgG antibodies detected.   H9IKLWE 17.90*   H2IGG Positive.  IgG antibody to HSV-2 detected.*     VZV:   Recent Labs   Lab Test 09/20/23  1023   VZVIGG Positive     HTLV:   Recent Labs   Lab Test 09/20/23  1023   DHTLVA Non-Reactive      Vitamin D No results for input(s): VITDT in the last 22748 hours.   [facility] IDMs Recent Labs   Lab Test 09/20/23  1023   DCMIG Negative   DHBSAG Non-Reactive   DHBCAB Non-Reactive   DHIVAB Non-Reactive   DHCVAB Non-Reactive   DHTLVA Non-Reactive   TCRUZI Non-Reactive   DTRPAB Non-Reactive        Other Lab Results:     COVID:  No lab results found.    LDH  Recent Labs   Lab Test 09/20/23  1023          Creatinine Clearance  No lab results found.    Immunoglobulins:   Recent Labs   Lab Test 09/20/23  1023   IGG 1,044     Recent Labs   Lab Test 09/20/23  1023        Recent Labs   Lab Test 09/20/23  1023        B2-Microglobulin   Recent Labs   Lab Test 09/20/23  1023   ZUSM2TTHW 2.2       Urine Studies   Recent Labs   Lab Test 09/20/23  1023   COLOR Yellow   APPEARANCE Slightly Cloudy*   URINEGLC Negative   URINEBILI Negative   URINEKETONE Negative   SG 1.034   UBLD Negative   URINEPH 6.0   PROTEIN Negative   UUROI Normal   NITRITE Negative   LEUKEST Trace*   MUCUS Present*   RBCU 1   WBCU 5   USQEI 14*       I have assessed all abnormal lab values for their clinical significance and any values considered clinically significant have been addressed in the assessment and plan    Family History: No family history on file.    Social History:   Social History     Socioeconomic History    Marital status:      Spouse name: Not on file    Number of children: Not on file     Years of education: Not on file    Highest education level: Not on file   Occupational History    Not on file   Tobacco Use    Smoking status: Never     Passive exposure: Never    Smokeless tobacco: Never   Substance and Sexual Activity    Alcohol use: Yes     Comment: Occassional    Drug use: Never    Sexual activity: Not on file   Other Topics Concern    Not on file   Social History Narrative    Not on file     Social Determinants of Health     Financial Resource Strain: Not on file   Food Insecurity: Not on file   Transportation Needs: Not on file   Physical Activity: Not on file   Stress: Not on file   Social Connections: Not on file   Interpersonal Safety: Not on file   Housing Stability: Not on file       Past Medical History: No past medical history on file.     Past Surgical History:   Past Surgical History:   Procedure Laterality Date    BONE MARROW BIOPSY, BONE SPECIMEN, NEEDLE/TROCAR Left 9/25/2023    Procedure: BIOPSY, BONE MARROW;  Surgeon: Patricia Salcedo PA-C;  Location: [facility] OR    INSERT CATHETER VASCULAR ACCESS Left 10/4/2023    Procedure: cvc tunnled line Insert Catheter Vascular Access;  Surgeon: Yobani Reeves MD;  Location: [facility] OR    IR CVC TUNNEL PLACEMENT > 5 YRS OF AGE  10/4/2023       Allergies:   Allergies   Allergen Reactions    Ceclor [Cefaclor] Hives     Hives as a child    Imitrex [Sumatriptan] Palpitations     Tolerated for a long period, then has one time tachycardia.  Tolerates naratriptan.        Home Medications      Prior to Admission medications    Medication Sig Start Date End Date Taking? Authorizing Provider   acetaminophen (TYLENOL) 325 MG tablet Take 650 mg by mouth every 6 hours as needed for mild pain    Reported, Patient   azithromycin (ZITHROMAX) 250 MG tablet Take 250 mg by mouth daily 9/27/23   Reported, Patient   buPROPion (WELLBUTRIN XL) 150 MG 24 hr tablet Take 150 mg by mouth every morning 7/15/21   Reported, Patient   hydrocortisone (CORTAID) 1 %  "external cream Apply topically 3 times daily 23   Reported, Patient   levothyroxine (SYNTHROID/LEVOTHROID) 75 MCG tablet Take 1 tablet by mouth every morning 23   Reported, Patient   loratadine (CLARITIN) 10 MG tablet Take 10 mg by mouth daily    Reported, Patient   naratriptan (AMERGE) 2.5 MG tablet Take 2.5 mg by mouth at onset of headache 23   Reported, Patient   propranolol (INDERAL) 20 MG tablet Take 20 mg by mouth 2 times daily 23   Reported, Patient   topiramate (TOPAMAX) 50 MG tablet Take 1 tablet by mouth every evening 23   Reported, Patient   venlafaxine (EFFEXOR XR) 150 MG 24 hr capsule Take 150 mg by mouth daily 3/20/23   Reported, Patient       Review of Systems    Review of Systems   Constitutional:  Negative for appetite change, chills and fever.   HENT:   Negative for lump/mass, mouth sores and sore throat.    Eyes:         Vision changes with aging   Respiratory:  Negative for chest tightness, cough and shortness of breath.    Cardiovascular:  Negative for chest pain and leg swelling.   Gastrointestinal:  Negative for abdominal pain, constipation, diarrhea, nausea and vomiting.   Endocrine:        [employer]we[employer] since KALYN BSO   Genitourinary:  Negative for dysuria.    Musculoskeletal:  Negative for arthralgias and myalgias.   Skin:  Negative for rash.   Neurological:  Positive for headaches. Negative for numbness.        H/o migraines   Psychiatric/Behavioral:  Positive for depression.      PHYSICAL EXAM      Weight     Wt Readings from Last 3 Encounters:   10/06/23 101.4 kg (223 lb 9.6 oz)   10/04/23 99.4 kg (219 lb 2.2 oz)   10/04/23 98.4 kg (217 lb)        KPS: 90  ECO    /83 (BP Location: Left arm)   Pulse 79   Temp 97.7  F (36.5  C) (Axillary)   Resp 16   Ht 1.64 m (5' 4.57\")   Wt 101.4 kg (223 lb 9.6 oz)   SpO2 97%   BMI 37.71 kg/m       General: NAD   Eyes: ARMEN, sclera anicteric   Nose/Mouth/Throat: OP clear, buccal mucosa moist, no ulcerations; 3 teeth " Medical Assessment Completed on: 14-Aug-2024 16:26 have been pulled  Lungs: CTA bilaterally  Cardiovascular: RRR, no M/R/G   Abdominal/Rectal: +BS, soft, NT, ND   Lymphatics: No edema  Skin: No rashes or petechaie  Neuro: A&O   Musculoskeletal: Muscle mass adequate for age  Additional Findings: Louis site left chest with blood soaked biopatch      LABS AND IMAGING: I have assessed all abnormal lab values for their clinical significance and any values considered clinically significant have been addressed in the assessment and plan.        Lab Results   Component Value Date    WBC 25.8 (H) 10/06/2023    ANEUTAUTO 2.3 09/25/2023    HGB 9.6 (L) 10/06/2023    HCT 29.1 (L) 10/06/2023     (L) 10/06/2023     10/04/2023    POTASSIUM 3.7 10/04/2023    CHLORIDE 104 10/04/2023    CO2 23 10/04/2023     (H) 10/04/2023    BUN 14.8 10/04/2023    CR 1.00 (H) 10/04/2023    MAG 1.8 10/04/2023    INR 1.02 10/06/2023     Oral/GI regimen related toxicities (per NCI CTCAE v 5.0):    Oral Mucositis: none  Nausea: none  Vomiting: none  Diarrhea: none    *diarrhea secondary to C. Diff is excluded from the definition of oral/GI RRT      CTCAE Terms     Vomiting: A disorder characterized by the reflexive act of ejecting the contents of the stomach through the mouth   Grade 1 Intervention not indicated   Grade 2 Outpatient IV hydration; medical intervention indicated   Grade 3 Tube feeding, TPN, or hospitalization indicated   Grade 4 Life-threatening consequences   Grade 5 Death     Oral Mucositis: A disorder characterized by ulceration or inflammation of the oral mucosal.   Grade 1 Asymptomatic or mild symptoms; intervention not indicated   Grade 2 Moderate pain or ulcer that does not interfere with oral intake; modified diet indicated   Grade 3 Severe pain; interfering with oral intake   Grade 4 Life-threatening consequences; urgent intervention indicated   Grade 5 Death     Nausea: A disorder characterized by a queasy sensation and/or the urge to vomit.   Grade 1 Loss  of appetite without alteration in eating habits  Grade 2 Oral intake decreased without significant weight loss, dehydration or malnutrition   Grade 3 Inadequate oral caloric or fluid intake; tube feeding, TPN, or hospitalization indicated   Grade 4 NA  Grade 5 NA    Diarrhea: A disorder characterized by an increase in frequency and/or loose or watery bowel movements.   Grade 1 Increase of <4 stools per day over baseline; mild increase in ostomy output compared to baseline   Grade 2 Increase of 4 - 6 stools per day over baseline; moderate increase in ostomy output compared to baseline; limiting instrumental ADL   Grade 3 Increase of >=7 stools per day over baseline; hospitalization indicated; severe increase in ostomy output compared to baseline; limiting self-care ADL    Grade 4 Life-threatening consequences; urgent intervention indicated    Grade 5 Death       Source: https://ctep.cancer.gov/protocoldevelopment/electronic_applications/docs/CTCAE_v5_Quick_Reference_8.5x11.pdf       SYSTEMS-BASED ASSESSMENT AND PLAN     Jennifer Ward is a 47 year old female, currently day -6 of her autologous hematopoietic cell transplant as rescue after BEAM therapy for Hodgkin Lymphoma. Co-enrolled on [employer] study.     Prep: BEAM    BMT  - BMT doc/Coordinator: Nay  - MT2016-11 with co-enrollment on MT2022-40 ([employer])  - Cell dose 7 million CD34+ cells/kg   - Prep as above. Flush and pre-meds prior to transplant.  - GCSF starts day +5, continue until ANC > 500 for 3 consecutive days.   - Restaging: next restage at day +28    HEME/COAG  - Transfusion parameters: hgb <7g/dL and plts <10,000  - Leucocytosis secondary to recent stem cell mobilization  - Anemia and thrombocytopenia secondary to Hodgkin Lymphoma    ID  Prophy: note that antimicrobial dosing is specific to protocol  Viral: ACV 400mg PO bid  Bacterial: levofloxacin 500 mg daily  Fungal: fluconazole 400mg daily  PCP: Bactrim or other PCP prophy to start at day  +28    GI/NUTRITION  - Anti-emetics: zofran/dex prior to transplant to prevent chemotherapy induced n/v.  Ativan and compazine prn.  Single dose of emend 30 minutes prior to melphalan on last day of chemo prep.  - Ulcer prophy: Protonix 40mg daily.   - dietician to support as indicated to prevent malnutrition.    ENDOCRINE  # hypothyroidism: continue PTA synthroid    PSYCH  # depression: continue PTA bupropion, venlafaxine    NEURO  # migraines: daily topamax with prn naratriptan    SUPPORTIVE CARE  - PT/OT to evaluate on admission and follow as indicated.   - BMT Social work to follow.      - # Pain Assessment:      10/4/2023     9:45 AM   Current Pain Score   Patient currently in pain? yes   Jennifer obrien pain level was assessed and she currently denies pain.          Code Status: Full Code    Dispo/Discharge: anticipate discharge on day +1.  Note that patient already has plenty of her PTA medications and will only need prescriptions for any new meds that we start this admission. She [place] her caregiver([place]      Patient was given a copy of consents in printed format on admission.       Helen Felix PA-C      Securely message with the Vocera Web Console

## 2024-08-14 NOTE — H&P ADULT - NSHPPHYSICALEXAM_GEN_ALL_CORE
General: WD, WN, NAD                                                         Neuro: A+O x 3, non-focal, speech clear and intact                     Eyes: PERRL, EOMI   ENT: Lower lip with 1 spot petechia and dry lips  Neck: supple, no JVD, trachea midline   Chest: CTA, good air entry, equal bilaterally, no wheezes/rales/rhonchi, normal excursion, no accessory muscle use noted  CV: regular rate, regular rhythm, +S1S2  GI: soft, NT, ND, +BS  Extremities: XIONG x 4, no C/C/E, distal motor/neuro/circ intact  SKIN: warm, dry, intact General: WD, WN, NAD                                                         Neuro: A+O x 3, non-focal, speech clear and intact                     Eyes: PERRL, EOMI   ENT: scant buccal petechiae  Neck: supple, no JVD, trachea midline   Chest: CTA, good air entry, equal bilaterally, no wheezes/rales/rhonchi, normal excursion, no accessory muscle use noted  CV: regular rate, regular rhythm, +S1S2  GI: soft, NT, ND, +BS  Extremities: XIONG x 4, no C/C/E, distal motor/neuro/circ intact  SKIN: warm, dry, intact    Limited bedside POCUS:  bilat IJ without thrombus    ICU Vital Signs Last 24 Hrs  T(C): 36.8 (14 Aug 2024 15:47), Max: 36.8 (14 Aug 2024 14:12)  T(F): 98.2 (14 Aug 2024 15:47), Max: 98.3 (14 Aug 2024 14:12)  HR: 93 (14 Aug 2024 15:47) (87 - 93), NSR  BP: 138/87 (14 Aug 2024 15:47) (136/92 - 156/78)  BP(mean): 104 (14 Aug 2024 15:47) (104 - 107)  RR: 25 (14 Aug 2024 15:47) (18 - 25)  SpO2: 100% (14 Aug 2024 15:47) (100% - 100%) on room air

## 2024-08-14 NOTE — REVIEW OF SYSTEMS
[Negative] : Allergic/Immunologic [FreeTextEntry7] : as above  [FreeTextEntry9] : per HPI  [de-identified] : as above

## 2024-08-14 NOTE — PATIENT PROFILE ADULT - HISTORY OF COVID-19 VACCINATION
07/08/19 1400   Pain Assessment   Pain Assessment No/denies pain   Pain Score No Pain   Restrictions/Precautions   Precautions Fall Risk;Cognitive;Supervision on toilet/commode   Weight Bearing Restrictions No   ROM Restrictions No   Cognition   Overall Cognitive Status Impaired   Arousal/Participation Alert; Cooperative   Attention Attends with cues to redirect   Orientation Level Oriented X4   Memory Decreased short term memory   Following Commands Follows one step commands with increased time or repetition   Subjective   Subjective Pt agreeable to PT tx  Pt c/o fatigue towards end of tx, however, able to tolerate all interventions well  QI: Roll Left and Right   Assistance Needed Independent   Assistance Provided by [place] No physical assistance   Comment MI w HOB flat w/o bed rails   Roll Left and Right CARE Score 6   QI: Sit to [address] / clean-up   Assistance Provided by [place] No physical assistance   Comment DS w HOB flat w/o bed rails   Sit to Lying CARE Score 5   QI: Lying to Sitting on Side of Bed   Assistance Needed Supervision   Assistance Provided by [place] No physical assistance   Comment DS w HOB flat w/o bed rails   Lying to Sitting on Side of Bed CARE Score 4   QI: Sit to [address] Provided by [place] No physical assistance   Comment CS w/o AD   Sit to Stand CARE Score 4   Bed Mobility   Able to Roll Left to Right;Right to Left;Scoot Up;Scoot Down   Adaptive Equipment   (no AD)   QI: Chair/Bed-to-Chair Transfer   Assistance Needed Physical assistance   Assistance Provided by [place] 25%-49%   Comment CGA at most times w/o AD; minAx1 required at one point as pt appeared fatigue post intervention and wanted to sit prior to taking steps back to chair   Chair/Bed-to-Chair Transfer CARE Score 3   Transfer Bed/Chair/Wheelchair   Limitations Noted In Balance; Coordination; Endurance;Problem Solving;Sensation; Sequencing;UE Strength;LE Strength   Adaptive Equipment None   Stand Pivot Minimal Assist;Contact Guard   Sit to Stand Supervision   Stand to Sit Supervision   Supine to Sit Supervision   Sit to Supine Supervision   Findings Pt performed with proper hand placement approx 90% of time  With fatigue, pt completes transfers with increased speed, however no LOB; educated pt on importance of safety during transfers to reduce risk for falls  Bed, Chair, Wheelchair Transfer (FIM) 4 - Patient completes 75% of all tasks   QI: Walk 10 Feet   Assistance Needed Incidental touching   Assistance Provided by [employer] No physical assistance   Comment CGA w/o AD, assistance provided at pt's hips; verbal cueing for pt to increase LUIS and decrease R out toeing   Walk 10 Feet CARE Score 4   QI: Walk 50 Feet with Two Turns   Assistance Needed Physical assistance   Assistance Provided by [employer] Less than 25%   Comment Tara-CGA w/o AD   Walk 50 Feet with Two Turns CARE Score 3   QI: Walk 150 Feet   Assistance Needed Physical assistance   Assistance Provided by [employer] 50%-74%   Comment modAx1 approx 25% of time; minAx1-CGA for rest of amb; performed 200'x4 trials   Walk 150 Feet CARE Score 2   Ambulation   Does the patient walk? 2  Yes   Primary Discharge Mode of Locomotion Walk   Walk Assist Level Moderate Assist;Minimum Assist;Contact Guard   Gait Pattern Inconsistant Dolly;Decreased foot clearance;Narrow LUIS; Trendelenburg; Improper weight shift  (R out toeing)   Assist Device Other  (no AD)   Distance Walked (feet) 200 ft  (x4)   Limitations Noted In Balance; Coordination; Endurance; Heel Strike;Posture;Sensation; Safety; Sequencing;Speed;Strength;Swing   Findings pt cont with R trendelenberg gait pattern, narrow LUIS, and R out toeing; performed all amb w/o AD to challenge pt's balance as well as requested by pt as she reports she did not use AD prior to hospitalization; pt demo's high guard with B UE's during amb; pt attempted at one point to walk with B UE's in her pockets, however educated pt on importance of keeping hands out of pocket to improve balance   Walking (FIM) 3 - Patient completes 50 - 74% of all tasks, needs more than steadying or light touch AND distance 150 feet or more, no rest   Wheelchair mobility   QI: Does the patient use a wheelchair? 0  No   Therapeutic Interventions   Strengthening Supine bridging performed 3 sets to fatigue and L sidelying clamshells performed for 1 set to fatigue; increased time required to complete exercises in order for pt to perform with proper technique; performed to improve pt's pelvic stabilizers to assist with proper gait dynamics during amb   Flexibility Seated passive B gastroc and hamstring stretches performed 3 minutes on each LE   Neuromuscular Re-Education tape on ground for visiual cue with cones placed over top of tape to encourage pt to widen LUIS and decrease R out toeing during amb; performed 8 trials with pt able to complete only 1 trial without knocking over any cones  ; performed to improve pt's gait dynamics as well as balance   Equipment Use   NuStep Level 1 x 15 mins (rest break taken at 10 mins)   Assessment   Treatment Assessment Pt engaged in skilled PT session focusing on principles of neuroplasticity including repetition and specificity  Performed all amb w/o AD in order to improve pt's balance and gait dynamics  Pt demos difficulty with following verbal cueing during amb, even with multiple trials, therefore exercise performed targeting widened LUIS and decreasing R out toeing with visual targets, however, pt still demo'd difficulty with performing exercise properly  Performed supine exercises targeting R pelvic stabilizers, with increased time required to complete due to pt's ability to perform with proper technique  Pt demo's increased LOB with amb during oncoming traffic and increased distraction   Continue to work on pt's R pelvic stabilizers, gait dynamics, and all functional mobilities in order to increase her independence and decrease caregiver burden upon d/c as well as reduce her overall risk for falls  Family/Caregiver Present no   Problem List Decreased strength;Decreased endurance; Impaired balance;Decreased mobility; Decreased coordination;Decreased cognition;Decreased safety awareness; Impaired judgement   Barriers to Discharge Decreased caregiver support   PT Barriers   Functional Limitation Car transfers;Stair negotiation;Standing;Transfers; Walking   Plan   Treatment/Interventions Functional transfer training;LE strengthening/ROM; Therapeutic exercise; Endurance training;Bed mobility;Gait training   Progress Progressing toward goals   Recommendation   Recommendation Outpatient PT; Home with family support;24 hour supervision/assist   Equipment Recommended   (most appropriate least restrictive AD)   PT - OK to Discharge No   PT Therapy Minutes   PT Time In 1400   PT Time Out 1530   PT Total Time (minutes) 90   PT Mode of treatment - Individual (minutes) 90   PT Mode of treatment - Concurrent (minutes) 0   PT Mode of treatment - Group (minutes) 0   PT Mode of treatment - Co-treat (minutes) 0   PT Mode of Teatment - Total time(minutes) 90 minutes   Therapy Time missed   Time missed?  No Yes

## 2024-08-14 NOTE — H&P ADULT - ASSESSMENT
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 Pt sent from Dr Darnell's office for plt count 16k. Admit with relapsed TTP and ELIF.    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 Pt sent from Dr Darnell's office for plt count 16k. Admit with relapsed TTP and ELIF.    Plan:  admit to SICU  plasmapheresis, steroids and rituximab per heme  LIJ EMILY placed without incident  adjust meds per CrCl  hold nephrotoxic meds  trend urine output  trend BMP

## 2024-08-14 NOTE — ED ADULT NURSE NOTE - NSFALLHARMRISKINTERV_ED_ALL_ED

## 2024-08-14 NOTE — H&P ADULT - NSHPLABSRESULTS_GEN_ALL_CORE
9.1    5.39  )-----------( 15       ( 14 Aug 2024 15:01 )             29.2     08-14    140  |  109<H>  |  23  ----------------------------<  105<H>  3.6   |  27  |  1.84<H>    Ca    9.2      14 Aug 2024 15:01  TPro  7.3  /  Alb  3.7  /  TBili  1.8<H>  /  DBili  x   /  AST  31  /  ALT  24  /  AlkPhos  72  08-14

## 2024-08-14 NOTE — ED ADULT TRIAGE NOTE - CHIEF COMPLAINT QUOTE
Patient sent to the ER by Dr. Darnell for plasmapheresis. Patient denies any symptoms at this time but states he has petechiae. Patient has history of TTP.

## 2024-08-14 NOTE — ED STATDOCS - OBJECTIVE STATEMENT
43 y/o male with a PMHx of HTN, lumbar spondylosis, low back pain, migraines, thrombosis of internal jugular vein, TTP presents to the ED sent by Dr. Darnell. Pt had outpatient labs and found to have a platelets of 16. Pt sent for IGG. Pt reports he had a recent GI bug. No other complaints at this time.

## 2024-08-15 DIAGNOSIS — E61.1 IRON DEFICIENCY: ICD-10-CM

## 2024-08-15 LAB
ADD ON TEST-SPECIMEN IN LAB: SIGNIFICANT CHANGE UP
ANION GAP SERPL CALC-SCNC: 5 MMOL/L — SIGNIFICANT CHANGE UP (ref 5–17)
BUN SERPL-MCNC: 23 MG/DL — SIGNIFICANT CHANGE UP (ref 7–23)
CALCIUM SERPL-MCNC: 8.7 MG/DL — SIGNIFICANT CHANGE UP (ref 8.5–10.1)
CHLORIDE SERPL-SCNC: 106 MMOL/L — SIGNIFICANT CHANGE UP (ref 96–108)
CO2 SERPL-SCNC: 28 MMOL/L — SIGNIFICANT CHANGE UP (ref 22–31)
CREAT SERPL-MCNC: 1.65 MG/DL — HIGH (ref 0.5–1.3)
EGFR: 52 ML/MIN/1.73M2 — LOW
GLUCOSE SERPL-MCNC: 116 MG/DL — HIGH (ref 70–99)
HAPTOGLOB SERPL-MCNC: <20 MG/DL — LOW (ref 34–200)
HCT VFR BLD CALC: 30.4 % — LOW (ref 39–50)
HGB BLD-MCNC: 9.4 G/DL — LOW (ref 13–17)
IRON SATN MFR SERPL: 166 UG/DL — HIGH (ref 45–165)
IRON SATN MFR SERPL: 42 % — SIGNIFICANT CHANGE UP (ref 16–55)
LDH SERPL L TO P-CCNC: 319 U/L — HIGH (ref 84–241)
MAGNESIUM SERPL-MCNC: 1.7 MG/DL — SIGNIFICANT CHANGE UP (ref 1.6–2.6)
MCHC RBC-ENTMCNC: 21.4 PG — LOW (ref 27–34)
MCHC RBC-ENTMCNC: 30.9 GM/DL — LOW (ref 32–36)
MCV RBC AUTO: 69.2 FL — LOW (ref 80–100)
PHOSPHATE SERPL-MCNC: 3.4 MG/DL — SIGNIFICANT CHANGE UP (ref 2.5–4.5)
PLATELET # BLD AUTO: 38 K/UL — LOW (ref 150–400)
POTASSIUM SERPL-MCNC: 3.5 MMOL/L — SIGNIFICANT CHANGE UP (ref 3.5–5.3)
POTASSIUM SERPL-SCNC: 3.5 MMOL/L — SIGNIFICANT CHANGE UP (ref 3.5–5.3)
RBC # BLD: 4.39 M/UL — SIGNIFICANT CHANGE UP (ref 4.2–5.8)
RBC # FLD: 23.1 % — HIGH (ref 10.3–14.5)
RETICS #: 183.2 K/UL — HIGH (ref 25–125)
RETICS/RBC NFR: 4.3 % — HIGH (ref 0.5–2.5)
SODIUM SERPL-SCNC: 139 MMOL/L — SIGNIFICANT CHANGE UP (ref 135–145)
TIBC SERPL-MCNC: 399 UG/DL — SIGNIFICANT CHANGE UP (ref 220–430)
UIBC SERPL-MCNC: 233 UG/DL — SIGNIFICANT CHANGE UP (ref 110–370)
VWF CP INHIB PPP-ACNC: 3 U/ML — SIGNIFICANT CHANGE UP
WBC # BLD: 6.59 K/UL — SIGNIFICANT CHANGE UP (ref 3.8–10.5)
WBC # FLD AUTO: 6.59 K/UL — SIGNIFICANT CHANGE UP (ref 3.8–10.5)

## 2024-08-15 PROCEDURE — 99233 SBSQ HOSP IP/OBS HIGH 50: CPT

## 2024-08-15 PROCEDURE — 99232 SBSQ HOSP IP/OBS MODERATE 35: CPT

## 2024-08-15 RX ORDER — DIPHENHYDRAMINE HCL 50 MG
50 CAPSULE ORAL DAILY
Refills: 0 | Status: DISCONTINUED | OUTPATIENT
Start: 2024-08-15 | End: 2024-08-19

## 2024-08-15 RX ORDER — DIPHENHYDRAMINE HCL 50 MG
25 CAPSULE ORAL ONCE
Refills: 0 | Status: COMPLETED | OUTPATIENT
Start: 2024-08-16 | End: 2024-08-16

## 2024-08-15 RX ORDER — FOLIC ACID 1 MG
1 TABLET ORAL DAILY
Refills: 0 | Status: DISCONTINUED | OUTPATIENT
Start: 2024-08-15 | End: 2024-08-20

## 2024-08-15 RX ORDER — FAMOTIDINE 10 MG/ML
20 INJECTION INTRAVENOUS ONCE
Refills: 0 | Status: COMPLETED | OUTPATIENT
Start: 2024-08-15 | End: 2024-08-15

## 2024-08-15 RX ORDER — ACETAMINOPHEN 325 MG/1
650 TABLET ORAL ONCE
Refills: 0 | Status: COMPLETED | OUTPATIENT
Start: 2024-08-16 | End: 2024-08-16

## 2024-08-15 RX ORDER — DIPHENHYDRAMINE HCL 50 MG
25 CAPSULE ORAL ONCE
Refills: 0 | Status: COMPLETED | OUTPATIENT
Start: 2024-08-15 | End: 2024-08-15

## 2024-08-15 RX ADMIN — Medication 50 MILLIGRAM(S): at 13:42

## 2024-08-15 RX ADMIN — CHLORHEXIDINE GLUCONATE 1 APPLICATION(S): 40 SOLUTION TOPICAL at 09:02

## 2024-08-15 RX ADMIN — Medication 1 TABLET(S): at 09:53

## 2024-08-15 RX ADMIN — ACETAMINOPHEN 650 MILLIGRAM(S): 325 TABLET ORAL at 08:08

## 2024-08-15 RX ADMIN — Medication 25 MILLIGRAM(S): at 16:19

## 2024-08-15 RX ADMIN — ACETAMINOPHEN 650 MILLIGRAM(S): 325 TABLET ORAL at 07:38

## 2024-08-15 RX ADMIN — Medication 100 MILLIGRAM(S): at 09:53

## 2024-08-15 RX ADMIN — FAMOTIDINE 20 MILLIGRAM(S): 10 INJECTION INTRAVENOUS at 16:19

## 2024-08-15 RX ADMIN — Medication 200 GRAM(S): at 13:15

## 2024-08-15 NOTE — PROGRESS NOTE ADULT - ASSESSMENT
Patient with hx. TTP, platelet was normal in early august, now 16.   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 Plan steroids 100 prednisone  2. Plasmapheresis daily  3. Rituxin today  4. monitor kidney function creatinine 1.6 better    d/w heme onc  will transfer to 1n  patient transferred to hospitalist service signed out to dr. Lopez

## 2024-08-15 NOTE — PROGRESS NOTE ADULT - ASSESSMENT
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 Pt was previously diagnosed with acute TTP in July 2023 (admission c/b RIJ DVT, completed xarelto). For the period 7/29/23 - 08/02/23  pt was on admission at  for chest pain, and noted with anemia and thrombocytopenia. Peripheral smear at presentation was consistent with large amount of schistocytes -> TTP. Confirmatory [id number] < 2%., s/p plasmapheresis and required prolonged steroid taper. Pt was Off steroids early October, 2023, s/p Rituxan x 4 in August 2023. Pt Responded well. Pt was doing well until recently, though [id number] low and starting rituxan was discussed. Pt reported GI distress including abdominal discomfort and diarrhea starting 8/10 which has since resolved. Pt did report some mild pain from his exercise regimen and chronic Low Back Pain for which he took a few doses of ibuprofen. He denied headache, myalgias, CP, SOB, palpitations, N/V, fever/chills, numbness/tingling/weakness, other c/o at this time. Pt noticed petechia on lip early in the day and went to Dr Darnell's office for his routine/previously scheduled visit where platelet count was noted to be 16K/ul (Platelet Count 277 K/uL on 08.05.24 @ 12:01). Pt was sent to ED for admission. Plan includes plasmapheresis, steroids and rituximab.    #TTP causing microcytic anemia and thrombocytopenia  -[id number]<2%  -trend CBC, LDH  -plasmapharesis, steroids and rituxan per heme  -f/u heme recs    #ELIF- likely 2/2 TMA in setting of TTP  -trend Cr, b/l 1.1-1.2 improving    Dispo timeline per plt trend and heme recs

## 2024-08-15 NOTE — PROGRESS NOTE ADULT - ASSESSMENT
Assessment and plan    # Thrombocytopenia      - Likely recurrent TTP     - Acute kidney injury likely from recurrent TTP (Monitor the Cr; 1.84 @ admission, 1.6 today)     - Steroids  - 100mg prednisone per Heme      - Plasmapheresis per Heme      - Rituxin per Heme    # Microcytic Anemia     - Recc Fe Supplementation    Assessment and plan    # Thrombocytopenia      - Likely recurrent TTP     - Acute kidney injury likely from recurrent TTP (Monitor the Cr; 1.84 @ admission, 1.6 today)     - Steroids  - 100mg prednisone per Heme      - Plasmapheresis per Heme      - Rituxin per Heme    # Microcytic Anemia (MCV 69.2, Low Ferritin, low TIBC and Low Fe)     - Recc Fe Supplementation

## 2024-08-15 NOTE — PROGRESS NOTE ADULT - SUBJECTIVE AND OBJECTIVE BOX
HOSPITALIST ATTENDING PROGRESS NOTE    Chart and meds reviewed.  Patient seen and examined.    CC: thrombocytopenia    Subjective: Denies CP or SOB.    All other systems reviewed and found to be negative with the exception of what has been described above.    MEDICATIONS  (STANDING):  calcium gluconate IVPB 2 Gram(s) IV Intermittent daily  chlorhexidine 4% Liquid 1 Application(s) Topical <User Schedule>  diphenhydrAMINE 25 milliGRAM(s) Oral once  diphenhydrAMINE Injectable 50 milliGRAM(s) IV Push daily  famotidine    Tablet 20 milliGRAM(s) Oral once  multivitamin 1 Tablet(s) Oral daily  predniSONE   Tablet 100 milliGRAM(s) Oral daily    MEDICATIONS  (PRN):  acetaminophen     Tablet .. 650 milliGRAM(s) Oral every 6 hours PRN Mild Pain (1 - 3)  sodium chloride 0.9% lock flush 10 milliLiter(s) IV Push every 1 hour PRN Pre/post blood products, medications, blood draw, and to maintain line patency      VITALS:  T(F): 98.2 (08-15-24 @ 13:56), Max: 98.7 (08-15-24 @ 06:00)  HR: 72 (08-15-24 @ 14:00) (68 - 95)  BP: 131/86 (08-15-24 @ 14:00) (118/84 - 155/92)  RR: 16 (08-15-24 @ 14:00) (13 - 25)  SpO2: 100% (08-15-24 @ 14:00) (97% - 100%)  Wt(kg): --    I&O's Summary    14 Aug 2024 07:01  -  15 Aug 2024 07:00  --------------------------------------------------------  IN: 50 mL / OUT: 500 mL / NET: -450 mL        CAPILLARY BLOOD GLUCOSE          PHYSICAL EXAM:  Gen: NAD  HEENT:  pupils equal and reactive, EOMI, no oropharyngeal lesions, erythema, exudates, oral thrush  NECK:   supple, no carotid bruits, no palpable lymph nodes, no thyromegaly, + MERE roman  CV:  +S1, +S2, regular, no murmurs or rubs  RESP:   lungs clear to auscultation bilaterally, no wheezing, rales, rhonchi, good air entry bilaterally  BREAST:  not examined  GI:  abdomen soft, non-tender, non-distended, normal BS, no bruits, no abdominal masses, no palpable masses  RECTAL:  not examined  :  not examined  MSK:   normal muscle tone, no atrophy, no rigidity, no contractions  EXT:  no clubbing, no cyanosis, no edema, no calf pain, swelling or erythema  VASCULAR:  pulses equal and symmetric in the upper and lower extremities  NEURO:  AAOX3, no focal neurological deficits, follows all commands, able to move extremities spontaneously  SKIN:  no ulcers, lesions or rashes    LABS:                            9.4    6.59  )-----------( 38       ( 15 Aug 2024 05:51 )             30.4     08-15    139  |  106  |  23  ----------------------------<  116<H>  3.5   |  28  |  1.65<H>    Ca    8.7      15 Aug 2024 05:51  Phos  3.4     08-15  Mg     1.7     08-15    TPro  7.3  /  Alb  3.7  /  TBili  1.8<H>  /  DBili  x   /  AST  31  /  ALT  24  /  AlkPhos  72  08-14        LIVER FUNCTIONS - ( 14 Aug 2024 15:01 )  Alb: 3.7 g/dL / Pro: 7.3 gm/dL / ALK PHOS: 72 U/L / ALT: 24 U/L / AST: 31 U/L / GGT: x             Urinalysis Basic - ( 15 Aug 2024 05:51 )    Color: x / Appearance: x / SG: x / pH: x  Gluc: 116 mg/dL / Ketone: x  / Bili: x / Urobili: x   Blood: x / Protein: x / Nitrite: x   Leuk Esterase: x / RBC: x / WBC x   Sq Epi: x / Non Sq Epi: x / Bacteria: x    CULTURES:  Blood, Urine: Moderate (08-14 @ 18:01)  no new    Additional results/Imaging, I have personally reviewed:  CXR: clear lungs

## 2024-08-15 NOTE — PROGRESS NOTE ADULT - ASSESSMENT
45 y/o M with prior acute TTP 07/2023 - 08/2023 currently admitted for TTP relapse requiring urgent tx      # Microcytic Anemia & Thrombocytopenia in setting of TTP    - 07/29/23 - 08/02/23 initial occurrence, presented with acute CP, anemia, thrombocytopenia & renal dysfunction  - Confirmatory [id number] <2% ---> s/p plasmapheresis and required prolonged steroid taper in addition to Rituxan q 1 weekly x4 doses, completed steroids 10/2023, continued to be followed in outpatient setting by Heme Onc Dr Darnell  - In last several months [id number] has been slowly dropping, but there has was no evidence of clinical relapse; maintenance Rituxan dosing was discussed and plan was for patient to start 08/14 but labs noted acute change in Plts warranting referral to the ED  - Case discussed with Heme Onc Dr Darnell with plan to start Prednisone 1mg / kg QD with appropriate taper with tx response as well as urgent plasmapheresis daily until Plt count >150K    - As of 08/15; Plts improved to 38K, patient is s/p x1 plasmapheresis pending second with plan for patient to receive Rituxan 08/16 by 1N RN staff, then will need to hold plasmapheresis for at least 24 hours before resuming if necessary based on Plt count   - Discussed case with [facility] who will have staff come to hospital as requested for daily plasmapheresis except for 24 hours post Rituxan therapy  - Discussed case with [facility] who will procure appropriate FFP volume for daily pheresis tx   - Patient did have reaction to both plasmapheresis and Rituxan last year when initially tx'ed, will continue with premedication regimen including Benadryl 50mg IV push as well as Tylenol 650mg PO  - Calcium gluconate 2G QD ordered in setting of plasmapheresis exchange  - Recommend Folic Acid 2mg QD    - Hgb remains low but stable, MCV 60s; overall consistent with ELIN, will repeat iron studies (most recent 08/05)  - Continue to trend serial CBCs while admitted   - Continue all supportive measures as clinically necessary     ?

## 2024-08-15 NOTE — PROGRESS NOTE ADULT - SUBJECTIVE AND OBJECTIVE BOX
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 Went to  Hematologist, Dr. Darnell's office  and found to have platelets of 16  no other symptoms, sent to ED. creatinine 1.8  patient admitted to SDU for plasmapharesis    ROS no chest pain, no sob, no abdominal pain no fevers, no chills, recent gi symptoms     PAST MEDICAL & SURGICAL HISTORY:    was on no meds    H/O low back pain  Lumbar spondylosis  H/O migraine  TTP (thrombotic thrombopenic purpura)  History of thrombosis of internal jugular vein  right    MEDICATIONS  (STANDING):  calcium gluconate IVPB 2 Gram(s) IV Intermittent daily  chlorhexidine 4% Liquid 1 Application(s) Topical <User Schedule>  diphenhydrAMINE Injectable 25 milliGRAM(s) IV Push daily  multivitamin 1 Tablet(s) Oral daily  predniSONE   Tablet 100 milliGRAM(s) Oral daily    MEDICATIONS  (PRN):  acetaminophen     Tablet .. 650 milliGRAM(s) Oral every 6 hours PRN Mild Pain (1 - 3)  sodium chloride 0.9% lock flush 10 milliLiter(s) IV Push every 1 hour PRN Pre/post blood products, medications, blood draw, and to maintain line patency      Height (cm): 180.3 (08-14 @ 14:11), 180.34 (08-14 @ 13:00)  Weight (kg): 118.2 (08-14 @ 14:11), 117.03 (08-14 @ 13:00)  BMI (kg/m2): 36.4 (08-14 @ 14:11), 36 (08-14 @ 13:00)    ICU Vital Signs Last 24 Hrs  T(C): 36.9 (15 Aug 2024 08:36), Max: 37.1 (15 Aug 2024 06:00)  T(F): 98.4 (15 Aug 2024 08:36), Max: 98.7 (15 Aug 2024 06:00)  HR: 71 (15 Aug 2024 08:00) (68 - 95)  BP: 137/99 (15 Aug 2024 08:00) (118/84 - 156/78)  BP(mean): 110 (15 Aug 2024 08:00) (93 - 113)  ABP: --  ABP(mean): --  RR: 15 (15 Aug 2024 08:00) (13 - 25)  SpO2: 100% (15 Aug 2024 08:00) (97% - 100%)    O2 Parameters below as of 15 Aug 2024 08:00  Patient On (Oxygen Delivery Method): room air    Physical Exam    General  - obese, no distress, looks well  HEENT nc/at small petechial on small area of lip  neck left sided shiley  lungs clear  cv rrr  abdomen soft, non tender  extrmetieis warm  gu voiding  neuro awake, alert no deficits      I&O's Summary    14 Aug 2024 07:01  -  15 Aug 2024 07:00  --------------------------------------------------------  IN: 50 mL / OUT: 500 mL / NET: -450 mL                               9.4    6.59  )-----------( 38       ( 15 Aug 2024 05:51 )             30.4       08-15    139  |  106  |  23  ----------------------------<  116<H>  3.5   |  28  |  1.65<H>    Ca    8.7      15 Aug 2024 05:51  Phos  3.4     08-15  Mg     1.7     08-15    TPro  7.3  /  Alb  3.7  /  TBili  1.8<H>  /  DBili  x   /  AST  31  /  ALT  24  /  AlkPhos  72  08-14    Urinalysis Basic - ( 15 Aug 2024 05:51 )    Color: x / Appearance: x / SG: x / pH: x  Gluc: 116 mg/dL / Ketone: x  / Bili: x / Urobili: x   Blood: x / Protein: x / Nitrite: x   Leuk Esterase: x / RBC: x / WBC x   Sq Epi: x / Non Sq Epi: x / Bacteria: x    DVT Prophylaxis:   held for thrombcoytopenia                                                                        Labs, Notes, Orders, radiologic studies reviewed and care coordinated with multidisciplinary team

## 2024-08-15 NOTE — PROGRESS NOTE ADULT - SUBJECTIVE AND OBJECTIVE BOX
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 Pt was previously diagnosed with acute TTP in July 2023 (admission c/b RIJ DVT, completed xarelto). For the period 7/29/23 - 08/02/23  pt was on admission at  for chest pain, and noted with anemia and thrombocytopenia. Peripheral smear at presentation was consistent with large amount of schistocytes -> TTP. Confirmatory [id number] < 2%., s/p plasmapheresis and required prolonged steroid taper. Pt was Off steroids early October, 2023, s/p Rituxan x 4 in August 2023. Pt Responded well. Pt was doing well until recently, though [id number] low and starting rituxan was discussed. Pt reported GI distress including abdominal discomfort and diarrhea starting 8/10 which has since resolved. Pt did report some mild pain from his exercise regimen and chronic Low Back Pain for which he took a few doses of ibuprofen. He denied headache, myalgias, CP, SOB, palpitations, N/V, fever/chills, numbness/tingling/weakness, other c/o at this time. Pt noticed petechia on lip early in the day and went to Dr Darnell's office for his routine/previously scheduled visit where platelet count was noted to be 16K/ul (Platelet Count 277 K/uL on 08.05.24 @ 12:01). Pt was sent to ED for admission. Plan includes plasmapheresis, steroids and rituximab.  On assessment today, patient was resting comfortably bedside in the presence of his wife. Shiley catheter was intact on the left side of his neck. Pt denied GI pain or complaint, no pain, chills or fever.     PAST MEDICAL & SURGICAL HISTORY:    HTN (hypertension)  H/O low back pain  Lumbar spondylosis  H/O migraine  TTP (thrombotic thrombopenic purpura)  History of thrombosis of internal jugular vein  right      Review of System: 14  review of systems negative, except as noted in HPI    Allergies and Intolerances:        Allergies:  	No Known Allergies:         Physical Exam:   General: obese, well-nourished, no acute distree                           Neuro: Awake, alert and oriented to time, place and person, non-focal, speech clear and intact       Eyes: Extraocular muscle intact, pupil are equal round and reactive to light   ENT: scant buccal petechiae (resolved)  Neck: supple, no JVD, trachea midline, Shiley catheter on left neck  Chest: CTA, good air entry, equal bilaterally, no wheezes/rales/rhonchi, normal excursion, no accessory muscle use noted  CV: regular rate, regular rhythm, +S1S2  GI: soft, non-tender, non-distended, Normal bowel sound  Extremities: Move all four extremities, no C/C/E, distal motor/neuro/circ intact      Labs and Results:            Labs, Radiology, Cardiology, and Other Results:             9.4    6.59  )-----------( 38       ( 15 Aug 2024 05:51 )             30.4       08-15    139  |  106  |  23  ----------------------------<  116<H>  3.5   |  28  |  1.65<H>    Ca    8.7      15 Aug 2024 05:51  Phos  3.4     08-15  Mg     1.7     08-15    TPro  7.3  /  Alb  3.7  /  TBili  1.8<H>  /  DBili  x   /  AST  31  /  ALT  24  /  AlkPhos  72  08-14  SKIN: warm, dry, intact    Vital Signs Last 24 Hrs  T(C): 36.9 (15 Aug 2024 08:36), Max: 37.1 (15 Aug 2024 06:00)  T(F): 98.4 (15 Aug 2024 08:36), Max: 98.7 (15 Aug 2024 06:00)  HR: 82 (15 Aug 2024 10:00) (68 - 95)  BP: 155/84 (15 Aug 2024 10:00) (118/84 - 156/78)  BP(mean): 104 (15 Aug 2024 10:00) (93 - 113)  RR: 16 (15 Aug 2024 10:00) (13 - 25)  SpO2: 100% (15 Aug 2024 10:00) (97% - 100%)    Parameters below as of 15 Aug 2024 08:00  Patient On (Oxygen Delivery Method): room air      Medication:    MEDICATIONS  (STANDING):  calcium gluconate IVPB 2 Gram(s) IV Intermittent daily  chlorhexidine 4% Liquid 1 Application(s) Topical <User Schedule>  diphenhydrAMINE Injectable 25 milliGRAM(s) IV Push daily  multivitamin 1 Tablet(s) Oral daily  predniSONE   Tablet 100 milliGRAM(s) Oral daily    Assessment and Plan:   · Completed VTE Risk Assessment(s)	Medical Assessment Completed on: 14-Aug-2024 16:26  · Completed VTE Risk Assessment(s)	Refer to the Assessment tab to view/cancel completed assessment.

## 2024-08-15 NOTE — PROGRESS NOTE ADULT - SUBJECTIVE AND OBJECTIVE BOX
HPI:    43 y/o M with a PMHx of HTN, migraines, and lumbar spondylosis, prior confirmed TTP 07/2023 - 08/2023, followed by Heme Onc Dr Darnell in outpatient setting referred to the ED for acute drop in Plt count with recent [id number] approx 2%.     8/14/24; Seen in ED along with Dr. Bruno & GENNY Turner. Patient with mild buccal petechia, in no acute distress. Aware of the plan for plasmapheresis/ plts infusion & tx    08/15/24: Seen at bedside in the SICU s/p first plasmapheresis overnight, with improved Plt count, feeling well, no acute distress      PAST MEDICAL & SURGICAL HISTORY:    HTN (hypertension)    H/O low back pain    Lumbar spondylosis    H/O migraine    TTP (thrombotic thrombopenic purpura)    History of thrombosis of internal jugular vein  right      FAMILY HISTORY:    Not noted      MEDICATIONS  (STANDING):    calcium gluconate IVPB 2 Gram(s) IV Intermittent daily  chlorhexidine 4% Liquid 1 Application(s) Topical <User Schedule>  diphenhydrAMINE Injectable 50 milliGRAM(s) IV Push daily  multivitamin 1 Tablet(s) Oral daily  predniSONE   Tablet 100 milliGRAM(s) Oral daily      MEDICATIONS  (PRN):    acetaminophen     Tablet .. 650 milliGRAM(s) Oral every 6 hours PRN Mild Pain (1 - 3)  sodium chloride 0.9% lock flush 10 milliLiter(s) IV Push every 1 hour PRN Pre/post blood products, medications, blood draw, and to maintain line patency        ALLERGIES:    No Known Allergies        REVIEW OF SYSTEMS:    Constitutional, Eyes, ENT, Cardiovascular, Respiratory, Gastrointestinal, Genitourinary, Musculoskeletal, Integumentary, Neurological, Psychiatric, Endocrine, Heme/Lymph and Allergic/Immunologic review of systems are otherwise negative except as noted in HPI.       VITALS:    T(C): 36.9 (15 Aug 2024 08:36), Max: 37.1 (15 Aug 2024 06:00)  T(F): 98.4 (15 Aug 2024 08:36), Max: 98.7 (15 Aug 2024 06:00)  HR: 82 (15 Aug 2024 10:00) (68 - 95)  BP: 155/84 (15 Aug 2024 10:00) (118/84 - 156/78)  BP(mean): 104 (15 Aug 2024 10:00) (93 - 113)  ABP: --  ABP(mean): --  RR: 16 (15 Aug 2024 10:00) (13 - 25)  SpO2: 100% (15 Aug 2024 10:00) (97% - 100%)    O2 Parameters below as of 15 Aug 2024 08:00  Patient On (Oxygen Delivery Method): room air        PHYSICAL EXAM:    Constitutional: no acute distress  Eyes: no conjunctival infection, anicteric.   ENT: pharynx is unremarkable  Neck: supple without JVD; L sided Shiley cath in place  Pulmonary: clear to auscultation bilaterally   Cardiac: RRR  Vascular: no calf tenderness, venous stasis changes, varices  Abdomen: normoactive bowel sounds, soft and nontender, no hepatosplenomegaly or masses appreciated  Lymphatic: no peripheral adenopathy appreciated  Musculoskeletal: full range of motion and no deformities appreciated  Skin: scattered buccal petechia, otherwise normal appearance, no rash/erythema  Neurology: awake, alert, oriented; no focal deficits      LABS:                          9.4    6.59  )-----------( 38       ( 15 Aug 2024 05:51 )             30.4     08-15    139  |  106  |  23  ----------------------------<  116<H>  3.5   |  28  |  1.65<H>    Ca    8.7      15 Aug 2024 05:51  Phos  3.4     08-15  Mg     1.7     08-15    TPro  7.3  /  Alb  3.7  /  TBili  1.8<H>  /  DBili  x   /  AST  31  /  ALT  24  /  AlkPhos  72  08-14        RADIOLOGY & ADDITIONAL STUDIES:      Xray Chest 1 View-PORTABLE IMMEDIATE (Xray Chest 1 View-PORTABLE IMMEDIATE .) (08.14.24 @ 16:44)    INTERPRETATION:    Heart size and the mediastinum cannot be accurately evaluated on this   projection.  There is a left IJ catheter with tip in the SVC. There is no pneumothorax.  The lungs are clear.  There is left apical pleural thickening.  There is no pleural effusion.  There is no acute bony abnormality.      IMPRESSION:  Left IJ catheter with tip in the SVC. No pneumothorax.    Clear lungs.

## 2024-08-15 NOTE — PROGRESS NOTE ADULT - NS ATTEND AMEND GEN_ALL_CORE FT
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   -Off steroids early October, 2023, s/p Rituxan x 4 in August 2023. Responded well.  -Outpatient f/u plan ; Monitor CBC , LDH, Haptoglobin , [id number] every 2-3 months x 2 years next yearly.  -plan to start Rituxan     Plan per primary Hematologist Dr. Darnell:  -  Start Prednisone 1 mg/ kg daily- to be started tomorrow (patient took 120 mg PO today at home). will need to be tapered likely in few days based on Tx response.  -  Urgent plasmapheresis today and next daily until platelets normalize.  -  IV pheresis catheter placed by SICU  - To start Rituxan in 1 -2 days ( 375 mg/m2 weekly x 4)-plan for starting either tomorrow or day after; to be infused after plasmapheresis      * Replace with 4.5 liters of FFP.     * Premedicate with Benadryl 25 mg IVP daily.     * Calcium gluconate 2 gram daily.  -[facility] pheresis services ([phone])-    ? 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    Confirmatory [id number] < 2 and recently declining  -s/p plasmapheresis 8/14 and 8/15/ replaced with 4.5 liters of FFP and Calcium gluconate 2 gram daily.  -Premedicate with Benadryl 50 mg IVP daily; increased dose s/t hives during procedure  -Monitor CBC , LDH  -Rituxan 375 mg/m2 weekly x 4 consented and ordered to be given post plasmapheresis today

## 2024-08-16 LAB
ADD ON TEST-SPECIMEN IN LAB: SIGNIFICANT CHANGE UP
ADD ON TEST-SPECIMEN IN LAB: SIGNIFICANT CHANGE UP
ALBUMIN SERPL ELPH-MCNC: 3.7 G/DL — SIGNIFICANT CHANGE UP (ref 3.3–5)
ALP SERPL-CCNC: 57 U/L — SIGNIFICANT CHANGE UP (ref 40–120)
ALT FLD-CCNC: 24 U/L — SIGNIFICANT CHANGE UP (ref 12–78)
ANION GAP SERPL CALC-SCNC: 7 MMOL/L — SIGNIFICANT CHANGE UP (ref 5–17)
AST SERPL-CCNC: 19 U/L — SIGNIFICANT CHANGE UP (ref 15–37)
BILIRUB SERPL-MCNC: 0.3 MG/DL — SIGNIFICANT CHANGE UP (ref 0.2–1.2)
BLD GP AB SCN SERPL QL: SIGNIFICANT CHANGE UP
BUN SERPL-MCNC: 28 MG/DL — HIGH (ref 7–23)
CALCIUM SERPL-MCNC: 8.9 MG/DL — SIGNIFICANT CHANGE UP (ref 8.5–10.1)
CHLORIDE SERPL-SCNC: 103 MMOL/L — SIGNIFICANT CHANGE UP (ref 96–108)
CO2 SERPL-SCNC: 27 MMOL/L — SIGNIFICANT CHANGE UP (ref 22–31)
CREAT SERPL-MCNC: 1.68 MG/DL — HIGH (ref 0.5–1.3)
EGFR: 50 ML/MIN/1.73M2 — LOW
GLUCOSE SERPL-MCNC: 112 MG/DL — HIGH (ref 70–99)
HAV IGM SER-ACNC: SIGNIFICANT CHANGE UP
HBV CORE AB SER-ACNC: REACTIVE
HBV CORE IGM SER-ACNC: SIGNIFICANT CHANGE UP
HBV SURFACE AB SER-ACNC: REACTIVE
HBV SURFACE AG SER-ACNC: SIGNIFICANT CHANGE UP
HBV SURFACE AG SER-ACNC: SIGNIFICANT CHANGE UP
HCT VFR BLD CALC: 30.1 % — LOW (ref 39–50)
HCV AB S/CO SERPL IA: 0.08 S/CO — SIGNIFICANT CHANGE UP (ref 0–0.99)
HCV AB SERPL-IMP: SIGNIFICANT CHANGE UP
HGB BLD-MCNC: 9 G/DL — LOW (ref 13–17)
LDH SERPL L TO P-CCNC: 251 U/L — HIGH (ref 84–241)
MAGNESIUM SERPL-MCNC: 1.9 MG/DL — SIGNIFICANT CHANGE UP (ref 1.6–2.6)
MCHC RBC-ENTMCNC: 21.4 PG — LOW (ref 27–34)
MCHC RBC-ENTMCNC: 29.9 GM/DL — LOW (ref 32–36)
MCV RBC AUTO: 71.7 FL — LOW (ref 80–100)
PHOSPHATE SERPL-MCNC: 3.3 MG/DL — SIGNIFICANT CHANGE UP (ref 2.5–4.5)
PLATELET # BLD AUTO: 102 K/UL — LOW (ref 150–400)
POTASSIUM SERPL-MCNC: 3.4 MMOL/L — LOW (ref 3.5–5.3)
POTASSIUM SERPL-SCNC: 3.4 MMOL/L — LOW (ref 3.5–5.3)
PROT SERPL-MCNC: 6.8 GM/DL — SIGNIFICANT CHANGE UP (ref 6–8.3)
RBC # BLD: 4.2 M/UL — SIGNIFICANT CHANGE UP (ref 4.2–5.8)
RBC # FLD: 24.6 % — HIGH (ref 10.3–14.5)
SODIUM SERPL-SCNC: 137 MMOL/L — SIGNIFICANT CHANGE UP (ref 135–145)
WBC # BLD: 8.13 K/UL — SIGNIFICANT CHANGE UP (ref 3.8–10.5)
WBC # FLD AUTO: 8.13 K/UL — SIGNIFICANT CHANGE UP (ref 3.8–10.5)

## 2024-08-16 PROCEDURE — 99232 SBSQ HOSP IP/OBS MODERATE 35: CPT

## 2024-08-16 PROCEDURE — 76770 US EXAM ABDO BACK WALL COMP: CPT | Mod: 26

## 2024-08-16 RX ORDER — DEXTROSE, SODIUM ACETATE, POTASSIUM CHLORIDE, POTASSIUM PHOSPHATE, AND SODIUM CHLORIDE 5; .15; .13; .28; .091 G/100ML; G/100ML; G/100ML; G/100ML; G/100ML
1000 INJECTION, SOLUTION INTRAVENOUS
Refills: 0 | Status: DISCONTINUED | OUTPATIENT
Start: 2024-08-16 | End: 2024-08-17

## 2024-08-16 RX ORDER — RITUXIMAB 10 MG/ML
880 INJECTION, SOLUTION INTRAVENOUS ONCE
Refills: 0 | Status: COMPLETED | OUTPATIENT
Start: 2024-08-16 | End: 2024-08-16

## 2024-08-16 RX ORDER — PANTOPRAZOLE SODIUM 40 MG
40 TABLET, DELAYED RELEASE (ENTERIC COATED) ORAL
Refills: 0 | Status: DISCONTINUED | OUTPATIENT
Start: 2024-08-16 | End: 2024-08-20

## 2024-08-16 RX ADMIN — Medication 25 MILLIGRAM(S): at 08:21

## 2024-08-16 RX ADMIN — Medication 1 TABLET(S): at 08:22

## 2024-08-16 RX ADMIN — RITUXIMAB 110 MILLIGRAM(S): 10 INJECTION, SOLUTION INTRAVENOUS at 10:21

## 2024-08-16 RX ADMIN — DEXTROSE, SODIUM ACETATE, POTASSIUM CHLORIDE, POTASSIUM PHOSPHATE, AND SODIUM CHLORIDE 75 MILLILITER(S): 5; .15; .13; .28; .091 INJECTION, SOLUTION INTRAVENOUS at 14:43

## 2024-08-16 RX ADMIN — Medication 1 MILLIGRAM(S): at 08:22

## 2024-08-16 RX ADMIN — ACETAMINOPHEN 650 MILLIGRAM(S): 325 TABLET ORAL at 08:22

## 2024-08-16 RX ADMIN — Medication 100 MILLIGRAM(S): at 08:23

## 2024-08-16 NOTE — PROGRESS NOTE ADULT - SUBJECTIVE AND OBJECTIVE BOX
HOSPITALIST ATTENDING PROGRESS NOTE    Chart and meds reviewed.  Patient seen and examined.    CC: thrombocytopenia    8/16 - pt seen and examined, denies cough, dyspnea, abdominal pain, signs of bleeding , tolerating po intake, afebrile    All other systems reviewed and found to be negative with the exception of what has been described above.    Vital sings reviewed for last 24 h  T(C): 36.4 (08-16-24 @ 13:30), Max: 37.2 (08-15-24 @ 17:55)  T(F): 97.6 (08-16-24 @ 13:30), Max: 98.9 (08-15-24 @ 17:55)  HR: 90 (08-16-24 @ 13:30) (77 - 104)  BP: 144/78 (08-16-24 @ 13:30) (126/70 - 155/73)  RR: 18 (08-16-24 @ 13:30) (15 - 19)  SpO2: 100% (08-16-24 @ 13:30) (97% - 100%)    PHYSICAL EXAM:  Gen: NAD  HEENT:  pupils equal and reactive, EOMI, no oropharyngeal lesions, erythema, exudates, oral thrush  NECK:   supple, no carotid bruits, no palpable lymph nodes, no thyromegaly, + LIJ shiley  CV:  +S1, +S2, regular, no murmurs or rubs  RESP:   lungs clear to auscultation bilaterally, no wheezing, rales, rhonchi, good air entry bilaterally  BREAST:  not examined  GI:  abdomen soft, non-tender, non-distended, normal BS, no bruits, no abdominal masses, no palpable masses  RECTAL:  not examined  :  not examined  MSK:   normal muscle tone, no atrophy, no rigidity, no contractions  EXT:  no clubbing, no cyanosis, no edema, no calf pain, swelling or erythema  VASCULAR:  pulses equal and symmetric in the upper and lower extremities  NEURO:  AAOX3, no focal neurological deficits, follows all commands, able to move extremities spontaneously  SKIN:  no ulcers, lesions or rashes    LABS: all reviewed                          9.0    8.13  )-----------( 102      ( 16 Aug 2024 06:25 )             30.1     08-16    137  |  103  |  28<H>  ----------------------------<  112<H>  3.4<L>   |  27  |  1.68<H>    Ca    8.9      16 Aug 2024 06:25  Phos  3.3     08-16  Mg     1.9     08-16    TPro  6.8  /  Alb  3.7  /  TBili  0.3  /  DBili  x   /  AST  19  /  ALT  24  /  AlkPhos  57  08-16        LIVER FUNCTIONS - ( 16 Aug 2024 06:25 )  Alb: 3.7 g/dL / Pro: 6.8 gm/dL / ALK PHOS: 57 U/L / ALT: 24 U/L / AST: 19 U/L / GGT: x           Xray Chest 1 View-PORTABLE IMMEDIATE (Xray Chest 1 View-PORTABLE IMMEDIATE .) (08.14.24 @ 16:44) >  Heart size and the mediastinum cannot be accurately evaluated on this   projection.  There is a left IJ catheter with tip in the SVC. There is no pneumothorax.  The lungs are clear.  There is left apical pleural thickening.  There is no pleural effusion.  There is no acute bony abnormality.      IMPRESSION:  Left IJ catheter with tip in the SVC. No pneumothorax.            MEDICATIONS  (STANDING):  calcium gluconate IVPB 2 Gram(s) IV Intermittent daily  chlorhexidine 4% Liquid 1 Application(s) Topical <User Schedule>  diphenhydrAMINE Injectable 50 milliGRAM(s) IV Push daily  folic acid 1 milliGRAM(s) Oral daily  multivitamin 1 Tablet(s) Oral daily  predniSONE   Tablet 100 milliGRAM(s) Oral daily  sodium chloride 0.9% with potassium chloride 20 mEq/L 1000 milliLiter(s) (75 mL/Hr) IV Continuous <Continuous>    MEDICATIONS  (PRN):  acetaminophen     Tablet .. 650 milliGRAM(s) Oral every 6 hours PRN Mild Pain (1 - 3)  sodium chloride 0.9% lock flush 10 milliLiter(s) IV Push every 1 hour PRN Pre/post blood products, medications, blood draw, and to maintain line patency

## 2024-08-16 NOTE — PROGRESS NOTE ADULT - SUBJECTIVE AND OBJECTIVE BOX
HPI:    43 y/o M with a PMHx of HTN, migraines, and lumbar spondylosis, prior confirmed TTP 07/2023 - 08/2023, followed by Heme Onc Dr Darnell in outpatient setting referred to the ED for acute drop in Plt count with recent [id number] approx 2%.     8/14/24; Seen in ED along with Dr. Bruno & GENNY Turner. Patient with mild buccal petechia, in no acute distress. Aware of the plan for plasmapheresis/ plts infusion & tx    08/15/24: Seen at bedside in the SICU s/p first plasmapheresis overnight, with improved Plt count, feeling well, no acute distress    08/16/24: Seen at bedside this AM on 1N, feeling well, pending Rituxan infusion      PAST MEDICAL & SURGICAL HISTORY:    HTN (hypertension)    H/O low back pain    Lumbar spondylosis    H/O migraine    TTP (thrombotic thrombopenic purpura)    History of thrombosis of internal jugular vein  right      FAMILY HISTORY:    Not noted      MEDICATIONS  (STANDING):    calcium gluconate IVPB 2 Gram(s) IV Intermittent daily  chlorhexidine 4% Liquid 1 Application(s) Topical <User Schedule>  diphenhydrAMINE Injectable 50 milliGRAM(s) IV Push daily  folic acid 1 milliGRAM(s) Oral daily  multivitamin 1 Tablet(s) Oral daily  predniSONE   Tablet 100 milliGRAM(s) Oral daily  riTUXimab IVPB (Non - oncologic) 880 milliGRAM(s) IV Intermittent once      MEDICATIONS  (PRN):    acetaminophen     Tablet .. 650 milliGRAM(s) Oral every 6 hours PRN Mild Pain (1 - 3)  sodium chloride 0.9% lock flush 10 milliLiter(s) IV Push every 1 hour PRN Pre/post blood products, medications, blood draw, and to maintain line patency          ALLERGIES:    No Known Allergies        REVIEW OF SYSTEMS:    Constitutional, Eyes, ENT, Cardiovascular, Respiratory, Gastrointestinal, Genitourinary, Musculoskeletal, Integumentary, Neurological, Psychiatric, Endocrine, Heme/Lymph and Allergic/Immunologic review of systems are otherwise negative except as noted in HPI.       VITALS:    ICU Vital Signs Last 24 Hrs  T(C): 36.7 (15 Aug 2024 20:57), Max: 37.2 (15 Aug 2024 17:55)  T(F): 98 (15 Aug 2024 20:57), Max: 98.9 (15 Aug 2024 17:55)  HR: 100 (15 Aug 2024 20:57) (72 - 104)  BP: 134/72 (15 Aug 2024 20:57) (131/86 - 155/84)  BP(mean): 110 (15 Aug 2024 16:30) (101 - 110)  ABP: --  ABP(mean): --  RR: 18 (15 Aug 2024 20:57) (2 - 19)  SpO2: 100% (15 Aug 2024 20:57) (97% - 100%)    O2 Parameters below as of 15 Aug 2024 20:57  Patient On (Oxygen Delivery Method): room air        PHYSICAL EXAM:    Constitutional: no acute distress  Eyes: no conjunctival infection, anicteric.   ENT: pharynx is unremarkable  Neck: supple without JVD; L sided Shiley cath in place  Pulmonary: clear to auscultation bilaterally   Cardiac: RRR  Vascular: no calf tenderness, venous stasis changes, varices  Abdomen: normoactive bowel sounds, soft and nontender, no hepatosplenomegaly or masses appreciated  Lymphatic: no peripheral adenopathy appreciated  Musculoskeletal: full range of motion and no deformities appreciated  Skin: scattered buccal petechia, otherwise normal appearance, no rash/erythema  Neurology: awake, alert, oriented; no focal deficits      LABS:                                            9.0    8.13  )-----------( 102      ( 16 Aug 2024 06:25 )             30.1     08-16    137  |  103  |  28<H>  ----------------------------<  112<H>  3.4<L>   |  27  |  1.68<H>    Ca    8.9      16 Aug 2024 06:25  Phos  3.4     08-15  Mg     1.7     08-15    TPro  6.8  /  Alb  3.7  /  TBili  0.3  /  DBili  x   /  AST  19  /  ALT  24  /  AlkPhos  57  08-16          RADIOLOGY & ADDITIONAL STUDIES:      Xray Chest 1 View-PORTABLE IMMEDIATE (Xray Chest 1 View-PORTABLE IMMEDIATE .) (08.14.24 @ 16:44)    INTERPRETATION:    Heart size and the mediastinum cannot be accurately evaluated on this   projection.  There is a left IJ catheter with tip in the SVC. There is no pneumothorax.  The lungs are clear.  There is left apical pleural thickening.  There is no pleural effusion.  There is no acute bony abnormality.      IMPRESSION:  Left IJ catheter with tip in the SVC. No pneumothorax.    Clear lungs.

## 2024-08-16 NOTE — PROGRESS NOTE ADULT - ASSESSMENT
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 Pt was previously diagnosed with acute TTP in July 2023 (admission c/b RIJ DVT, completed xarelto). For the period 7/29/23 - 08/02/23  pt was on admission at  for chest pain, and noted with anemia and thrombocytopenia. Peripheral smear at presentation was consistent with large amount of schistocytes -> TTP. Confirmatory [id number] < 2%., s/p plasmapheresis and required prolonged steroid taper. Pt was Off steroids early October, 2023, s/p Rituxan x 4 in August 2023. Pt Responded well. Pt was doing well until recently, though [id number] low and starting rituxan was discussed. Pt reported GI distress including abdominal discomfort and diarrhea starting 8/10 which has since resolved. Pt did report some mild pain from his exercise regimen and chronic Low Back Pain for which he took a few doses of ibuprofen. He denied headache, myalgias, CP, SOB, palpitations, N/V, fever/chills, numbness/tingling/weakness, other c/o at this time. Pt noticed petechia on lip early in the day and went to Dr Darnell's office for his routine/previously scheduled visit where platelet count was noted to be 16K/ul (Platelet Count 277 K/uL on 08.05.24 @ 12:01). Pt was sent to ED for admission. Plan includes plasmapheresis, steroids and rituximab.    #TTP causing microcytic anemia and thrombocytopenia  -DIFGKI43<2%  -trend CBC, LDH  -plasmapharesis  - on  steroids  prednisone 100 qd   - 8/16 s/p 1 dose pf rituxan  as per  heme  - heme consult     #ELIF  - likely  TMA in setting of TTP  -trend Cr, b/l 1.1-1.2 improving  - Creatinine Trend: 1.68<--, 1.65<--, 1.84<--  - IV fluids trial   - US kidney - to r/o obstruction    # Mild hypokalemia  - replace with fluids    # Hyperglycemia steroids induced   - check A1C     Dispo - plasmapheresis, steroids, monitoring inpatient

## 2024-08-16 NOTE — PROGRESS NOTE ADULT - TIME BILLING
patient seen, d/w hematology lab studies reviewed
Time spent  coordinating the patient's care. This includes reviewing documentation pertinent to this admission, results and imaging. Further tests, medications, and procedures have been ordered as indicated. Laboratory results and the plan of care were communicated to the patient. Supporting documentation was completed and added to the patient's chart.
Time spent  coordinating the patient's care. This includes reviewing documentation pertinent to this admission, results and imaging. Further tests, medications, and procedures have been ordered as indicated. Laboratory results and the plan of care were communicated to the patient. Supporting documentation was completed and added to the patient's chart.
Patient seen labs reviewed discussed with heme onc

## 2024-08-16 NOTE — PROGRESS NOTE ADULT - ASSESSMENT
43 y/o M with prior acute TTP 07/2023 - 08/2023 currently admitted for TTP relapse requiring urgent tx      # Microcytic Anemia & Thrombocytopenia in setting of TTP    - 07/29/23 - 08/02/23 initial occurrence, presented with acute CP, anemia, thrombocytopenia & renal dysfunction  - Confirmatory [id number] <2% ---> s/p plasmapheresis and required prolonged steroid taper in addition to Rituxan q 1 weekly x4 doses, completed steroids 10/2023, continued to be followed in outpatient setting by Heme Onc Dr Darnell  - In last several months [id number] has been slowly dropping, but there has was no evidence of clinical relapse; maintenance Rituxan dosing was discussed and plan was for patient to start 08/14 but labs noted acute change in Plts warranting referral to the ED  - Case discussed with Heme Onc Dr Darnell with plan to start Prednisone 1mg / kg QD with appropriate taper with tx response as well as urgent plasmapheresis daily until Plt count >150K    - As of 08/16; Plts continue to improve, up to 102K, patient is s/p plasmapheresis x2 (08/14 & 08/15)  - Given timing / staffing / [facility] availability ---> will only receive Rituxan 08/16 AM, then will resume plasmapheresis 24 hours post infusion   - Discussed case with [facility] who will have staff come to hospital as requested for daily plasmapheresis except for 24 hours post Rituxan therapy ---> plan to resume 08/17 AM  - Discussed case with [facility] who will procure appropriate FFP volume for daily pheresis tx   - Patient did have reaction to both plasmapheresis & Rituxan last year when initially tx'ed, will continue with premedication regimen including Benadryl 50mg IV push as well as Tylenol 650mg PO ---> added additional Benadryl 25mg & Pepcid 20mg once 08/15 per patient request because of post pheresis hives / itching   - Calcium gluconate 2G QD ordered in setting of plasmapheresis exchange  - Continue Folic Acid 1mg QD    - Hgb remains low but stable, MCV 60s; overall consistent with ELIN, repeat iron studies noted  - Continue to trend serial CBCs while admitted   - Continue all supportive measures as clinically necessary     ?

## 2024-08-16 NOTE — PROGRESS NOTE ADULT - NS ATTEND AMEND GEN_ALL_CORE FT
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    Confirmatory [id number] < 2 and recently declining  -s/p plasmapheresis 8/14 and 8/15/ replaced with 4.5 liters of FFP and Calcium gluconate 2 gram daily.  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  He was sent in for outpatient labs with plts of 16K/ul--> 102K s/p plasmapheresis x 2, last on 8/15/24.    Confirmatory [id number] < 2 and recently declining  -s/p plasmapheresis 8/14 and 8/15/ replaced with 4.5 liters of FFP and Calcium gluconate 2 gram daily.  -Premedicate with Benadryl 50 mg IVP daily; increased dose s/t hives during procedure  -Monitor CBC , LDH  -Rituxan 375 mg/m2 weekly x 4 consented and ordered to be given today.   -next pheresis to begin in AM 8/17/24

## 2024-08-17 LAB
ALBUMIN SERPL ELPH-MCNC: 3.5 G/DL — SIGNIFICANT CHANGE UP (ref 3.3–5)
ALP SERPL-CCNC: 61 U/L — SIGNIFICANT CHANGE UP (ref 40–120)
ALT FLD-CCNC: 24 U/L — SIGNIFICANT CHANGE UP (ref 12–78)
ANION GAP SERPL CALC-SCNC: 6 MMOL/L — SIGNIFICANT CHANGE UP (ref 5–17)
AST SERPL-CCNC: 17 U/L — SIGNIFICANT CHANGE UP (ref 15–37)
BASOPHILS # BLD AUTO: 0 K/UL — SIGNIFICANT CHANGE UP (ref 0–0.2)
BASOPHILS NFR BLD AUTO: 0 % — SIGNIFICANT CHANGE UP (ref 0–2)
BILIRUB SERPL-MCNC: 0.3 MG/DL — SIGNIFICANT CHANGE UP (ref 0.2–1.2)
BUN SERPL-MCNC: 22 MG/DL — SIGNIFICANT CHANGE UP (ref 7–23)
CALCIUM SERPL-MCNC: 8.7 MG/DL — SIGNIFICANT CHANGE UP (ref 8.5–10.1)
CHLORIDE SERPL-SCNC: 105 MMOL/L — SIGNIFICANT CHANGE UP (ref 96–108)
CK SERPL-CCNC: 102 U/L — SIGNIFICANT CHANGE UP (ref 26–308)
CO2 SERPL-SCNC: 27 MMOL/L — SIGNIFICANT CHANGE UP (ref 22–31)
CREAT SERPL-MCNC: 1.52 MG/DL — HIGH (ref 0.5–1.3)
CRP SERPL-MCNC: <3 MG/L — SIGNIFICANT CHANGE UP
EGFR: 57 ML/MIN/1.73M2 — LOW
EOSINOPHIL # BLD AUTO: 0.02 K/UL — SIGNIFICANT CHANGE UP (ref 0–0.5)
EOSINOPHIL NFR BLD AUTO: 0.3 % — SIGNIFICANT CHANGE UP (ref 0–6)
FOLATE SERPL-MCNC: 7.2 NG/ML — SIGNIFICANT CHANGE UP
GLUCOSE SERPL-MCNC: 98 MG/DL — SIGNIFICANT CHANGE UP (ref 70–99)
HAPTOGLOB SERPL-MCNC: 132 MG/DL — SIGNIFICANT CHANGE UP (ref 34–200)
HCT VFR BLD CALC: 28.2 % — LOW (ref 39–50)
HGB BLD-MCNC: 8.4 G/DL — LOW (ref 13–17)
IMM GRANULOCYTES NFR BLD AUTO: 0.9 % — SIGNIFICANT CHANGE UP (ref 0–0.9)
LDH SERPL L TO P-CCNC: 247 U/L — HIGH (ref 84–241)
LYMPHOCYTES # BLD AUTO: 0.9 K/UL — LOW (ref 1–3.3)
LYMPHOCYTES # BLD AUTO: 11.5 % — LOW (ref 13–44)
MAGNESIUM SERPL-MCNC: 1.9 MG/DL — SIGNIFICANT CHANGE UP (ref 1.6–2.6)
MCHC RBC-ENTMCNC: 21.5 PG — LOW (ref 27–34)
MCHC RBC-ENTMCNC: 29.8 GM/DL — LOW (ref 32–36)
MCV RBC AUTO: 72.3 FL — LOW (ref 80–100)
MONOCYTES # BLD AUTO: 0.64 K/UL — SIGNIFICANT CHANGE UP (ref 0–0.9)
MONOCYTES NFR BLD AUTO: 8.2 % — SIGNIFICANT CHANGE UP (ref 2–14)
NEUTROPHILS # BLD AUTO: 6.18 K/UL — SIGNIFICANT CHANGE UP (ref 1.8–7.4)
NEUTROPHILS NFR BLD AUTO: 79.1 % — HIGH (ref 43–77)
PHOSPHATE SERPL-MCNC: 2.5 MG/DL — SIGNIFICANT CHANGE UP (ref 2.5–4.5)
PLATELET # BLD AUTO: 193 K/UL — SIGNIFICANT CHANGE UP (ref 150–400)
POTASSIUM SERPL-MCNC: 3.6 MMOL/L — SIGNIFICANT CHANGE UP (ref 3.5–5.3)
POTASSIUM SERPL-SCNC: 3.6 MMOL/L — SIGNIFICANT CHANGE UP (ref 3.5–5.3)
PROT SERPL-MCNC: 6.6 GM/DL — SIGNIFICANT CHANGE UP (ref 6–8.3)
RBC # BLD: 3.9 M/UL — LOW (ref 4.2–5.8)
RBC # FLD: 25 % — HIGH (ref 10.3–14.5)
SODIUM SERPL-SCNC: 138 MMOL/L — SIGNIFICANT CHANGE UP (ref 135–145)
TSH SERPL-MCNC: 2.85 UU/ML — SIGNIFICANT CHANGE UP (ref 0.34–4.82)
VIT B12 SERPL-MCNC: 412 PG/ML — SIGNIFICANT CHANGE UP (ref 232–1245)
WBC # BLD: 7.81 K/UL — SIGNIFICANT CHANGE UP (ref 3.8–10.5)
WBC # FLD AUTO: 7.81 K/UL — SIGNIFICANT CHANGE UP (ref 3.8–10.5)

## 2024-08-17 PROCEDURE — 99232 SBSQ HOSP IP/OBS MODERATE 35: CPT

## 2024-08-17 RX ORDER — ALTEPLASE 50 MG
2 KIT INTRAVENOUS ONCE
Refills: 0 | Status: DISCONTINUED | OUTPATIENT
Start: 2024-08-17 | End: 2024-08-20

## 2024-08-17 RX ORDER — ALTEPLASE 50 MG
2 KIT INTRAVENOUS ONCE
Refills: 0 | Status: COMPLETED | OUTPATIENT
Start: 2024-08-17 | End: 2024-08-17

## 2024-08-17 RX ORDER — MAGNESIUM, ALUMINUM HYDROXIDE 200-225/5
30 SUSPENSION, ORAL (FINAL DOSE FORM) ORAL EVERY 4 HOURS
Refills: 0 | Status: DISCONTINUED | OUTPATIENT
Start: 2024-08-17 | End: 2024-08-20

## 2024-08-17 RX ADMIN — CHLORHEXIDINE GLUCONATE 1 APPLICATION(S): 40 SOLUTION TOPICAL at 08:23

## 2024-08-17 RX ADMIN — Medication 40 MILLIGRAM(S): at 05:16

## 2024-08-17 RX ADMIN — Medication 50 MILLIGRAM(S): at 08:22

## 2024-08-17 RX ADMIN — Medication 1 TABLET(S): at 15:56

## 2024-08-17 RX ADMIN — Medication 100 MILLIGRAM(S): at 15:56

## 2024-08-17 RX ADMIN — ALTEPLASE 2 MILLIGRAM(S): KIT at 10:47

## 2024-08-17 RX ADMIN — Medication 200 GRAM(S): at 08:22

## 2024-08-17 RX ADMIN — Medication 30 MILLILITER(S): at 16:33

## 2024-08-17 RX ADMIN — Medication 1 MILLIGRAM(S): at 15:57

## 2024-08-17 NOTE — PROGRESS NOTE ADULT - ASSESSMENT
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 Pt was previously diagnosed with acute TTP in July 2023 (admission c/b RIJ DVT, completed xarelto). For the period 7/29/23 - 08/02/23  pt was on admission at  for chest pain, and noted with anemia and thrombocytopenia. Peripheral smear at presentation was consistent with large amount of schistocytes -> TTP. Confirmatory [id number] < 2%., s/p plasmapheresis and required prolonged steroid taper. Pt was Off steroids early October, 2023, s/p Rituxan x 4 in August 2023. Pt Responded well. Pt was doing well until recently, though [id number] low and starting rituxan was discussed. Pt reported GI distress including abdominal discomfort and diarrhea starting 8/10 which has since resolved. Pt did report some mild pain from his exercise regimen and chronic Low Back Pain for which he took a few doses of ibuprofen. He denied headache, myalgias, CP, SOB, palpitations, N/V, fever/chills, numbness/tingling/weakness, other c/o at this time. Pt noticed petechia on lip early in the day and went to Dr Darnell's office for his routine/previously scheduled visit where platelet count was noted to be 16K/ul (Platelet Count 277 K/uL on 08.05.24 @ 12:01). Pt was sent to ED for admission. Plan includes plasmapheresis, steroids and rituximab.    #TTP causing microcytic anemia and thrombocytopenia  -WQORXW88<2%  -trend CBC, LDH  - c/w plasmapheresis as per heme team  - on  steroids  prednisone 100 qd   - 8/16 s/p 1 dose pf rituxan  as per  heme  - heme consult     #ELIF  - likely  TMA in setting of TTP  -trend Cr, b/l 1.1-1.2 improving  - Creatinine Trend: 1.5 1.68<--, 1.65<--, 1.84<--  -s/p  IV fluids trial  - will stop   - US kidney - no obstruction    # Mild hypokalemia  - replace with fluids    # Hyperglycemia steroids induced   - check A1C  -pending    Dispo - plasmapheresis, steroids, monitoring inpatient

## 2024-08-17 NOTE — PROGRESS NOTE ADULT - SUBJECTIVE AND OBJECTIVE BOX
HPI:    45 y/o M with a PMHx of HTN, migraines, and lumbar spondylosis, prior confirmed TTP 07/2023 - 08/2023, followed by Heme Onc Dr Darnell in outpatient setting referred to the ED for acute drop in Plt count with recent [id number] approx 2%.     8/14/24; Seen in ED along with Dr. Bruno & GENNY Turner. Patient with mild buccal petechia, in no acute distress. Aware of the plan for plasmapheresis/ plts infusion & tx    08/15/24: Seen at bedside in the SICU s/p first plasmapheresis overnight, with improved Plt count, feeling well, no acute distress    08/16/24: Seen at bedside this AM on 1N, feeling well, pending Rituxan infusion    08/17/24: Seen at bedside, no acute distress, receiving plasmapheresis       PAST MEDICAL & SURGICAL HISTORY:    HTN (hypertension)    H/O low back pain    Lumbar spondylosis    H/O migraine    TTP (thrombotic thrombopenic purpura)    History of thrombosis of internal jugular vein  right      FAMILY HISTORY:    Not noted      MEDICATIONS  (STANDING):    calcium gluconate IVPB 2 Gram(s) IV Intermittent daily  chlorhexidine 4% Liquid 1 Application(s) Topical <User Schedule>  diphenhydrAMINE Injectable 50 milliGRAM(s) IV Push daily  folic acid 1 milliGRAM(s) Oral daily  multivitamin 1 Tablet(s) Oral daily  pantoprazole    Tablet 40 milliGRAM(s) Oral before breakfast  predniSONE   Tablet 100 milliGRAM(s) Oral daily  sodium chloride 0.9% with potassium chloride 20 mEq/L 1000 milliLiter(s) (75 mL/Hr) IV Continuous <Continuous>      MEDICATIONS  (PRN):    acetaminophen     Tablet .. 650 milliGRAM(s) Oral every 6 hours PRN Mild Pain (1 - 3)  sodium chloride 0.9% lock flush 10 milliLiter(s) IV Push every 1 hour PRN Pre/post blood products, medications, blood draw, and to maintain line patency            ALLERGIES:    No Known Allergies        REVIEW OF SYSTEMS:    Constitutional, Eyes, ENT, Cardiovascular, Respiratory, Gastrointestinal, Genitourinary, Musculoskeletal, Integumentary, Neurological, Psychiatric, Endocrine, Heme/Lymph and Allergic/Immunologic review of systems are otherwise negative except as noted in HPI.       VITALS:    ICU Vital Signs Last 24 Hrs  T(C): 36.7 (16 Aug 2024 21:14), Max: 36.8 (16 Aug 2024 13:00)  T(F): 98 (16 Aug 2024 21:14), Max: 98.2 (16 Aug 2024 13:00)  HR: 87 (16 Aug 2024 21:14) (73 - 96)  BP: 120/64 (16 Aug 2024 21:14) (120/64 - 155/73)  BP(mean): 76 (16 Aug 2024 21:14) (76 - 76)  ABP: --  ABP(mean): --  RR: 18 (16 Aug 2024 21:14) (17 - 18)  SpO2: 100% (16 Aug 2024 21:14) (98% - 100%)    O2 Parameters below as of 16 Aug 2024 21:14  Patient On (Oxygen Delivery Method): room air        PHYSICAL EXAM:    Constitutional: no acute distress  Eyes: no conjunctival infection, anicteric.   ENT: pharynx is unremarkable  Neck: supple without JVD; L sided Shiley cath in place  Pulmonary: clear to auscultation bilaterally   Cardiac: RRR  Vascular: no calf tenderness, venous stasis changes, varices  Abdomen: normoactive bowel sounds, soft and nontender, no hepatosplenomegaly or masses appreciated  Lymphatic: no peripheral adenopathy appreciated  Musculoskeletal: full range of motion and no deformities appreciated  Skin: scattered buccal petechia, otherwise normal appearance, no rash/erythema  Neurology: awake, alert, oriented; no focal deficits      LABS:                                                     8.4    7.81  )-----------( 193      ( 17 Aug 2024 06:16 )             28.2     08-17    138  |  105  |  22  ----------------------------<  98  3.6   |  27  |  1.52<H>    Ca    8.7      17 Aug 2024 06:16  Phos  2.5     08-17  Mg     1.9     08-17    TPro  6.6  /  Alb  3.5  /  TBili  0.3  /  DBili  x   /  AST  17  /  ALT  24  /  AlkPhos  61  08-17          RADIOLOGY & ADDITIONAL STUDIES:      Xray Chest 1 View-PORTABLE IMMEDIATE (Xray Chest 1 View-PORTABLE IMMEDIATE .) (08.14.24 @ 16:44)    INTERPRETATION:    Heart size and the mediastinum cannot be accurately evaluated on this   projection.  There is a left IJ catheter with tip in the SVC. There is no pneumothorax.  The lungs are clear.  There is left apical pleural thickening.  There is no pleural effusion.  There is no acute bony abnormality.      IMPRESSION:  Left IJ catheter with tip in the SVC. No pneumothorax.    Clear lungs.

## 2024-08-17 NOTE — PROGRESS NOTE ADULT - ASSESSMENT
43 y/o M with prior acute TTP 07/2023 - 08/2023 currently admitted for TTP relapse requiring urgent tx      # Microcytic Anemia & Thrombocytopenia in setting of TTP    - 07/29/23 - 08/02/23 initial occurrence, presented with acute CP, anemia, thrombocytopenia & renal dysfunction  - Confirmatory NVYIRI11 <2% ---> s/p plasmapheresis and required prolonged steroid taper in addition to Rituxan q 1 weekly x4 doses, completed steroids 10/2023, continued to be followed in outpatient setting by Heme Onc Dr Darnell    - In last several months [id number] has been slowly dropping, but there has was no evidence of clinical relapse; maintenance Rituxan dosing was discussed and plan was for patient to start 08/14 but labs noted acute change in Plts warranting referral to the ED    - As of 08/17; Plts continue to improve, up to 193K, patient is s/p plasmapheresis x2 (08/14 & 08/15)  - Given timing / staffing / [facility] availability ---> received only Rituxan 08/16  - Plasmapheresis resumed 08/17 with additional tx planned for 08/18   - Discussed case with [facility] who will have staff come to hospital as requested for daily plasmapheresis except for 24 hours post Rituxan therapy  - Discussed case with [facility] who will procure appropriate FFP volume for daily pheresis tx   - Patient did have reaction to both plasmapheresis & Rituxan last year when initially tx'ed, will continue with premedication regimen including Benadryl 50mg IV push as well as Tylenol 650mg PO ---> s/p additional Benadryl 25mg & Pepcid 20mg once 08/15 per patient request because of post pheresis hives / itching   - Started Prednisone 100mg QD 08/15 ---> continue daily at this time and will discuss taper with Primary Heme Onc Dr Darnell this upcoming Monday 08/19 when she takes over coverage. In the past, patient was discharged on Prednisone 40mg BID PO with outpatient follow up within the week the continue taper  - Patient also receiving Pantoprazole 40mg QD in setting of high dose steroids   - Calcium gluconate 2G QD ordered in setting of plasmapheresis exchange  - Continue Folic Acid 1mg QD    - Hgb remains low but stable, MCV 60s; overall consistent with ELIN, repeat iron studies noted  - Continue to trend serial CBCs while admitted   - Continue all supportive measures as clinically necessary     ?       Behzad Holliday PA-C  Hematology / Oncology   [facility]   [phone]

## 2024-08-17 NOTE — PROGRESS NOTE ADULT - SUBJECTIVE AND OBJECTIVE BOX
HOSPITALIST ATTENDING PROGRESS NOTE    Chart and meds reviewed.  Patient seen and examined.    CC: thrombocytopenia     - pt seen and examined, denies cough, dyspnea, abdominal pain, signs of bleeding , tolerating po intake, afebrile   - no events feels some fluid retention in the legs, denies cp, dyspnea, + pruritis during plasmapheresis, results discussed    All other systems reviewed and found to be negative with the exception of what has been described above.    Vital sings reviewed for last 24 h  T(C): 36.8 (24 @ 16:22), Max: 36.8 (24 @ 16:22)  T(F): 98.2 (24 @ 16:22), Max: 98.2 (24 @ 16:22)  HR: 82 (24 @ :22) (82 - 87)  BP: 117/70 (24 @ 16:22) (117/70 - 120/64)  RR: 18 (24 @ :) (18 - 18)  SpO2: 100% (24 @ 16:22) (100% - 100%)  Wt(kg): --  Daily     Daily Weight in k.5 (17 Aug 2024 06:20)  CAPILLARY BLOOD GLUCOSE          PHYSICAL EXAM:  Gen: NAD  HEENT:  pupils equal and reactive, EOMI, no oropharyngeal lesions, erythema, exudates, oral thrush  NECK:   supple, no carotid bruits, no palpable lymph nodes, no thyromegaly, + LIJ shiley  CV:  +S1, +S2, regular, no murmurs or rubs  RESP:   lungs clear to auscultation bilaterally, no wheezing, rales, rhonchi, good air entry bilaterally  BREAST:  not examined  GI:  abdomen soft, non-tender, non-distended, normal BS, no bruits, no abdominal masses, no palpable masses  RECTAL:  not examined  :  not examined  MSK:   normal muscle tone, no atrophy, no rigidity, no contractions  EXT:  no clubbing, no cyanosis, no edema, no calf pain, swelling or erythema  VASCULAR:  pulses equal and symmetric in the upper and lower extremities  NEURO:  AAOX3, no focal neurological deficits, follows all commands, able to move extremities spontaneously  SKIN:  no ulcers, lesions or rashes    LABS: all reviewed                          8.4    7.81  )-----------( 193      ( 17 Aug 2024 06:16 )             28.2         138  |  105  |  22  ----------------------------<  98  3.6   |  27  |  1.52<H>    Ca    8.7      17 Aug 2024 06:16  Phos  2.5       Mg     1.9         TPro  6.6  /  Alb  3.5  /  TBili  0.3  /  DBili  x   /  AST  17  /  ALT  24  /  AlkPhos  61          LIVER FUNCTIONS - ( 17 Aug 2024 06:16 )  Alb: 3.5 g/dL / Pro: 6.6 gm/dL / ALK PHOS: 61 U/L / ALT: 24 U/L / AST: 17 U/L / GGT: x                       LIVER FUNCTIONS - ( 16 Aug 2024 06:25 )  Alb: 3.7 g/dL / Pro: 6.8 gm/dL / ALK PHOS: 57 U/L / ALT: 24 U/L / AST: 19 U/L / GGT: x           Xray Chest 1 View-PORTABLE IMMEDIATE (Xray Chest 1 View-PORTABLE IMMEDIATE .) (24 @ 16:44) >  Heart size and the mediastinum cannot be accurately evaluated on this   projection.  There is a left IJ catheter with tip in the SVC. There is no pneumothorax.  The lungs are clear.  There is left apical pleural thickening.  There is no pleural effusion.  There is no acute bony abnormality.      IMPRESSION:  Left IJ catheter with tip in the SVC. No pneumothorax.            MEDICATIONS  (STANDING):  calcium gluconate IVPB 2 Gram(s) IV Intermittent daily  chlorhexidine 4% Liquid 1 Application(s) Topical <User Schedule>  diphenhydrAMINE Injectable 50 milliGRAM(s) IV Push daily  folic acid 1 milliGRAM(s) Oral daily  multivitamin 1 Tablet(s) Oral daily  predniSONE   Tablet 100 milliGRAM(s) Oral daily  sodium chloride 0.9% with potassium chloride 20 mEq/L 1000 milliLiter(s) (75 mL/Hr) IV Continuous <Continuous>    MEDICATIONS  (PRN):  acetaminophen     Tablet .. 650 milliGRAM(s) Oral every 6 hours PRN Mild Pain (1 - 3)  sodium chloride 0.9% lock flush 10 milliLiter(s) IV Push every 1 hour PRN Pre/post blood products, medications, blood draw, and to maintain line patency

## 2024-08-18 LAB
A1C WITH ESTIMATED AVERAGE GLUCOSE RESULT: 5.4 % — SIGNIFICANT CHANGE UP (ref 4–5.6)
ADAMTS13 ACTIVITY: 39 % — LOW (ref 40–130)
ALBUMIN SERPL ELPH-MCNC: 3.2 G/DL — LOW (ref 3.3–5)
ALP SERPL-CCNC: 59 U/L — SIGNIFICANT CHANGE UP (ref 40–120)
ALT FLD-CCNC: 30 U/L — SIGNIFICANT CHANGE UP (ref 12–78)
ANION GAP SERPL CALC-SCNC: 4 MMOL/L — LOW (ref 5–17)
ANISOCYTOSIS BLD QL: SIGNIFICANT CHANGE UP
AST SERPL-CCNC: 21 U/L — SIGNIFICANT CHANGE UP (ref 15–37)
BASOPHILS # BLD AUTO: 0 K/UL — SIGNIFICANT CHANGE UP (ref 0–0.2)
BASOPHILS NFR BLD AUTO: 0 % — SIGNIFICANT CHANGE UP (ref 0–2)
BILIRUB SERPL-MCNC: 0.4 MG/DL — SIGNIFICANT CHANGE UP (ref 0.2–1.2)
BUN SERPL-MCNC: 16 MG/DL — SIGNIFICANT CHANGE UP (ref 7–23)
CALCIUM SERPL-MCNC: 8.9 MG/DL — SIGNIFICANT CHANGE UP (ref 8.5–10.1)
CHLORIDE SERPL-SCNC: 102 MMOL/L — SIGNIFICANT CHANGE UP (ref 96–108)
CO2 SERPL-SCNC: 30 MMOL/L — SIGNIFICANT CHANGE UP (ref 22–31)
CREAT SERPL-MCNC: 1.3 MG/DL — SIGNIFICANT CHANGE UP (ref 0.5–1.3)
CRP SERPL-MCNC: 4 MG/L — SIGNIFICANT CHANGE UP
EGFR: 69 ML/MIN/1.73M2 — SIGNIFICANT CHANGE UP
EOSINOPHIL # BLD AUTO: 0.01 K/UL — SIGNIFICANT CHANGE UP (ref 0–0.5)
EOSINOPHIL NFR BLD AUTO: 0.2 % — SIGNIFICANT CHANGE UP (ref 0–6)
ESTIMATED AVERAGE GLUCOSE: 108 MG/DL — SIGNIFICANT CHANGE UP (ref 68–114)
GLUCOSE SERPL-MCNC: 98 MG/DL — SIGNIFICANT CHANGE UP (ref 70–99)
HCT VFR BLD CALC: 26.2 % — LOW (ref 39–50)
HGB BLD-MCNC: 7.8 G/DL — LOW (ref 13–17)
IMM GRANULOCYTES NFR BLD AUTO: 1.2 % — HIGH (ref 0–0.9)
LYMPHOCYTES # BLD AUTO: 0.68 K/UL — LOW (ref 1–3.3)
LYMPHOCYTES # BLD AUTO: 11.6 % — LOW (ref 13–44)
MACROCYTES BLD QL: SLIGHT — SIGNIFICANT CHANGE UP
MANUAL SMEAR VERIFICATION: SIGNIFICANT CHANGE UP
MCHC RBC-ENTMCNC: 21.5 PG — LOW (ref 27–34)
MCHC RBC-ENTMCNC: 29.8 GM/DL — LOW (ref 32–36)
MCV RBC AUTO: 72.2 FL — LOW (ref 80–100)
MICROCYTES BLD QL: SIGNIFICANT CHANGE UP
MONOCYTES # BLD AUTO: 0.51 K/UL — SIGNIFICANT CHANGE UP (ref 0–0.9)
MONOCYTES NFR BLD AUTO: 8.7 % — SIGNIFICANT CHANGE UP (ref 2–14)
NEUTROPHILS # BLD AUTO: 4.58 K/UL — SIGNIFICANT CHANGE UP (ref 1.8–7.4)
NEUTROPHILS NFR BLD AUTO: 78.3 % — HIGH (ref 43–77)
PHOSPHATE SERPL-MCNC: 3 MG/DL — SIGNIFICANT CHANGE UP (ref 2.5–4.5)
PLAT MORPH BLD: NORMAL — SIGNIFICANT CHANGE UP
PLATELET # BLD AUTO: 249 K/UL — SIGNIFICANT CHANGE UP (ref 150–400)
POIKILOCYTOSIS BLD QL AUTO: SLIGHT — SIGNIFICANT CHANGE UP
POLYCHROMASIA BLD QL SMEAR: SLIGHT — SIGNIFICANT CHANGE UP
POTASSIUM SERPL-MCNC: 3.4 MMOL/L — LOW (ref 3.5–5.3)
POTASSIUM SERPL-SCNC: 3.4 MMOL/L — LOW (ref 3.5–5.3)
PROT SERPL-MCNC: 6.2 GM/DL — SIGNIFICANT CHANGE UP (ref 6–8.3)
RBC # BLD: 3.63 M/UL — LOW (ref 4.2–5.8)
RBC # FLD: 24.4 % — HIGH (ref 10.3–14.5)
RBC BLD AUTO: ABNORMAL
SCHISTOCYTES BLD QL AUTO: SLIGHT — SIGNIFICANT CHANGE UP
SODIUM SERPL-SCNC: 136 MMOL/L — SIGNIFICANT CHANGE UP (ref 135–145)
WBC # BLD: 5.85 K/UL — SIGNIFICANT CHANGE UP (ref 3.8–10.5)
WBC # FLD AUTO: 5.85 K/UL — SIGNIFICANT CHANGE UP (ref 3.8–10.5)

## 2024-08-18 PROCEDURE — 99232 SBSQ HOSP IP/OBS MODERATE 35: CPT

## 2024-08-18 RX ORDER — POTASSIUM CHLORIDE 10 MEQ
20 TABLET, EXT RELEASE, PARTICLES/CRYSTALS ORAL
Refills: 0 | Status: DISCONTINUED | OUTPATIENT
Start: 2024-08-18 | End: 2024-08-18

## 2024-08-18 RX ORDER — POTASSIUM CHLORIDE 10 MEQ
20 TABLET, EXT RELEASE, PARTICLES/CRYSTALS ORAL
Refills: 0 | Status: COMPLETED | OUTPATIENT
Start: 2024-08-18 | End: 2024-08-18

## 2024-08-18 RX ADMIN — CHLORHEXIDINE GLUCONATE 1 APPLICATION(S): 40 SOLUTION TOPICAL at 09:24

## 2024-08-18 RX ADMIN — Medication 100 MILLIGRAM(S): at 09:18

## 2024-08-18 RX ADMIN — Medication 20 MILLIEQUIVALENT(S): at 15:38

## 2024-08-18 RX ADMIN — Medication 20 MILLIEQUIVALENT(S): at 17:42

## 2024-08-18 RX ADMIN — Medication 200 GRAM(S): at 09:16

## 2024-08-18 RX ADMIN — Medication 40 MILLIGRAM(S): at 05:49

## 2024-08-18 RX ADMIN — Medication 50 MILLIGRAM(S): at 08:45

## 2024-08-18 RX ADMIN — Medication 1 TABLET(S): at 09:17

## 2024-08-18 RX ADMIN — Medication 1 MILLIGRAM(S): at 13:27

## 2024-08-18 NOTE — PROGRESS NOTE ADULT - ASSESSMENT
43 y/o M with prior acute TTP 07/2023 - 08/2023 currently admitted for TTP relapse requiring urgent tx      # Microcytic Anemia & Thrombocytopenia in setting of TTP    - 07/29/23 - 08/02/23 initial occurrence, presented with acute CP, anemia, thrombocytopenia & renal dysfunction  - Confirmatory [id number] <2% ---> s/p plasmapheresis and required prolonged steroid taper in addition to Rituxan q 1 weekly x4 doses, completed steroids 10/2023, continued to be followed in outpatient setting by Heme Onc Dr Darnell    - In last several months [id number] has been slowly dropping, but there has was no evidence of clinical relapse; maintenance Rituxan dosing was discussed and plan was for patient to start 08/14 but labs noted acute change in Plts warranting referral to the ED  - Discussed case with [facility] who will have staff come to hospital as requested for daily plasmapheresis except for 24 hours post Rituxan therapy  - Discussed case with [facility] who will procure appropriate FFP volume for daily pheresis tx     - As of 08/18; Plts continue to improve, up to 249K, patient is s/p plasmapheresis x3 (08/14, 08/15 & 08/17)  - Given timing / staffing / [facility] availability ---> received only Rituxan 08/16 ---> next due 08/23  - Plasmapheresis resumed 08/17 ---> currently receiving 08/18 with tentative tx to be continued 08/19   - Patient did have reaction to both plasmapheresis & Rituxan last year when initially tx'ed, will continue with premedication regimen including Benadryl 50mg IV push as well as Tylenol 650mg PO ---> s/p additional Benadryl 25mg & Pepcid 20mg once 08/15 per patient request because of post pheresis hives / itching   - Started Prednisone 100mg QD 08/15 ---> continue daily at this time and will discuss taper with Primary Heme Onc Dr Darnell this upcoming Monday 08/19 when she takes over coverage. In the past, patient was discharged on Prednisone 40mg BID PO with outpatient follow up within the week the continue taper  - Patient also receiving Pantoprazole 40mg QD in setting of high dose steroids   - Calcium gluconate 2G QD ordered in setting of plasmapheresis exchange  - Continue Folic Acid 1mg QD    - Hgb remains low but stable, MCV 60s; overall consistent with ELIN, repeat iron studies noted ---> as of 08/18, hgb down to 7.8 g/dL; need to consider [facility] if drops lower   - Continue to trend serial CBCs while admitted   - Continue all supportive measures as clinically necessary     ?       Behzad Holliday PA-C  Hematology / Oncology   [facility]   [phone] Render In Strict Bullet Format?: No Detail Level: Zone Initiate Treatment: 5 f/u

## 2024-08-18 NOTE — PROGRESS NOTE ADULT - ASSESSMENT
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 Pt was previously diagnosed with acute TTP in July 2023 (admission c/b RIJ DVT, completed xarelto). For the period 7/29/23 - 08/02/23  pt was on admission at  for chest pain, and noted with anemia and thrombocytopenia. Peripheral smear at presentation was consistent with large amount of schistocytes -> TTP. Confirmatory [id number] < 2%., s/p plasmapheresis and required prolonged steroid taper. Pt was Off steroids early October, 2023, s/p Rituxan x 4 in August 2023. Pt Responded well. Pt was doing well until recently, though [id number] low and starting rituxan was discussed. Pt reported GI distress including abdominal discomfort and diarrhea starting 8/10 which has since resolved. Pt did report some mild pain from his exercise regimen and chronic Low Back Pain for which he took a few doses of ibuprofen. He denied headache, myalgias, CP, SOB, palpitations, N/V, fever/chills, numbness/tingling/weakness, other c/o at this time. Pt noticed petechia on lip early in the day and went to Dr Darnell's office for his routine/previously scheduled visit where platelet count was noted to be 16K/ul (Platelet Count 277 K/uL on 08.05.24 @ 12:01). Pt was sent to ED for admission. Plan includes plasmapheresis, steroids and rituximab.    #TTP causing microcytic anemia and thrombocytopenia  -IDSEXT95<2%  -trend CBC, LDH  - c/w plasmapheresis  4 sessions as per heme team  on  8/14, 8/15, 8/17, 8/18   - on  steroids  prednisone 100 qd   - 8/16 s/p 1 dose pf rituxan  as per  heme  - heme consult     #ELIF, resolved   - likely  TMA in setting of TTP  -trend Cr, b/l 1.1-1.2 improving  - Creatinine Trend: 1.3 <-- 1.5 <--1.68<--, 1.65<--, 1.84<--  -s/p  IV fluids trial  - will stop   - US kidney - no obstruction    # Mild hypokalemia  - replace PO   - check Mg     # Hyperglycemia steroids induced   - check A1C   5.4    Dispo - plasmapheresis, steroids, monitoring inpatient

## 2024-08-18 NOTE — PROGRESS NOTE ADULT - SUBJECTIVE AND OBJECTIVE BOX
HOSPITALIST ATTENDING PROGRESS NOTE    Chart and meds reviewed.  Patient seen and examined.    CC: thrombocytopenia     - pt seen and examined, denies cough, dyspnea, abdominal pain, signs of bleeding , tolerating po intake, afebrile   - no events feels some fluid retention in the legs, denies cp, dyspnea, + pruritis during plasmapheresis, results discussed   - + pruritis s/p benadryl , denies cp, dyspnea, leg edema, tolerating plasmapheresis    All other systems reviewed and found to be negative with the exception of what has been described above.    Vital sings reviewed for last 24 h  T(C): 36.8 (24 @ 09:13), Max: 36.8 (24 @ 16:22)  T(F): 98.3 (24 @ 09:13), Max: 98.3 (24 @ 09:13)  HR: 69 (24 @ 09:13) (69 - 94)  BP: 132/82 (24 @ 09:13) (111/62 - 132/82)  RR: 17 (24 @ 09:13) (17 - 18)  SpO2: 100% (24 @ 09:13) (98% - 100%)  Wt(kg): --  Daily     Daily Weight in k.8 (18 Aug 2024 05:40)  CAPILLARY BLOOD GLUCOSE      PHYSICAL EXAM:  Gen: NAD  HEENT:  pupils equal and reactive, EOMI, no oropharyngeal lesions, erythema, exudates, oral thrush  NECK:   supple, no carotid bruits, no palpable lymph nodes, no thyromegaly, + LIJ shiley  CV:  +S1, +S2, regular, no murmurs or rubs  RESP:   lungs clear to auscultation bilaterally, no wheezing, rales, rhonchi, good air entry bilaterally  BREAST:  not examined  GI:  abdomen soft, non-tender, non-distended, normal BS, no bruits, no abdominal masses, no palpable masses  RECTAL:  not examined  :  not examined  MSK:   normal muscle tone, no atrophy, no rigidity, no contractions  EXT:  no clubbing, no cyanosis, no edema, no calf pain, swelling or erythema  VASCULAR:  pulses equal and symmetric in the upper and lower extremities  NEURO:  AAOX3, no focal neurological deficits, follows all commands, able to move extremities spontaneously  SKIN:  no ulcers, lesions or rashes    LABS: all reviewed                          7.8    5.85  )-----------( 249      ( 18 Aug 2024 08:25 )             26.2     08-18    136  |  102  |  16  ----------------------------<  98  3.4<L>   |  30  |  1.30    Ca    8.9      18 Aug 2024 08:25  Phos  3.0     08-18  Mg     1.9     08-17    TPro  6.2  /  Alb  3.2<L>  /  TBili  0.4  /  DBili  x   /  AST  21  /  ALT  30  /  AlkPhos  59  08-18        LIVER FUNCTIONS - ( 18 Aug 2024 08:25 )  Alb: 3.2 g/dL / Pro: 6.2 gm/dL / ALK PHOS: 59 U/L / ALT: 30 U/L / AST: 21 U/L / GGT: x             < from: US Kidney and Bladder (24 @ 16:23) >  FINDINGS:  Right kidney: 11.6 cm. No renal mass, hydronephrosis or calculi.    Left kidney: 11.8 cm. No renal mass, hydronephrosis or calculi.    Urinary bladder: Within normal limits. Prevoid volume was 114 cc and post   void volume was 9 cc. Prostate measures 8 cc in volume.    IMPRESSION:  No evidence of hydronephrosis.    < end of copied text >      Xray Chest 1 View-PORTABLE IMMEDIATE (Xray Chest 1 View-PORTABLE IMMEDIATE .) (24 @ 16:44) >  Heart size and the mediastinum cannot be accurately evaluated on this   projection.  There is a left IJ catheter with tip in the SVC. There is no pneumothorax.  The lungs are clear.  There is left apical pleural thickening.  There is no pleural effusion.  There is no acute bony abnormality.      IMPRESSION:  Left IJ catheter with tip in the SVC. No pneumothorax.            MEDICATIONS  (STANDING):  calcium gluconate IVPB 2 Gram(s) IV Intermittent daily  chlorhexidine 4% Liquid 1 Application(s) Topical <User Schedule>  diphenhydrAMINE Injectable 50 milliGRAM(s) IV Push daily  folic acid 1 milliGRAM(s) Oral daily  multivitamin 1 Tablet(s) Oral daily  predniSONE   Tablet 100 milliGRAM(s) Oral daily  sodium chloride 0.9% with potassium chloride 20 mEq/L 1000 milliLiter(s) (75 mL/Hr) IV Continuous <Continuous>    MEDICATIONS  (PRN):  acetaminophen     Tablet .. 650 milliGRAM(s) Oral every 6 hours PRN Mild Pain (1 - 3)  sodium chloride 0.9% lock flush 10 milliLiter(s) IV Push every 1 hour PRN Pre/post blood products, medications, blood draw, and to maintain line patency

## 2024-08-18 NOTE — PROGRESS NOTE ADULT - SUBJECTIVE AND OBJECTIVE BOX
HPI:    45 y/o M with a PMHx of HTN, migraines, and lumbar spondylosis, prior confirmed TTP 07/2023 - 08/2023, followed by Heme Onc Dr Darnell in outpatient setting referred to the ED for acute drop in Plt count with recent [id number] approx 2%.     8/14/24; Seen in ED along with Dr. Bruno & GENNY Turner. Patient with mild buccal petechia, in no acute distress. Aware of the plan for plasmapheresis/ plts infusion & tx    08/15/24: Seen at bedside in the SICU s/p first plasmapheresis overnight, with improved Plt count, feeling well, no acute distress    08/16/24: Seen at bedside this AM on 1N, feeling well, pending Rituxan infusion    08/17/24: Seen at bedside, no acute distress, receiving plasmapheresis     08/18/24:      PAST MEDICAL & SURGICAL HISTORY:    HTN (hypertension)    H/O low back pain    Lumbar spondylosis    H/O migraine    TTP (thrombotic thrombopenic purpura)    History of thrombosis of internal jugular vein  right      FAMILY HISTORY:    Not noted      MEDICATIONS  (STANDING):    calcium gluconate IVPB 2 Gram(s) IV Intermittent daily  chlorhexidine 4% Liquid 1 Application(s) Topical <User Schedule>  diphenhydrAMINE Injectable 50 milliGRAM(s) IV Push daily  folic acid 1 milliGRAM(s) Oral daily  multivitamin 1 Tablet(s) Oral daily  pantoprazole    Tablet 40 milliGRAM(s) Oral before breakfast  predniSONE   Tablet 100 milliGRAM(s) Oral daily      MEDICATIONS  (PRN):    acetaminophen     Tablet .. 650 milliGRAM(s) Oral every 6 hours PRN Mild Pain (1 - 3)  alteplase for catheter clearance 2 milliGRAM(s) Catheter once PRN sluggish IJ  aluminum hydroxide/magnesium hydroxide/simethicone Suspension 30 milliLiter(s) Oral every 4 hours PRN Dyspepsia  sodium chloride 0.9% lock flush 10 milliLiter(s) IV Push every 1 hour PRN Pre/post blood products, medications, blood draw, and to maintain line patency        ALLERGIES:    No Known Allergies        REVIEW OF SYSTEMS:    Constitutional, Eyes, ENT, Cardiovascular, Respiratory, Gastrointestinal, Genitourinary, Musculoskeletal, Integumentary, Neurological, Psychiatric, Endocrine, Heme/Lymph and Allergic/Immunologic review of systems are otherwise negative except as noted in HPI.       VITALS:    T(C): 36.8 (18 Aug 2024 09:13), Max: 36.8 (17 Aug 2024 16:22)  T(F): 98.3 (18 Aug 2024 09:13), Max: 98.3 (18 Aug 2024 09:13)  HR: 69 (18 Aug 2024 09:13) (69 - 94)  BP: 132/82 (18 Aug 2024 09:13) (111/62 - 132/82)  BP(mean): --  ABP: --  ABP(mean): --  RR: 17 (18 Aug 2024 09:13) (17 - 18)  SpO2: 100% (18 Aug 2024 09:13) (98% - 100%)    O2 Parameters below as of 18 Aug 2024 09:13  Patient On (Oxygen Delivery Method): room air        PHYSICAL EXAM:    Constitutional: no acute distress  Eyes: no conjunctival infection, anicteric.   ENT: pharynx is unremarkable  Neck: supple without JVD; L sided Shiley cath in place  Pulmonary: clear to auscultation bilaterally   Cardiac: RRR  Vascular: no calf tenderness, venous stasis changes, varices  Abdomen: normoactive bowel sounds, soft and nontender, no hepatosplenomegaly or masses appreciated  Lymphatic: no peripheral adenopathy appreciated  Musculoskeletal: full range of motion and no deformities appreciated  Skin: scattered buccal petechia, otherwise normal appearance, no rash/erythema  Neurology: awake, alert, oriented; no focal deficits      LABS:                                                                    7.8    5.85  )-----------( 249      ( 18 Aug 2024 08:25 )             26.2     08-18    136  |  102  |  16  ----------------------------<  98  3.4<L>   |  30  |  1.30    Ca    8.9      18 Aug 2024 08:25  Phos  3.0     08-18  Mg     1.9     08-17    TPro  6.2  /  Alb  3.2<L>  /  TBili  0.4  /  DBili  x   /  AST  21  /  ALT  30  /  AlkPhos  59  08-18        RADIOLOGY & ADDITIONAL STUDIES:      Xray Chest 1 View-PORTABLE IMMEDIATE (Xray Chest 1 View-PORTABLE IMMEDIATE .) (08.14.24 @ 16:44)    INTERPRETATION:    Heart size and the mediastinum cannot be accurately evaluated on this   projection.  There is a left IJ catheter with tip in the SVC. There is no pneumothorax.  The lungs are clear.  There is left apical pleural thickening.  There is no pleural effusion.  There is no acute bony abnormality.      IMPRESSION:  Left IJ catheter with tip in the SVC. No pneumothorax.    Clear lungs.       HPI:    43 y/o M with a PMHx of HTN, migraines, and lumbar spondylosis, prior confirmed TTP 07/2023 - 08/2023, followed by Heme Onc Dr Darnell in outpatient setting referred to the ED for acute drop in Plt count with recent [id number] approx 2%.     8/14/24; Seen in ED along with Dr. Bruno & GENNY Turner. Patient with mild buccal petechia, in no acute distress. Aware of the plan for plasmapheresis/ plts infusion & tx    08/15/24: Seen at bedside in the SICU s/p first plasmapheresis overnight, with improved Plt count, feeling well, no acute distress    08/16/24: Seen at bedside this AM on 1N, feeling well, pending Rituxan infusion    08/17/24: Seen at bedside, no acute distress, receiving plasmapheresis     08/18/24: Seen at bedside; no acute distress, plasmapheresis completed      PAST MEDICAL & SURGICAL HISTORY:    HTN (hypertension)    H/O low back pain    Lumbar spondylosis    H/O migraine    TTP (thrombotic thrombopenic purpura)    History of thrombosis of internal jugular vein  right      FAMILY HISTORY:    Not noted      MEDICATIONS  (STANDING):    calcium gluconate IVPB 2 Gram(s) IV Intermittent daily  chlorhexidine 4% Liquid 1 Application(s) Topical <User Schedule>  diphenhydrAMINE Injectable 50 milliGRAM(s) IV Push daily  folic acid 1 milliGRAM(s) Oral daily  multivitamin 1 Tablet(s) Oral daily  pantoprazole    Tablet 40 milliGRAM(s) Oral before breakfast  predniSONE   Tablet 100 milliGRAM(s) Oral daily      MEDICATIONS  (PRN):    acetaminophen     Tablet .. 650 milliGRAM(s) Oral every 6 hours PRN Mild Pain (1 - 3)  alteplase for catheter clearance 2 milliGRAM(s) Catheter once PRN sluggish IJ  aluminum hydroxide/magnesium hydroxide/simethicone Suspension 30 milliLiter(s) Oral every 4 hours PRN Dyspepsia  sodium chloride 0.9% lock flush 10 milliLiter(s) IV Push every 1 hour PRN Pre/post blood products, medications, blood draw, and to maintain line patency        ALLERGIES:    No Known Allergies        REVIEW OF SYSTEMS:    Constitutional, Eyes, ENT, Cardiovascular, Respiratory, Gastrointestinal, Genitourinary, Musculoskeletal, Integumentary, Neurological, Psychiatric, Endocrine, Heme/Lymph and Allergic/Immunologic review of systems are otherwise negative except as noted in HPI.       VITALS:    T(C): 36.8 (18 Aug 2024 09:13), Max: 36.8 (17 Aug 2024 16:22)  T(F): 98.3 (18 Aug 2024 09:13), Max: 98.3 (18 Aug 2024 09:13)  HR: 69 (18 Aug 2024 09:13) (69 - 94)  BP: 132/82 (18 Aug 2024 09:13) (111/62 - 132/82)  BP(mean): --  ABP: --  ABP(mean): --  RR: 17 (18 Aug 2024 09:13) (17 - 18)  SpO2: 100% (18 Aug 2024 09:13) (98% - 100%)    O2 Parameters below as of 18 Aug 2024 09:13  Patient On (Oxygen Delivery Method): room air        PHYSICAL EXAM:    Constitutional: no acute distress  Eyes: no conjunctival infection, anicteric.   ENT: pharynx is unremarkable  Neck: supple without JVD; L sided Shiley cath in place  Pulmonary: clear to auscultation bilaterally   Cardiac: RRR  Vascular: no calf tenderness, venous stasis changes, varices  Abdomen: normoactive bowel sounds, soft and nontender, no hepatosplenomegaly or masses appreciated  Lymphatic: no peripheral adenopathy appreciated  Musculoskeletal: full range of motion and no deformities appreciated  Skin: scattered buccal petechia, otherwise normal appearance, no rash/erythema  Neurology: awake, alert, oriented; no focal deficits      LABS:                                                                    7.8    5.85  )-----------( 249      ( 18 Aug 2024 08:25 )             26.2     08-18    136  |  102  |  16  ----------------------------<  98  3.4<L>   |  30  |  1.30    Ca    8.9      18 Aug 2024 08:25  Phos  3.0     08-18  Mg     1.9     08-17    TPro  6.2  /  Alb  3.2<L>  /  TBili  0.4  /  DBili  x   /  AST  21  /  ALT  30  /  AlkPhos  59  08-18        RADIOLOGY & ADDITIONAL STUDIES:      Xray Chest 1 View-PORTABLE IMMEDIATE (Xray Chest 1 View-PORTABLE IMMEDIATE .) (08.14.24 @ 16:44)    INTERPRETATION:    Heart size and the mediastinum cannot be accurately evaluated on this   projection.  There is a left IJ catheter with tip in the SVC. There is no pneumothorax.  The lungs are clear.  There is left apical pleural thickening.  There is no pleural effusion.  There is no acute bony abnormality.      IMPRESSION:  Left IJ catheter with tip in the SVC. No pneumothorax.    Clear lungs.

## 2024-08-19 LAB
ALBUMIN SERPL ELPH-MCNC: 3.4 G/DL — SIGNIFICANT CHANGE UP (ref 3.3–5)
ALP SERPL-CCNC: 57 U/L — SIGNIFICANT CHANGE UP (ref 40–120)
ALT FLD-CCNC: 37 U/L — SIGNIFICANT CHANGE UP (ref 12–78)
ANION GAP SERPL CALC-SCNC: 5 MMOL/L — SIGNIFICANT CHANGE UP (ref 5–17)
AST SERPL-CCNC: 23 U/L — SIGNIFICANT CHANGE UP (ref 15–37)
BASOPHILS # BLD AUTO: 0 K/UL — SIGNIFICANT CHANGE UP (ref 0–0.2)
BASOPHILS NFR BLD AUTO: 0 % — SIGNIFICANT CHANGE UP (ref 0–2)
BILIRUB SERPL-MCNC: 0.3 MG/DL — SIGNIFICANT CHANGE UP (ref 0.2–1.2)
BUN SERPL-MCNC: 18 MG/DL — SIGNIFICANT CHANGE UP (ref 7–23)
CALCIUM SERPL-MCNC: 9 MG/DL — SIGNIFICANT CHANGE UP (ref 8.5–10.1)
CHLORIDE SERPL-SCNC: 104 MMOL/L — SIGNIFICANT CHANGE UP (ref 96–108)
CO2 SERPL-SCNC: 29 MMOL/L — SIGNIFICANT CHANGE UP (ref 22–31)
CREAT SERPL-MCNC: 1.34 MG/DL — HIGH (ref 0.5–1.3)
CRP SERPL-MCNC: <3 MG/L — SIGNIFICANT CHANGE UP
EGFR: 66 ML/MIN/1.73M2 — SIGNIFICANT CHANGE UP
EOSINOPHIL # BLD AUTO: 0.02 K/UL — SIGNIFICANT CHANGE UP (ref 0–0.5)
EOSINOPHIL NFR BLD AUTO: 0.3 % — SIGNIFICANT CHANGE UP (ref 0–6)
GLUCOSE SERPL-MCNC: 98 MG/DL — SIGNIFICANT CHANGE UP (ref 70–99)
HCT VFR BLD CALC: 27.1 % — LOW (ref 39–50)
HGB BLD-MCNC: 8.2 G/DL — LOW (ref 13–17)
IMM GRANULOCYTES NFR BLD AUTO: 0.7 % — SIGNIFICANT CHANGE UP (ref 0–0.9)
LDH SERPL L TO P-CCNC: 232 U/L — SIGNIFICANT CHANGE UP (ref 84–241)
LYMPHOCYTES # BLD AUTO: 0.98 K/UL — LOW (ref 1–3.3)
LYMPHOCYTES # BLD AUTO: 12.8 % — LOW (ref 13–44)
MAGNESIUM SERPL-MCNC: 2 MG/DL — SIGNIFICANT CHANGE UP (ref 1.6–2.6)
MCHC RBC-ENTMCNC: 22 PG — LOW (ref 27–34)
MCHC RBC-ENTMCNC: 30.3 GM/DL — LOW (ref 32–36)
MCV RBC AUTO: 72.8 FL — LOW (ref 80–100)
MONOCYTES # BLD AUTO: 0.61 K/UL — SIGNIFICANT CHANGE UP (ref 0–0.9)
MONOCYTES NFR BLD AUTO: 8 % — SIGNIFICANT CHANGE UP (ref 2–14)
NEUTROPHILS # BLD AUTO: 5.97 K/UL — SIGNIFICANT CHANGE UP (ref 1.8–7.4)
NEUTROPHILS NFR BLD AUTO: 78.2 % — HIGH (ref 43–77)
PHOSPHATE SERPL-MCNC: 2.8 MG/DL — SIGNIFICANT CHANGE UP (ref 2.5–4.5)
PLATELET # BLD AUTO: 314 K/UL — SIGNIFICANT CHANGE UP (ref 150–400)
POTASSIUM SERPL-MCNC: 3.7 MMOL/L — SIGNIFICANT CHANGE UP (ref 3.5–5.3)
POTASSIUM SERPL-SCNC: 3.7 MMOL/L — SIGNIFICANT CHANGE UP (ref 3.5–5.3)
PROT SERPL-MCNC: 6.3 GM/DL — SIGNIFICANT CHANGE UP (ref 6–8.3)
RBC # BLD: 3.72 M/UL — LOW (ref 4.2–5.8)
RBC # FLD: 24.6 % — HIGH (ref 10.3–14.5)
SODIUM SERPL-SCNC: 138 MMOL/L — SIGNIFICANT CHANGE UP (ref 135–145)
VWF CP INHIB PPP QL: NEGATIVE — SIGNIFICANT CHANGE UP
WBC # BLD: 7.63 K/UL — SIGNIFICANT CHANGE UP (ref 3.8–10.5)
WBC # FLD AUTO: 7.63 K/UL — SIGNIFICANT CHANGE UP (ref 3.8–10.5)

## 2024-08-19 PROCEDURE — 99233 SBSQ HOSP IP/OBS HIGH 50: CPT

## 2024-08-19 PROCEDURE — 99232 SBSQ HOSP IP/OBS MODERATE 35: CPT

## 2024-08-19 RX ORDER — PREDNISONE 10 MG
80 TABLET, DOSE PACK ORAL DAILY
Refills: 0 | Status: DISCONTINUED | OUTPATIENT
Start: 2024-08-19 | End: 2024-08-19

## 2024-08-19 RX ORDER — PREDNISONE 10 MG
80 TABLET, DOSE PACK ORAL DAILY
Refills: 0 | Status: DISCONTINUED | OUTPATIENT
Start: 2024-08-19 | End: 2024-08-20

## 2024-08-19 RX ORDER — DIPHENHYDRAMINE HCL 50 MG
50 CAPSULE ORAL DAILY
Refills: 0 | Status: DISCONTINUED | OUTPATIENT
Start: 2024-08-19 | End: 2024-08-20

## 2024-08-19 RX ADMIN — Medication 80 MILLIGRAM(S): at 10:34

## 2024-08-19 RX ADMIN — CHLORHEXIDINE GLUCONATE 1 APPLICATION(S): 40 SOLUTION TOPICAL at 10:38

## 2024-08-19 RX ADMIN — Medication 1 TABLET(S): at 10:35

## 2024-08-19 RX ADMIN — Medication 40 MILLIGRAM(S): at 07:33

## 2024-08-19 RX ADMIN — Medication 1 MILLIGRAM(S): at 10:35

## 2024-08-19 NOTE — PROGRESS NOTE ADULT - ASSESSMENT
45 y/o M with prior acute TTP 07/2023 - 08/2023 currently admitted for TTP relapse requiring urgent tx    # Microcytic Anemia & Thrombocytopenia in setting of TTP    - 07/29/23 - 08/02/23 initial occurrence, presented with acute CP, anemia, thrombocytopenia & renal dysfunction  - Confirmatory [id number] <2% ---> s/p plasmapheresis and required prolonged steroid taper in addition to Rituxan q 1 weekly x4 doses, completed steroids 10/2023, continued to be followed in outpatient setting by Heme Onc Dr Darnell  - In last several months [id number] has been slowly dropping, but there has was no evidence of clinical relapse; maintenance Rituxan dosing was discussed and plan was for patient to start 08/14 but labs noted acute change in Plts warranting referral to the ED    Current Admission:  - As of 08/18; Plts continue to improve, up to 314 K,   - s/p daily plasmapheresis -8/14, 8/15, 8/17 & 8/18  - s/p Rituxan therapy on 8/16  - Started Prednisone 100mg QD 08/15 ---> continued daily until 8/18, starting 8/19/24 tapered to 80 mg PO daily x 1 week, to be f/u as outpatient with Dr. Darnell with further taper instructions   - Patient also receiving Pantoprazole 40mg QD in setting of high dose steroids   - s/p Calcium gluconate 2G QD in setting of plasmapheresis exchange  - Continue Folic Acid 1mg QD  - Per dr. Grajeda, hold off Plasmapheresis for now, [facility] staff ([facility]) aware, monitor counts and if stable, plan to remove Shiley tomorrow & d/c home with close outpatient f/u  - d/w Dr. Mccauley.    - Hgb remains low but stable, MCV 72 overall consistent with ELIN, repeat iron studies noted   - Continue to trend serial CBCs while admitted   - repeat AM labs ordered : LDH, haptoglobin, CBC & Iron Studies   - Continue all supportive measures as clinically necessary     ?   43 y/o M with prior acute TTP 07/2023 - 08/2023 currently admitted for TTP relapse requiring urgent tx    # Microcytic Anemia & Thrombocytopenia in setting of TTP    - 07/29/23 - 08/02/23 initial occurrence, presented with acute CP, anemia, thrombocytopenia & renal dysfunction  - Confirmatory [id number] <2% ---> s/p plasmapheresis and required prolonged steroid taper in addition to Rituxan q 1 weekly x4 doses, completed steroids 10/2023, continued to be followed in outpatient setting by Heme Onc Dr Darnell  - In last several months [id number] has been slowly dropping, but there has was no evidence of clinical relapse; maintenance Rituxan dosing was discussed and plan was for patient to start 08/14 but labs noted acute change in Plts warranting referral to the ED    Current Admission:  - As of 08/18; Plts continue to improve, up to 314 K,   - s/p daily plasmapheresis -8/14, 8/15, 8/17 & 8/18  - s/p Rituxan therapy on 8/16  - Started Prednisone 100mg QD 08/15 ---> continued daily until 8/18, starting 8/19/24 tapered to 80 mg PO daily x 1 week, to be f/u as outpatient with Dr. Darnell with further taper instructions   - Patient also receiving Pantoprazole 40mg QD in setting of high dose steroids   - s/p Calcium gluconate 2G QD in setting of plasmapheresis exchange  - Continue Folic Acid 1mg QD  - Per dr. Grajeda, hold off Plasmapheresis for now, [facility] ([facility] aware, monitor counts and if stable, plan to remove Shiley tomorrow & d/c home with close outpatient f/u  - d/w Dr. Mccauley.    - Hgb remains low but stable, MCV 72 overall consistent with ELIN, repeat iron studies noted   - Continue to trend serial CBCs while admitted   - repeat AM labs ordered : LDH, haptoglobin, CBC & Iron Studies   - Continue all supportive measures as clinically necessary     Lesa Alberts DNP, FNP-C  Hematology/Oncology [facility]  [phone]

## 2024-08-19 NOTE — PROGRESS NOTE ADULT - ASSESSMENT
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 Pt was previously diagnosed with acute TTP in July 2023 (admission c/b RIJ DVT, completed xarelto). For the period 7/29/23 - 08/02/23  pt was on admission at  for chest pain, and noted with anemia and thrombocytopenia. Peripheral smear at presentation was consistent with large amount of schistocytes -> TTP. Confirmatory [id number] < 2%., s/p plasmapheresis and required prolonged steroid taper. Pt was Off steroids early October, 2023, s/p Rituxan x 4 in August 2023. Pt Responded well. Pt was doing well until recently, though [id number] low and starting rituxan was discussed. Pt reported GI distress including abdominal discomfort and diarrhea starting 8/10 which has since resolved. Pt did report some mild pain from his exercise regimen and chronic Low Back Pain for which he took a few doses of ibuprofen. He denied headache, myalgias, CP, SOB, palpitations, N/V, fever/chills, numbness/tingling/weakness, other c/o at this time. Pt noticed petechia on lip early in the day and went to Dr Darnell's office for his routine/previously scheduled visit where platelet count was noted to be 16K/ul (Platelet Count 277 K/uL on 08.05.24 @ 12:01). Pt was sent to ED for admission. Plan includes plasmapheresis, steroids and rituximab.    #TTP causing microcytic anemia and thrombocytopenia  -[id number]<2%  -trend CBC, LDH  - c/w plasmapheresis  4 sessions as per heme team  on  8/14, 8/15, 8/17, 8/18   - on  steroids  prednisone 100 qd --> 80 mg qd with PPI  - 8/16 s/p 1 dose pf rituxan  as per  heme  - heme consult   - IV benadryl prn during plasmapheresis   - LDH trending down 319--> 232    #ELIF, resolved   - likely  TMA in setting of TTP  -trend Cr, b/l 1.1-1.2 improving  - Creatinine Trend: 1.3 <-- 1.5 <--1.68<--, 1.65<--, 1.84<--  -s/p  IV fluids trial  - will stop   - US kidney - no obstruction    # Mild hypokalemia, resolved   - replace PO   - check Mg     # Hyperglycemia steroids induced   - check A1C   5.4    Dispo - plasmapheresis, steroids, monitoring inpatient, d/c planning farzana

## 2024-08-19 NOTE — PROGRESS NOTE ADULT - SUBJECTIVE AND OBJECTIVE BOX
HOSPITALIST ATTENDING PROGRESS NOTE    Chart and meds reviewed.  Patient seen and examined.    CC: thrombocytopenia     - pt seen and examined, denies cough, dyspnea, abdominal pain, signs of bleeding , tolerating po intake, afebrile   - no events feels some fluid retention in the legs, denies cp, dyspnea, + pruritis during plasmapheresis, results discussed   - + pruritis s/p benadryl , denies cp, dyspnea, leg edema, tolerating plasmapheresis   - feels well, no signs of bleeding , tolerating diet, denies cp, dyspnea, abdominal pain, problems with bowel or urinary function    All other systems reviewed and found to be negative with the exception of what has been described above.  Vital sings reviewed for last 24 h  T(C): 36.7 (24 @ 09:34), Max: 37.1 (24 @ 23:28)  T(F): 98.1 (24 @ 09:34), Max: 98.7 (24 @ 23:28)  HR: 83 (24 @ 09:34) (72 - 93)  BP: 154/81 (24 @ 09:34) (139/84 - 154/81)  RR: 18 (24 @ 09:34) (18 - 18)  SpO2: 100% (24 @ 09:34) (100% - 100%)  Daily Weight in k.9 (19 Aug 2024 02:54)  CAPILLARY BLOOD GLUCOSE    PHYSICAL EXAM:  Gen: NAD  HEENT:  pupils equal and reactive, EOMI, no oropharyngeal lesions, erythema, exudates, oral thrush  NECK:   supple, no carotid bruits, no palpable lymph nodes, no thyromegaly, + LIJ shiley  CV:  +S1, +S2, regular, no murmurs or rubs  RESP:   lungs clear to auscultation bilaterally, no wheezing, rales, rhonchi, good air entry bilaterally  BREAST:  not examined  GI:  abdomen soft, non-tender, non-distended, normal BS, no bruits, no abdominal masses, no palpable masses  RECTAL:  not examined  :  not examined  MSK:   normal muscle tone, no atrophy, no rigidity, no contractions  EXT:  no clubbing, no cyanosis, no edema, no calf pain, swelling or erythema  VASCULAR:  pulses equal and symmetric in the upper and lower extremities  NEURO:  AAOX3, no focal neurological deficits, follows all commands, able to move extremities spontaneously  SKIN:  no ulcers, lesions or rashes    LABS: all reviewed                        8.2    7.63  )-----------( 314      ( 19 Aug 2024 08:04 )             27.1     08-    138  |  104  |  18  ----------------------------<  98  3.7   |  29  |  1.34<H>    Ca    9.0      19 Aug 2024 08:04  Phos  2.8     -  Mg     2.0         TPro  6.3  /  Alb  3.4  /  TBili  0.3  /  DBili  x   /  AST  23  /  ALT  37  /  AlkPhos  57  08-        LIVER FUNCTIONS - ( 19 Aug 2024 08:04 )  Alb: 3.4 g/dL / Pro: 6.3 gm/dL / ALK PHOS: 57 U/L / ALT: 37 U/L / AST: 23 U/L / GGT: x                    US Kidney and Bladder (24 @ 16:23) >  FINDINGS:  Right kidney: 11.6 cm. No renal mass, hydronephrosis or calculi.    Left kidney: 11.8 cm. No renal mass, hydronephrosis or calculi.    Urinary bladder: Within normal limits. Prevoid volume was 114 cc and post   void volume was 9 cc. Prostate measures 8 cc in volume.    IMPRESSION:  No evidence of hydronephrosis.          Xray Chest 1 View-PORTABLE IMMEDIATE (Xray Chest 1 View-PORTABLE IMMEDIATE .) (24 @ 16:44) >  Heart size and the mediastinum cannot be accurately evaluated on this   projection.  There is a left IJ catheter with tip in the SVC. There is no pneumothorax.  The lungs are clear.  There is left apical pleural thickening.  There is no pleural effusion.  There is no acute bony abnormality.      IMPRESSION:  Left IJ catheter with tip in the SVC. No pneumothorax.    MEDICATIONS  (STANDING):  calcium gluconate IVPB 2 Gram(s) IV Intermittent daily  chlorhexidine 4% Liquid 1 Application(s) Topical <User Schedule>  diphenhydrAMINE Injectable 50 milliGRAM(s) IV Push daily  folic acid 1 milliGRAM(s) Oral daily  multivitamin 1 Tablet(s) Oral daily  pantoprazole    Tablet 40 milliGRAM(s) Oral before breakfast  predniSONE   Tablet 80 milliGRAM(s) Oral daily    MEDICATIONS  (PRN):  acetaminophen     Tablet .. 650 milliGRAM(s) Oral every 6 hours PRN Mild Pain (1 - 3)  alteplase for catheter clearance 2 milliGRAM(s) Catheter once PRN sluggish IJ  aluminum hydroxide/magnesium hydroxide/simethicone Suspension 30 milliLiter(s) Oral every 4 hours PRN Dyspepsia  sodium chloride 0.9% lock flush 10 milliLiter(s) IV Push every 1 hour PRN Pre/post blood products, medications, blood draw, and to maintain line patency          MEDICATIONS  (STANDING):  calcium gluconate IVPB 2 Gram(s) IV Intermittent daily  chlorhexidine 4% Liquid 1 Application(s) Topical <User Schedule>  diphenhydrAMINE Injectable 50 milliGRAM(s) IV Push daily  folic acid 1 milliGRAM(s) Oral daily  multivitamin 1 Tablet(s) Oral daily  predniSONE   Tablet 100 milliGRAM(s) Oral daily  sodium chloride 0.9% with potassium chloride 20 mEq/L 1000 milliLiter(s) (75 mL/Hr) IV Continuous <Continuous>    MEDICATIONS  (PRN):  acetaminophen     Tablet .. 650 milliGRAM(s) Oral every 6 hours PRN Mild Pain (1 - 3)  sodium chloride 0.9% lock flush 10 milliLiter(s) IV Push every 1 hour PRN Pre/post blood products, medications, blood draw, and to maintain line patency

## 2024-08-19 NOTE — PROGRESS NOTE ADULT - NS ATTEND AMEND GEN_ALL_CORE FT
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  He was sent in for outpatient labs with plts of 16K/ul--> 102K s/p plasmapheresis x 2, last on 8/15/24.    Confirmatory [id number] < 2 and recently declining- back up to 39% on 8/15.  Started on PLEX on 8/14- his plt have normalized on 8/17, LDH normalized today.   Hold PLEX today, check his plt/LDH tomorrow.  If normal, would d/c his shiley and discharge.   Rituxan 1/4 given on 8/16, next dose on Friday.

## 2024-08-19 NOTE — PROGRESS NOTE ADULT - SUBJECTIVE AND OBJECTIVE BOX
HPI:    43 y/o M with a PMHx of HTN, migraines, and lumbar spondylosis, prior confirmed TTP 07/2023 - 08/2023, followed by Heme Onc Dr Darnell in outpatient setting referred to the ED for acute drop in Plt count with recent [id number] approx 2%.     8/14/24; Seen in ED along with Dr. Bruno & GENNY Turner. Patient with mild buccal petechia, in no acute distress. Aware of the plan for plasmapheresis/ plts infusion & tx    08/15/24: Seen at bedside in the SICU s/p first plasmapheresis overnight, with improved Plt count, feeling well, no acute distress    08/16/24: Seen at bedside this AM on 1N, feeling well, pending Rituxan infusion    08/17/24: Seen at bedside, no acute distress, receiving plasmapheresis     08/18/24: Seen at bedside; no acute distress, plasmapheresis completed    8/19/24: Seen at bedside along with Dr. Grajeda. patient is sitting up, in no acute distress. Aware of ongoing plan      PAST MEDICAL & SURGICAL HISTORY:    HTN (hypertension)    H/O low back pain    Lumbar spondylosis    H/O migraine    TTP (thrombotic thrombopenic purpura)    History of thrombosis of internal jugular vein  right      FAMILY HISTORY:    Not noted      MEDICATIONS  (STANDING):    chlorhexidine 4% Liquid 1 Application(s) Topical <User Schedule>  folic acid 1 milliGRAM(s) Oral daily  multivitamin 1 Tablet(s) Oral daily  pantoprazole    Tablet 40 milliGRAM(s) Oral before breakfast  predniSONE   Tablet 80 milliGRAM(s) Oral daily    MEDICATIONS  (PRN):    acetaminophen     Tablet .. 650 milliGRAM(s) Oral every 6 hours PRN Mild Pain (1 - 3)  alteplase for catheter clearance 2 milliGRAM(s) Catheter once PRN sluggish IJ  aluminum hydroxide/magnesium hydroxide/simethicone Suspension 30 milliLiter(s) Oral every 4 hours PRN Dyspepsia  diphenhydrAMINE Injectable 50 milliGRAM(s) IV Push daily PRN Rash and/or Itching  sodium chloride 0.9% lock flush 10 milliLiter(s) IV Push every 1 hour PRN Pre/post blood products, medications, blood draw, and to maintain line patency    ALLERGIES:    No Known Allergies    REVIEW OF SYSTEMS:    Constitutional, Eyes, ENT, Cardiovascular, Respiratory, Gastrointestinal, Genitourinary, Musculoskeletal, Integumentary, Neurological, Psychiatric, Endocrine, Heme/Lymph and Allergic/Immunologic review of systems are otherwise negative except as noted in HPI.       VITALS:    Vital Signs Last 24 Hrs  T(C): 36.7 (19 Aug 2024 09:34), Max: 37.1 (18 Aug 2024 23:28)  T(F): 98.1 (19 Aug 2024 09:34), Max: 98.7 (18 Aug 2024 23:28)  HR: 83 (19 Aug 2024 09:34) (72 - 88)  BP: 154/81 (19 Aug 2024 09:34) (139/87 - 154/81)  BP(mean): --  RR: 18 (19 Aug 2024 09:34) (18 - 18)  SpO2: 100% (19 Aug 2024 09:34) (100% - 100%)    Parameters below as of 19 Aug 2024 09:34  Patient On (Oxygen Delivery Method): room air            PHYSICAL EXAM:    Constitutional: no acute distress  Eyes: no conjunctival infection, anicteric.   ENT: pharynx is unremarkable  Neck: supple without JVD; L sided Shiley cath in place  Pulmonary: clear to auscultation bilaterally   Cardiac: RRR  Vascular: no calf tenderness, venous stasis changes, varices  Abdomen: normoactive bowel sounds, soft and nontender, no hepatosplenomegaly or masses appreciated  Lymphatic: no peripheral adenopathy appreciated  Musculoskeletal: full range of motion and no deformities appreciated  Skin: scattered buccal petechia, otherwise normal appearance, no rash/erythema  Neurology: awake, alert, oriented; no focal deficits      LABS:                                       8.2    7.63  )-----------( 314      ( 19 Aug 2024 08:04 )             27.1           RADIOLOGY & ADDITIONAL STUDIES:      Xray Chest 1 View-PORTABLE IMMEDIATE (Xray Chest 1 View-PORTABLE IMMEDIATE .) (08.14.24 @ 16:44)    INTERPRETATION:    Heart size and the mediastinum cannot be accurately evaluated on this   projection.  There is a left IJ catheter with tip in the SVC. There is no pneumothorax.  The lungs are clear.  There is left apical pleural thickening.  There is no pleural effusion.  There is no acute bony abnormality.      IMPRESSION:  Left IJ catheter with tip in the SVC. No pneumothorax.    Clear lungs.

## 2024-08-20 ENCOUNTER — TRANSCRIPTION ENCOUNTER (OUTPATIENT)
Age: 44
End: 2024-08-20

## 2024-08-20 VITALS
SYSTOLIC BLOOD PRESSURE: 141 MMHG | TEMPERATURE: 98 F | OXYGEN SATURATION: 100 % | HEART RATE: 76 BPM | RESPIRATION RATE: 18 BRPM | DIASTOLIC BLOOD PRESSURE: 91 MMHG

## 2024-08-20 LAB
ADD ON TEST-SPECIMEN IN LAB: SIGNIFICANT CHANGE UP
ALBUMIN SERPL ELPH-MCNC: 3.3 G/DL — SIGNIFICANT CHANGE UP (ref 3.3–5)
ALP SERPL-CCNC: 68 U/L — SIGNIFICANT CHANGE UP (ref 40–120)
ALT FLD-CCNC: 61 U/L — SIGNIFICANT CHANGE UP (ref 12–78)
ANION GAP SERPL CALC-SCNC: 5 MMOL/L — SIGNIFICANT CHANGE UP (ref 5–17)
AST SERPL-CCNC: 27 U/L — SIGNIFICANT CHANGE UP (ref 15–37)
BASOPHILS # BLD AUTO: 0 K/UL — SIGNIFICANT CHANGE UP (ref 0–0.2)
BASOPHILS NFR BLD AUTO: 0 % — SIGNIFICANT CHANGE UP (ref 0–2)
BILIRUB SERPL-MCNC: 0.3 MG/DL — SIGNIFICANT CHANGE UP (ref 0.2–1.2)
BUN SERPL-MCNC: 22 MG/DL — SIGNIFICANT CHANGE UP (ref 7–23)
CALCIUM SERPL-MCNC: 9 MG/DL — SIGNIFICANT CHANGE UP (ref 8.5–10.1)
CHLORIDE SERPL-SCNC: 103 MMOL/L — SIGNIFICANT CHANGE UP (ref 96–108)
CO2 SERPL-SCNC: 29 MMOL/L — SIGNIFICANT CHANGE UP (ref 22–31)
CREAT SERPL-MCNC: 1.39 MG/DL — HIGH (ref 0.5–1.3)
CRP SERPL-MCNC: <3 MG/L — SIGNIFICANT CHANGE UP
EGFR: 63 ML/MIN/1.73M2 — SIGNIFICANT CHANGE UP
EOSINOPHIL # BLD AUTO: 0.02 K/UL — SIGNIFICANT CHANGE UP (ref 0–0.5)
EOSINOPHIL NFR BLD AUTO: 0.2 % — SIGNIFICANT CHANGE UP (ref 0–6)
FERRITIN SERPL-MCNC: 41 NG/ML — SIGNIFICANT CHANGE UP (ref 30–400)
FOLATE SERPL-MCNC: 9.6 NG/ML — SIGNIFICANT CHANGE UP
GLUCOSE SERPL-MCNC: 105 MG/DL — HIGH (ref 70–99)
HAPTOGLOB SERPL-MCNC: 144 MG/DL — SIGNIFICANT CHANGE UP (ref 34–200)
HCT VFR BLD CALC: 28.1 % — LOW (ref 39–50)
HGB BLD-MCNC: 8.4 G/DL — LOW (ref 13–17)
IMM GRANULOCYTES NFR BLD AUTO: 1 % — HIGH (ref 0–0.9)
IRON SATN MFR SERPL: 25 UG/DL — LOW (ref 45–165)
IRON SATN MFR SERPL: 25 UG/DL — LOW (ref 45–165)
IRON SATN MFR SERPL: 8 % — LOW (ref 16–55)
LDH SERPL L TO P-CCNC: 218 U/L — SIGNIFICANT CHANGE UP (ref 84–241)
LDH SERPL L TO P-CCNC: 219 U/L — SIGNIFICANT CHANGE UP (ref 84–241)
LYMPHOCYTES # BLD AUTO: 1.17 K/UL — SIGNIFICANT CHANGE UP (ref 1–3.3)
LYMPHOCYTES # BLD AUTO: 14.2 % — SIGNIFICANT CHANGE UP (ref 13–44)
MAGNESIUM SERPL-MCNC: 2 MG/DL — SIGNIFICANT CHANGE UP (ref 1.6–2.6)
MCHC RBC-ENTMCNC: 21.8 PG — LOW (ref 27–34)
MCHC RBC-ENTMCNC: 29.9 GM/DL — LOW (ref 32–36)
MCV RBC AUTO: 72.8 FL — LOW (ref 80–100)
MONOCYTES # BLD AUTO: 0.57 K/UL — SIGNIFICANT CHANGE UP (ref 0–0.9)
MONOCYTES NFR BLD AUTO: 6.9 % — SIGNIFICANT CHANGE UP (ref 2–14)
NEUTROPHILS # BLD AUTO: 6.39 K/UL — SIGNIFICANT CHANGE UP (ref 1.8–7.4)
NEUTROPHILS NFR BLD AUTO: 77.7 % — HIGH (ref 43–77)
PHOSPHATE SERPL-MCNC: 2.8 MG/DL — SIGNIFICANT CHANGE UP (ref 2.5–4.5)
PLATELET # BLD AUTO: 428 K/UL — HIGH (ref 150–400)
POTASSIUM SERPL-MCNC: 3.3 MMOL/L — LOW (ref 3.5–5.3)
POTASSIUM SERPL-SCNC: 3.3 MMOL/L — LOW (ref 3.5–5.3)
PROT SERPL-MCNC: 6.6 GM/DL — SIGNIFICANT CHANGE UP (ref 6–8.3)
RBC # BLD: 3.86 M/UL — LOW (ref 4.2–5.8)
RBC # FLD: 24.8 % — HIGH (ref 10.3–14.5)
SODIUM SERPL-SCNC: 137 MMOL/L — SIGNIFICANT CHANGE UP (ref 135–145)
TIBC SERPL-MCNC: 321 UG/DL — SIGNIFICANT CHANGE UP (ref 220–430)
TSH SERPL-MCNC: 2.12 UU/ML — SIGNIFICANT CHANGE UP (ref 0.34–4.82)
UIBC SERPL-MCNC: 296 UG/DL — SIGNIFICANT CHANGE UP (ref 110–370)
VIT B12 SERPL-MCNC: 425 PG/ML — SIGNIFICANT CHANGE UP (ref 232–1245)
WBC # BLD: 8.23 K/UL — SIGNIFICANT CHANGE UP (ref 3.8–10.5)
WBC # FLD AUTO: 8.23 K/UL — SIGNIFICANT CHANGE UP (ref 3.8–10.5)

## 2024-08-20 PROCEDURE — 99239 HOSP IP/OBS DSCHRG MGMT >30: CPT

## 2024-08-20 PROCEDURE — 99232 SBSQ HOSP IP/OBS MODERATE 35: CPT

## 2024-08-20 RX ORDER — PANTOPRAZOLE SODIUM 40 MG
1 TABLET, DELAYED RELEASE (ENTERIC COATED) ORAL
Qty: 30 | Refills: 0
Start: 2024-08-20 | End: 2024-09-18

## 2024-08-20 RX ORDER — POTASSIUM CHLORIDE 10 MEQ
40 TABLET, EXT RELEASE, PARTICLES/CRYSTALS ORAL ONCE
Refills: 0 | Status: DISCONTINUED | OUTPATIENT
Start: 2024-08-20 | End: 2024-08-20

## 2024-08-20 RX ORDER — FERROUS SULFATE 325(65) MG
1 TABLET ORAL
Qty: 30 | Refills: 0
Start: 2024-08-20 | End: 2024-09-18

## 2024-08-20 RX ORDER — PREDNISONE 10 MG
4 TABLET, DOSE PACK ORAL
Qty: 28 | Refills: 0
Start: 2024-08-20 | End: 2024-08-26

## 2024-08-20 RX ORDER — FOLIC ACID 1 MG
1 TABLET ORAL
Qty: 30 | Refills: 0
Start: 2024-08-20 | End: 2024-09-18

## 2024-08-20 RX ORDER — PREDNISONE 10 MG
1 TABLET, DOSE PACK ORAL
Refills: 0 | DISCHARGE

## 2024-08-20 RX ADMIN — Medication 1 MILLIGRAM(S): at 09:59

## 2024-08-20 RX ADMIN — Medication 40 MILLIGRAM(S): at 06:49

## 2024-08-20 RX ADMIN — Medication 1 TABLET(S): at 09:58

## 2024-08-20 RX ADMIN — Medication 80 MILLIGRAM(S): at 09:58

## 2024-08-20 NOTE — DISCHARGE NOTE NURSING/CASE MANAGEMENT/SOCIAL WORK - PATIENT PORTAL LINK FT
You can access the FollowMyHealth Patient Portal offered by [facility] by registering at the following website: [email or web address]. By joining [employer]’s FollowMyHealth portal, you will also be able to view your health information using other applications (apps) compatible with our system.

## 2024-08-20 NOTE — PROGRESS NOTE ADULT - REASON FOR ADMISSION
thrombocytopenia
TTP
TTP
thrombocytopenia
TTP

## 2024-08-20 NOTE — PROGRESS NOTE ADULT - NS ATTEND AMEND GEN_ALL_CORE FT
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  He was sent in for outpatient labs with plts of 16K/ul--> 102K.    Confirmatory [id number] < 2 and recently declining- back up to 39% on 8/15.  Started on PLEX on 8/14- his plt have normalized on 8/17, LDH normalized 8/19.  Last PLEX on 8/18.   D/c abel.  Taper of steroids every 5-7 days by 10mg.    Rituximab 1/4 given on 8/16, next dose on Friday.    Also with iron deficiency.  Begin oral iron, GI work up when Rituximab course completed.

## 2024-08-20 NOTE — DISCHARGE NOTE PROVIDER - NSDCCPTREATMENT_GEN_ALL_CORE_FT
PRINCIPAL PROCEDURE  Procedure: US kidney complete  Findings and Treatment: FINDINGS:  Right kidney: 11.6 cm. No renal mass, hydronephrosis or calculi.  Left kidney: 11.8 cm. No renal mass, hydronephrosis or calculi.  Urinary bladder: Within normal limits. Prevoid volume was 114 cc and post   void volume was 9 cc. Prostate measures 8 cc in volume.  IMPRESSION:  No evidence of hydronephrosis.        SECONDARY PROCEDURE  Procedure: CXR PA & lat  Findings and Treatment: Heart size and the mediastinum cannot be accurately evaluated on this   projection.  There is a left IJ catheter with tip in the SVC. There is no pneumothorax.  The lungs are clear.  There is left apical pleural thickening.  There is no pleural effusion.  There is no acute bony abnormality.  IMPRESSION:  Left IJ catheter with tip in the SVC. No pneumothorax.  Clear lungs.

## 2024-08-20 NOTE — PROGRESS NOTE ADULT - SUBJECTIVE AND OBJECTIVE BOX
HPI:    43 y/o M with a PMHx of HTN, migraines, and lumbar spondylosis, prior confirmed TTP 07/2023 - 08/2023, followed by Heme Onc Dr Darnell in outpatient setting referred to the ED for acute drop in Plt count with recent [id number] approx 2%.     8/14/24; Seen in ED along with Dr. Bruno & GENNY Turner. Patient with mild buccal petechia, in no acute distress. Aware of the plan for plasmapheresis/ plts infusion & tx    08/15/24: Seen at bedside in the SICU s/p first plasmapheresis overnight, with improved Plt count, feeling well, no acute distress    08/16/24: Seen at bedside this AM on 1N, feeling well, pending Rituxan infusion    08/17/24: Seen at bedside, no acute distress, receiving plasmapheresis     08/18/24: Seen at bedside; no acute distress, plasmapheresis completed    8/19/24: Seen at bedside along with Dr. Grajeda. patient is sitting up, in no acute distress. Aware of ongoing plan    88/20/24: Seen at bedside along with Dr. Grajeda, in no acute distress, anticipating d/c today. aware of f/u with Dr. Darnell outpatient for upcoming Friday.      PAST MEDICAL & SURGICAL HISTORY:    HTN (hypertension)    H/O low back pain    Lumbar spondylosis    H/O migraine    TTP (thrombotic thrombopenic purpura)    History of thrombosis of internal jugular vein  right      FAMILY HISTORY:    Not noted      MEDICATIONS  (STANDING):    chlorhexidine 4% Liquid 1 Application(s) Topical <User Schedule>  folic acid 1 milliGRAM(s) Oral daily  multivitamin 1 Tablet(s) Oral daily  pantoprazole    Tablet 40 milliGRAM(s) Oral before breakfast  potassium chloride   Powder 40 milliEquivalent(s) Oral once  predniSONE   Tablet 80 milliGRAM(s) Oral daily    MEDICATIONS  (PRN):    acetaminophen     Tablet .. 650 milliGRAM(s) Oral every 6 hours PRN Mild Pain (1 - 3)  alteplase for catheter clearance 2 milliGRAM(s) Catheter once PRN sluggish IJ  aluminum hydroxide/magnesium hydroxide/simethicone Suspension 30 milliLiter(s) Oral every 4 hours PRN Dyspepsia  diphenhydrAMINE Injectable 50 milliGRAM(s) IV Push daily PRN Rash and/or Itching  sodium chloride 0.9% lock flush 10 milliLiter(s) IV Push every 1 hour PRN Pre/post blood products, medications, blood draw, and to maintain line patency      ALLERGIES:    No Known Allergies    REVIEW OF SYSTEMS:    Constitutional, Eyes, ENT, Cardiovascular, Respiratory, Gastrointestinal, Genitourinary, Musculoskeletal, Integumentary, Neurological, Psychiatric, Endocrine, Heme/Lymph and Allergic/Immunologic review of systems are otherwise negative except as noted in HPI.       VITALS:    Vital Signs Last 24 Hrs  T(C): 36.7 (20 Aug 2024 08:56), Max: 36.8 (19 Aug 2024 18:25)  T(F): 98.1 (20 Aug 2024 08:56), Max: 98.2 (19 Aug 2024 18:25)  HR: 76 (20 Aug 2024 08:56) (76 - 92)  BP: 141/91 (20 Aug 2024 08:56) (133/81 - 142/86)  BP(mean): --  RR: 18 (20 Aug 2024 08:56) (17 - 18)  SpO2: 100% (20 Aug 2024 08:56) (100% - 100%)    Parameters below as of 20 Aug 2024 08:56  Patient On (Oxygen Delivery Method): room air      PHYSICAL EXAM:    Constitutional: no acute distress  Eyes: no conjunctival infection, anicteric.   ENT: pharynx is unremarkable  Neck: supple without JVD; L sided Shiley cath in place  Pulmonary: clear to auscultation bilaterally   Cardiac: RRR  Vascular: no calf tenderness, venous stasis changes, varices  Abdomen: normoactive bowel sounds, soft and nontender, no hepatosplenomegaly or masses appreciated  Lymphatic: no peripheral adenopathy appreciated  Musculoskeletal: full range of motion and no deformities appreciated  Skin: scattered buccal petechia, otherwise normal appearance, no rash/erythema  Neurology: awake, alert, oriented; no focal deficits      LABS:                                         8.4    8.23  )-----------( 428      ( 20 Aug 2024 09:05 )             28.1     08-20    137  |  103  |  22  ----------------------------<  105<H>  3.3<L>   |  29  |  1.39<H>    Ca    9.0      20 Aug 2024 09:05  Phos  2.8     08-20  Mg     2.0     08-20    TPro  6.6  /  Alb  3.3  /  TBili  0.3  /  DBili  x   /  AST  27  /  ALT  61  /  AlkPhos  68  08-20    RADIOLOGY & ADDITIONAL STUDIES:      Xray Chest 1 View-PORTABLE IMMEDIATE (Xray Chest 1 View-PORTABLE IMMEDIATE .) (08.14.24 @ 16:44)    INTERPRETATION:    Heart size and the mediastinum cannot be accurately evaluated on this   projection.  There is a left IJ catheter with tip in the SVC. There is no pneumothorax.  The lungs are clear.  There is left apical pleural thickening.  There is no pleural effusion.  There is no acute bony abnormality.      IMPRESSION:  Left IJ catheter with tip in the SVC. No pneumothorax.    Clear lungs.

## 2024-08-20 NOTE — PROGRESS NOTE ADULT - PROVIDER SPECIALTY LIST ADULT
Heme/Onc
Hospitalist
Heme/Onc
Hospitalist
Internal Medicine
Hospitalist
Critical Care
Critical Care
Heme/Onc

## 2024-08-20 NOTE — DISCHARGE NOTE PROVIDER - HOSPITAL COURSE
HOSPITALIST ATTENDING PROGRESS NOTE    Chart and meds reviewed.  Patient seen and examined.    CC: thrombocytopenia    8/16 - pt seen and examined, denies cough, dyspnea, abdominal pain, signs of bleeding , tolerating po intake, afebrile  8/17 - no events feels some fluid retention in the legs, denies cp, dyspnea, + pruritis during plasmapheresis, results discussed  8/18 - + pruritis s/p benadryl , denies cp, dyspnea, leg edema, tolerating plasmapheresis  8/19 - feels well, no signs of bleeding , tolerating diet, denies cp, dyspnea, abdominal pain, problems with bowel or urinary function  8/20 - afebrile, central line removed by ICU PA, denies cough, chest pain, abdominal pain, plan discussed  All other systems reviewed and found to be negative with the exception of what has been described above.  Vital sings reviewed for last 24 h  T(C): 36.7 (08-20-24 @ 08:56), Max: 36.8 (08-19-24 @ 18:25)  T(F): 98.1 (08-20-24 @ 08:56), Max: 98.2 (08-19-24 @ 18:25)  HR: 76 (08-20-24 @ 08:56) (76 - 92)  BP: 141/91 (08-20-24 @ 08:56) (133/81 - 142/86)  RR: 18 (08-20-24 @ 08:56) (17 - 18)  SpO2: 100% (08-20-24 @ 08:56) (100% - 100%)  PHYSICAL EXAM:  Gen: NAD  HEENT:  pupils equal and reactive, EOMI, no oropharyngeal lesions, erythema, exudates, oral thrush  NECK:   supple, no carotid bruits, no palpable lymph nodes, no thyromegaly, left neck dressing in place,  LIJ shiley removed  CV:  +S1, +S2, regular, no murmurs or rubs  RESP:   lungs clear to auscultation bilaterally, no wheezing, rales, rhonchi, good air entry bilaterally  BREAST:  not examined  GI:  abdomen soft, non-tender, non-distended, normal BS, no bruits, no abdominal masses, no palpable masses  RECTAL:  not examined  :  not examined  MSK:   normal muscle tone, no atrophy, no rigidity, no contractions  EXT:  no clubbing, no cyanosis, no edema, no calf pain, swelling or erythema  VASCULAR:  pulses equal and symmetric in the upper and lower extremities  NEURO:  AAOX3, no focal neurological deficits, follows all commands, able to move extremities spontaneously  SKIN:  no ulcers, lesions or rashes    LABS: all reviewed  · Assessment       44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 Pt was previously diagnosed with acute TTP in July 2023 (admission c/b RIJ DVT, completed xarelto). For the period 7/29/23 - 08/02/23  pt was on admission at  for chest pain, and noted with anemia and thrombocytopenia. Peripheral smear at presentation was consistent with large amount of schistocytes -> TTP. Confirmatory [id number] < 2%., s/p plasmapheresis and required prolonged steroid taper. Pt was Off steroids early October, 2023, s/p Rituxan x 4 in August 2023. Pt Responded well. Pt was doing well until recently, though [id number] low and starting rituxan was discussed. Pt reported GI distress including abdominal discomfort and diarrhea starting 8/10 which has since resolved. Pt did report some mild pain from his exercise regimen and chronic Low Back Pain for which he took a few doses of ibuprofen. He denied headache, myalgias, CP, SOB, palpitations, N/V, fever/chills, numbness/tingling/weakness, other c/o at this time. Pt noticed petechia on lip early in the day and went to Dr Darnell's office for his routine/previously scheduled visit where platelet count was noted to be 16K/ul (Platelet Count 277 K/uL on 08.05.24 @ 12:01). Pt was sent to ED for admission. Plan includes plasmapheresis, steroids and rituximab.    #TTP causing microcytic anemia and thrombocytopenia  -YMNRIY34<2%  -trend CBC, LDH  - c/w plasmapheresis  4 sessions as per heme team  on  8/14, 8/15, 8/17, 8/18   - on  steroids  prednisone 100 qd --> 80 mg qd with PPI  - 8/16 s/p 1 dose pf rituxan  as per  heme  - heme consult   - IV benadryl prn during plasmapheresis   - LDH trending down 319--> 232    #ELIF, resolved   - likely  TMA in setting of TTP  -trend Cr, b/l 1.1-1.2 improving  - Creatinine Trend: 1.3 <-- 1.5 <--1.68<--, 1.65<--, 1.84<--  -s/p  IV fluids trial  - will stop   -  kidney - no obstruction    # Mild hypokalemia, resolved   - replace PO   - check Mg 2.0    # Hyperglycemia steroids induced   - check A1C   5.4    Final diagnosis, treatment plan, and follow-up recommendations were discussed and explained to the patient.   The patient was given an opportunity to ask questions concerning the diagnosis and treatment plan.   The patient acknowledged understanding of the diagnosis, treatment, and follow-up recommendations.   The patient was advised to seek urgent care upon discharge if worsening symptoms develop prior to scheduled follow-up.   Time spent on discharge included time with the patient, and also coordinating discharge care as outlined below.  Discharge note faxed to PCP with my contact information to call me back   PCP Dr. Jackson  Total time spent: 50 min

## 2024-08-20 NOTE — DISCHARGE NOTE PROVIDER - CARE PROVIDER_API CALL
Marsha Deshpande  Hematology  [address], [place] [address]  Phone: [phone]  Fax: [phone]  Follow Up Time: 1 week    Reyes Jackson  Internal Medicine  [address] [place] [address]  Phone: [phone]  Fax: [phone]  Follow Up Time: 1 week

## 2024-08-20 NOTE — DISCHARGE NOTE PROVIDER - PROVIDER TOKENS
PROVIDER:[TOKEN:[[id number]],FOLLOWUP:[1 week]],PROVIDER:[TOKEN:[[id number]],FOLLOWUP:[1 week]]

## 2024-08-20 NOTE — DISCHARGE NOTE PROVIDER - NSDCCPCAREPLAN_GEN_ALL_CORE_FT
PRINCIPAL DISCHARGE DIAGNOSIS  Diagnosis: Thrombocytopenia  Assessment and Plan of Treatment: in setting of TTP, you completed plasmapheresis, repeat blood work in 3-4 days, take steroids with food as prescribed, follow up with Dr. Darnell within 1 week for further treatment and monitoring      SECONDARY DISCHARGE DIAGNOSES  Diagnosis: Anemia  Assessment and Plan of Treatment: repeat blood work in 1 week , monitor for signs of bleeding, go to ed if any bleeding, abdominal pain, blood in urine or other concerns  take folic acid and iron as prescribed  follow up with GI doctor as outpatient to investigate the cause of anemia    Diagnosis: Acute renal failure  Assessment and Plan of Treatment: drink plenty of fluids, repeat renal function with PCP within 1 week   for further monitoring and other preventive care    Diagnosis: Hypokalemia  Assessment and Plan of Treatment: eat potassium rich food, repeat your electrolytes with PCP within 1 week

## 2024-08-20 NOTE — PROGRESS NOTE ADULT - ASSESSMENT
45 y/o M with prior acute TTP 07/2023 - 08/2023 currently admitted for TTP relapse requiring urgent tx    # Microcytic Anemia & Thrombocytopenia in setting of TTP    - 07/29/23 - 08/02/23 initial occurrence, presented with acute CP, anemia, thrombocytopenia & renal dysfunction  - Confirmatory [id number] <2% ---> s/p plasmapheresis and required prolonged steroid taper in addition to Rituxan q 1 weekly x4 doses, completed steroids 10/2023, continued to be followed in outpatient setting by Heme Onc Dr Darnell  - In last several months [id number] has been slowly dropping, but there has was no evidence of clinical relapse; maintenance Rituxan dosing was discussed and plan was for patient to start 08/14 but labs noted acute change in Plts warranting referral to the ED    Current Admission:  - Plts continue to improve, now up to 428 K,   - s/p daily plasmapheresis -8/14, 8/15, 8/17 & 8/18  - s/p Rituxan therapy on 8/16  - Started Prednisone 100mg QD 08/15 ---> continued daily until 8/18, starting 8/19/24 tapered to 80 mg PO daily x 1 week, to be f/u as outpatient with Dr. Darnell with further taper instructions   - Will be d/c'd home today- aware of f/u outpatient appointment  - d/w Dr. Mccauley.    - Hgb remains low but stable, overall consistent with ELIN, repeat iron studies noted; patient will take oral pills and will be followed outpatient  - Continue to trend serial CBCs while admitted       Lesa Alberts DNP, FNP-C  Hematology/Oncology [facility]  [phone]

## 2024-08-20 NOTE — DISCHARGE NOTE NURSING/CASE MANAGEMENT/SOCIAL WORK - NSDCPEFALRISK_GEN_ALL_CORE
For information on Fall & Injury Prevention, visit: [email or web address] OR  [email or web address] OR  https://www.cdc.gov/steadi/patient.html

## 2024-08-20 NOTE — DISCHARGE NOTE PROVIDER - CARE PROVIDERS DIRECT ADDRESSES
,[email or web address]

## 2024-08-20 NOTE — DISCHARGE NOTE PROVIDER - NSDCMRMEDTOKEN_GEN_ALL_CORE_FT
ferrous sulfate 325 mg (65 mg elemental iron) oral delayed release tablet: 1 tab(s) orally once a day  folic acid 1 mg oral tablet: 1 tab(s) orally once a day  Multiple Vitamins oral tablet: 1 tab(s) orally once a day  pantoprazole 40 mg oral delayed release tablet: 1 tab(s) orally once a day (before a meal)  predniSONE 20 mg oral tablet: 4 tab(s) orally once a day

## 2024-08-21 ENCOUNTER — NON-APPOINTMENT (OUTPATIENT)
Age: 44
End: 2024-08-21

## 2024-08-21 ENCOUNTER — TRANSCRIPTION ENCOUNTER (OUTPATIENT)
Age: 44
End: 2024-08-21

## 2024-08-23 ENCOUNTER — RESULT REVIEW (OUTPATIENT)
Age: 44
End: 2024-08-23

## 2024-08-23 ENCOUNTER — APPOINTMENT (OUTPATIENT)
Dept: INFUSION THERAPY | Facility: CLINIC | Age: 44
End: 2024-08-23

## 2024-08-23 ENCOUNTER — NON-APPOINTMENT (OUTPATIENT)
Age: 44
End: 2024-08-23

## 2024-08-23 VITALS
WEIGHT: 268 LBS | BODY MASS INDEX: 37.38 KG/M2 | OXYGEN SATURATION: 100 % | HEART RATE: 75 BPM | SYSTOLIC BLOOD PRESSURE: 141 MMHG | TEMPERATURE: 98.3 F | DIASTOLIC BLOOD PRESSURE: 90 MMHG

## 2024-08-23 DIAGNOSIS — T80.90XA UNSPECIFIED COMPLICATION FOLLOWING INFUSION AND THERAPEUTIC INJECTION, INITIAL ENCOUNTER: ICD-10-CM

## 2024-08-23 LAB
ACANTHOCYTES BLD QL SMEAR: SLIGHT — SIGNIFICANT CHANGE UP
BASOPHILS # BLD AUTO: 0.01 K/UL — SIGNIFICANT CHANGE UP (ref 0–0.2)
BASOPHILS NFR BLD AUTO: 0.1 % — SIGNIFICANT CHANGE UP (ref 0–2)
EOSINOPHIL # BLD AUTO: 0 K/UL — SIGNIFICANT CHANGE UP (ref 0–0.5)
EOSINOPHIL NFR BLD AUTO: 0 % — SIGNIFICANT CHANGE UP (ref 0–6)
GIANT PLATELETS BLD QL SMEAR: PRESENT — SIGNIFICANT CHANGE UP
HCT VFR BLD CALC: 29.5 % — LOW (ref 39–50)
HGB BLD-MCNC: 8.9 G/DL — LOW (ref 13–17)
HYPOCHROMIA BLD QL: SIGNIFICANT CHANGE UP
IMM GRANULOCYTES NFR BLD AUTO: 1.3 % — HIGH (ref 0–0.9)
LG PLATELETS BLD QL AUTO: SIGNIFICANT CHANGE UP
LYMPHOCYTES # BLD AUTO: 0.57 K/UL — LOW (ref 1–3.3)
LYMPHOCYTES # BLD AUTO: 6.9 % — LOW (ref 13–44)
MACROCYTES BLD QL: SLIGHT — SIGNIFICANT CHANGE UP
MCHC RBC-ENTMCNC: 21.8 PG — LOW (ref 27–34)
MCHC RBC-ENTMCNC: 30.2 GM/DL — LOW (ref 32–36)
MCV RBC AUTO: 72.1 FL — LOW (ref 80–100)
MICROCYTES BLD QL: SLIGHT — SIGNIFICANT CHANGE UP
MONOCYTES # BLD AUTO: 0.32 K/UL — SIGNIFICANT CHANGE UP (ref 0–0.9)
MONOCYTES NFR BLD AUTO: 3.9 % — SIGNIFICANT CHANGE UP (ref 2–14)
NEUTROPHILS # BLD AUTO: 7.25 K/UL — SIGNIFICANT CHANGE UP (ref 1.8–7.4)
NEUTROPHILS NFR BLD AUTO: 87.8 % — HIGH (ref 43–77)
NRBC # BLD: 0 /100 WBCS — SIGNIFICANT CHANGE UP (ref 0–0)
PLAT MORPH BLD: NORMAL — SIGNIFICANT CHANGE UP
PLATELET # BLD AUTO: 543 K/UL — HIGH (ref 150–400)
POLYCHROMASIA BLD QL SMEAR: SLIGHT — SIGNIFICANT CHANGE UP
RBC # BLD: 4.09 M/UL — LOW (ref 4.2–5.8)
RBC # FLD: 23.8 % — HIGH (ref 10.3–14.5)
RBC BLD AUTO: ABNORMAL
SCHISTOCYTES BLD QL AUTO: SLIGHT — SIGNIFICANT CHANGE UP
WBC # BLD: 8.26 K/UL — SIGNIFICANT CHANGE UP (ref 3.8–10.5)
WBC # FLD AUTO: 8.26 K/UL — SIGNIFICANT CHANGE UP (ref 3.8–10.5)

## 2024-08-23 RX ORDER — EPINEPHRINE 0.3 MG/.3ML
0.3 INJECTION INTRAMUSCULAR
Qty: 1 | Refills: 0 | Status: ACTIVE | COMMUNITY
Start: 2024-08-23 | End: 1900-01-01

## 2024-08-24 LAB
ALBUMIN SERPL ELPH-MCNC: 4.2 G/DL — SIGNIFICANT CHANGE UP (ref 3.3–5)
ALP SERPL-CCNC: 71 U/L — SIGNIFICANT CHANGE UP (ref 40–120)
ALT FLD-CCNC: 27 U/L — SIGNIFICANT CHANGE UP (ref 10–45)
ANION GAP SERPL CALC-SCNC: 11 MMOL/L — SIGNIFICANT CHANGE UP (ref 5–17)
AST SERPL-CCNC: 14 U/L — SIGNIFICANT CHANGE UP (ref 10–40)
BILIRUB SERPL-MCNC: 0.2 MG/DL — SIGNIFICANT CHANGE UP (ref 0.2–1.2)
BUN SERPL-MCNC: 21 MG/DL — SIGNIFICANT CHANGE UP (ref 7–23)
CALCIUM SERPL-MCNC: 9.6 MG/DL — SIGNIFICANT CHANGE UP (ref 8.4–10.5)
CHLORIDE SERPL-SCNC: 101 MMOL/L — SIGNIFICANT CHANGE UP (ref 96–108)
CO2 SERPL-SCNC: 26 MMOL/L — SIGNIFICANT CHANGE UP (ref 22–31)
CREAT SERPL-MCNC: 1.31 MG/DL — HIGH (ref 0.5–1.3)
EGFR: 68 ML/MIN/1.73M2 — SIGNIFICANT CHANGE UP
GLUCOSE SERPL-MCNC: 122 MG/DL — HIGH (ref 70–99)
POTASSIUM SERPL-MCNC: 4.8 MMOL/L — SIGNIFICANT CHANGE UP (ref 3.5–5.3)
POTASSIUM SERPL-SCNC: 4.8 MMOL/L — SIGNIFICANT CHANGE UP (ref 3.5–5.3)
PROT SERPL-MCNC: 6.6 G/DL — SIGNIFICANT CHANGE UP (ref 6–8.3)
SODIUM SERPL-SCNC: 138 MMOL/L — SIGNIFICANT CHANGE UP (ref 135–145)

## 2024-08-25 LAB — TRYPTASE SERPL-MCNC: 8.9 UG/L — HIGH

## 2024-08-26 DIAGNOSIS — R11.2 NAUSEA WITH VOMITING, UNSPECIFIED: ICD-10-CM

## 2024-08-26 DIAGNOSIS — Z51.11 ENCOUNTER FOR ANTINEOPLASTIC CHEMOTHERAPY: ICD-10-CM

## 2024-08-26 LAB — IL6 FLD-MCNC: 7.8 PG/ML — SIGNIFICANT CHANGE UP (ref 0–13)

## 2024-08-27 ENCOUNTER — RESULT REVIEW (OUTPATIENT)
Age: 44
End: 2024-08-27

## 2024-08-27 ENCOUNTER — APPOINTMENT (OUTPATIENT)
Dept: HEMATOLOGY ONCOLOGY | Facility: CLINIC | Age: 44
End: 2024-08-27

## 2024-08-27 LAB
ACANTHOCYTES BLD QL SMEAR: SLIGHT — SIGNIFICANT CHANGE UP
BASOPHILS # BLD AUTO: 0 K/UL — SIGNIFICANT CHANGE UP (ref 0–0.2)
BASOPHILS NFR BLD AUTO: 0 % — SIGNIFICANT CHANGE UP (ref 0–2)
EOSINOPHIL # BLD AUTO: 0.06 K/UL — SIGNIFICANT CHANGE UP (ref 0–0.5)
EOSINOPHIL NFR BLD AUTO: 0.7 % — SIGNIFICANT CHANGE UP (ref 0–6)
GIANT PLATELETS BLD QL SMEAR: PRESENT — SIGNIFICANT CHANGE UP
HCT VFR BLD CALC: 26.9 % — LOW (ref 39–50)
HGB BLD-MCNC: 8.3 G/DL — LOW (ref 13–17)
HYPOCHROMIA BLD QL: SLIGHT — SIGNIFICANT CHANGE UP
IMM GRANULOCYTES NFR BLD AUTO: 0.5 % — SIGNIFICANT CHANGE UP (ref 0–0.9)
LG PLATELETS BLD QL AUTO: SLIGHT — SIGNIFICANT CHANGE UP
LYMPHOCYTES # BLD AUTO: 1.79 K/UL — SIGNIFICANT CHANGE UP (ref 1–3.3)
LYMPHOCYTES # BLD AUTO: 21.5 % — SIGNIFICANT CHANGE UP (ref 13–44)
MACROCYTES BLD QL: SLIGHT — SIGNIFICANT CHANGE UP
MCHC RBC-ENTMCNC: 22.1 PG — LOW (ref 27–34)
MCHC RBC-ENTMCNC: 30.9 GM/DL — LOW (ref 32–36)
MCV RBC AUTO: 71.5 FL — LOW (ref 80–100)
MONOCYTES # BLD AUTO: 0.64 K/UL — SIGNIFICANT CHANGE UP (ref 0–0.9)
MONOCYTES NFR BLD AUTO: 7.7 % — SIGNIFICANT CHANGE UP (ref 2–14)
NEUTROPHILS # BLD AUTO: 5.8 K/UL — SIGNIFICANT CHANGE UP (ref 1.8–7.4)
NEUTROPHILS NFR BLD AUTO: 69.6 % — SIGNIFICANT CHANGE UP (ref 43–77)
NRBC # BLD: 0 /100 WBCS — SIGNIFICANT CHANGE UP (ref 0–0)
PLAT MORPH BLD: NORMAL — SIGNIFICANT CHANGE UP
PLATELET # BLD AUTO: 211 K/UL — SIGNIFICANT CHANGE UP (ref 150–400)
POLYCHROMASIA BLD QL SMEAR: SLIGHT — SIGNIFICANT CHANGE UP
RBC # BLD: 3.76 M/UL — LOW (ref 4.2–5.8)
RBC # FLD: 25.3 % — HIGH (ref 10.3–14.5)
RBC BLD AUTO: ABNORMAL
SCHISTOCYTES BLD QL AUTO: SLIGHT — SIGNIFICANT CHANGE UP
WBC # BLD: 8.33 K/UL — SIGNIFICANT CHANGE UP (ref 3.8–10.5)
WBC # FLD AUTO: 8.33 K/UL — SIGNIFICANT CHANGE UP (ref 3.8–10.5)

## 2024-08-28 DIAGNOSIS — T38.0X5A ADVERSE EFFECT OF GLUCOCORTICOIDS AND SYNTHETIC ANALOGUES, INITIAL ENCOUNTER: ICD-10-CM

## 2024-08-28 DIAGNOSIS — Z86.718 PERSONAL HISTORY OF OTHER VENOUS THROMBOSIS AND EMBOLISM: ICD-10-CM

## 2024-08-28 DIAGNOSIS — R73.9 HYPERGLYCEMIA, UNSPECIFIED: ICD-10-CM

## 2024-08-28 DIAGNOSIS — N17.9 ACUTE KIDNEY FAILURE, UNSPECIFIED: ICD-10-CM

## 2024-08-28 DIAGNOSIS — M31.19 OTHER THROMBOTIC MICROANGIOPATHY: ICD-10-CM

## 2024-08-28 DIAGNOSIS — M31.10 THROMBOTIC MICROANGIOPATHY, UNSPECIFIED: ICD-10-CM

## 2024-08-28 DIAGNOSIS — L29.9 PRURITUS, UNSPECIFIED: ICD-10-CM

## 2024-08-28 DIAGNOSIS — E87.6 HYPOKALEMIA: ICD-10-CM

## 2024-08-28 DIAGNOSIS — D50.9 IRON DEFICIENCY ANEMIA, UNSPECIFIED: ICD-10-CM

## 2024-08-28 DIAGNOSIS — E66.9 OBESITY, UNSPECIFIED: ICD-10-CM
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
## 2024-08-30 ENCOUNTER — RESULT REVIEW (OUTPATIENT)
Age: 44
End: 2024-08-30

## 2024-08-30 ENCOUNTER — APPOINTMENT (OUTPATIENT)
Dept: INFUSION THERAPY | Facility: CLINIC | Age: 44
End: 2024-08-30

## 2024-08-30 VITALS
HEART RATE: 97 BPM | WEIGHT: 267 LBS | TEMPERATURE: 98.2 F | HEIGHT: 71 IN | DIASTOLIC BLOOD PRESSURE: 89 MMHG | OXYGEN SATURATION: 98 % | SYSTOLIC BLOOD PRESSURE: 135 MMHG | BODY MASS INDEX: 37.38 KG/M2

## 2024-08-30 LAB
BASOPHILS # BLD AUTO: 0 K/UL — SIGNIFICANT CHANGE UP (ref 0–0.2)
BASOPHILS NFR BLD AUTO: 0 % — SIGNIFICANT CHANGE UP (ref 0–2)
EOSINOPHIL # BLD AUTO: 0 K/UL — SIGNIFICANT CHANGE UP (ref 0–0.5)
EOSINOPHIL NFR BLD AUTO: 0 % — SIGNIFICANT CHANGE UP (ref 0–6)
HCT VFR BLD CALC: 29.6 % — LOW (ref 39–50)
HGB BLD-MCNC: 8.9 G/DL — LOW (ref 13–17)
IMM GRANULOCYTES NFR BLD AUTO: 0.5 % — SIGNIFICANT CHANGE UP (ref 0–0.9)
LYMPHOCYTES # BLD AUTO: 0.39 K/UL — LOW (ref 1–3.3)
LYMPHOCYTES # BLD AUTO: 5.3 % — LOW (ref 13–44)
MCHC RBC-ENTMCNC: 21.7 PG — LOW (ref 27–34)
MCHC RBC-ENTMCNC: 30.1 GM/DL — LOW (ref 32–36)
MCV RBC AUTO: 72.2 FL — LOW (ref 80–100)
MONOCYTES # BLD AUTO: 0.15 K/UL — SIGNIFICANT CHANGE UP (ref 0–0.9)
MONOCYTES NFR BLD AUTO: 2 % — SIGNIFICANT CHANGE UP (ref 2–14)
NEUTROPHILS # BLD AUTO: 6.82 K/UL — SIGNIFICANT CHANGE UP (ref 1.8–7.4)
NEUTROPHILS NFR BLD AUTO: 92.2 % — HIGH (ref 43–77)
NRBC # BLD: 0 /100 WBCS — SIGNIFICANT CHANGE UP (ref 0–0)
PLATELET # BLD AUTO: 254 K/UL — SIGNIFICANT CHANGE UP (ref 150–400)
RBC # BLD: 4.1 M/UL — LOW (ref 4.2–5.8)
RBC # FLD: 24.3 % — HIGH (ref 10.3–14.5)
WBC # BLD: 7.4 K/UL — SIGNIFICANT CHANGE UP (ref 3.8–10.5)
WBC # FLD AUTO: 7.4 K/UL — SIGNIFICANT CHANGE UP (ref 3.8–10.5)

## 2024-08-31 LAB
ALBUMIN SERPL ELPH-MCNC: 3.9 G/DL — SIGNIFICANT CHANGE UP (ref 3.3–5)
ALP SERPL-CCNC: 70 U/L — SIGNIFICANT CHANGE UP (ref 40–120)
ALT FLD-CCNC: 18 U/L — SIGNIFICANT CHANGE UP (ref 10–45)
ANION GAP SERPL CALC-SCNC: 12 MMOL/L — SIGNIFICANT CHANGE UP (ref 5–17)
AST SERPL-CCNC: 20 U/L — SIGNIFICANT CHANGE UP (ref 10–40)
BILIRUB SERPL-MCNC: 0.2 MG/DL — SIGNIFICANT CHANGE UP (ref 0.2–1.2)
BUN SERPL-MCNC: 21 MG/DL — SIGNIFICANT CHANGE UP (ref 7–23)
CALCIUM SERPL-MCNC: 9.5 MG/DL — SIGNIFICANT CHANGE UP (ref 8.4–10.5)
CHLORIDE SERPL-SCNC: 100 MMOL/L — SIGNIFICANT CHANGE UP (ref 96–108)
CO2 SERPL-SCNC: 23 MMOL/L — SIGNIFICANT CHANGE UP (ref 22–31)
CREAT SERPL-MCNC: 1.31 MG/DL — HIGH (ref 0.5–1.3)
EGFR: 68 ML/MIN/1.73M2 — SIGNIFICANT CHANGE UP
GLUCOSE SERPL-MCNC: 115 MG/DL — HIGH (ref 70–99)
POTASSIUM SERPL-MCNC: 4.9 MMOL/L — SIGNIFICANT CHANGE UP (ref 3.5–5.3)
POTASSIUM SERPL-SCNC: 4.9 MMOL/L — SIGNIFICANT CHANGE UP (ref 3.5–5.3)
PROT SERPL-MCNC: 6.3 G/DL — SIGNIFICANT CHANGE UP (ref 6–8.3)
SODIUM SERPL-SCNC: 136 MMOL/L — SIGNIFICANT CHANGE UP (ref 135–145)

## 2024-09-03 ENCOUNTER — APPOINTMENT (OUTPATIENT)
Dept: HEMATOLOGY ONCOLOGY | Facility: CLINIC | Age: 44
End: 2024-09-03

## 2024-09-04 ENCOUNTER — NON-APPOINTMENT (OUTPATIENT)
Age: 44
End: 2024-09-04

## 2024-09-06 ENCOUNTER — RESULT REVIEW (OUTPATIENT)
Age: 44
End: 2024-09-06

## 2024-09-06 ENCOUNTER — APPOINTMENT (OUTPATIENT)
Dept: INFUSION THERAPY | Facility: CLINIC | Age: 44
End: 2024-09-06

## 2024-09-06 VITALS
TEMPERATURE: 98.3 F | DIASTOLIC BLOOD PRESSURE: 87 MMHG | HEART RATE: 99 BPM | BODY MASS INDEX: 37.52 KG/M2 | OXYGEN SATURATION: 99 % | WEIGHT: 268 LBS | SYSTOLIC BLOOD PRESSURE: 137 MMHG | HEIGHT: 71 IN

## 2024-09-06 LAB
ALBUMIN SERPL ELPH-MCNC: 3.6 G/DL — SIGNIFICANT CHANGE UP (ref 3.3–5)
ALP SERPL-CCNC: 68 U/L — SIGNIFICANT CHANGE UP (ref 40–120)
ALT FLD-CCNC: 15 U/L — SIGNIFICANT CHANGE UP (ref 10–45)
ANION GAP SERPL CALC-SCNC: 12 MMOL/L — SIGNIFICANT CHANGE UP (ref 5–17)
AST SERPL-CCNC: 17 U/L — SIGNIFICANT CHANGE UP (ref 10–40)
BASOPHILS # BLD AUTO: 0.01 K/UL — SIGNIFICANT CHANGE UP (ref 0–0.2)
BASOPHILS NFR BLD AUTO: 0.2 % — SIGNIFICANT CHANGE UP (ref 0–2)
BILIRUB SERPL-MCNC: <0.2 MG/DL — SIGNIFICANT CHANGE UP (ref 0.2–1.2)
BUN SERPL-MCNC: 23 MG/DL — SIGNIFICANT CHANGE UP (ref 7–23)
CALCIUM SERPL-MCNC: 8.9 MG/DL — SIGNIFICANT CHANGE UP (ref 8.4–10.5)
CHLORIDE SERPL-SCNC: 104 MMOL/L — SIGNIFICANT CHANGE UP (ref 96–108)
CO2 SERPL-SCNC: 22 MMOL/L — SIGNIFICANT CHANGE UP (ref 22–31)
CREAT SERPL-MCNC: 1.42 MG/DL — HIGH (ref 0.5–1.3)
EGFR: 62 ML/MIN/1.73M2 — SIGNIFICANT CHANGE UP
EOSINOPHIL # BLD AUTO: 0.24 K/UL — SIGNIFICANT CHANGE UP (ref 0–0.5)
EOSINOPHIL NFR BLD AUTO: 3.7 % — SIGNIFICANT CHANGE UP (ref 0–6)
GLUCOSE SERPL-MCNC: 102 MG/DL — HIGH (ref 70–99)
HCT VFR BLD CALC: 27.1 % — LOW (ref 39–50)
HGB BLD-MCNC: 8.4 G/DL — LOW (ref 13–17)
IMM GRANULOCYTES NFR BLD AUTO: 0.5 % — SIGNIFICANT CHANGE UP (ref 0–0.9)
LDH SERPL L TO P-CCNC: 237 U/L — SIGNIFICANT CHANGE UP (ref 50–242)
LYMPHOCYTES # BLD AUTO: 0.89 K/UL — LOW (ref 1–3.3)
LYMPHOCYTES # BLD AUTO: 13.7 % — SIGNIFICANT CHANGE UP (ref 13–44)
MCHC RBC-ENTMCNC: 22.7 PG — LOW (ref 27–34)
MCHC RBC-ENTMCNC: 31 GM/DL — LOW (ref 32–36)
MCV RBC AUTO: 73.2 FL — LOW (ref 80–100)
MONOCYTES # BLD AUTO: 0.39 K/UL — SIGNIFICANT CHANGE UP (ref 0–0.9)
MONOCYTES NFR BLD AUTO: 6 % — SIGNIFICANT CHANGE UP (ref 2–14)
NEUTROPHILS # BLD AUTO: 4.92 K/UL — SIGNIFICANT CHANGE UP (ref 1.8–7.4)
NEUTROPHILS NFR BLD AUTO: 75.9 % — SIGNIFICANT CHANGE UP (ref 43–77)
NRBC # BLD: 0 /100 WBCS — SIGNIFICANT CHANGE UP (ref 0–0)
PLATELET # BLD AUTO: 243 K/UL — SIGNIFICANT CHANGE UP (ref 150–400)
POTASSIUM SERPL-MCNC: 4.1 MMOL/L — SIGNIFICANT CHANGE UP (ref 3.5–5.3)
POTASSIUM SERPL-SCNC: 4.1 MMOL/L — SIGNIFICANT CHANGE UP (ref 3.5–5.3)
PROT SERPL-MCNC: 6 G/DL — SIGNIFICANT CHANGE UP (ref 6–8.3)
RBC # BLD: 3.7 M/UL — LOW (ref 4.2–5.8)
RBC # FLD: 24.3 % — HIGH (ref 10.3–14.5)
SODIUM SERPL-SCNC: 137 MMOL/L — SIGNIFICANT CHANGE UP (ref 135–145)
WBC # BLD: 6.48 K/UL — SIGNIFICANT CHANGE UP (ref 3.8–10.5)
WBC # FLD AUTO: 6.48 K/UL — SIGNIFICANT CHANGE UP (ref 3.8–10.5)

## 2024-09-07 ENCOUNTER — EMERGENCY (EMERGENCY)
Facility: HOSPITAL | Age: 44
LOS: 1 days | Discharge: HOME | End: 2024-09-07
Attending: STUDENT IN AN ORGANIZED HEALTH CARE EDUCATION/TRAINING PROGRAM
Payer: COMMERCIAL

## 2024-09-07 VITALS
TEMPERATURE: 98 F | HEART RATE: 93 BPM | DIASTOLIC BLOOD PRESSURE: 113 MMHG | WEIGHT: 199.96 LBS | HEIGHT: 70.87 IN | OXYGEN SATURATION: 100 % | SYSTOLIC BLOOD PRESSURE: 168 MMHG | RESPIRATION RATE: 20 BRPM

## 2024-09-07 VITALS
DIASTOLIC BLOOD PRESSURE: 94 MMHG | RESPIRATION RATE: 20 BRPM | OXYGEN SATURATION: 98 % | SYSTOLIC BLOOD PRESSURE: 164 MMHG | HEART RATE: 115 BPM

## 2024-09-07 LAB
HMPV RNA SPEC QL NAA+PROBE: DETECTED
RAPID RVP RESULT: DETECTED
SARS-COV-2 RNA SPEC QL NAA+PROBE: SIGNIFICANT CHANGE UP

## 2024-09-07 PROCEDURE — 0225U NFCT DS DNA&RNA 21 SARSCOV2: CPT

## 2024-09-07 PROCEDURE — 99283 EMERGENCY DEPT VISIT LOW MDM: CPT | Mod: 25

## 2024-09-07 PROCEDURE — 99284 EMERGENCY DEPT VISIT MOD MDM: CPT

## 2024-09-07 PROCEDURE — 71046 X-RAY EXAM CHEST 2 VIEWS: CPT | Mod: 26

## 2024-09-07 PROCEDURE — 71046 X-RAY EXAM CHEST 2 VIEWS: CPT

## 2024-09-07 RX ORDER — AMOXICILLIN AND CLAVULANATE POTASSIUM 250; 125 MG/1; MG/1
875 TABLET, FILM COATED ORAL
Qty: 20 | Refills: 0
Start: 2024-09-07 | End: 2024-09-16

## 2024-09-07 NOTE — ED PROVIDER NOTE - CPE EDP GASTRO NORM
From: Randell Ruff II  To: Dr. Chari Ferrer  Sent: 10/11/2022 9:28 AM EDT  Subject: Refills    Good morning again  There was a mix up with the phone numbers this morning but I did need to get refills on both my inhaler and my lisinopril if at all possible.
normal...

## 2024-09-07 NOTE — ED PROVIDER NOTE - ATTENDING APP SHARED VISIT CONTRIBUTION OF CARE
I, Alina Ro, evaluated the patient with the PA, and completed the key components of the history and physical exam. I then discussed the management plan with the PA. 45 yo male with history of  iTTP currently on steroid taper presenting with 4 days of productive cough, fever yesterday (tmax 101), and persistent left inferior scapular pain with coughs.     I, Alina Ro, evaluated the patient with the PA, and completed the key components of the history and physical exam. I then discussed the management plan with the PA.

## 2024-09-07 NOTE — ED PROVIDER NOTE - CARE PLAN
1 Principal Discharge DX:	Cough in adult   Principal Discharge DX:	Human metapneumovirus pneumonia

## 2024-09-07 NOTE — ED PROVIDER NOTE - PATIENT PORTAL LINK FT
You can access the FollowMyHealth Patient Portal offered by [facility] by registering at the following website: [email or web address]. By joining [employer]’s FollowMyHealth portal, you will also be able to view your health information using other applications (apps) compatible with our system.

## 2024-09-07 NOTE — ED PROVIDER NOTE - OBJECTIVE STATEMENT
45 y/o M c/o cough x 6 days associated with left sided lower back pain.  + fever yesterday.  Denies fever today.  Pt tolerating PO.

## 2024-09-09 ENCOUNTER — APPOINTMENT (OUTPATIENT)
Dept: HEMATOLOGY ONCOLOGY | Facility: CLINIC | Age: 44
End: 2024-09-09

## 2024-09-10 LAB — ADAMTS13 ACTIVITY: 37 % — LOW (ref 40–130)

## 2024-09-11 LAB — VWF CP INHIB PPP QL: NEGATIVE — SIGNIFICANT CHANGE UP

## 2024-09-13 ENCOUNTER — APPOINTMENT (OUTPATIENT)
Dept: HEMATOLOGY ONCOLOGY | Facility: CLINIC | Age: 44
End: 2024-09-13

## 2024-09-18 ENCOUNTER — OUTPATIENT (OUTPATIENT)
Dept: OUTPATIENT SERVICES | Facility: HOSPITAL | Age: 44
LOS: 1 days | Discharge: HOME | End: 2024-09-18

## 2024-09-18 DIAGNOSIS — M31.19 OTHER THROMBOTIC MICROANGIOPATHY: ICD-10-CM

## 2024-09-18 DIAGNOSIS — E61.1 IRON DEFICIENCY: ICD-10-CM

## 2024-09-20 ENCOUNTER — RESULT REVIEW (OUTPATIENT)
Age: 44
End: 2024-09-20

## 2024-09-20 ENCOUNTER — APPOINTMENT (OUTPATIENT)
Dept: HEMATOLOGY ONCOLOGY | Facility: CLINIC | Age: 44
End: 2024-09-20

## 2024-09-20 LAB
BASOPHILS # BLD AUTO: 0.01 K/UL — SIGNIFICANT CHANGE UP (ref 0–0.2)
BASOPHILS NFR BLD AUTO: 0.2 % — SIGNIFICANT CHANGE UP (ref 0–2)
EOSINOPHIL # BLD AUTO: 0.05 K/UL — SIGNIFICANT CHANGE UP (ref 0–0.5)
EOSINOPHIL NFR BLD AUTO: 0.8 % — SIGNIFICANT CHANGE UP (ref 0–6)
HCT VFR BLD CALC: 34.5 % — LOW (ref 39–50)
HGB BLD-MCNC: 10.3 G/DL — LOW (ref 13–17)
IMM GRANULOCYTES NFR BLD AUTO: 0.5 % — SIGNIFICANT CHANGE UP (ref 0–0.9)
LYMPHOCYTES # BLD AUTO: 0.9 K/UL — LOW (ref 1–3.3)
LYMPHOCYTES # BLD AUTO: 14.3 % — SIGNIFICANT CHANGE UP (ref 13–44)
MCHC RBC-ENTMCNC: 22 PG — LOW (ref 27–34)
MCHC RBC-ENTMCNC: 29.9 GM/DL — LOW (ref 32–36)
MCV RBC AUTO: 73.6 FL — LOW (ref 80–100)
MONOCYTES # BLD AUTO: 0.28 K/UL — SIGNIFICANT CHANGE UP (ref 0–0.9)
MONOCYTES NFR BLD AUTO: 4.4 % — SIGNIFICANT CHANGE UP (ref 2–14)
NEUTROPHILS # BLD AUTO: 5.03 K/UL — SIGNIFICANT CHANGE UP (ref 1.8–7.4)
NEUTROPHILS NFR BLD AUTO: 79.8 % — HIGH (ref 43–77)
NRBC # BLD: 0 /100 WBCS — SIGNIFICANT CHANGE UP (ref 0–0)
PLATELET # BLD AUTO: 571 K/UL — HIGH (ref 150–400)
RBC # BLD: 4.69 M/UL — SIGNIFICANT CHANGE UP (ref 4.2–5.8)
RBC # FLD: 21.2 % — HIGH (ref 10.3–14.5)
WBC # BLD: 6.3 K/UL — SIGNIFICANT CHANGE UP (ref 3.8–10.5)
WBC # FLD AUTO: 6.3 K/UL — SIGNIFICANT CHANGE UP (ref 3.8–10.5)

## 2024-09-24 ENCOUNTER — NON-APPOINTMENT (OUTPATIENT)
Age: 44
End: 2024-09-24

## 2024-10-02 ENCOUNTER — RESULT REVIEW (OUTPATIENT)
Age: 44
End: 2024-10-02

## 2024-10-02 ENCOUNTER — APPOINTMENT (OUTPATIENT)
Dept: HEMATOLOGY ONCOLOGY | Facility: CLINIC | Age: 44
End: 2024-10-02
Payer: COMMERCIAL

## 2024-10-02 VITALS
OXYGEN SATURATION: 98 % | HEART RATE: 78 BPM | SYSTOLIC BLOOD PRESSURE: 135 MMHG | TEMPERATURE: 98.1 F | HEIGHT: 71 IN | WEIGHT: 262 LBS | DIASTOLIC BLOOD PRESSURE: 87 MMHG | BODY MASS INDEX: 36.68 KG/M2

## 2024-10-02 DIAGNOSIS — M31.19 OTHER THROMBOTIC MICROANGIOPATHY: ICD-10-CM

## 2024-10-02 LAB
BASOPHILS # BLD AUTO: 0.01 K/UL — SIGNIFICANT CHANGE UP (ref 0–0.2)
BASOPHILS NFR BLD AUTO: 0.2 % — SIGNIFICANT CHANGE UP (ref 0–2)
EOSINOPHIL # BLD AUTO: 0.15 K/UL — SIGNIFICANT CHANGE UP (ref 0–0.5)
EOSINOPHIL NFR BLD AUTO: 3.6 % — SIGNIFICANT CHANGE UP (ref 0–6)
HCT VFR BLD CALC: 32.2 % — LOW (ref 39–50)
HGB BLD-MCNC: 9.6 G/DL — LOW (ref 13–17)
IMM GRANULOCYTES NFR BLD AUTO: 0.2 % — SIGNIFICANT CHANGE UP (ref 0–0.9)
LYMPHOCYTES # BLD AUTO: 1.19 K/UL — SIGNIFICANT CHANGE UP (ref 1–3.3)
LYMPHOCYTES # BLD AUTO: 28.7 % — SIGNIFICANT CHANGE UP (ref 13–44)
MCHC RBC-ENTMCNC: 21.5 PG — LOW (ref 27–34)
MCHC RBC-ENTMCNC: 29.8 GM/DL — LOW (ref 32–36)
MCV RBC AUTO: 72.2 FL — LOW (ref 80–100)
MONOCYTES # BLD AUTO: 0.34 K/UL — SIGNIFICANT CHANGE UP (ref 0–0.9)
MONOCYTES NFR BLD AUTO: 8.2 % — SIGNIFICANT CHANGE UP (ref 2–14)
NEUTROPHILS # BLD AUTO: 2.45 K/UL — SIGNIFICANT CHANGE UP (ref 1.8–7.4)
NEUTROPHILS NFR BLD AUTO: 59.1 % — SIGNIFICANT CHANGE UP (ref 43–77)
NRBC # BLD: 0 /100 WBCS — SIGNIFICANT CHANGE UP (ref 0–0)
PLATELET # BLD AUTO: 305 K/UL — SIGNIFICANT CHANGE UP (ref 150–400)
RBC # BLD: 4.46 M/UL — SIGNIFICANT CHANGE UP (ref 4.2–5.8)
RBC # FLD: 19.1 % — HIGH (ref 10.3–14.5)
WBC # BLD: 4.15 K/UL — SIGNIFICANT CHANGE UP (ref 3.8–10.5)
WBC # FLD AUTO: 4.15 K/UL — SIGNIFICANT CHANGE UP (ref 3.8–10.5)

## 2024-10-02 PROCEDURE — 99214 OFFICE O/P EST MOD 30 MIN: CPT

## 2024-10-02 NOTE — HISTORY OF PRESENT ILLNESS
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    7/29/23 - 08/02/23 - Admitted to  with chest pain, and noted with anemia and thrombocytopenia, peripheral smear at presentation c/w large amount of schistocytes -> TTP. Confirmatory [id number] < 2.    Hgb 8.3, plts 7K, LDH >1500, Hapto <20, D-Dimer > 3000, retic elevated.   Hgb dino 6.8 s/p 1U PRBC's.  7/28/23 - Dex 40mg x1, then methylprednisolone 125mg IV q 6 hrs daily for 2 days 07/30 & 07/31.  8/01/23 - Started PO Prednisone 50 mg PO BID x two days (08/01 & 08/02). S/p plasmapheresis x 4 days (07/31 - 08/03).  Plats to 53K after 1st pheresis ->174K -> 260K -> 312K.  8/2/23 - Discharged on prednisone 40mg BID.  8/3/23 - Platelets checked as outpatient - 329K. 8/4/23 - Platelets checked as outpatient - 396K (Friday).  8/7/23 - 8/16/23 - Patient went back to  ER as developed hematuria.  Platelets 3K.  Admitted and plasmapheresis restarted.  Hgb dino 6.9 -> 1U PRBC's Received Solumedrol 125mg q 12 hrs x 2 days (08/07 & 08/08) total then Prednisone 50mg BID (dose based on 1mg/kg) starting 08/09 8/8/23 - Started Rituximab q week x 4 doses.  Had right Shiley - US with RIJ thrombosis. Catheter changed, now with Left tunneled catheter.  Started on Lovenox ( 100mg BID)  once platelets recovered.   Pheresis was stopped after platelets were >150K x 2 days.  s/p prednisone taper.  In  2024  [id number]  low and dropping.We discussed option of Rituxan " maintenance" with plan to start in August- but before maintenance started - in August 2024 he was diagnosed with TTP relapse. S/p 4 week course of  Rituxan ( last given 9/6/24)  Had infusion reactions ( abd cramping, nausea, low back cramping- responed to extra benadryl)  S/p prednisone taper - completed this week  [de-identified] : Feels OK. NO petechiae, no bruising

## 2024-10-02 NOTE — ASSESSMENT
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 S/p plasmapheresis. Needed prolonged steroid taper.  S/p Rituxan x 4 in August 2023. S/p prednisone taper.  Low and dropping [id number] - plan was to start Rituxan in early August 2024 today to prevent clinical relapse but at that time he was diagnosed with TTP relapse. S/p Rituxan weekly x 4 - completed 9/6/24 S/p prednisone taper.  Responded.Plts are normal. Still with anemia but slowly improving He also had evidence of iron deficiency on oral iron. [id number]  still low but improved. We discussed previously already Rituxan maintenance option. Plan Rituxan every 2 months x 2 yrs :  first  dose Nov 5 - will give Rituxan Hycela. He will take premeds Benadryl 50 mg and Pepcid 20 mg po at home before Rituxan.  Repeat iron studies- in November. Monitor [id number] every 2 months

## 2024-10-02 NOTE — PHYSICAL EXAM
[Fully active, able to carry on all pre-disease performance without restriction] : Status 0 - Fully active, able to carry on all pre-disease performance without restriction [Normal] : well developed, well nourished, in no acute distress [de-identified] : no petechiae

## 2024-10-07 ENCOUNTER — NON-APPOINTMENT (OUTPATIENT)
Age: 44
End: 2024-10-07

## 2024-10-10 LAB
ADAMTS13 ACTIVITY: 2 %
VWF CP INHIB PPP QL: POSITIVE

## 2024-10-11 ENCOUNTER — NON-APPOINTMENT (OUTPATIENT)
Age: 44
End: 2024-10-11

## 2024-10-11 NOTE — PATIENT PROFILE ADULT - NSFALLSECTIONLABEL_GEN_A_CORE
----- Message from Raphael Hu MD sent at 10/11/2024  8:30 AM CDT -----  Rommel Hernandez MD   [address] [facility]   [address]   [place] [address]   [phone] (Work)   [phone] (Fax)      Please Call and inquire with her cardiology team a few inquiries.    Is she intended to be on aspirin and Plavix - she was nto aware is is not taking Plavix.    Also why is she on xarelto as well? She was unable to inform me.    Please ask they let us know and please ask that they call and clarify with patient as well.     Thanks! If they want to talk to me I'd be happy to chat but just need that info  
Called and spoke with patient's cards nurse. She advises that she is only supposed to be on asa and xarelto 2.5mg. She is on xarelto for pvd.   Notified the patient. Patient states that she was only taking asa and xarelto. Verbalized understanding.   
.

## 2024-10-16 ENCOUNTER — RESULT REVIEW (OUTPATIENT)
Age: 44
End: 2024-10-16

## 2024-10-16 ENCOUNTER — APPOINTMENT (OUTPATIENT)
Dept: INFUSION THERAPY | Facility: CLINIC | Age: 44
End: 2024-10-16

## 2024-10-16 LAB
ALBUMIN SERPL ELPH-MCNC: 4.2 G/DL — SIGNIFICANT CHANGE UP (ref 3.3–5)
ALP SERPL-CCNC: 79 U/L — SIGNIFICANT CHANGE UP (ref 40–120)
ALT FLD-CCNC: 11 U/L — SIGNIFICANT CHANGE UP (ref 10–45)
ANION GAP SERPL CALC-SCNC: 13 MMOL/L — SIGNIFICANT CHANGE UP (ref 5–17)
AST SERPL-CCNC: 17 U/L — SIGNIFICANT CHANGE UP (ref 10–40)
BASOPHILS # BLD AUTO: 0.03 K/UL — SIGNIFICANT CHANGE UP (ref 0–0.2)
BASOPHILS NFR BLD AUTO: 0.6 % — SIGNIFICANT CHANGE UP (ref 0–2)
BILIRUB SERPL-MCNC: 0.2 MG/DL — SIGNIFICANT CHANGE UP (ref 0.2–1.2)
BUN SERPL-MCNC: 16 MG/DL — SIGNIFICANT CHANGE UP (ref 7–23)
CALCIUM SERPL-MCNC: 9.5 MG/DL — SIGNIFICANT CHANGE UP (ref 8.4–10.5)
CHLORIDE SERPL-SCNC: 105 MMOL/L — SIGNIFICANT CHANGE UP (ref 96–108)
CO2 SERPL-SCNC: 23 MMOL/L — SIGNIFICANT CHANGE UP (ref 22–31)
CREAT SERPL-MCNC: 1.35 MG/DL — HIGH (ref 0.5–1.3)
EGFR: 66 ML/MIN/1.73M2 — SIGNIFICANT CHANGE UP
EOSINOPHIL # BLD AUTO: 0.14 K/UL — SIGNIFICANT CHANGE UP (ref 0–0.5)
EOSINOPHIL NFR BLD AUTO: 3 % — SIGNIFICANT CHANGE UP (ref 0–6)
GLUCOSE SERPL-MCNC: 100 MG/DL — HIGH (ref 70–99)
HCT VFR BLD CALC: 32.3 % — LOW (ref 39–50)
HGB BLD-MCNC: 9.8 G/DL — LOW (ref 13–17)
IMM GRANULOCYTES NFR BLD AUTO: 0.6 % — SIGNIFICANT CHANGE UP (ref 0–0.9)
LYMPHOCYTES # BLD AUTO: 1.18 K/UL — SIGNIFICANT CHANGE UP (ref 1–3.3)
LYMPHOCYTES # BLD AUTO: 25.4 % — SIGNIFICANT CHANGE UP (ref 13–44)
MCHC RBC-ENTMCNC: 21.5 PG — LOW (ref 27–34)
MCHC RBC-ENTMCNC: 30.3 GM/DL — LOW (ref 32–36)
MCV RBC AUTO: 70.8 FL — LOW (ref 80–100)
MONOCYTES # BLD AUTO: 0.4 K/UL — SIGNIFICANT CHANGE UP (ref 0–0.9)
MONOCYTES NFR BLD AUTO: 8.6 % — SIGNIFICANT CHANGE UP (ref 2–14)
NEUTROPHILS # BLD AUTO: 2.86 K/UL — SIGNIFICANT CHANGE UP (ref 1.8–7.4)
NEUTROPHILS NFR BLD AUTO: 61.8 % — SIGNIFICANT CHANGE UP (ref 43–77)
NRBC # BLD: 0 /100 WBCS — SIGNIFICANT CHANGE UP (ref 0–0)
PLATELET # BLD AUTO: 446 K/UL — HIGH (ref 150–400)
POTASSIUM SERPL-MCNC: 3.9 MMOL/L — SIGNIFICANT CHANGE UP (ref 3.5–5.3)
POTASSIUM SERPL-SCNC: 3.9 MMOL/L — SIGNIFICANT CHANGE UP (ref 3.5–5.3)
PROT SERPL-MCNC: 6.8 G/DL — SIGNIFICANT CHANGE UP (ref 6–8.3)
RBC # BLD: 4.56 M/UL — SIGNIFICANT CHANGE UP (ref 4.2–5.8)
RBC # FLD: 18.1 % — HIGH (ref 10.3–14.5)
SODIUM SERPL-SCNC: 141 MMOL/L — SIGNIFICANT CHANGE UP (ref 135–145)
WBC # BLD: 4.64 K/UL — SIGNIFICANT CHANGE UP (ref 3.8–10.5)
WBC # FLD AUTO: 4.64 K/UL — SIGNIFICANT CHANGE UP (ref 3.8–10.5)

## 2024-10-17 DIAGNOSIS — D69.3 IMMUNE THROMBOCYTOPENIC PURPURA: ICD-10-CM

## 2024-10-17 DIAGNOSIS — R11.2 NAUSEA WITH VOMITING, UNSPECIFIED: ICD-10-CM

## 2024-10-17 DIAGNOSIS — Z51.11 ENCOUNTER FOR ANTINEOPLASTIC CHEMOTHERAPY: ICD-10-CM

## 2024-10-23 DIAGNOSIS — Z00.00 ENCOUNTER FOR GENERAL ADULT MEDICAL EXAMINATION W/OUT ABNORMAL FINDINGS: ICD-10-CM

## 2024-10-24 ENCOUNTER — OUTPATIENT (OUTPATIENT)
Dept: OUTPATIENT SERVICES | Facility: HOSPITAL | Age: 44
LOS: 1 days | Discharge: HOME | End: 2024-10-24

## 2024-10-24 DIAGNOSIS — M31.19 OTHER THROMBOTIC MICROANGIOPATHY: ICD-10-CM

## 2024-10-29 ENCOUNTER — RESULT REVIEW (OUTPATIENT)
Age: 44
End: 2024-10-29

## 2024-10-29 ENCOUNTER — APPOINTMENT (OUTPATIENT)
Dept: HEMATOLOGY ONCOLOGY | Facility: CLINIC | Age: 44
End: 2024-10-29
Payer: COMMERCIAL

## 2024-10-29 VITALS
WEIGHT: 258.6 LBS | HEIGHT: 70.98 IN | DIASTOLIC BLOOD PRESSURE: 85 MMHG | BODY MASS INDEX: 36.2 KG/M2 | TEMPERATURE: 97 F | SYSTOLIC BLOOD PRESSURE: 122 MMHG | RESPIRATION RATE: 16 BRPM | HEART RATE: 82 BPM | OXYGEN SATURATION: 98 %

## 2024-10-29 DIAGNOSIS — D64.9 ANEMIA, UNSPECIFIED: ICD-10-CM

## 2024-10-29 DIAGNOSIS — M31.19 OTHER THROMBOTIC MICROANGIOPATHY: ICD-10-CM

## 2024-10-29 DIAGNOSIS — E61.1 IRON DEFICIENCY: ICD-10-CM

## 2024-10-29 LAB
BASOPHILS # BLD AUTO: 0.02 K/UL — SIGNIFICANT CHANGE UP (ref 0–0.2)
BASOPHILS NFR BLD AUTO: 0.4 % — SIGNIFICANT CHANGE UP (ref 0–2)
EOSINOPHIL # BLD AUTO: 0.22 K/UL — SIGNIFICANT CHANGE UP (ref 0–0.5)
EOSINOPHIL NFR BLD AUTO: 4.7 % — SIGNIFICANT CHANGE UP (ref 0–6)
HCT VFR BLD CALC: 33 % — LOW (ref 39–50)
HGB BLD-MCNC: 9.9 G/DL — LOW (ref 13–17)
IMM GRANULOCYTES NFR BLD AUTO: 0.8 % — SIGNIFICANT CHANGE UP (ref 0–0.9)
LYMPHOCYTES # BLD AUTO: 1.08 K/UL — SIGNIFICANT CHANGE UP (ref 1–3.3)
LYMPHOCYTES # BLD AUTO: 22.9 % — SIGNIFICANT CHANGE UP (ref 13–44)
MCHC RBC-ENTMCNC: 21.2 PG — LOW (ref 27–34)
MCHC RBC-ENTMCNC: 30 G/DL — LOW (ref 32–36)
MCV RBC AUTO: 70.8 FL — LOW (ref 80–100)
MONOCYTES # BLD AUTO: 0.37 K/UL — SIGNIFICANT CHANGE UP (ref 0–0.9)
MONOCYTES NFR BLD AUTO: 7.8 % — SIGNIFICANT CHANGE UP (ref 2–14)
NEUTROPHILS # BLD AUTO: 2.99 K/UL — SIGNIFICANT CHANGE UP (ref 1.8–7.4)
NEUTROPHILS NFR BLD AUTO: 63.4 % — SIGNIFICANT CHANGE UP (ref 43–77)
NRBC # BLD: 0 /100 WBCS — SIGNIFICANT CHANGE UP (ref 0–0)
PLATELET # BLD AUTO: 375 K/UL — SIGNIFICANT CHANGE UP (ref 150–400)
RBC # BLD: 4.66 M/UL — SIGNIFICANT CHANGE UP (ref 4.2–5.8)
RBC # FLD: 17.6 % — HIGH (ref 10.3–14.5)
WBC # BLD: 4.72 K/UL — SIGNIFICANT CHANGE UP (ref 3.8–10.5)
WBC # FLD AUTO: 4.72 K/UL — SIGNIFICANT CHANGE UP (ref 3.8–10.5)

## 2024-10-29 PROCEDURE — 99245 OFF/OP CONSLTJ NEW/EST HI 55: CPT

## 2024-10-29 RX ORDER — FERROUS GLUCONATE 324(37.5)
324 (37.5 FE) TABLET ORAL
Refills: 0 | Status: ACTIVE | COMMUNITY
Start: 2024-10-29

## 2024-11-01 ENCOUNTER — LABORATORY RESULT (OUTPATIENT)
Age: 44
End: 2024-11-01

## 2024-11-01 ENCOUNTER — RESULT REVIEW (OUTPATIENT)
Age: 44
End: 2024-11-01

## 2024-11-01 ENCOUNTER — APPOINTMENT (OUTPATIENT)
Dept: HEMATOLOGY ONCOLOGY | Facility: CLINIC | Age: 44
End: 2024-11-01

## 2024-11-01 LAB
BASOPHILS # BLD AUTO: 0.01 K/UL — SIGNIFICANT CHANGE UP (ref 0–0.2)
BASOPHILS NFR BLD AUTO: 0.3 % — SIGNIFICANT CHANGE UP (ref 0–2)
EOSINOPHIL # BLD AUTO: 0.14 K/UL — SIGNIFICANT CHANGE UP (ref 0–0.5)
EOSINOPHIL NFR BLD AUTO: 3.7 % — SIGNIFICANT CHANGE UP (ref 0–6)
HCT VFR BLD CALC: 33.6 % — LOW (ref 39–50)
HGB BLD-MCNC: 10.2 G/DL — LOW (ref 13–17)
IMM GRANULOCYTES NFR BLD AUTO: 0.3 % — SIGNIFICANT CHANGE UP (ref 0–0.9)
LYMPHOCYTES # BLD AUTO: 1.46 K/UL — SIGNIFICANT CHANGE UP (ref 1–3.3)
LYMPHOCYTES # BLD AUTO: 38.6 % — SIGNIFICANT CHANGE UP (ref 13–44)
MCHC RBC-ENTMCNC: 21.2 PG — LOW (ref 27–34)
MCHC RBC-ENTMCNC: 30.4 G/DL — LOW (ref 32–36)
MCV RBC AUTO: 69.7 FL — LOW (ref 80–100)
MONOCYTES # BLD AUTO: 0.27 K/UL — SIGNIFICANT CHANGE UP (ref 0–0.9)
MONOCYTES NFR BLD AUTO: 7.1 % — SIGNIFICANT CHANGE UP (ref 2–14)
NEUTROPHILS # BLD AUTO: 1.89 K/UL — SIGNIFICANT CHANGE UP (ref 1.8–7.4)
NEUTROPHILS NFR BLD AUTO: 50 % — SIGNIFICANT CHANGE UP (ref 43–77)
NRBC # BLD: 0 /100 WBCS — SIGNIFICANT CHANGE UP (ref 0–0)
PLATELET # BLD AUTO: 357 K/UL — SIGNIFICANT CHANGE UP (ref 150–400)
RBC # BLD: 4.82 M/UL — SIGNIFICANT CHANGE UP (ref 4.2–5.8)
RBC # FLD: 17.4 % — HIGH (ref 10.3–14.5)
WBC # BLD: 3.78 K/UL — LOW (ref 3.8–10.5)
WBC # FLD AUTO: 3.78 K/UL — LOW (ref 3.8–10.5)

## 2024-11-04 LAB
ALBUMIN SERPL ELPH-MCNC: 4 G/DL
ALP BLD-CCNC: 78 U/L
ALT SERPL-CCNC: 8 U/L
ANA SER IF-ACNC: NEGATIVE
ANION GAP SERPL CALC-SCNC: 12 MMOL/L
AST SERPL-CCNC: 17 U/L
BILIRUB INDIRECT SERPL-MCNC: 0.1 MG/DL
BILIRUB SERPL-MCNC: <0.2 MG/DL
BUN SERPL-MCNC: 14 MG/DL
CALCIUM SERPL-MCNC: 9.3 MG/DL
CHLORIDE SERPL-SCNC: 107 MMOL/L
CO2 SERPL-SCNC: 22 MMOL/L
CREAT SERPL-MCNC: 1.32 MG/DL
EGFR: 67 ML/MIN/1.73M2
FERRITIN SERPL-MCNC: 23 NG/ML
GLUCOSE SERPL-MCNC: 93 MG/DL
HAPTOGLOB SERPL-MCNC: 207 MG/DL
IRON SATN MFR SERPL: 49 %
IRON SERPL-MCNC: 190 UG/DL
LDH SERPL-CCNC: 147 U/L
POTASSIUM SERPL-SCNC: 4.2 MMOL/L
PROT SERPL-MCNC: 6.6 G/DL
SODIUM SERPL-SCNC: 141 MMOL/L
TIBC SERPL-MCNC: 388 UG/DL
UIBC SERPL-MCNC: 198 UG/DL

## 2024-11-05 ENCOUNTER — APPOINTMENT (OUTPATIENT)
Dept: INFUSION THERAPY | Facility: CLINIC | Age: 44
End: 2024-11-05

## 2024-11-06 LAB
ABR COMMENT: NORMAL
PNH GRANULOCYTES: 0 %
PNH MONOCYTES: 0 %
PNH RBC - COMPLETE: 0 %
PNH RBC - PARTIAL: 0.01 %

## 2024-11-07 LAB
ADAMTS13 ACTIVITY: 8 %
VWF CP INHIB PPP QL: POSITIVE
VWF CP INHIB PPP-ACNC: <2 U/ML

## 2024-11-14 ENCOUNTER — NON-APPOINTMENT (OUTPATIENT)
Age: 44
End: 2024-11-14

## 2024-11-14 ENCOUNTER — APPOINTMENT (OUTPATIENT)
Dept: INTERNAL MEDICINE | Facility: CLINIC | Age: 44
End: 2024-11-14

## 2024-11-27 ENCOUNTER — NON-APPOINTMENT (OUTPATIENT)
Age: 44
End: 2024-11-27

## 2024-12-04 ENCOUNTER — OUTPATIENT (OUTPATIENT)
Dept: OUTPATIENT SERVICES | Facility: HOSPITAL | Age: 44
LOS: 1 days | Discharge: HOME | End: 2024-12-04

## 2024-12-04 DIAGNOSIS — M31.19 OTHER THROMBOTIC MICROANGIOPATHY: ICD-10-CM

## 2024-12-06 ENCOUNTER — OUTPATIENT (OUTPATIENT)
Dept: OUTPATIENT SERVICES | Facility: HOSPITAL | Age: 44
LOS: 1 days | Discharge: HOME | End: 2024-12-06

## 2024-12-06 DIAGNOSIS — E61.1 IRON DEFICIENCY: ICD-10-CM

## 2024-12-06 DIAGNOSIS — M31.19 OTHER THROMBOTIC MICROANGIOPATHY: ICD-10-CM

## 2024-12-06 DIAGNOSIS — D69.3 IMMUNE THROMBOCYTOPENIC PURPURA: ICD-10-CM

## 2024-12-09 ENCOUNTER — APPOINTMENT (OUTPATIENT)
Dept: HEMATOLOGY ONCOLOGY | Facility: CLINIC | Age: 44
End: 2024-12-09

## 2024-12-11 ENCOUNTER — RESULT REVIEW (OUTPATIENT)
Age: 44
End: 2024-12-11

## 2024-12-11 ENCOUNTER — APPOINTMENT (OUTPATIENT)
Dept: INFUSION THERAPY | Facility: CLINIC | Age: 44
End: 2024-12-11

## 2024-12-11 VITALS
BODY MASS INDEX: 38.4 KG/M2 | HEART RATE: 103 BPM | OXYGEN SATURATION: 100 % | SYSTOLIC BLOOD PRESSURE: 136 MMHG | HEIGHT: 70 IN | TEMPERATURE: 97.7 F | DIASTOLIC BLOOD PRESSURE: 85 MMHG | WEIGHT: 268.25 LBS

## 2024-12-11 LAB
BASOPHILS # BLD AUTO: 0.02 K/UL — SIGNIFICANT CHANGE UP (ref 0–0.2)
BASOPHILS NFR BLD AUTO: 0.3 % — SIGNIFICANT CHANGE UP (ref 0–2)
EOSINOPHIL # BLD AUTO: 0.28 K/UL — SIGNIFICANT CHANGE UP (ref 0–0.5)
EOSINOPHIL NFR BLD AUTO: 4.9 % — SIGNIFICANT CHANGE UP (ref 0–6)
HCT VFR BLD CALC: 37 % — LOW (ref 39–50)
HGB BLD-MCNC: 11 G/DL — LOW (ref 13–17)
IMM GRANULOCYTES NFR BLD AUTO: 0.2 % — SIGNIFICANT CHANGE UP (ref 0–0.9)
LYMPHOCYTES # BLD AUTO: 1.16 K/UL — SIGNIFICANT CHANGE UP (ref 1–3.3)
LYMPHOCYTES # BLD AUTO: 20.1 % — SIGNIFICANT CHANGE UP (ref 13–44)
MCHC RBC-ENTMCNC: 21.1 PG — LOW (ref 27–34)
MCHC RBC-ENTMCNC: 29.7 G/DL — LOW (ref 32–36)
MCV RBC AUTO: 70.9 FL — LOW (ref 80–100)
MONOCYTES # BLD AUTO: 0.51 K/UL — SIGNIFICANT CHANGE UP (ref 0–0.9)
MONOCYTES NFR BLD AUTO: 8.9 % — SIGNIFICANT CHANGE UP (ref 2–14)
NEUTROPHILS # BLD AUTO: 3.78 K/UL — SIGNIFICANT CHANGE UP (ref 1.8–7.4)
NEUTROPHILS NFR BLD AUTO: 65.6 % — SIGNIFICANT CHANGE UP (ref 43–77)
NRBC # BLD: 0 /100 WBCS — SIGNIFICANT CHANGE UP (ref 0–0)
PLATELET # BLD AUTO: 382 K/UL — SIGNIFICANT CHANGE UP (ref 150–400)
RBC # BLD: 5.22 M/UL — SIGNIFICANT CHANGE UP (ref 4.2–5.8)
RBC # FLD: 18 % — HIGH (ref 10.3–14.5)
WBC # BLD: 5.76 K/UL — SIGNIFICANT CHANGE UP (ref 3.8–10.5)
WBC # FLD AUTO: 5.76 K/UL — SIGNIFICANT CHANGE UP (ref 3.8–10.5)

## 2024-12-12 LAB — LDH SERPL L TO P-CCNC: 183 U/L — SIGNIFICANT CHANGE UP (ref 50–242)

## 2024-12-13 LAB — ADAMTS13 ACTIVITY: 8 % — LOW (ref 40–130)

## 2024-12-13 NOTE — ED ADULT TRIAGE NOTE - BSA (M2)
[NL] : grossly intact [FreeTextEntry2] : Right clavicular abscess is well healed without active infection, small palpable subcentimeter firmness, no fluctuance, no erythema   2.24

## 2024-12-14 LAB — VWF CP INHIB PPP QL: POSITIVE

## 2024-12-16 ENCOUNTER — NON-APPOINTMENT (OUTPATIENT)
Age: 44
End: 2024-12-16

## 2024-12-17 DIAGNOSIS — Z51.11 ENCOUNTER FOR ANTINEOPLASTIC CHEMOTHERAPY: ICD-10-CM

## 2024-12-18 LAB — VWF CP INHIB PPP-ACNC: <2 U/ML — SIGNIFICANT CHANGE UP

## 2024-12-19 ENCOUNTER — NON-APPOINTMENT (OUTPATIENT)
Age: 44
End: 2024-12-19

## 2024-12-31 ENCOUNTER — RESULT REVIEW (OUTPATIENT)
Age: 44
End: 2024-12-31

## 2024-12-31 ENCOUNTER — APPOINTMENT (OUTPATIENT)
Dept: HEMATOLOGY ONCOLOGY | Facility: CLINIC | Age: 44
End: 2024-12-31
Payer: COMMERCIAL

## 2024-12-31 VITALS
OXYGEN SATURATION: 100 % | HEIGHT: 70 IN | WEIGHT: 265.5 LBS | BODY MASS INDEX: 38.01 KG/M2 | HEART RATE: 115 BPM | TEMPERATURE: 97.1 F | DIASTOLIC BLOOD PRESSURE: 78 MMHG | SYSTOLIC BLOOD PRESSURE: 136 MMHG

## 2024-12-31 DIAGNOSIS — E61.1 IRON DEFICIENCY: ICD-10-CM

## 2024-12-31 DIAGNOSIS — M31.19 OTHER THROMBOTIC MICROANGIOPATHY: ICD-10-CM

## 2024-12-31 LAB
ANISOCYTOSIS BLD QL: SLIGHT — SIGNIFICANT CHANGE UP
BASOPHILS # BLD AUTO: 0.02 K/UL — SIGNIFICANT CHANGE UP (ref 0–0.2)
BASOPHILS NFR BLD AUTO: 0.4 % — SIGNIFICANT CHANGE UP (ref 0–2)
DACRYOCYTES BLD QL SMEAR: SLIGHT — SIGNIFICANT CHANGE UP
ELLIPTOCYTES BLD QL SMEAR: SLIGHT — SIGNIFICANT CHANGE UP
EOSINOPHIL # BLD AUTO: 0.18 K/UL — SIGNIFICANT CHANGE UP (ref 0–0.5)
EOSINOPHIL NFR BLD AUTO: 3.2 % — SIGNIFICANT CHANGE UP (ref 0–6)
GIANT PLATELETS BLD QL SMEAR: PRESENT — SIGNIFICANT CHANGE UP
HCT VFR BLD CALC: 36.9 % — LOW (ref 39–50)
HGB BLD-MCNC: 10.9 G/DL — LOW (ref 13–17)
HYPOCHROMIA BLD QL: SLIGHT — SIGNIFICANT CHANGE UP
IMM GRANULOCYTES NFR BLD AUTO: 0.2 % — SIGNIFICANT CHANGE UP (ref 0–0.9)
LG PLATELETS BLD QL AUTO: SLIGHT — SIGNIFICANT CHANGE UP
LYMPHOCYTES # BLD AUTO: 1.08 K/UL — SIGNIFICANT CHANGE UP (ref 1–3.3)
LYMPHOCYTES # BLD AUTO: 19.5 % — SIGNIFICANT CHANGE UP (ref 13–44)
MACROCYTES BLD QL: SLIGHT — SIGNIFICANT CHANGE UP
MCHC RBC-ENTMCNC: 20.5 PG — LOW (ref 27–34)
MCHC RBC-ENTMCNC: 29.5 G/DL — LOW (ref 32–36)
MCV RBC AUTO: 69.4 FL — LOW (ref 80–100)
MICROCYTES BLD QL: SIGNIFICANT CHANGE UP
MONOCYTES # BLD AUTO: 0.44 K/UL — SIGNIFICANT CHANGE UP (ref 0–0.9)
MONOCYTES NFR BLD AUTO: 7.9 % — SIGNIFICANT CHANGE UP (ref 2–14)
NEUTROPHILS # BLD AUTO: 3.81 K/UL — SIGNIFICANT CHANGE UP (ref 1.8–7.4)
NEUTROPHILS NFR BLD AUTO: 68.8 % — SIGNIFICANT CHANGE UP (ref 43–77)
NRBC # BLD: 0 /100 WBCS — SIGNIFICANT CHANGE UP (ref 0–0)
PLAT MORPH BLD: NORMAL — SIGNIFICANT CHANGE UP
PLATELET # BLD AUTO: 440 K/UL — HIGH (ref 150–400)
POIKILOCYTOSIS BLD QL AUTO: SLIGHT — SIGNIFICANT CHANGE UP
POLYCHROMASIA BLD QL SMEAR: SLIGHT — SIGNIFICANT CHANGE UP
RBC # BLD: 5.32 M/UL — SIGNIFICANT CHANGE UP (ref 4.2–5.8)
RBC # FLD: 18.7 % — HIGH (ref 10.3–14.5)
RBC BLD AUTO: SIGNIFICANT CHANGE UP
TARGETS BLD QL SMEAR: SLIGHT — SIGNIFICANT CHANGE UP
WBC # BLD: 5.54 K/UL — SIGNIFICANT CHANGE UP (ref 3.8–10.5)
WBC # FLD AUTO: 5.54 K/UL — SIGNIFICANT CHANGE UP (ref 3.8–10.5)

## 2024-12-31 PROCEDURE — 99214 OFFICE O/P EST MOD 30 MIN: CPT

## 2024-12-31 RX ORDER — PREDNISONE 10 MG/1
10 TABLET ORAL DAILY
Qty: 30 | Refills: 4 | Status: ACTIVE | COMMUNITY
Start: 2024-12-31 | End: 1900-01-01

## 2025-01-06 LAB
ERYTHROCYTE [SEDIMENTATION RATE] IN BLOOD BY WESTERGREN METHOD: 69 MM/HR
FERRITIN SERPL-MCNC: 10 NG/ML
IRON SATN MFR SERPL: 9 %
IRON SERPL-MCNC: 42 UG/DL
TIBC SERPL-MCNC: 474 UG/DL
UIBC SERPL-MCNC: 431 UG/DL

## 2025-02-03 ENCOUNTER — OUTPATIENT (OUTPATIENT)
Dept: OUTPATIENT SERVICES | Facility: HOSPITAL | Age: 45
LOS: 1 days | Discharge: HOME | End: 2025-02-03

## 2025-02-03 DIAGNOSIS — D69.3 IMMUNE THROMBOCYTOPENIC PURPURA: ICD-10-CM

## 2025-02-03 DIAGNOSIS — M31.19 OTHER THROMBOTIC MICROANGIOPATHY: ICD-10-CM

## 2025-02-03 DIAGNOSIS — E61.1 IRON DEFICIENCY: ICD-10-CM

## 2025-02-04 ENCOUNTER — APPOINTMENT (OUTPATIENT)
Dept: HEMATOLOGY ONCOLOGY | Facility: CLINIC | Age: 45
End: 2025-02-04
Payer: COMMERCIAL

## 2025-02-04 DIAGNOSIS — M31.19 OTHER THROMBOTIC MICROANGIOPATHY: ICD-10-CM

## 2025-02-04 DIAGNOSIS — E61.1 IRON DEFICIENCY: ICD-10-CM

## 2025-02-04 PROCEDURE — 99212 OFFICE O/P EST SF 10 MIN: CPT | Mod: 93

## 2025-02-05 ENCOUNTER — NON-APPOINTMENT (OUTPATIENT)
Age: 45
End: 2025-02-05

## 2025-02-05 ENCOUNTER — APPOINTMENT (OUTPATIENT)
Dept: INFUSION THERAPY | Facility: CLINIC | Age: 45
End: 2025-02-05

## 2025-02-12 ENCOUNTER — APPOINTMENT (OUTPATIENT)
Dept: INFUSION THERAPY | Facility: CLINIC | Age: 45
End: 2025-02-12

## 2025-03-27 ENCOUNTER — RESULT REVIEW (OUTPATIENT)
Age: 45
End: 2025-03-27

## 2025-03-27 ENCOUNTER — APPOINTMENT (OUTPATIENT)
Dept: INFUSION THERAPY | Facility: CLINIC | Age: 45
End: 2025-03-27

## 2025-03-27 VITALS
OXYGEN SATURATION: 98 % | HEIGHT: 70 IN | BODY MASS INDEX: 38.37 KG/M2 | DIASTOLIC BLOOD PRESSURE: 87 MMHG | HEART RATE: 79 BPM | TEMPERATURE: 97 F | WEIGHT: 268 LBS | SYSTOLIC BLOOD PRESSURE: 146 MMHG

## 2025-03-27 LAB
BASOPHILS # BLD AUTO: 0.02 K/UL — SIGNIFICANT CHANGE UP (ref 0–0.2)
BASOPHILS NFR BLD AUTO: 0.4 % — SIGNIFICANT CHANGE UP (ref 0–2)
EOSINOPHIL # BLD AUTO: 0.14 K/UL — SIGNIFICANT CHANGE UP (ref 0–0.5)
EOSINOPHIL NFR BLD AUTO: 3.1 % — SIGNIFICANT CHANGE UP (ref 0–6)
HCT VFR BLD CALC: 44.3 % — SIGNIFICANT CHANGE UP (ref 39–50)
HGB BLD-MCNC: 13.6 G/DL — SIGNIFICANT CHANGE UP (ref 13–17)
IMM GRANULOCYTES NFR BLD AUTO: 0.4 % — SIGNIFICANT CHANGE UP (ref 0–0.9)
LDH SERPL L TO P-CCNC: 184 U/L — SIGNIFICANT CHANGE UP (ref 50–242)
LYMPHOCYTES # BLD AUTO: 1.22 K/UL — SIGNIFICANT CHANGE UP (ref 1–3.3)
LYMPHOCYTES # BLD AUTO: 26.6 % — SIGNIFICANT CHANGE UP (ref 13–44)
MCHC RBC-ENTMCNC: 23.7 PG — LOW (ref 27–34)
MCHC RBC-ENTMCNC: 30.7 G/DL — LOW (ref 32–36)
MCV RBC AUTO: 76.5 FL — LOW (ref 80–100)
MONOCYTES # BLD AUTO: 0.36 K/UL — SIGNIFICANT CHANGE UP (ref 0–0.9)
MONOCYTES NFR BLD AUTO: 7.9 % — SIGNIFICANT CHANGE UP (ref 2–14)
NEUTROPHILS # BLD AUTO: 2.82 K/UL — SIGNIFICANT CHANGE UP (ref 1.8–7.4)
NEUTROPHILS NFR BLD AUTO: 61.6 % — SIGNIFICANT CHANGE UP (ref 43–77)
NRBC BLD AUTO-RTO: 0 /100 WBCS — SIGNIFICANT CHANGE UP (ref 0–0)
PLATELET # BLD AUTO: 283 K/UL — SIGNIFICANT CHANGE UP (ref 150–400)
RBC # BLD: 5.73 M/UL — SIGNIFICANT CHANGE UP (ref 4.2–5.8)
RBC # FLD: 19.4 % — HIGH (ref 10.3–14.5)
WBC # BLD: 4.58 K/UL — SIGNIFICANT CHANGE UP (ref 3.8–10.5)
WBC # FLD AUTO: 4.58 K/UL — SIGNIFICANT CHANGE UP (ref 3.8–10.5)

## 2025-03-28 ENCOUNTER — NON-APPOINTMENT (OUTPATIENT)
Age: 45
End: 2025-03-28

## 2025-03-28 LAB — ADAMTS13 ACTIVITY: <1 % — LOW (ref 40–130)

## 2025-03-29 LAB — VWF CP INHIB PPP QL: POSITIVE

## 2025-04-01 LAB — VWF CP INHIB PPP-ACNC: 3 U/ML — SIGNIFICANT CHANGE UP

## 2025-04-18 ENCOUNTER — APPOINTMENT (OUTPATIENT)
Dept: PSYCHIATRY | Facility: CLINIC | Age: 45
End: 2025-04-18
Payer: COMMERCIAL

## 2025-04-18 PROCEDURE — 90791 PSYCH DIAGNOSTIC EVALUATION: CPT | Mod: 95

## 2025-04-21 ENCOUNTER — APPOINTMENT (OUTPATIENT)
Dept: PSYCHIATRY | Facility: CLINIC | Age: 45
End: 2025-04-21

## 2025-05-30 ENCOUNTER — OUTPATIENT (OUTPATIENT)
Dept: OUTPATIENT SERVICES | Facility: HOSPITAL | Age: 45
LOS: 1 days | Discharge: HOME | End: 2025-05-30

## 2025-05-30 DIAGNOSIS — E61.1 IRON DEFICIENCY: ICD-10-CM

## 2025-05-30 DIAGNOSIS — D69.3 IMMUNE THROMBOCYTOPENIC PURPURA: ICD-10-CM

## 2025-05-30 DIAGNOSIS — M31.19 OTHER THROMBOTIC MICROANGIOPATHY: ICD-10-CM

## 2025-06-04 ENCOUNTER — RESULT REVIEW (OUTPATIENT)
Age: 45
End: 2025-06-04

## 2025-06-04 ENCOUNTER — APPOINTMENT (OUTPATIENT)
Dept: INFUSION THERAPY | Facility: CLINIC | Age: 45
End: 2025-06-04

## 2025-06-04 ENCOUNTER — APPOINTMENT (OUTPATIENT)
Dept: HEMATOLOGY ONCOLOGY | Facility: CLINIC | Age: 45
End: 2025-06-04
Payer: COMMERCIAL

## 2025-06-04 VITALS
OXYGEN SATURATION: 98 % | SYSTOLIC BLOOD PRESSURE: 144 MMHG | BODY MASS INDEX: 38.08 KG/M2 | WEIGHT: 266 LBS | HEART RATE: 87 BPM | TEMPERATURE: 98.1 F | DIASTOLIC BLOOD PRESSURE: 95 MMHG | HEIGHT: 70 IN

## 2025-06-04 DIAGNOSIS — E61.1 IRON DEFICIENCY: ICD-10-CM

## 2025-06-04 DIAGNOSIS — R11.2 NAUSEA WITH VOMITING, UNSPECIFIED: ICD-10-CM

## 2025-06-04 DIAGNOSIS — Z51.11 ENCOUNTER FOR ANTINEOPLASTIC CHEMOTHERAPY: ICD-10-CM

## 2025-06-04 DIAGNOSIS — M31.19 OTHER THROMBOTIC MICROANGIOPATHY: ICD-10-CM

## 2025-06-04 LAB
BASOPHILS # BLD AUTO: 0.02 K/UL — SIGNIFICANT CHANGE UP (ref 0–0.2)
BASOPHILS NFR BLD AUTO: 0.4 % — SIGNIFICANT CHANGE UP (ref 0–2)
EOSINOPHIL # BLD AUTO: 0.05 K/UL — SIGNIFICANT CHANGE UP (ref 0–0.5)
EOSINOPHIL NFR BLD AUTO: 0.9 % — SIGNIFICANT CHANGE UP (ref 0–6)
HCT VFR BLD CALC: 41.5 % — SIGNIFICANT CHANGE UP (ref 39–50)
HGB BLD-MCNC: 13.2 G/DL — SIGNIFICANT CHANGE UP (ref 13–17)
IMM GRANULOCYTES NFR BLD AUTO: 0.5 % — SIGNIFICANT CHANGE UP (ref 0–0.9)
LDH SERPL L TO P-CCNC: 153 U/L — SIGNIFICANT CHANGE UP (ref 50–242)
LYMPHOCYTES # BLD AUTO: 1.27 K/UL — SIGNIFICANT CHANGE UP (ref 1–3.3)
LYMPHOCYTES # BLD AUTO: 22.6 % — SIGNIFICANT CHANGE UP (ref 13–44)
MCHC RBC-ENTMCNC: 24.4 PG — LOW (ref 27–34)
MCHC RBC-ENTMCNC: 31.8 G/DL — LOW (ref 32–36)
MCV RBC AUTO: 76.7 FL — LOW (ref 80–100)
MONOCYTES # BLD AUTO: 0.4 K/UL — SIGNIFICANT CHANGE UP (ref 0–0.9)
MONOCYTES NFR BLD AUTO: 7.1 % — SIGNIFICANT CHANGE UP (ref 2–14)
NEUTROPHILS # BLD AUTO: 3.86 K/UL — SIGNIFICANT CHANGE UP (ref 1.8–7.4)
NEUTROPHILS NFR BLD AUTO: 68.5 % — SIGNIFICANT CHANGE UP (ref 43–77)
NRBC BLD AUTO-RTO: 0 /100 WBCS — SIGNIFICANT CHANGE UP (ref 0–0)
PLATELET # BLD AUTO: 352 K/UL — SIGNIFICANT CHANGE UP (ref 150–400)
RBC # BLD: 5.41 M/UL — SIGNIFICANT CHANGE UP (ref 4.2–5.8)
RBC # FLD: 14.5 % — SIGNIFICANT CHANGE UP (ref 10.3–14.5)
WBC # BLD: 5.63 K/UL — SIGNIFICANT CHANGE UP (ref 3.8–10.5)
WBC # FLD AUTO: 5.63 K/UL — SIGNIFICANT CHANGE UP (ref 3.8–10.5)

## 2025-06-04 PROCEDURE — 99214 OFFICE O/P EST MOD 30 MIN: CPT

## 2025-06-06 LAB — ADAMTS13 ACTIVITY: 5 % — LOW (ref 40–130)

## 2025-06-09 LAB — VWF CP INHIB PPP QL: NEGATIVE — SIGNIFICANT CHANGE UP

## 2025-07-10 ENCOUNTER — RESULT REVIEW (OUTPATIENT)
Age: 45
End: 2025-07-10

## 2025-07-10 ENCOUNTER — APPOINTMENT (OUTPATIENT)
Dept: HEMATOLOGY ONCOLOGY | Facility: CLINIC | Age: 45
End: 2025-07-10

## 2025-07-10 LAB
BASOPHILS # BLD AUTO: 0.01 K/UL — SIGNIFICANT CHANGE UP (ref 0–0.2)
BASOPHILS NFR BLD AUTO: 0.2 % — SIGNIFICANT CHANGE UP (ref 0–2)
EOSINOPHIL # BLD AUTO: 0.24 K/UL — SIGNIFICANT CHANGE UP (ref 0–0.5)
EOSINOPHIL NFR BLD AUTO: 4.5 % — SIGNIFICANT CHANGE UP (ref 0–6)
HCT VFR BLD CALC: 39.3 % — SIGNIFICANT CHANGE UP (ref 39–50)
HGB BLD-MCNC: 12.1 G/DL — LOW (ref 13–17)
IMM GRANULOCYTES # BLD AUTO: 0.01 K/UL — SIGNIFICANT CHANGE UP (ref 0–0.07)
IMM GRANULOCYTES NFR BLD AUTO: 0.2 % — SIGNIFICANT CHANGE UP (ref 0–0.9)
LYMPHOCYTES # BLD AUTO: 1.59 K/UL — SIGNIFICANT CHANGE UP (ref 1–3.3)
LYMPHOCYTES NFR BLD AUTO: 29.7 % — SIGNIFICANT CHANGE UP (ref 13–44)
MCHC RBC-ENTMCNC: 24.1 PG — LOW (ref 27–34)
MCHC RBC-ENTMCNC: 30.8 G/DL — LOW (ref 32–36)
MCV RBC AUTO: 78.1 FL — LOW (ref 80–100)
MONOCYTES # BLD AUTO: 0.35 K/UL — SIGNIFICANT CHANGE UP (ref 0–0.9)
MONOCYTES NFR BLD AUTO: 6.5 % — SIGNIFICANT CHANGE UP (ref 2–14)
NEUTROPHILS # BLD AUTO: 3.16 K/UL — SIGNIFICANT CHANGE UP (ref 1.8–7.4)
NEUTROPHILS NFR BLD AUTO: 58.9 % — SIGNIFICANT CHANGE UP (ref 43–77)
NRBC # BLD AUTO: 0 K/UL — SIGNIFICANT CHANGE UP (ref 0–0)
NRBC # FLD: 0 K/UL — SIGNIFICANT CHANGE UP (ref 0–0)
NRBC BLD AUTO-RTO: 0 /100 WBCS — SIGNIFICANT CHANGE UP (ref 0–0)
PLATELET # BLD AUTO: 317 K/UL — SIGNIFICANT CHANGE UP (ref 150–400)
PMV BLD: 9.5 FL — SIGNIFICANT CHANGE UP (ref 7–13)
RBC # BLD: 5.03 M/UL — SIGNIFICANT CHANGE UP (ref 4.2–5.8)
RBC # FLD: 14.7 % — HIGH (ref 10.3–14.5)
WBC # BLD: 5.36 K/UL — SIGNIFICANT CHANGE UP (ref 3.8–10.5)
WBC # FLD AUTO: 5.36 K/UL — SIGNIFICANT CHANGE UP (ref 3.8–10.5)

## 2025-07-11 LAB
ALBUMIN SERPL ELPH-MCNC: 4.1 G/DL
ALP BLD-CCNC: 82 U/L
ALT SERPL-CCNC: 13 U/L
ANION GAP SERPL CALC-SCNC: 11 MMOL/L
AST SERPL-CCNC: 19 U/L
BILIRUB SERPL-MCNC: 0.3 MG/DL
BUN SERPL-MCNC: 18 MG/DL
CALCIUM SERPL-MCNC: 9.5 MG/DL
CHLORIDE SERPL-SCNC: 107 MMOL/L
CO2 SERPL-SCNC: 23 MMOL/L
CREAT SERPL-MCNC: 1.24 MG/DL
EGFRCR SERPLBLD CKD-EPI 2021: 72 ML/MIN/1.73M2
ERYTHROCYTE [SEDIMENTATION RATE] IN BLOOD BY WESTERGREN METHOD: 46 MM/HR
FERRITIN SERPL-MCNC: 12 NG/ML
GLUCOSE SERPL-MCNC: 93 MG/DL
IRON SATN MFR SERPL: 11 %
IRON SERPL-MCNC: 40 UG/DL
MAGNESIUM SERPL-MCNC: 2.2 MG/DL
POTASSIUM SERPL-SCNC: 4.5 MMOL/L
PROT SERPL-MCNC: 6.4 G/DL
SODIUM SERPL-SCNC: 141 MMOL/L
TIBC SERPL-MCNC: 346 UG/DL
UIBC SERPL-MCNC: 306 UG/DL

## 2025-08-06 ENCOUNTER — RESULT REVIEW (OUTPATIENT)
Age: 45
End: 2025-08-06

## 2025-08-06 ENCOUNTER — APPOINTMENT (OUTPATIENT)
Dept: INFUSION THERAPY | Facility: CLINIC | Age: 45
End: 2025-08-06

## 2025-08-06 VITALS
HEART RATE: 67 BPM | BODY MASS INDEX: 37.45 KG/M2 | WEIGHT: 261 LBS | DIASTOLIC BLOOD PRESSURE: 81 MMHG | OXYGEN SATURATION: 99 % | TEMPERATURE: 98.4 F | SYSTOLIC BLOOD PRESSURE: 137 MMHG